# Patient Record
Sex: MALE | Race: WHITE
[De-identification: names, ages, dates, MRNs, and addresses within clinical notes are randomized per-mention and may not be internally consistent; named-entity substitution may affect disease eponyms.]

---

## 2020-12-23 ENCOUNTER — HOSPITAL ENCOUNTER (INPATIENT)
Dept: HOSPITAL 61 - 1 WEST ICU | Age: 65
LOS: 33 days | Discharge: NURSING, REHAB OR LONG-TERM CARE | DRG: 4 | End: 2021-01-25
Attending: INTERNAL MEDICINE | Admitting: INTERNAL MEDICINE
Payer: MEDICARE

## 2020-12-23 ENCOUNTER — HOSPITAL ENCOUNTER (EMERGENCY)
Dept: HOSPITAL 63 - ER | Age: 65
Discharge: ANOTHER HOSPITAL | End: 2020-12-23
Payer: MEDICARE

## 2020-12-23 VITALS — SYSTOLIC BLOOD PRESSURE: 111 MMHG | DIASTOLIC BLOOD PRESSURE: 69 MMHG

## 2020-12-23 VITALS — DIASTOLIC BLOOD PRESSURE: 67 MMHG | SYSTOLIC BLOOD PRESSURE: 111 MMHG

## 2020-12-23 VITALS — SYSTOLIC BLOOD PRESSURE: 96 MMHG | DIASTOLIC BLOOD PRESSURE: 62 MMHG

## 2020-12-23 VITALS — DIASTOLIC BLOOD PRESSURE: 83 MMHG | SYSTOLIC BLOOD PRESSURE: 128 MMHG

## 2020-12-23 VITALS — SYSTOLIC BLOOD PRESSURE: 71 MMHG | DIASTOLIC BLOOD PRESSURE: 49 MMHG

## 2020-12-23 VITALS — SYSTOLIC BLOOD PRESSURE: 105 MMHG | DIASTOLIC BLOOD PRESSURE: 64 MMHG

## 2020-12-23 VITALS — SYSTOLIC BLOOD PRESSURE: 83 MMHG | DIASTOLIC BLOOD PRESSURE: 53 MMHG

## 2020-12-23 VITALS — SYSTOLIC BLOOD PRESSURE: 92 MMHG | DIASTOLIC BLOOD PRESSURE: 60 MMHG

## 2020-12-23 VITALS — SYSTOLIC BLOOD PRESSURE: 100 MMHG | DIASTOLIC BLOOD PRESSURE: 64 MMHG

## 2020-12-23 VITALS — SYSTOLIC BLOOD PRESSURE: 74 MMHG | DIASTOLIC BLOOD PRESSURE: 55 MMHG

## 2020-12-23 VITALS — SYSTOLIC BLOOD PRESSURE: 101 MMHG | DIASTOLIC BLOOD PRESSURE: 62 MMHG

## 2020-12-23 VITALS — SYSTOLIC BLOOD PRESSURE: 124 MMHG | DIASTOLIC BLOOD PRESSURE: 73 MMHG

## 2020-12-23 VITALS — HEIGHT: 70 IN | WEIGHT: 227.08 LBS | BODY MASS INDEX: 32.51 KG/M2

## 2020-12-23 VITALS — SYSTOLIC BLOOD PRESSURE: 140 MMHG | DIASTOLIC BLOOD PRESSURE: 101 MMHG

## 2020-12-23 VITALS — DIASTOLIC BLOOD PRESSURE: 66 MMHG | SYSTOLIC BLOOD PRESSURE: 128 MMHG

## 2020-12-23 VITALS — HEIGHT: 70 IN | WEIGHT: 285.72 LBS | BODY MASS INDEX: 40.9 KG/M2

## 2020-12-23 VITALS — SYSTOLIC BLOOD PRESSURE: 81 MMHG | DIASTOLIC BLOOD PRESSURE: 54 MMHG

## 2020-12-23 VITALS — DIASTOLIC BLOOD PRESSURE: 54 MMHG | SYSTOLIC BLOOD PRESSURE: 81 MMHG

## 2020-12-23 VITALS — SYSTOLIC BLOOD PRESSURE: 139 MMHG | DIASTOLIC BLOOD PRESSURE: 82 MMHG

## 2020-12-23 DIAGNOSIS — E78.5: ICD-10-CM

## 2020-12-23 DIAGNOSIS — J80: ICD-10-CM

## 2020-12-23 DIAGNOSIS — N39.0: ICD-10-CM

## 2020-12-23 DIAGNOSIS — D63.8: ICD-10-CM

## 2020-12-23 DIAGNOSIS — E87.6: ICD-10-CM

## 2020-12-23 DIAGNOSIS — U07.1: Primary | ICD-10-CM

## 2020-12-23 DIAGNOSIS — I10: ICD-10-CM

## 2020-12-23 DIAGNOSIS — E43: ICD-10-CM

## 2020-12-23 DIAGNOSIS — E78.00: ICD-10-CM

## 2020-12-23 DIAGNOSIS — J12.82: ICD-10-CM

## 2020-12-23 DIAGNOSIS — K50.90: ICD-10-CM

## 2020-12-23 DIAGNOSIS — A41.89: Primary | ICD-10-CM

## 2020-12-23 DIAGNOSIS — Z85.810: ICD-10-CM

## 2020-12-23 DIAGNOSIS — L40.50: ICD-10-CM

## 2020-12-23 DIAGNOSIS — C61: ICD-10-CM

## 2020-12-23 DIAGNOSIS — Z85.46: ICD-10-CM

## 2020-12-23 DIAGNOSIS — M47.9: ICD-10-CM

## 2020-12-23 DIAGNOSIS — U07.1: ICD-10-CM

## 2020-12-23 DIAGNOSIS — I71.2: ICD-10-CM

## 2020-12-23 DIAGNOSIS — G89.29: ICD-10-CM

## 2020-12-23 DIAGNOSIS — F32.9: ICD-10-CM

## 2020-12-23 DIAGNOSIS — J96.90: ICD-10-CM

## 2020-12-23 DIAGNOSIS — Z80.9: ICD-10-CM

## 2020-12-23 DIAGNOSIS — E66.9: ICD-10-CM

## 2020-12-23 DIAGNOSIS — I25.10: ICD-10-CM

## 2020-12-23 DIAGNOSIS — L40.9: ICD-10-CM

## 2020-12-23 DIAGNOSIS — K21.9: ICD-10-CM

## 2020-12-23 DIAGNOSIS — N17.0: ICD-10-CM

## 2020-12-23 DIAGNOSIS — R13.10: ICD-10-CM

## 2020-12-23 DIAGNOSIS — N28.89: ICD-10-CM

## 2020-12-23 DIAGNOSIS — Z79.899: ICD-10-CM

## 2020-12-23 DIAGNOSIS — K59.00: ICD-10-CM

## 2020-12-23 DIAGNOSIS — J90: ICD-10-CM

## 2020-12-23 DIAGNOSIS — D84.821: ICD-10-CM

## 2020-12-23 DIAGNOSIS — Z82.49: ICD-10-CM

## 2020-12-23 DIAGNOSIS — I11.9: ICD-10-CM

## 2020-12-23 DIAGNOSIS — M19.90: ICD-10-CM

## 2020-12-23 LAB
ALBUMIN SERPL-MCNC: 3 G/DL (ref 3.4–5)
ALBUMIN/GLOB SERPL: 0.7 {RATIO} (ref 1–1.7)
ALP SERPL-CCNC: 49 U/L (ref 46–116)
ALT SERPL-CCNC: 35 U/L (ref 16–63)
ANION GAP SERPL CALC-SCNC: 9 MMOL/L (ref 6–14)
AST SERPL-CCNC: 80 U/L (ref 15–37)
BASE EXCESS ABG: 8 MMOL/L (ref -3–3)
BASOPHILS # BLD AUTO: 0 X10^3/UL (ref 0–0.2)
BASOPHILS NFR BLD: 0 % (ref 0–3)
BGAS PCO2: 42 MMHG (ref 35–46)
BGAS PH: 7.53 (ref 7.35–7.46)
BGAS PO2: 64 MMHG (ref 80–100)
BILIRUB SERPL-MCNC: 0.6 MG/DL (ref 0.2–1)
BUN/CREAT SERPL: 23 (ref 6–20)
CA-I SERPL ISE-MCNC: 34 MG/DL (ref 8–26)
CALCIUM SERPL-MCNC: 7.9 MG/DL (ref 8.5–10.1)
CHLORIDE SERPL-SCNC: 93 MMOL/L (ref 98–107)
CO2 SERPL-SCNC: 36 MMOL/L (ref 21–32)
CREAT SERPL-MCNC: 1.5 MG/DL (ref 0.7–1.3)
CRP SERPL-MCNC: 204.9 MG/L (ref 0–3.3)
DELTA BASE BGAS: 12 MMOL/L (ref 0–3)
EOSINOPHIL NFR BLD: 0 % (ref 0–3)
EOSINOPHIL NFR BLD: 0 X10^3/UL (ref 0–0.7)
ERYTHROCYTE [DISTWIDTH] IN BLOOD BY AUTOMATED COUNT: 14.5 % (ref 11.5–14.5)
GFR SERPLBLD BASED ON 1.73 SQ M-ARVRAT: 46.8 ML/MIN
GLOBULIN SER-MCNC: 4.2 G/DL (ref 2.2–3.8)
GLUCOSE SERPL-MCNC: 119 MG/DL (ref 70–99)
HCO3 BLDA-SCNC: 33 MMOL/L (ref 21–28)
HCT VFR BLD CALC: 35.6 % (ref 39–53)
HGB BLD-MCNC: 11.8 G/DL (ref 13–17.5)
INSPIRATION SETTING TIME VENT: 80
LYMPHOCYTES # BLD: 0.6 X10^3/UL (ref 1–4.8)
LYMPHOCYTES NFR BLD AUTO: 13 % (ref 24–48)
MCH RBC QN AUTO: 33 PG (ref 25–35)
MCHC RBC AUTO-ENTMCNC: 33 G/DL (ref 31–37)
MCV RBC AUTO: 99 FL (ref 79–100)
MONO #: 0 X10^3/UL (ref 0–1.1)
MONOCYTES NFR BLD: 1 % (ref 0–9)
NEUT #: 3.8 X10^3UL (ref 1.8–7.7)
NEUTROPHILS NFR BLD AUTO: 86 % (ref 31–73)
O2 SAT BGAS: 94 % (ref 92–99)
O2/TOTAL GAS SETTING VFR VENT: 80 %
PCO2 BLDA: 47 MMHG (ref 35–46)
PLATELET # BLD AUTO: 224 X10^3/UL (ref 140–400)
PO2 BLDA: 59 MMHG (ref 65–108)
POTASSIUM SERPL-SCNC: 2.8 MMOL/L (ref 3.5–5.1)
PROT SERPL-MCNC: 7.2 G/DL (ref 6.4–8.2)
RBC # BLD AUTO: 3.61 X10^6/UL (ref 4.3–5.7)
SAO2 % BLDA: 90 % (ref 92–99)
SODIUM SERPL-SCNC: 138 MMOL/L (ref 136–145)
WBC # BLD AUTO: 4.5 X10^3/UL (ref 4–11)

## 2020-12-23 PROCEDURE — 82550 ASSAY OF CK (CPK): CPT

## 2020-12-23 PROCEDURE — 74018 RADEX ABDOMEN 1 VIEW: CPT

## 2020-12-23 PROCEDURE — G0378 HOSPITAL OBSERVATION PER HR: HCPCS

## 2020-12-23 PROCEDURE — C9803 HOPD COVID-19 SPEC COLLECT: HCPCS

## 2020-12-23 PROCEDURE — 85379 FIBRIN DEGRADATION QUANT: CPT

## 2020-12-23 PROCEDURE — 84145 PROCALCITONIN (PCT): CPT

## 2020-12-23 PROCEDURE — P9016 RBC LEUKOCYTES REDUCED: HCPCS

## 2020-12-23 PROCEDURE — 84478 ASSAY OF TRIGLYCERIDES: CPT

## 2020-12-23 PROCEDURE — 87040 BLOOD CULTURE FOR BACTERIA: CPT

## 2020-12-23 PROCEDURE — 94760 N-INVAS EAR/PLS OXIMETRY 1: CPT

## 2020-12-23 PROCEDURE — 94002 VENT MGMT INPAT INIT DAY: CPT

## 2020-12-23 PROCEDURE — 36600 WITHDRAWAL OF ARTERIAL BLOOD: CPT

## 2020-12-23 PROCEDURE — 87426 SARSCOV CORONAVIRUS AG IA: CPT

## 2020-12-23 PROCEDURE — 96375 TX/PRO/DX INJ NEW DRUG ADDON: CPT

## 2020-12-23 PROCEDURE — 94660 CPAP INITIATION&MGMT: CPT

## 2020-12-23 PROCEDURE — 96365 THER/PROPH/DIAG IV INF INIT: CPT

## 2020-12-23 PROCEDURE — 71045 X-RAY EXAM CHEST 1 VIEW: CPT

## 2020-12-23 PROCEDURE — 83880 ASSAY OF NATRIURETIC PEPTIDE: CPT

## 2020-12-23 PROCEDURE — 83605 ASSAY OF LACTIC ACID: CPT

## 2020-12-23 PROCEDURE — 80053 COMPREHEN METABOLIC PANEL: CPT

## 2020-12-23 PROCEDURE — 36569 INSJ PICC 5 YR+ W/O IMAGING: CPT

## 2020-12-23 PROCEDURE — 87641 MR-STAPH DNA AMP PROBE: CPT

## 2020-12-23 PROCEDURE — 43246 EGD PLACE GASTROSTOMY TUBE: CPT

## 2020-12-23 PROCEDURE — 80048 BASIC METABOLIC PNL TOTAL CA: CPT

## 2020-12-23 PROCEDURE — 86900 BLOOD TYPING SEROLOGIC ABO: CPT

## 2020-12-23 PROCEDURE — 81001 URINALYSIS AUTO W/SCOPE: CPT

## 2020-12-23 PROCEDURE — 86901 BLOOD TYPING SEROLOGIC RH(D): CPT

## 2020-12-23 PROCEDURE — 5A09457 ASSISTANCE WITH RESPIRATORY VENTILATION, 24-96 CONSECUTIVE HOURS, CONTINUOUS POSITIVE AIRWAY PRESSURE: ICD-10-PCS | Performed by: INTERNAL MEDICINE

## 2020-12-23 PROCEDURE — 86920 COMPATIBILITY TEST SPIN: CPT

## 2020-12-23 PROCEDURE — 87106 FUNGI IDENTIFICATION YEAST: CPT

## 2020-12-23 PROCEDURE — 86850 RBC ANTIBODY SCREEN: CPT

## 2020-12-23 PROCEDURE — 71275 CT ANGIOGRAPHY CHEST: CPT

## 2020-12-23 PROCEDURE — 99291 CRITICAL CARE FIRST HOUR: CPT

## 2020-12-23 PROCEDURE — U0003 INFECTIOUS AGENT DETECTION BY NUCLEIC ACID (DNA OR RNA); SEVERE ACUTE RESPIRATORY SYNDROME CORONAVIRUS 2 (SARS-COV-2) (CORONAVIRUS DISEASE [COVID-19]), AMPLIFIED PROBE TECHNIQUE, MAKING USE OF HIGH THROUGHPUT TECHNOLOGIES AS DESCRIBED BY CMS-2020-01-R: HCPCS

## 2020-12-23 PROCEDURE — 82805 BLOOD GASES W/O2 SATURATION: CPT

## 2020-12-23 PROCEDURE — 94003 VENT MGMT INPAT SUBQ DAY: CPT

## 2020-12-23 PROCEDURE — 85610 PROTHROMBIN TIME: CPT

## 2020-12-23 PROCEDURE — 0BH17EZ INSERTION OF ENDOTRACHEAL AIRWAY INTO TRACHEA, VIA NATURAL OR ARTIFICIAL OPENING: ICD-10-PCS | Performed by: INTERNAL MEDICINE

## 2020-12-23 PROCEDURE — 86140 C-REACTIVE PROTEIN: CPT

## 2020-12-23 PROCEDURE — 85025 COMPLETE CBC W/AUTO DIFF WBC: CPT

## 2020-12-23 PROCEDURE — 85007 BL SMEAR W/DIFF WBC COUNT: CPT

## 2020-12-23 PROCEDURE — 02HV33Z INSERTION OF INFUSION DEVICE INTO SUPERIOR VENA CAVA, PERCUTANEOUS APPROACH: ICD-10-PCS | Performed by: INTERNAL MEDICINE

## 2020-12-23 PROCEDURE — 36415 COLL VENOUS BLD VENIPUNCTURE: CPT

## 2020-12-23 PROCEDURE — 83735 ASSAY OF MAGNESIUM: CPT

## 2020-12-23 PROCEDURE — 82565 ASSAY OF CREATININE: CPT

## 2020-12-23 PROCEDURE — 80076 HEPATIC FUNCTION PANEL: CPT

## 2020-12-23 PROCEDURE — 85027 COMPLETE CBC AUTOMATED: CPT

## 2020-12-23 PROCEDURE — 84484 ASSAY OF TROPONIN QUANT: CPT

## 2020-12-23 PROCEDURE — 82803 BLOOD GASES ANY COMBINATION: CPT

## 2020-12-23 PROCEDURE — 87086 URINE CULTURE/COLONY COUNT: CPT

## 2020-12-23 PROCEDURE — 82962 GLUCOSE BLOOD TEST: CPT

## 2020-12-23 RX ADMIN — ATORVASTATIN CALCIUM SCH MG: 40 TABLET, FILM COATED ORAL at 20:52

## 2020-12-23 RX ADMIN — GLYCERIN, ISOLEUCINE, LEUCINE, LYSINE, METHIONINE, PHENYLALANINE, THREONINE, TRYPTOPHAN, VALINE, ALANINE, GLYCINE, ARGININE, HISTIDINE, PROLINE, SERINE, CYSTEINE, SODIUM ACETATE, MAGNESIUM ACETATE, CALCIUM ACETATE, SODIUM CHLORIDE, POTASSIUM CHLORIDE, PHOSPHORIC ACID, AND POTASSIUM METABISULFITE SCH MLS/HR
3; .21; .27; .22; .16; .17; .12; .046; .2; .21; .42; .29; .085; .34; .18; .014; .2; .054; .026; .12; .15; .041 INJECTION INTRAVENOUS at 22:24

## 2020-12-23 RX ADMIN — BACITRACIN SCH MLS/HR: 5000 INJECTION, POWDER, FOR SOLUTION INTRAMUSCULAR at 15:10

## 2020-12-23 RX ADMIN — GLYCERIN, ISOLEUCINE, LEUCINE, LYSINE, METHIONINE, PHENYLALANINE, THREONINE, TRYPTOPHAN, VALINE, ALANINE, GLYCINE, ARGININE, HISTIDINE, PROLINE, SERINE, CYSTEINE, SODIUM ACETATE, MAGNESIUM ACETATE, CALCIUM ACETATE, SODIUM CHLORIDE, POTASSIUM CHLORIDE, PHOSPHORIC ACID, AND POTASSIUM METABISULFITE SCH MLS/HR
3; .21; .27; .22; .16; .17; .12; .046; .2; .21; .42; .29; .085; .34; .18; .014; .2; .054; .026; .12; .15; .041 INJECTION INTRAVENOUS at 09:57

## 2020-12-23 RX ADMIN — ZINC SULFATE CAP 220 MG (50 MG ELEMENTAL ZN) SCH MG: 220 (50 ZN) CAP at 09:00

## 2020-12-23 RX ADMIN — ENOXAPARIN SODIUM SCH MG: 40 INJECTION SUBCUTANEOUS at 20:52

## 2020-12-23 RX ADMIN — ENOXAPARIN SODIUM SCH MG: 40 INJECTION SUBCUTANEOUS at 09:58

## 2020-12-23 RX ADMIN — BACITRACIN SCH MLS/HR: 5000 INJECTION, POWDER, FOR SOLUTION INTRAMUSCULAR at 09:57

## 2020-12-23 RX ADMIN — MORPHINE SULFATE SCH MG: 15 TABLET, EXTENDED RELEASE ORAL at 20:52

## 2020-12-23 NOTE — RAD
Single view chest dated 12/23/2020.



No comparison available.



CLINICAL INDICATION: Shortness of breath.



FINDINGS: 

Single upright portable exam performed. Heart and mediastinal contours are within normal limits. Patc
hy perihilar and bibasilar airspace disease. No pleural effusion or pneumothorax.



IMPRESSION:



Bilateral airspace disease, suspicious for pneumonia. Covid 19 pneumonitis not excluded.



Electronically signed by: Giovanni Juarez MD (12/23/2020 3:52 AM) [facility]

## 2020-12-23 NOTE — PDOC1
History and Physical


Date of Admission


Date of Admission


DATE: 12/23/20 


TIME: 08:28





Identification/Chief Complaint


Chief Complaint


Shortness of breath





Source


Source:  Patient





History of Present Illness


History of Present Illness


Mr Nance is 65-year-old male w/ past medical history of Crohn's, psoriatic 

arthritis, GERD, HLD, HTN, prostate ca, tongue cancer who presents to the 

emergency department at [facility] concerned about shortness of breath that had 

been progressive. This started 12/18/2020 and has been getting worse since that 

time. Of note patient also has loss of taste, loss of smell, cough, shortness of

breath, fever. He recently went to [place] on a business trip and just 

returned 5 days prior to presentation.


Upon arrival to the emergency room patient had significant hypoxia with a pulse 

ox in the 40s.  He was placed initially on a nonrebreather and then placed on 

BiPAP.


He was also somewhat concerned about a Crohn's flareup.  Patient states he has 

been having abdominal pain with nausea and diarrhea.  These are not typical 

symptoms of his Crohn's.  He feels very tired and has body aches as well.


He was given steroids and Rocephin in ED. 


Chest radiograph concerning for bilateral interstitial infiltrates.


Labs significant for WBC 4.5, Hb 11.8, platelets 229, , K2.8, BUN 34, CR 

1.5, glucose 119, .9, albumin 3, , , lactic acid 1.6, D-

dimer 3.15, troponin 0.068.


ABG on 80% FiO2 7.53/42/64


CT negative for pulmonary embolism. Widespread airspace disease throughout both 

lungs, consistent with pneumonia. Mild mediastinal and bilateral hilar 

lymphadenopathy, likely reactive. Cardiomegaly with mild aneurysmal dilation of 

the ascending thoracic aorta. Indeterminate hyperdense nodule at the midpole 

right kidney. This could represent a solid mass or complex cyst.





Patient transferred to [facility] for pulmonary consultation and 

ICU admission.





Past Medical History


Cardiovascular:  HTN, Hyperlipidemia


GI:  GERD, Inflam bowel disease (Crohns)


Rheumatologic:  Other (Crohns, Psoriatic arthritis)





Past Surgical History


Past Surgical History


Prostate cancer, tongue cancer


Past Surgical History:  Other (Tongue resection)





Family History


Family History:  Cancer (Mother - ), Coronary Artery Disease (Mother)





Social History


Smoke:  No


ALCOHOL:  none


Drugs:  None





Current Medications


Current Medications





Current Medications


Sodium Chloride (Normal Saline Flush) 3 ml QSHIFT  PRN IV AFTER MEDS AND BLOOD 

DRAWS;  Start 12/23/20 at 07:45


Sodium Chloride 1,000 ml @  150 mls/hr Q6H40M IV ;  Start 12/23/20 at 08:15;  

Stop 12/23/20 at 21:34


Ondansetron HCl (Zofran) 4 mg PRN Q4HRS  PRN IV NAUSEA/VOMITING;  Start 12/23/20

at 08:15


Acetaminophen (Tylenol) 650 mg PRN Q4HRS  PRN PO TEMP OVER 100.4F OR MILD PAIN; 

Start 12/23/20 at 08:15


Docusate Sodium (Colace) 100 mg PRN BID  PRN PO HARD STOOLS;  Start 12/23/20 at 

08:15





Allergies


Allergies:  


Coded Allergies:  


     No Known Allergies (Verified  Allergy, Unknown, 12/23/20)





ROS


General:  YES: Chills, Fatigue, Malaise, Appetite; 


   No: Night Sweats, Other


PSYCHOLOGICAL ROS:  No: Anxiety, Behavioral Disorder, Concentration difficultie,

Decreased libido, Depression, Disorientation, Hallucinations, Hostility, Irritab

lity, Memory difficulties, Mood Swings, Obsessive thoughts, Physical abuse, 

Sexual abuse, Sleep disturbances, Suicidal ideation, Other


Eyes:  No Blurry vision, No Decreased vision, No Double vision, No Dry eyes, No 

Excessive tearing, No Eye Pain, No Itchy Eyes, No Loss of vision, No 

Photophobia, No Scotomata, No Uses contacts, No Uses glasses, No Other


HEENT:  No: Heacaches, Visual Changes, Hearing change, Nasal congestion, Nasal 

discharge, Oral lesions, Sinus pain, Sore Throat, Epistaxis, Sneezing, Snoring, 

Tinnitus, Vertigo, Vocal changes, Other


ALLERGY AND IMMUNOLOGY:  No: Hives, Insect Bite Sensitivity, Itchy/Watery Eyes, 

Nasal Congestion, Post Nasal Drip, Seasonal Allergies, Other


Hematological and Lymphatic:  No: Bleeding Problems, Blood Clots, Blood 

Transfusions, Brusing, Night Sweats, Pallor, Swollen Lymph Nodes, Other


ENDOCRINE:  No: Breast Changes, Galactorrhea, Hair Pattern Changes, Hot Flashes,

Malaise/lethargy, Mood Swings, Palpitations, Polydipsia/polyuria, Skin Changes, 

Temperature Intolerance, Unexpected Weight Changes, Other


Breast:  No New/Changing Breast Lumps, No Nipple changes, No Nipple discharge, 

No Other


Respiratory:  YES: Cough, Shortness of breath, SOB with excertion; 


   No: Hemoptysis, Orthopnea, Pleuritic Pain, Sputum Changes, Stridor, 

Tachypnea, Wheezing, Other


Cardiovascular:  No Chest Pain, No Palpitations, No Orthopnea, No Paroxysmal 

Noc. Dyspnea, No Edema, No Lt Headedness, No Other


Gastrointestinal:  Yes Nausea, Yes Diarrhea; 


   No Vomiting, No Abdominal Pain, No Constipation, No Melena, No Hematochezia, 

No Other


Genitourinary:  No Dysuria, No Frequency, No Incontinence, No Hematuria, No 

Retention, No Discharge, No Urgency, No Pain, No Flank Pain, No Other, No , No ,

No , No , No , No , No 


Musculoskeletal:  No Gait Disturbance, No Joint Pain, No Joint Stiffness, No 

Joint Swelling, No Muscle Pain, No Muscular Weakness, No Pain In:, No Swelling 

In:, No Other


Neurological:  No Behavorial Changes, No Bowel/Bladder ControlChng, No 

Confusion, No Dizziness, No Gait Disturbance, No Headaches, No Impaired 

Coord/balance, No Memory Loss, No Numbness/Tingling, No Seizures, No Speech 

Problems, No Tremors, No Visual Changes, No Weakness, No Other


Skin:  No Dry Skin, No Eczema, No Hair Changes, No Lumps, No Mole Changes, No 

Mottling, No Nail Changes, No Pruritus, No Rash, No Skin Lesion Changes, No 

Other, No Acne





Physical Exam


General:  Alert, Oriented X3, Cooperative, moderate distress


HEENT:  Atraumatic, PERRLA, EOMI, Mucous membr. moist/pink


Lungs:  Other (Bilateral rhonchi)


Heart:  S1S2, RRR, no thrills, no rubs, no gallops, no murmurs


Abdomen:  Normal bowel sounds, Soft, No tenderness, No hepatosplenomegaly, No 

masses


Male Genitals Exam:  normal genitalia


Rectal Exam:  not examined


Extremities:  No clubbing, No cyanosis, No edema, Normal pulses, No 

tenderness/swelling


Skin:  No rashes, No breakdown, No significant lesion


Neuro:  Normal gait, Normal speech, Strength at 5/5 X4 ext, Normal tone, 

Sensation intact, Cranial nerves 3-12 NL, Reflexes 2+


Psych/Mental Status:  Mental status NL, Mood NL





Vitals


Vitals





Vital Signs








  Date Time  Temp Pulse Resp B/P (MAP) Pulse Ox O2 Delivery O2 Flow Rate FiO2


 


12/23/20 07:00 98.8 72 19 105/64 (78) 91 BiPAP/CPAP  





 98.8       











Images


Images


Chest radiograph:


Single upright portable exam performed. Heart and mediastinal contours are 

within normal limits. Patchy perihilar and bibasilar airspace disease. No 

pleural effusion or pneumothorax.





IMPRESSION:


Bilateral airspace disease, suspicious for pneumonia. Covid 19 pneumonitis not 

excluded.





CTPA:


Contrast bolus is adequate. No evidence of central, lobar or segmental pulmonary

embolus. Subsegmental branches are not well evaluated based on technique.


Heart size is mildly enlarged. Mild aneurysmal dilation of the ascending 

thoracic aorta measuring 4.4 cm transverse. No pericardial effusion. Scattered 

coronary calcifications. Borderline enlarged bilateral hilar, subcarinal and 

right paratracheal lymph nodes measure up to 10 mm short axis. No hilar or 

supraclavicular lymphadenopathy. Thyroid gland unremarkable.


Central airways are patent. There is widespread airspace disease throughout both

lungs with peripheral predominance and groundglass appearance. Small right 

pleural effusion.


Limited images of the upper abdomen show no significant abnormality. There is a 

hyperdense nodule at the midpole right kidney posteriorly that measures about 

1.7 cm, indeterminate.


Bone windows show no acute findings. Multilevel spondylosis.





IMPRESSION:


1. No evidence of central, lobar or segmental pulmonary embolus.


2. Widespread airspace disease throughout both lungs, consistent with pneumonia.

Covid 19 pneumonitis not excluded.


3. Mild mediastinal and bilateral hilar lymphadenopathy, likely reactive.


4. Cardiomegaly with mild aneurysmal dilation of the ascending thoracic aorta.


5. Indeterminate hyperdense nodule at the midpole right kidney. This could 

represent a solid mass or complex cyst. Follow-up ultrasound may provide 

additional information.





VTE Prophylaxis Ordered


VTE Prophylaxis Devices:  No


VTE Pharmacological Prophylaxi:  Yes





Assessment/Plan


Assessment/Plan


A/P:


Acute hypoxic respiratory failure - no pulmonary history. With recent travel to 

and from [place] likely COVID 19 vs other atypical pneumonia. Cont empiric 

antibiotics, steroids. Consult Pulm. BIPAP in negative pressure room 16/8 on 80%

FiO2


RYDER - likely vasomotor nephropathy - no known renal history, will hydrate


Pneumonia - likely atypical, gram negative vs viral. will cover 


Prostate Ca - s/p resection at [facility] in [place]


Hypokalemia - likely nutritional or loss from diarrhea, will replace


Elevated troponin - likely from type II demand ischemia, will trend troponins, 

maintain telemetry


Crohn's - sees rheumatology regularly, Dr Fan in [place], on sulfasalazine


Psoriatic arthritis - on pain medications 30mg MS Contin BID, tramadol and 25mg 

Methotrexate weekly as DMARD per rheumatology, Dr. Fan. Hold MTX while 

inpatient


GERD - PPI


HLD - statin


HTN - Lisinopril and HCTZ on hold for RYDER


Anemia - likely of chronci disease, will monitor


Cardiomegaly - notable on CT chest - no known cardiac history, though his mother

did have CHF and CAD


Right renal mass - will need US for further assessment





FEN - NPO, can try regular diet when off BIPAP and more awake


PPX - lovenox


FULL CODE


Dispo - ICU for respiratory failure


CC time 47 minutes


Have d/w wife, Hoa and son, Desmond, [phone]





Justifications for Admission


Other Justification














MISSAEL MANCIA MD        Dec 23, 2020 09:33

## 2020-12-23 NOTE — RAD
CTA chest with contrast dated 12/23/2020.



No comparison available.



Clinical data indication: Shortness of breath. Hypoxia.



TECHNIQUE:



Contiguous axial imaging the chest performed following the intravenous administration of 75 cc Omnipa
que 350. Study was performed as dedicated PE protocol with thin cut coronal MIPS 3-D reconstruction. 


One or more of the following individualized dose reduction techniques were utilized for this examinat
ion:  

1. Automated exposure control  

2. Adjustment of the mA and/or kV according to patient size  

3. Use of iterative reconstruction technique.



FINDINGS:



Contrast bolus is adequate. No evidence of central, lobar or segmental pulmonary embolus. Subsegmenta
l branches are not well evaluated based on technique.



Heart size is mildly enlarged. Mild aneurysmal dilation of the ascending thoracic aorta measuring 4.4
 cm transverse. No pericardial effusion. Scattered coronary calcifications. Borderline enlarged bilat
eral hilar, subcarinal and right paratracheal lymph nodes measure up to 10 mm short axis. No hilar or
 supraclavicular lymphadenopathy. Thyroid gland unremarkable.



Central airways are patent. There is widespread airspace disease throughout both lungs with periphera
l predominance and groundglass appearance. Small right pleural effusion.



Limited images of the upper abdomen show no significant abnormality. There is a hyperdense nodule at 
the midpole right kidney posteriorly that measures about 1.7 cm, indeterminate.



Bone windows show no acute findings. Multilevel spondylosis.



IMPRESSION:

1. No evidence of central, lobar or segmental pulmonary embolus.

2. Widespread airspace disease throughout both lungs, consistent with pneumonia. Covid 19 pneumonitis
 not excluded.

3. Mild mediastinal and bilateral hilar lymphadenopathy, likely reactive.

4. Cardiomegaly with mild aneurysmal dilation of the ascending thoracic aorta.

5. Indeterminate hyperdense nodule at the midpole right kidney. This could represent a solid mass or 
complex cyst. Follow-up ultrasound may provide additional information.



Electronically signed by: Giovanni Juarez MD (12/23/2020 4:58 AM) [facility] Center[facility]IA

## 2020-12-23 NOTE — PHYS DOC
Past History


Past Medical History:  Arthritis, Cancer, Constipation, GERD, High Cholesterol, 

Hypertension


Additional Past Medical Histor:  CHRONES


Past Surgical History:  Cancer Surgery


Alcohol Use:  None





Adult General


Chief Complaint


Chief Complaint:  MULTIPLE COMPLAINTS





[facility]


HPI


Patient is 65-year-old male past medical history of Crohn's presents to the 

emergency room stating that he is concerned he may have a Crohn's flareup.  

Patient states he has been having abdominal pain with nausea and diarrhea.  This

started last Friday and has been getting worse since that time.  Of note patient

also has loss of taste, loss of smell, cough, shortness of breath, fever.  These

are not typical symptoms of his Crohn's.  He feels very tired and has body 

aches.  He recently went to [place] on a business trip and just returned 5 

days ago.





Review of Systems


Review of Systems


Complete ROS is negative unless otherwise documented in HPI





Current Medications


Current Medications





Current Medications








 Medications


  (Trade)  Dose


 Ordered  Sig/Kayli  Start Time


 Stop Time Status Last Admin


Dose Admin


 


 Ceftriaxone


 Sodium 1 gm/


 Sodium Chloride  50 ml @ 


 100 mls/hr  1X  ONCE  12/23/20 04:00


 12/23/20 04:29 DC 12/23/20 03:34


100 MLS/HR


 


 Ceftriaxone Sodium


  (Rocephin)  1 gm  STK-MED ONCE  12/23/20 03:27


 12/23/20 03:27 DC  





 


 Dexamethasone


 Sodium Phosphate


  (Decadron)  10 mg  1X  ONCE  12/23/20 03:30


 12/23/20 03:31 DC 12/23/20 03:34


10 MG


 


 Info


  (Do NOT chart on


 this entry -- for


 MONITORING)  1 each  PRN DAILY  PRN  12/23/20 04:15


 12/25/20 04:14   





 


 Iohexol


  (Omnipaque 350


 Mg/ml)  100 ml  1X  ONCE  12/23/20 04:30


 12/23/20 04:31 DC 12/23/20 04:35


100 ML


 


 Sodium Chloride  50 ml @ As


 Directed  STK-MED ONCE  12/23/20 03:27


 12/23/20 03:27 DC  














Allergies


Allergies





Allergies








Coded Allergies Type Severity Reaction Last Updated Verified


 


  No Known Drug Allergies    12/23/20 No











Physical Exam


Physical Exam


General: Awake, alert, NAD. Well Nourished, well hydrated. Cooperative


HEENT: Atraumatic, EOMI, PERRL, airway patent, moist oral mucosa


Neck: Supple, trachea midline


Respiratory: Decreased breath sounds bilaterally, diffuse crackles, mild 

increased work of breathing


CV: RRR, no murmur, cap refill <2


GI: Soft, nondistended, nontender, no masses


MSK: No obvious deformities


Skin: Warm, dry, intact


Neuro: A&O x3, speech NL, sensory and motor grossly intact, no focal deficits


Psych: Normal affect, normal mood, not suicidal or homicidal





Current Patient Data


Vital Signs





                                   Vital Signs








  Date Time  Temp Pulse Resp B/P (MAP) Pulse Ox O2 Delivery O2 Flow Rate FiO2


 


12/23/20 03:24  90 26 119/73 (88) 89 NonRebreather Mask 15.0 


 


12/23/20 02:45 98.8       








Lab Results





                                Laboratory Tests








Test


 12/23/20


03:07 12/23/20


04:15


 


White Blood Count


 4.5 x10^3/uL


(4.0-11.0) 





 


Red Blood Count


 3.61 x10^6/uL


(4.30-5.70)  L 





 


Hemoglobin


 11.8 g/dL


(13.0-17.5)  L 





 


Hematocrit


 35.6 %


(39.0-53.0)  L 





 


Mean Corpuscular Volume


 99 fL ()


 





 


Mean Corpuscular Hemoglobin 33 pg (25-35)   


 


Mean Corpuscular Hemoglobin


Concent 33 g/dL


(31-37) 





 


Red Cell Distribution Width


 14.5 %


(11.5-14.5) 





 


Platelet Count


 224 x10^3/uL


(140-400) 





 


Neutrophils (%) (Auto) 86 % (31-73)  H 


 


Lymphocytes (%) (Auto) 13 % (24-48)  L 


 


Monocytes (%) (Auto) 1 % (0-9)   


 


Eosinophils (%) (Auto) 0 % (0-3)   


 


Basophils (%) (Auto) 0 % (0-3)   


 


Neutrophils # (Auto)


 3.8 x10^3uL


(1.8-7.7) 





 


Lymphocytes # (Auto)


 0.6 x10^3/uL


(1.0-4.8)  L 





 


Monocytes # (Auto)


 0.0 x10^3/uL


(0.0-1.1) 





 


Eosinophils # (Auto)


 0.0 x10^3/uL


(0.0-0.7) 





 


Basophils # (Auto)


 0.0 x10^3/uL


(0.0-0.2) 





 


D-Dimer (Josefa)


 3.15 mg/L


(0.00-0.50)  H 





 


Sodium Level


 138 mmol/L


(136-145) 





 


Potassium Level


 2.8 mmol/L


(3.5-5.1)  *L 





 


Chloride Level


 93 mmol/L


()  L 





 


Carbon Dioxide Level


 36 mmol/L


(21-32)  H 





 


Anion Gap 9 (6-14)   


 


Blood Urea Nitrogen


 34 mg/dL


(8-26)  H 





 


Creatinine


 1.5 mg/dL


(0.7-1.3)  H 





 


Estimated GFR


(Cockcroft-Gault) 46.8  


 





 


BUN/Creatinine Ratio 23 (6-20)  H 


 


Glucose Level


 119 mg/dL


(70-99)  H 





 


Lactic Acid Level


 1.6 mmol/L


(0.4-2.0) 





 


Calcium Level


 7.9 mg/dL


(8.5-10.1)  L 





 


Total Bilirubin


 0.6 mg/dL


(0.2-1.0) 





 


Aspartate Amino Transferase


(AST) 80 U/L (15-37)


H 





 


Alanine Aminotransferase (ALT)


 35 U/L (16-63)


 





 


Alkaline Phosphatase


 49 U/L


() 





 


Creatine Kinase


 917 U/L


()  H 





 


Troponin I Quantitative


 0.068 ng/mL


(0-0.055)  H 





 


C-Reactive Protein


 204.9 mg/L


(0-3.3)  H 





 


NT-Pro-B-Type Natriuretic


Peptide 211 pg/mL


(0-124)  H 





 


Total Protein


 7.2 g/dL


(6.4-8.2) 





 


Albumin


 3.0 g/dL


(3.4-5.0)  L 





 


Albumin/Globulin Ratio


 0.7 (1.0-1.7)


L 





 


Blood pH


 


 7.53


(7.35-7.46)  H


 


Blood Gas PCO2


 


 42 mmHg


(35-46)


 


Blood Gas PO2


 


 64 mmHg


()  L


 


Blood Gas HCO3


 


 35 mmol/L


(21-28)  H


 


Arterial Bld O2 Saturation


(Calc) 


 94 % (92-99)  





 


FiO2  80 %  











EKG


EKG


[]





Radiology/Procedures


Radiology/Procedures


[]





Heart Score


Risk Factors:


Risk Factors:  DM, Current or recent (<one month) smoker, HTN, HLP, family 

history of CAD, obesity.


Risk Scores:


Risk Factors:  DM, Current or recent (<one month) smoker, HTN, HLP, family 

history of CAD, obesity.





Course & Med Decision Making


Course & Med Decision Making


Pertinent Labs and Imaging studies reviewed. (See chart for details)





Patient is a 65-year-old male who presents to the emergency room complaining of 

abdominal symptoms.  Of note patient has loss of taste, loss of smell, shortness

 of breath, severe hypoxia.  Patient likely has coronavirus 19.  Swab was sent 

for testing.  Patient will be treated as presumed coronavirus 19.  Upon arrival 

to the emergency room patient had significant hypoxia with a pulse ox in the 40s

.  He was placed initially on a nonrebreather and then placed on BiPAP.  He was 

given steroids and Rocephin.  Work-up was ordered to risk stratify the patient 

for his likely Covid infection.  Lab work is as above.  He did have an elevated 

D-dimer and given the increased rates of pulmonary embolisms with Covid a CT 

angio was done.  CT at this time is negative for pulmonary embolism.  Patient 

will need to be transferred to [facility] for pulmonology given 

severe respiratory failure.





Dragon Disclaimer


Dragon Disclaimer


This electronic medical record was generated, in whole or in part, using a voice

 recognition dictation system.





Critical Care Note


Comments


Critical Care:


Authorized and Performed by: Brent Moseley MD


Total critical care time: approximately 45 minutes


Due to a high probability of clinically significant, life threatening 

deterioration, the patient required my highest level of preparedness to 

intervene emergently and I personally spent this critical care time directly and

 personally managing the patient. This critical care time included obtaining a 

history; examining the patient; pulse oximetry; ventilator management if 

necessary; ordering and review of studies; arranging urgent treatment with 

development of a management plan; evaluation of patient's response to treatment;

 frequent reassessment; discussion with patient/family; and, discussions with 

other providers.


This critical care time was performed to assess and manage the high probability 

of imminent, life-threatening deterioration that could result in multi-organ 

failure. It was exclusive of separately billable procedures and treating other 

patients and teaching time.


Please see MDM section and the rest of the note for further information on 

patient assessment and treatment.





Departure


Departure:


Impression:  


   Primary Impression:  


   Suspected 2019 novel coronavirus infection


   Additional Impression:  


   Respiratory failure


Disposition:  02 DC/TRF OTHER SHORT TERM HOS


Condition:  IMPROVED


Referrals:  


PCP,UNKNOWN (PCP)





Problem Qualifiers











BRENT MOSELEY MD              Dec 23, 2020 05:16

## 2020-12-23 NOTE — CONS
DATE OF CONSULTATION:  12/23/2020



PULMONARY CONSULTATION



ATTENDING PHYSICIAN:  Brendan Samayoa MD



REASON FOR CONSULTATION:  Respiratory failure, highly suspected COVID pneumonia

and ARDS.



HISTORY OF PRESENT ILLNESS:  The patient is a 65-year-old male who has past

medical history of hypertension, GERD, history of psoriatic arthritis, history

of Crohn's disease, history of prostate cancer and tongue cancer.  He was

brought into the Emergency Department at [facility] because of shortness of

breath.  The symptoms began on 12/18/2020.  The patient also had loss of taste

and loss of smell.  He had a cough.  He was here recently.  He traveled to

[place] on a business trip and just returned 5 days prior to presentation. 

The patient was hypoxic in the Emergency Department with saturation in the 40s. 

He was initially placed on a nonrebreather mask and now currently on BiPAP.  He

appears to be comfortable on the BiPAP, resting.  His arterial blood gases

reveal a pH of 7.46, pCO2 of 47, a pO2 of 59 on 80% FiO2.



The patient's CT chest was reviewed from [facility].  There are extensive

ground glass and alveolar/interstitial infiltrates throughout both lungs.  There

is some reactive mediastinal/hilar adenopathy.  I have been asked to see him for

further evaluation.



PAST MEDICAL HISTORY:  Significant for:

1.  History of hypertension.

2.  Hyperlipidemia.

3.  GERD.

4.  Inflammatory bowel disease.

5.  History of psoriatic arthritis, on methotrexate.

6.  History of tongue cancer and prostate cancer, details are unknown.



PAST SURGICAL HISTORY:  He has history of surgery for prostate cancer and tongue

cancer,  details unknown.  Apparently, he had some tongue resection.



FAMILY HISTORY:  Mother with cancer and coronary artery disease.



SOCIAL HISTORY:  Reportedly, nonsmoker.



ALLERGIES:  None.



MEDICATIONS:  Reviewed as listed in the MRAD including antibiotics, azithromycin

and Rocephin.  He is on Lovenox for DVT prophylaxis and dexamethasone.



PHYSICAL EXAMINATION:

GENERAL:  He is comfortable while on BiPAP.

HEENT:  Visual exam done due to COVID suspicion.

LUNGS:  He is in no obvious respiratory distress.

ABDOMEN:  Slightly obese.

EXTREMITIES:  With no rash and no leg edema.



LABORATORY DATA:  Reviewed.  ABGs are discussed in my history of present

illness.



Chemistries and CBC is pending.  From [place], his platelets were 229,

hemoglobin 11.8, white cell count 4.5.  CPK was 917.  Lactic acid 1.6.  D-dimer

was 3.15.



IMPRESSION:

1.  Acute hypoxic respiratory failure secondary to highly suspected COVID-19

pneumonia and acute respiratory distress syndrome/acute lung injury.

2.  Abnormal CT chest with extensive widespread ground glass and alveolar and

interstitial infiltrates with reactive mild mediastinal/hilar adenopathy.  This

is likely related to COVID-19 pneumonia.

3.  Patient with history of psoriatic arthritis.  He is likely immunocompromise

as he has been on methotrexate.  Needs to cover for opportunistic infection.

4.  History of tongue cancer with resection, details unknown.

5.  History of prostate cancer.

6.  History of Crohn's disease.



RECOMMENDATIONS:

1.  The patient is critically ill.  He will remain on oxygen at high flow via

BiPAP.  We will closely watch for his respiratory status.

2.  Continue with empiric antibiotic.

3.  Continue with IV steroids.

4.  He is already severely hypoxic and would not benefit from remdesivir.

5.  DVT prophylaxis with Lovenox.

6.  Monitor the D-dimers.

7.  Discussed with RN and RT.  We will follow along with you.  Chart reviewed.



Critical care time, 37 minutes.

 



______________________________

ABI MENDEZ MD



DR:  PAULY/brionna  JOB#:  [id number] / [id number]

DD:  12/23/2020 10:31  DT:  12/23/2020 12:29

## 2020-12-24 VITALS — DIASTOLIC BLOOD PRESSURE: 66 MMHG | SYSTOLIC BLOOD PRESSURE: 118 MMHG

## 2020-12-24 VITALS — DIASTOLIC BLOOD PRESSURE: 65 MMHG | SYSTOLIC BLOOD PRESSURE: 110 MMHG

## 2020-12-24 VITALS — DIASTOLIC BLOOD PRESSURE: 81 MMHG | SYSTOLIC BLOOD PRESSURE: 140 MMHG

## 2020-12-24 VITALS — SYSTOLIC BLOOD PRESSURE: 119 MMHG | DIASTOLIC BLOOD PRESSURE: 65 MMHG

## 2020-12-24 VITALS — DIASTOLIC BLOOD PRESSURE: 69 MMHG | SYSTOLIC BLOOD PRESSURE: 113 MMHG

## 2020-12-24 VITALS — DIASTOLIC BLOOD PRESSURE: 56 MMHG | SYSTOLIC BLOOD PRESSURE: 116 MMHG

## 2020-12-24 VITALS — DIASTOLIC BLOOD PRESSURE: 61 MMHG | SYSTOLIC BLOOD PRESSURE: 87 MMHG

## 2020-12-24 VITALS — DIASTOLIC BLOOD PRESSURE: 77 MMHG | SYSTOLIC BLOOD PRESSURE: 123 MMHG

## 2020-12-24 VITALS — SYSTOLIC BLOOD PRESSURE: 127 MMHG | DIASTOLIC BLOOD PRESSURE: 75 MMHG

## 2020-12-24 VITALS — DIASTOLIC BLOOD PRESSURE: 67 MMHG | SYSTOLIC BLOOD PRESSURE: 112 MMHG

## 2020-12-24 VITALS — DIASTOLIC BLOOD PRESSURE: 75 MMHG | SYSTOLIC BLOOD PRESSURE: 150 MMHG

## 2020-12-24 VITALS — DIASTOLIC BLOOD PRESSURE: 90 MMHG | SYSTOLIC BLOOD PRESSURE: 162 MMHG

## 2020-12-24 VITALS — SYSTOLIC BLOOD PRESSURE: 141 MMHG | DIASTOLIC BLOOD PRESSURE: 84 MMHG

## 2020-12-24 VITALS — SYSTOLIC BLOOD PRESSURE: 119 MMHG | DIASTOLIC BLOOD PRESSURE: 70 MMHG

## 2020-12-24 VITALS — SYSTOLIC BLOOD PRESSURE: 110 MMHG | DIASTOLIC BLOOD PRESSURE: 61 MMHG

## 2020-12-24 VITALS — DIASTOLIC BLOOD PRESSURE: 70 MMHG | SYSTOLIC BLOOD PRESSURE: 130 MMHG

## 2020-12-24 VITALS — DIASTOLIC BLOOD PRESSURE: 80 MMHG | SYSTOLIC BLOOD PRESSURE: 148 MMHG

## 2020-12-24 VITALS — SYSTOLIC BLOOD PRESSURE: 131 MMHG | DIASTOLIC BLOOD PRESSURE: 66 MMHG

## 2020-12-24 VITALS — SYSTOLIC BLOOD PRESSURE: 119 MMHG | DIASTOLIC BLOOD PRESSURE: 72 MMHG

## 2020-12-24 VITALS — DIASTOLIC BLOOD PRESSURE: 71 MMHG | SYSTOLIC BLOOD PRESSURE: 125 MMHG

## 2020-12-24 VITALS — DIASTOLIC BLOOD PRESSURE: 68 MMHG | SYSTOLIC BLOOD PRESSURE: 118 MMHG

## 2020-12-24 VITALS — DIASTOLIC BLOOD PRESSURE: 67 MMHG | SYSTOLIC BLOOD PRESSURE: 117 MMHG

## 2020-12-24 VITALS — DIASTOLIC BLOOD PRESSURE: 80 MMHG | SYSTOLIC BLOOD PRESSURE: 106 MMHG

## 2020-12-24 VITALS — SYSTOLIC BLOOD PRESSURE: 165 MMHG | DIASTOLIC BLOOD PRESSURE: 87 MMHG

## 2020-12-24 LAB
% BANDS: 15 % (ref 0–9)
% LYMPHS: 8 % (ref 24–48)
% MONOS: 1 % (ref 0–10)
% SEGS: 76 % (ref 35–66)
ALBUMIN SERPL-MCNC: 2.5 G/DL (ref 3.4–5)
ALBUMIN/GLOB SERPL: 0.6 {RATIO} (ref 1–1.7)
ALP SERPL-CCNC: 48 U/L (ref 46–116)
ALT SERPL-CCNC: 39 U/L (ref 16–63)
ANION GAP SERPL CALC-SCNC: 5 MMOL/L (ref 6–14)
AST SERPL-CCNC: 74 U/L (ref 15–37)
BASOPHILS # BLD AUTO: 0 X10^3/UL (ref 0–0.2)
BASOPHILS NFR BLD: 0 % (ref 0–3)
BILIRUB SERPL-MCNC: 0.5 MG/DL (ref 0.2–1)
BUN SERPL-MCNC: 34 MG/DL (ref 8–26)
BUN/CREAT SERPL: 34 (ref 6–20)
CALCIUM SERPL-MCNC: 8.1 MG/DL (ref 8.5–10.1)
CHLORIDE SERPL-SCNC: 101 MMOL/L (ref 98–107)
CO2 SERPL-SCNC: 35 MMOL/L (ref 21–32)
CREAT SERPL-MCNC: 1 MG/DL (ref 0.7–1.3)
EOSINOPHIL NFR BLD: 0 % (ref 0–3)
EOSINOPHIL NFR BLD: 0 X10^3/UL (ref 0–0.7)
ERYTHROCYTE [DISTWIDTH] IN BLOOD BY AUTOMATED COUNT: 14.6 % (ref 11.5–14.5)
GFR SERPLBLD BASED ON 1.73 SQ M-ARVRAT: 74.8 ML/MIN
GLUCOSE SERPL-MCNC: 96 MG/DL (ref 70–99)
HCT VFR BLD CALC: 32.4 % (ref 39–53)
HGB BLD-MCNC: 11 G/DL (ref 13–17.5)
LYMPHOCYTES # BLD: 0.6 X10^3/UL (ref 1–4.8)
LYMPHOCYTES NFR BLD AUTO: 9 % (ref 24–48)
MCH RBC QN AUTO: 33 PG (ref 25–35)
MCHC RBC AUTO-ENTMCNC: 34 G/DL (ref 31–37)
MCV RBC AUTO: 98 FL (ref 79–100)
MONO #: 0 X10^3/UL (ref 0–1.1)
MONOCYTES NFR BLD: 1 % (ref 0–9)
NEUT #: 6.5 X10^3/UL (ref 1.8–7.7)
NEUTROPHILS NFR BLD AUTO: 90 % (ref 31–73)
PLATELET # BLD AUTO: 240 X10^3/UL (ref 140–400)
PLATELET # BLD EST: ADEQUATE 10*3/UL
POTASSIUM SERPL-SCNC: 3.1 MMOL/L (ref 3.5–5.1)
PROT SERPL-MCNC: 6.4 G/DL (ref 6.4–8.2)
RBC # BLD AUTO: 3.3 X10^6/UL (ref 4.3–5.7)
SODIUM SERPL-SCNC: 141 MMOL/L (ref 136–145)
WBC # BLD AUTO: 7.2 X10^3/UL (ref 4–11)

## 2020-12-24 RX ADMIN — GLYCERIN, ISOLEUCINE, LEUCINE, LYSINE, METHIONINE, PHENYLALANINE, THREONINE, TRYPTOPHAN, VALINE, ALANINE, GLYCINE, ARGININE, HISTIDINE, PROLINE, SERINE, CYSTEINE, SODIUM ACETATE, MAGNESIUM ACETATE, CALCIUM ACETATE, SODIUM CHLORIDE, POTASSIUM CHLORIDE, PHOSPHORIC ACID, AND POTASSIUM METABISULFITE SCH MLS/HR
3; .21; .27; .22; .16; .17; .12; .046; .2; .21; .42; .29; .085; .34; .18; .014; .2; .054; .026; .12; .15; .041 INJECTION INTRAVENOUS at 10:24

## 2020-12-24 RX ADMIN — ATORVASTATIN CALCIUM SCH MG: 40 TABLET, FILM COATED ORAL at 20:37

## 2020-12-24 RX ADMIN — GLYCERIN, ISOLEUCINE, LEUCINE, LYSINE, METHIONINE, PHENYLALANINE, THREONINE, TRYPTOPHAN, VALINE, ALANINE, GLYCINE, ARGININE, HISTIDINE, PROLINE, SERINE, CYSTEINE, SODIUM ACETATE, MAGNESIUM ACETATE, CALCIUM ACETATE, SODIUM CHLORIDE, POTASSIUM CHLORIDE, PHOSPHORIC ACID, AND POTASSIUM METABISULFITE SCH MLS/HR
3; .21; .27; .22; .16; .17; .12; .046; .2; .21; .42; .29; .085; .34; .18; .014; .2; .054; .026; .12; .15; .041 INJECTION INTRAVENOUS at 22:16

## 2020-12-24 RX ADMIN — MORPHINE SULFATE SCH MG: 15 TABLET, EXTENDED RELEASE ORAL at 20:38

## 2020-12-24 RX ADMIN — CITALOPRAM HYDROBROMIDE SCH MG: 20 TABLET ORAL at 08:30

## 2020-12-24 RX ADMIN — CETIRIZINE HYDROCHLORIDE SCH MG: 10 TABLET, FILM COATED ORAL at 08:31

## 2020-12-24 RX ADMIN — ENOXAPARIN SODIUM SCH MG: 40 INJECTION SUBCUTANEOUS at 20:37

## 2020-12-24 RX ADMIN — CEFTRIAXONE SCH GM: 1 INJECTION, POWDER, FOR SOLUTION INTRAMUSCULAR; INTRAVENOUS at 08:30

## 2020-12-24 RX ADMIN — ZINC SULFATE CAP 220 MG (50 MG ELEMENTAL ZN) SCH MG: 220 (50 ZN) CAP at 08:30

## 2020-12-24 RX ADMIN — CYANOCOBALAMIN TAB 1000 MCG SCH MCG: 1000 TAB at 08:30

## 2020-12-24 RX ADMIN — ZOLPIDEM TARTRATE PRN MG: 5 TABLET ORAL at 01:29

## 2020-12-24 RX ADMIN — DEXAMETHASONE SODIUM PHOSPHATE SCH MG: 4 INJECTION, SOLUTION INTRAMUSCULAR; INTRAVENOUS at 08:30

## 2020-12-24 RX ADMIN — ENOXAPARIN SODIUM SCH MG: 40 INJECTION SUBCUTANEOUS at 08:30

## 2020-12-24 RX ADMIN — AZITHROMYCIN MONOHYDRATE SCH MLS/HR: 500 INJECTION, POWDER, LYOPHILIZED, FOR SOLUTION INTRAVENOUS at 08:30

## 2020-12-24 RX ADMIN — MORPHINE SULFATE SCH MG: 15 TABLET, EXTENDED RELEASE ORAL at 08:31

## 2020-12-24 NOTE — PDOC
PULMONARY PROGRESS NOTES


DATE: 12/24/20 


TIME: 11:34


Subjective


Pt. remains on 100% NRB


afebrile overnight 


no overnight concerns from nursing


Vitals





Vital Signs








  Date Time  Temp Pulse Resp B/P (MAP) Pulse Ox O2 Delivery O2 Flow Rate FiO2


 


12/24/20 11:27     95 NonRebreather Mask  


 


12/24/20 11:00 97.7 90 18 125/71 (89)   15.0 





 97.7       








Comments


Pt. seen during COVID-19 pandemic, visual exam preformed 


RRR


no distress


100% NRB 


no accessory muscle use 


No edema or rash


Labs





Laboratory Tests








Test


 12/23/20


09:35 12/23/20


12:00 12/23/20


18:15 12/24/20


08:40


 


O2 Saturation 90 % (92-99)    


 


Arterial Blood pH


 7.46


(7.35-7.45) 


 


 





 


Arterial Blood pCO2 at


Patient Temp 47 mmHg


(35-46) 


 


 





 


Arterial Blood pO2 at Patient


Temp 59 mmHg


() 


 


 





 


Arterial Blood HCO3


 33 mmol/L


(21-28) 


 


 





 


Arterial Blood Base Excess


 8 mmol/L


(-3-3) 


 


 





 


FiO2 80    


 


Troponin I Quantitative


 


 0.031 ng/mL


(0.000-0.055) 0.019 ng/mL


(0.000-0.055) 





 


White Blood Count


 


 


 


 7.2 x10^3/uL


(4.0-11.0)


 


Red Blood Count


 


 


 


 3.30 x10^6/uL


(4.30-5.70)


 


Hemoglobin


 


 


 


 11.0 g/dL


(13.0-17.5)


 


Hematocrit


 


 


 


 32.4 %


(39.0-53.0)


 


Mean Corpuscular Volume    98 fL () 


 


Mean Corpuscular Hemoglobin    33 pg (25-35) 


 


Mean Corpuscular Hemoglobin


Concent 


 


 


 34 g/dL


(31-37)


 


Red Cell Distribution Width


 


 


 


 14.6 %


(11.5-14.5)


 


Platelet Count


 


 


 


 240 x10^3/uL


(140-400)


 


Neutrophils (%) (Auto)    90 % (31-73) 


 


Lymphocytes (%) (Auto)    9 % (24-48) 


 


Monocytes (%) (Auto)    1 % (0-9) 


 


Eosinophils (%) (Auto)    0 % (0-3) 


 


Basophils (%) (Auto)    0 % (0-3) 


 


Neutrophils # (Auto)


 


 


 


 6.5 x10^3/uL


(1.8-7.7)


 


Lymphocytes # (Auto)


 


 


 


 0.6 x10^3/uL


(1.0-4.8)


 


Monocytes # (Auto)


 


 


 


 0.0 x10^3/uL


(0.0-1.1)


 


Eosinophils # (Auto)


 


 


 


 0.0 x10^3/uL


(0.0-0.7)


 


Basophils # (Auto)


 


 


 


 0.0 x10^3/uL


(0.0-0.2)


 


D-Dimer (Josefa)


 


 


 


 1.64 ug/mlFEU


(0.00-0.50)


 


Sodium Level


 


 


 


 141 mmol/L


(136-145)


 


Potassium Level


 


 


 


 3.1 mmol/L


(3.5-5.1)


 


Chloride Level


 


 


 


 101 mmol/L


()


 


Carbon Dioxide Level


 


 


 


 35 mmol/L


(21-32)


 


Anion Gap    5 (6-14) 


 


Blood Urea Nitrogen


 


 


 


 34 mg/dL


(8-26)


 


Creatinine


 


 


 


 1.0 mg/dL


(0.7-1.3)


 


Estimated GFR


(Cockcroft-Gault) 


 


 


 74.8 





 


BUN/Creatinine Ratio    34 (6-20) 


 


Glucose Level


 


 


 


 96 mg/dL


(70-99)


 


Calcium Level


 


 


 


 8.1 mg/dL


(8.5-10.1)


 


Total Bilirubin


 


 


 


 0.5 mg/dL


(0.2-1.0)


 


Aspartate Amino Transf


(AST/SGOT) 


 


 


 74 U/L (15-37) 





 


Alanine Aminotransferase


(ALT/SGPT) 


 


 


 39 U/L (16-63) 





 


Alkaline Phosphatase


 


 


 


 48 U/L


()


 


Creatine Kinase


 


 


 


 537 U/L


()


 


Total Protein


 


 


 


 6.4 g/dL


(6.4-8.2)


 


Albumin


 


 


 


 2.5 g/dL


(3.4-5.0)


 


Albumin/Globulin Ratio    0.6 (1.0-1.7) 








Laboratory Tests








Test


 12/23/20


12:00 12/23/20


18:15 12/24/20


08:40


 


Troponin I Quantitative


 0.031 ng/mL


(0.000-0.055) 0.019 ng/mL


(0.000-0.055) 





 


White Blood Count


 


 


 7.2 x10^3/uL


(4.0-11.0)


 


Red Blood Count


 


 


 3.30 x10^6/uL


(4.30-5.70)


 


Hemoglobin


 


 


 11.0 g/dL


(13.0-17.5)


 


Hematocrit


 


 


 32.4 %


(39.0-53.0)


 


Mean Corpuscular Volume   98 fL () 


 


Mean Corpuscular Hemoglobin   33 pg (25-35) 


 


Mean Corpuscular Hemoglobin


Concent 


 


 34 g/dL


(31-37)


 


Red Cell Distribution Width


 


 


 14.6 %


(11.5-14.5)


 


Platelet Count


 


 


 240 x10^3/uL


(140-400)


 


Neutrophils (%) (Auto)   90 % (31-73) 


 


Lymphocytes (%) (Auto)   9 % (24-48) 


 


Monocytes (%) (Auto)   1 % (0-9) 


 


Eosinophils (%) (Auto)   0 % (0-3) 


 


Basophils (%) (Auto)   0 % (0-3) 


 


Neutrophils # (Auto)


 


 


 6.5 x10^3/uL


(1.8-7.7)


 


Lymphocytes # (Auto)


 


 


 0.6 x10^3/uL


(1.0-4.8)


 


Monocytes # (Auto)


 


 


 0.0 x10^3/uL


(0.0-1.1)


 


Eosinophils # (Auto)


 


 


 0.0 x10^3/uL


(0.0-0.7)


 


Basophils # (Auto)


 


 


 0.0 x10^3/uL


(0.0-0.2)


 


D-Dimer (Josefa)


 


 


 1.64 ug/mlFEU


(0.00-0.50)


 


Sodium Level


 


 


 141 mmol/L


(136-145)


 


Potassium Level


 


 


 3.1 mmol/L


(3.5-5.1)


 


Chloride Level


 


 


 101 mmol/L


()


 


Carbon Dioxide Level


 


 


 35 mmol/L


(21-32)


 


Anion Gap   5 (6-14) 


 


Blood Urea Nitrogen


 


 


 34 mg/dL


(8-26)


 


Creatinine


 


 


 1.0 mg/dL


(0.7-1.3)


 


Estimated GFR


(Cockcroft-Gault) 


 


 74.8 





 


BUN/Creatinine Ratio   34 (6-20) 


 


Glucose Level


 


 


 96 mg/dL


(70-99)


 


Calcium Level


 


 


 8.1 mg/dL


(8.5-10.1)


 


Total Bilirubin


 


 


 0.5 mg/dL


(0.2-1.0)


 


Aspartate Amino Transf


(AST/SGOT) 


 


 74 U/L (15-37) 





 


Alanine Aminotransferase


(ALT/SGPT) 


 


 39 U/L (16-63) 





 


Alkaline Phosphatase


 


 


 48 U/L


()


 


Creatine Kinase


 


 


 537 U/L


()


 


Total Protein


 


 


 6.4 g/dL


(6.4-8.2)


 


Albumin


 


 


 2.5 g/dL


(3.4-5.0)


 


Albumin/Globulin Ratio   0.6 (1.0-1.7) 








Medications





Active Scripts








 Medications  Dose


 Route/Sig


 Max Daily Dose Days Date Category


 


 Morphine Sulfate


 Er (Morphine


 Sulfate) 30 Mg


 Tablet.er  1 Tab


 PO BID


   12/23/20 Reported


 


 Tramadol Hcl 100


 Mg Tbmp.24hr  100 Mg


 PO PRN Q8HRS PRN


   12/23/20 Reported


 


 Ambien (Zolpidem


 Tartrate) 10 Mg


 Tablet  10 Mg


 PO PRN QHS PRN


   12/23/20 Reported


 


 Methotrexate


  (Methotrexate


 Sodium) 2.5 Mg


 Tablet  10 Tab


 PO WEEKLY


   12/23/20 Reported


 


 Lisinopril 10 Mg


 Tablet  1 Tab


 PO DAILY


   12/23/20 Reported


 


 Crestor


  (Rosuvastatin


 Calcium) 5 Mg


 Tablet  2 Tab


 PO DAILY


   12/23/20 Reported


 


 Hydrochlorothiazide


 Tablet


  (Hydrochlorothiazide)


 50 Mg Tablet  25 Mg


 PO DAILY


   12/23/20 Reported


 


 Escitalopram


 Oxalate 10 Mg


 Tablet  1 Tab


 PO DAILY


   12/23/20 Reported


 


 Sulfasalazine Dr


  (Sulfasalazine)


 500 Mg Tablet.dr  2 Tab


 PO TID


  30 12/23/20 Reported


 


 Colace (Docusate


 Sodium) 100 Mg


 Capsule  1 Cap


 PO DA


  30 12/23/20 Reported


 


 Acetaminophen


 Ext.release


  (Acetaminophen)


 650 Mg Tablet.er  650 Mg


 PO PRN Q8HRS PRN


   12/23/20 Reported


 


 Melatonin 5 Mg


 Capsule  1 Cap


 PO QHS


  30 12/23/20 Reported


 


 B-12


  (Cyanocobalamin


  (Vitamin B-12))


 500 Mcg Tablet  2 Tab


 PO DAILY


  30 12/23/20 Reported


 


 Vitamin D3


  (Cholecalciferol


  (Vitamin D3)) 50


 Mcg Capsule  50 Mcg


 PO DAILY


   12/23/20 Reported


 


 Aller-Sung


  (Cetirizine Hcl)


 10 Mg Tablet  1 Tab


 PO DAILY


  30 12/23/20 Reported











Impression


.


IMPRESSION:


1.  Acute hypoxic respiratory failure secondary to highly suspected COVID-19


pneumonia and acute respiratory distress syndrome/acute lung injury.


2.  Abnormal CT chest with extensive widespread ground glass and alveolar and


interstitial infiltrates with reactive mild mediastinal/hilar adenopathy.  This


is likely related to COVID-19 pneumonia.


3.  Patient with history of psoriatic arthritis.  He is likely immunocompromise


as he has been on methotrexate.  Needs to cover for opportunistic infection.


4.  History of tongue cancer with resection, details unknown.


5.  History of prostate cancer.


6.  History of Crohn's disease.





Plan


.


RECOMMENDATIONS:


Continue supplemental oxygen to keep sats above 92%, now on 100% NRB 


BIPAP PRN 


COVID-19 pending 


Continue with empiric antibiotic.


Continue with IV steroids with slow taper 


would not benefit from remdesivir.


Continue PPN for nutritional support 


DVT/GI PPX 





D/W RN and RT 





Critical care time 30 minutes











ABI MENDEZ MD                 Dec 24, 2020 11:38

## 2020-12-24 NOTE — PDOC
[employer] PROGRESS NOTE


Date of Service


DOS:


DATE: 12/24/20 


TIME: 07:49





Chief Complaint


Chief Complaint


A/P:


Acute hypoxic respiratory failure - no pulmonary history. With recent travel to 

and from [place] likely COVID 19 vs other atypical pneumonia. Cont empiric 

antibiotics, steroids. Consult Pulm. BIPAP in negative pressure room 16/8 on 80%

FiO2


RYDER - likely vasomotor nephropathy - no known renal history, will hydrate


Pneumonia - likely atypical, gram negative vs viral. will cover 


Prostate Ca - s/p resection at [facility] in [place]


Hypokalemia - likely nutritional or loss from diarrhea, will replace


Elevated troponin - likely from type II demand ischemia, will trend troponins, 

maintain telemetry


Crohn's - sees rheumatology regularly, Dr Fan in [place], on sulfasalazine


Psoriatic arthritis - on pain medications 30mg MS Contin BID, tramadol and 25mg 

Methotrexate weekly as DMARD per rheumatology, Dr. Fan. Hold MTX while 

inpatient


GERD - PPI


HLD - statin


HTN - Lisinopril and HCTZ on hold for RYDER


Anemia - likely of chronci disease, will monitor


Cardiomegaly - notable on CT chest - no known cardiac history, though his mother

did have CHF and CAD


Right renal mass - will need US for further assessment





FEN - NPO, can try regular diet when off BIPAP


PPX - lovenox


FULL CODE


Dispo - ICU for respiratory failure


CC time 47 minutes


Have d/w wife, Hoa and son, Desmond, [phone]





History of Present Illness


History of Present Illness


Mr Nance is 65-year-old male w/ past medical history of Crohn's, psoriatic 

arthritis, GERD, HLD, HTN, prostate ca, tongue cancer who presents to the 

emergency department at [facility] concerned about shortness of breath that had 

been progressive. This started 12/18/2020 and has been getting worse since that 

time. Of note patient also has loss of taste, loss of smell, cough, shortness of

breath, fever. He recently went to [place] on a business trip and just 

returned 5 days prior to presentation.


Upon arrival to the emergency room patient had significant hypoxia with a pulse 

ox in the 40s.  He was placed initially on a nonrebreather and then placed on 

BiPAP.


He was also somewhat concerned about a Crohn's flareup.  Patient states he has 

been having abdominal pain with nausea and diarrhea.  These are not typical 

symptoms of his Crohn's.  He feels very tired and has body aches as well.


He was given steroids and Rocephin in ED. 


Chest radiograph concerning for bilateral interstitial infiltrates.


Labs significant for WBC 4.5, Hb 11.8, platelets 229, , K2.8, BUN 34, CR 

1.5, glucose 119, .9, albumin 3, , , lactic acid 1.6, D-

dimer 3.15, troponin 0.068.


ABG on 80% FiO2 7.53/42/64


CT negative for pulmonary embolism. Widespread airspace disease throughout both 

lungs, consistent with pneumonia. Mild mediastinal and bilateral hilar 

lymphadenopathy, likely reactive. Cardiomegaly with mild aneurysmal dilation of 

the ascending thoracic aorta. Indeterminate hyperdense nodule at the midpole 

right kidney. This could represent a solid mass or complex cyst.


Patient transferred to [facility] for pulmonary consultation and 

ICU admission.





Overnight BP improved with fluids. O2 saturations slightly increased. Did not 

tolerate more than 1 hour off BIPAP even with non-rebreather O2 saturations were

92% at best. No pain currently, very slightly appetite.





Vitals/I&O


Vitals/I&O:





                                   Vital Signs








  Date Time  Temp Pulse Resp B/P (MAP) Pulse Ox O2 Delivery O2 Flow Rate FiO2


 


12/24/20 07:00 97.7 67 17 116/56 (76) 100 BiPAP/CPAP  





 97.7       














                                    I & O   


 


 12/23/20 12/23/20 12/24/20





 15:00 23:00 07:00


 


Intake Total 0 ml 120 ml 1274 ml


 


Output Total 50 ml 100 ml 750 ml


 


Balance -50 ml 20 ml 524 ml











Physical Exam


General:  Alert, Oriented X3, Cooperative, moderate distress


Abdomen:  Normal bowel sounds, Soft, No tenderness, No hepatosplenomegaly, No 

masses


Extremities:  No clubbing, No cyanosis, No edema, Normal pulses, No 

tenderness/swelling


Skin:  No rashes, No breakdown, No significant lesion





Labs


Labs:





Laboratory Tests








Test


 12/23/20


09:35 12/23/20


12:00 12/23/20


18:15


 


O2 Saturation 90 % (92-99)   


 


Arterial Blood pH


 7.46


(7.35-7.45) 


 





 


Arterial Blood pCO2 at


Patient Temp 47 mmHg


(35-46) 


 





 


Arterial Blood pO2 at Patient


Temp 59 mmHg


() 


 





 


Arterial Blood HCO3


 33 mmol/L


(21-28) 


 





 


Arterial Blood Base Excess


 8 mmol/L


(-3-3) 


 





 


FiO2 80   


 


Troponin I Quantitative


 


 0.031 ng/mL


(0.000-0.055) 0.019 ng/mL


(0.000-0.055)











Comment


Review of Relevant


I have reviewed the following items jabier (where applicable) has been applied.


Medications:





Current Medications








 Medications


  (Trade)  Dose


 Ordered  Sig/Kayli


 Route


 PRN Reason  Start Time


 Stop Time Status Last Admin


Dose Admin


 


 Sodium Chloride  1,000 ml @ 


 150 mls/hr  [id number]


 IV


   12/23/20 08:15


 12/23/20 21:34 DC 12/23/20 15:10





 


 Amino Acids/


 Glycerin/


 Electrolytes  1,000 ml @ 


 80 mls/hr  [id number]


 IV


   12/23/20 08:45


    12/23/20 22:24





 


 Enoxaparin Sodium


  (Lovenox 40mg


 Syringe)  40 mg  Q12HR


 SQ


   12/23/20 09:00


    12/23/20 20:52





 


 Azithromycin 500


 mg/Sodium Chloride  250 ml @ 


 250 mls/hr  1X  ONCE


 IV


   12/23/20 10:00


 12/23/20 10:59 DC 12/23/20 09:59





 


 Morphine Sulfate


  (Ms Contin)  15 mg  BID


 PO


   12/23/20 21:00


    12/23/20 20:52





 


 Atorvastatin


 Calcium


  (Lipitor)  40 mg  QHS


 PO


   12/23/20 21:00


    12/23/20 20:52





 


 Sulfasalazine


  (Azulfidine)  1,000 mg  BID


 PO


   12/23/20 21:00


    12/23/20 20:52





 


 Zolpidem Tartrate


  (Ambien)  5 mg  PRN QHS  PRN


 PO


 INSOMNIA  12/23/20 15:00


    12/24/20 01:29














Justifications for Admission


Other Justification














MISSAEL MANCIA MD        Dec 24, 2020 07:49

## 2020-12-25 VITALS — SYSTOLIC BLOOD PRESSURE: 138 MMHG | DIASTOLIC BLOOD PRESSURE: 81 MMHG

## 2020-12-25 VITALS — SYSTOLIC BLOOD PRESSURE: 163 MMHG | DIASTOLIC BLOOD PRESSURE: 92 MMHG

## 2020-12-25 VITALS — DIASTOLIC BLOOD PRESSURE: 78 MMHG | SYSTOLIC BLOOD PRESSURE: 125 MMHG

## 2020-12-25 VITALS — SYSTOLIC BLOOD PRESSURE: 141 MMHG | DIASTOLIC BLOOD PRESSURE: 86 MMHG

## 2020-12-25 VITALS — SYSTOLIC BLOOD PRESSURE: 122 MMHG | DIASTOLIC BLOOD PRESSURE: 81 MMHG

## 2020-12-25 VITALS — DIASTOLIC BLOOD PRESSURE: 82 MMHG | SYSTOLIC BLOOD PRESSURE: 122 MMHG

## 2020-12-25 VITALS — SYSTOLIC BLOOD PRESSURE: 140 MMHG | DIASTOLIC BLOOD PRESSURE: 86 MMHG

## 2020-12-25 VITALS — SYSTOLIC BLOOD PRESSURE: 141 MMHG | DIASTOLIC BLOOD PRESSURE: 117 MMHG

## 2020-12-25 VITALS — SYSTOLIC BLOOD PRESSURE: 132 MMHG | DIASTOLIC BLOOD PRESSURE: 81 MMHG

## 2020-12-25 VITALS — DIASTOLIC BLOOD PRESSURE: 95 MMHG | SYSTOLIC BLOOD PRESSURE: 163 MMHG

## 2020-12-25 VITALS — SYSTOLIC BLOOD PRESSURE: 150 MMHG | DIASTOLIC BLOOD PRESSURE: 97 MMHG

## 2020-12-25 VITALS — SYSTOLIC BLOOD PRESSURE: 160 MMHG | DIASTOLIC BLOOD PRESSURE: 90 MMHG

## 2020-12-25 VITALS — DIASTOLIC BLOOD PRESSURE: 86 MMHG | SYSTOLIC BLOOD PRESSURE: 148 MMHG

## 2020-12-25 VITALS — SYSTOLIC BLOOD PRESSURE: 137 MMHG | DIASTOLIC BLOOD PRESSURE: 83 MMHG

## 2020-12-25 VITALS — DIASTOLIC BLOOD PRESSURE: 81 MMHG | SYSTOLIC BLOOD PRESSURE: 132 MMHG

## 2020-12-25 VITALS — SYSTOLIC BLOOD PRESSURE: 122 MMHG | DIASTOLIC BLOOD PRESSURE: 77 MMHG

## 2020-12-25 VITALS — DIASTOLIC BLOOD PRESSURE: 79 MMHG | SYSTOLIC BLOOD PRESSURE: 131 MMHG

## 2020-12-25 VITALS — SYSTOLIC BLOOD PRESSURE: 160 MMHG | DIASTOLIC BLOOD PRESSURE: 91 MMHG

## 2020-12-25 VITALS — DIASTOLIC BLOOD PRESSURE: 89 MMHG | SYSTOLIC BLOOD PRESSURE: 154 MMHG

## 2020-12-25 VITALS — SYSTOLIC BLOOD PRESSURE: 140 MMHG | DIASTOLIC BLOOD PRESSURE: 83 MMHG

## 2020-12-25 VITALS — SYSTOLIC BLOOD PRESSURE: 137 MMHG | DIASTOLIC BLOOD PRESSURE: 78 MMHG

## 2020-12-25 VITALS — SYSTOLIC BLOOD PRESSURE: 154 MMHG | DIASTOLIC BLOOD PRESSURE: 89 MMHG

## 2020-12-25 LAB
ALBUMIN SERPL-MCNC: 2.5 G/DL (ref 3.4–5)
ALBUMIN/GLOB SERPL: 0.6 {RATIO} (ref 1–1.7)
ALP SERPL-CCNC: 53 U/L (ref 46–116)
ALT SERPL-CCNC: 51 U/L (ref 16–63)
ANION GAP SERPL CALC-SCNC: 7 MMOL/L (ref 6–14)
AST SERPL-CCNC: 89 U/L (ref 15–37)
BASOPHILS # BLD AUTO: 0 X10^3/UL (ref 0–0.2)
BASOPHILS NFR BLD: 0 % (ref 0–3)
BILIRUB SERPL-MCNC: 0.5 MG/DL (ref 0.2–1)
BUN SERPL-MCNC: 21 MG/DL (ref 8–26)
BUN/CREAT SERPL: 23 (ref 6–20)
CALCIUM SERPL-MCNC: 8.3 MG/DL (ref 8.5–10.1)
CHLORIDE SERPL-SCNC: 103 MMOL/L (ref 98–107)
CO2 SERPL-SCNC: 32 MMOL/L (ref 21–32)
CREAT SERPL-MCNC: 0.9 MG/DL (ref 0.7–1.3)
EOSINOPHIL NFR BLD: 0 % (ref 0–3)
EOSINOPHIL NFR BLD: 0 X10^3/UL (ref 0–0.7)
ERYTHROCYTE [DISTWIDTH] IN BLOOD BY AUTOMATED COUNT: 14.9 % (ref 11.5–14.5)
GFR SERPLBLD BASED ON 1.73 SQ M-ARVRAT: 84.4 ML/MIN
GLUCOSE SERPL-MCNC: 89 MG/DL (ref 70–99)
HCT VFR BLD CALC: 30.6 % (ref 39–53)
HGB BLD-MCNC: 10.3 G/DL (ref 13–17.5)
LYMPHOCYTES # BLD: 0.6 X10^3/UL (ref 1–4.8)
LYMPHOCYTES NFR BLD AUTO: 14 % (ref 24–48)
MCH RBC QN AUTO: 33 PG (ref 25–35)
MCHC RBC AUTO-ENTMCNC: 34 G/DL (ref 31–37)
MCV RBC AUTO: 97 FL (ref 79–100)
MONO #: 0.1 X10^3/UL (ref 0–1.1)
MONOCYTES NFR BLD: 2 % (ref 0–9)
NEUT #: 3.9 X10^3/UL (ref 1.8–7.7)
NEUTROPHILS NFR BLD AUTO: 85 % (ref 31–73)
PLATELET # BLD AUTO: 255 X10^3/UL (ref 140–400)
POTASSIUM SERPL-SCNC: 3.3 MMOL/L (ref 3.5–5.1)
PROT SERPL-MCNC: 6.4 G/DL (ref 6.4–8.2)
RBC # BLD AUTO: 3.15 X10^6/UL (ref 4.3–5.7)
SODIUM SERPL-SCNC: 142 MMOL/L (ref 136–145)
WBC # BLD AUTO: 4.6 X10^3/UL (ref 4–11)

## 2020-12-25 RX ADMIN — MORPHINE SULFATE SCH MG: 15 TABLET, EXTENDED RELEASE ORAL at 20:35

## 2020-12-25 RX ADMIN — MORPHINE SULFATE SCH MG: 15 TABLET, EXTENDED RELEASE ORAL at 08:17

## 2020-12-25 RX ADMIN — DEXAMETHASONE SODIUM PHOSPHATE SCH MG: 4 INJECTION, SOLUTION INTRAMUSCULAR; INTRAVENOUS at 08:19

## 2020-12-25 RX ADMIN — MORPHINE SULFATE PRN MG: 2 INJECTION, SOLUTION INTRAMUSCULAR; INTRAVENOUS at 17:04

## 2020-12-25 RX ADMIN — ATORVASTATIN CALCIUM SCH MG: 40 TABLET, FILM COATED ORAL at 20:35

## 2020-12-25 RX ADMIN — ENOXAPARIN SODIUM SCH MG: 40 INJECTION SUBCUTANEOUS at 20:35

## 2020-12-25 RX ADMIN — MORPHINE SULFATE PRN MG: 2 INJECTION, SOLUTION INTRAMUSCULAR; INTRAVENOUS at 08:48

## 2020-12-25 RX ADMIN — GUAIFENESIN AND DEXTROMETHORPHAN PRN ML: 100; 10 SYRUP ORAL at 17:03

## 2020-12-25 RX ADMIN — AZITHROMYCIN MONOHYDRATE SCH MLS/HR: 500 INJECTION, POWDER, LYOPHILIZED, FOR SOLUTION INTRAVENOUS at 08:20

## 2020-12-25 RX ADMIN — CEFTRIAXONE SCH GM: 1 INJECTION, POWDER, FOR SOLUTION INTRAMUSCULAR; INTRAVENOUS at 08:19

## 2020-12-25 RX ADMIN — GUAIFENESIN AND DEXTROMETHORPHAN PRN ML: 100; 10 SYRUP ORAL at 08:16

## 2020-12-25 RX ADMIN — ZINC SULFATE CAP 220 MG (50 MG ELEMENTAL ZN) SCH MG: 220 (50 ZN) CAP at 08:18

## 2020-12-25 RX ADMIN — GLYCERIN, ISOLEUCINE, LEUCINE, LYSINE, METHIONINE, PHENYLALANINE, THREONINE, TRYPTOPHAN, VALINE, ALANINE, GLYCINE, ARGININE, HISTIDINE, PROLINE, SERINE, CYSTEINE, SODIUM ACETATE, MAGNESIUM ACETATE, CALCIUM ACETATE, SODIUM CHLORIDE, POTASSIUM CHLORIDE, PHOSPHORIC ACID, AND POTASSIUM METABISULFITE SCH MLS/HR
3; .21; .27; .22; .16; .17; .12; .046; .2; .21; .42; .29; .085; .34; .18; .014; .2; .054; .026; .12; .15; .041 INJECTION INTRAVENOUS at 12:16

## 2020-12-25 RX ADMIN — MORPHINE SULFATE PRN MG: 2 INJECTION, SOLUTION INTRAMUSCULAR; INTRAVENOUS at 04:42

## 2020-12-25 RX ADMIN — CITALOPRAM HYDROBROMIDE SCH MG: 20 TABLET ORAL at 08:19

## 2020-12-25 RX ADMIN — CETIRIZINE HYDROCHLORIDE SCH MG: 10 TABLET, FILM COATED ORAL at 08:18

## 2020-12-25 RX ADMIN — CYANOCOBALAMIN TAB 1000 MCG SCH MCG: 1000 TAB at 08:17

## 2020-12-25 RX ADMIN — ENOXAPARIN SODIUM SCH MG: 40 INJECTION SUBCUTANEOUS at 08:16

## 2020-12-25 NOTE — PDOC
PULMONARY PROGRESS NOTES


DATE: 12/25/20 


TIME: 09:52


Subjective


Pt. is on BIPAP 65%, using intermittent HIGH flow N/C 


afebrile overnight 


no overnight concerns from nursing


Vitals





Vital Signs








  Date Time  Temp Pulse Resp B/P (MAP) Pulse Ox O2 Delivery O2 Flow Rate FiO2


 


12/25/20 09:18   24  94 BiPAP/CPAP  


 


12/25/20 08:17       15.0 


 


12/25/20 06:03 99.0 79  154/89 (110)    





 99.0       








Comments


Pt. seen during COVID-19 pandemic, visual exam preformed 


RRR


no distress


BIPAP


no accessory muscle use 


No edema or rash


General:  Alert


Labs





Laboratory Tests








Test


 12/23/20


10:15 12/23/20


12:00 12/23/20


18:15 12/24/20


08:40


 


Coronavirus (PCR)


 Detected (Not


Detected) 


 


 





 


Troponin I Quantitative


 


 0.031 ng/mL


(0.000-0.055) 0.019 ng/mL


(0.000-0.055) 





 


White Blood Count


 


 


 


 7.2 x10^3/uL


(4.0-11.0)


 


Red Blood Count


 


 


 


 3.30 x10^6/uL


(4.30-5.70)


 


Hemoglobin


 


 


 


 11.0 g/dL


(13.0-17.5)


 


Hematocrit


 


 


 


 32.4 %


(39.0-53.0)


 


Mean Corpuscular Volume    98 fL () 


 


Mean Corpuscular Hemoglobin    33 pg (25-35) 


 


Mean Corpuscular Hemoglobin


Concent 


 


 


 34 g/dL


(31-37)


 


Red Cell Distribution Width


 


 


 


 14.6 %


(11.5-14.5)


 


Platelet Count


 


 


 


 240 x10^3/uL


(140-400)


 


Neutrophils (%) (Auto)    90 % (31-73) 


 


Lymphocytes (%) (Auto)    9 % (24-48) 


 


Monocytes (%) (Auto)    1 % (0-9) 


 


Eosinophils (%) (Auto)    0 % (0-3) 


 


Basophils (%) (Auto)    0 % (0-3) 


 


Neutrophils # (Auto)


 


 


 


 6.5 x10^3/uL


(1.8-7.7)


 


Lymphocytes # (Auto)


 


 


 


 0.6 x10^3/uL


(1.0-4.8)


 


Monocytes # (Auto)


 


 


 


 0.0 x10^3/uL


(0.0-1.1)


 


Eosinophils # (Auto)


 


 


 


 0.0 x10^3/uL


(0.0-0.7)


 


Basophils # (Auto)


 


 


 


 0.0 x10^3/uL


(0.0-0.2)


 


Segmented Neutrophils %    76 % (35-66) 


 


Band Neutrophils %    15 % (0-9) 


 


Lymphocytes %    8 % (24-48) 


 


Monocytes %    1 % (0-10) 


 


Platelet Estimate


 


 


 


 Adequate


(ADEQUATE)


 


D-Dimer (Josefa)


 


 


 


 1.64 ug/mlFEU


(0.00-0.50)


 


Sodium Level


 


 


 


 141 mmol/L


(136-145)


 


Potassium Level


 


 


 


 3.1 mmol/L


(3.5-5.1)


 


Chloride Level


 


 


 


 101 mmol/L


()


 


Carbon Dioxide Level


 


 


 


 35 mmol/L


(21-32)


 


Anion Gap    5 (6-14) 


 


Blood Urea Nitrogen


 


 


 


 34 mg/dL


(8-26)


 


Creatinine


 


 


 


 1.0 mg/dL


(0.7-1.3)


 


Estimated GFR


(Cockcroft-Gault) 


 


 


 74.8 





 


BUN/Creatinine Ratio    34 (6-20) 


 


Glucose Level


 


 


 


 96 mg/dL


(70-99)


 


Calcium Level


 


 


 


 8.1 mg/dL


(8.5-10.1)


 


Magnesium Level


 


 


 


 2.4 mg/dL


(1.8-2.4)


 


Total Bilirubin


 


 


 


 0.5 mg/dL


(0.2-1.0)


 


Aspartate Amino Transf


(AST/SGOT) 


 


 


 74 U/L (15-37) 





 


Alanine Aminotransferase


(ALT/SGPT) 


 


 


 39 U/L (16-63) 





 


Alkaline Phosphatase


 


 


 


 48 U/L


()


 


Creatine Kinase


 


 


 


 537 U/L


()


 


Total Protein


 


 


 


 6.4 g/dL


(6.4-8.2)


 


Albumin


 


 


 


 2.5 g/dL


(3.4-5.0)


 


Albumin/Globulin Ratio    0.6 (1.0-1.7) 


 


Test


 12/25/20


04:00 


 


 





 


White Blood Count


 4.6 x10^3/uL


(4.0-11.0) 


 


 





 


Red Blood Count


 3.15 x10^6/uL


(4.30-5.70) 


 


 





 


Hemoglobin


 10.3 g/dL


(13.0-17.5) 


 


 





 


Hematocrit


 30.6 %


(39.0-53.0) 


 


 





 


Mean Corpuscular Volume 97 fL ()    


 


Mean Corpuscular Hemoglobin 33 pg (25-35)    


 


Mean Corpuscular Hemoglobin


Concent 34 g/dL


(31-37) 


 


 





 


Red Cell Distribution Width


 14.9 %


(11.5-14.5) 


 


 





 


Platelet Count


 255 x10^3/uL


(140-400) 


 


 





 


Neutrophils (%) (Auto) 85 % (31-73)    


 


Lymphocytes (%) (Auto) 14 % (24-48)    


 


Monocytes (%) (Auto) 2 % (0-9)    


 


Eosinophils (%) (Auto) 0 % (0-3)    


 


Basophils (%) (Auto) 0 % (0-3)    


 


Neutrophils # (Auto)


 3.9 x10^3/uL


(1.8-7.7) 


 


 





 


Lymphocytes # (Auto)


 0.6 x10^3/uL


(1.0-4.8) 


 


 





 


Monocytes # (Auto)


 0.1 x10^3/uL


(0.0-1.1) 


 


 





 


Eosinophils # (Auto)


 0.0 x10^3/uL


(0.0-0.7) 


 


 





 


Basophils # (Auto)


 0.0 x10^3/uL


(0.0-0.2) 


 


 





 


Sodium Level


 142 mmol/L


(136-145) 


 


 





 


Potassium Level


 3.3 mmol/L


(3.5-5.1) 


 


 





 


Chloride Level


 103 mmol/L


() 


 


 





 


Carbon Dioxide Level


 32 mmol/L


(21-32) 


 


 





 


Anion Gap 7 (6-14)    


 


Blood Urea Nitrogen


 21 mg/dL


(8-26) 


 


 





 


Creatinine


 0.9 mg/dL


(0.7-1.3) 


 


 





 


Estimated GFR


(Cockcroft-Gault) 84.4 


 


 


 





 


BUN/Creatinine Ratio 23 (6-20)    


 


Glucose Level


 89 mg/dL


(70-99) 


 


 





 


Calcium Level


 8.3 mg/dL


(8.5-10.1) 


 


 





 


Total Bilirubin


 0.5 mg/dL


(0.2-1.0) 


 


 





 


Aspartate Amino Transf


(AST/SGOT) 89 U/L (15-37) 


 


 


 





 


Alanine Aminotransferase


(ALT/SGPT) 51 U/L (16-63) 


 


 


 





 


Alkaline Phosphatase


 53 U/L


() 


 


 





 


Total Protein


 6.4 g/dL


(6.4-8.2) 


 


 





 


Albumin


 2.5 g/dL


(3.4-5.0) 


 


 





 


Albumin/Globulin Ratio 0.6 (1.0-1.7)    








Laboratory Tests








Test


 12/25/20


04:00


 


White Blood Count


 4.6 x10^3/uL


(4.0-11.0)


 


Red Blood Count


 3.15 x10^6/uL


(4.30-5.70)


 


Hemoglobin


 10.3 g/dL


(13.0-17.5)


 


Hematocrit


 30.6 %


(39.0-53.0)


 


Mean Corpuscular Volume 97 fL () 


 


Mean Corpuscular Hemoglobin 33 pg (25-35) 


 


Mean Corpuscular Hemoglobin


Concent 34 g/dL


(31-37)


 


Red Cell Distribution Width


 14.9 %


(11.5-14.5)


 


Platelet Count


 255 x10^3/uL


(140-400)


 


Neutrophils (%) (Auto) 85 % (31-73) 


 


Lymphocytes (%) (Auto) 14 % (24-48) 


 


Monocytes (%) (Auto) 2 % (0-9) 


 


Eosinophils (%) (Auto) 0 % (0-3) 


 


Basophils (%) (Auto) 0 % (0-3) 


 


Neutrophils # (Auto)


 3.9 x10^3/uL


(1.8-7.7)


 


Lymphocytes # (Auto)


 0.6 x10^3/uL


(1.0-4.8)


 


Monocytes # (Auto)


 0.1 x10^3/uL


(0.0-1.1)


 


Eosinophils # (Auto)


 0.0 x10^3/uL


(0.0-0.7)


 


Basophils # (Auto)


 0.0 x10^3/uL


(0.0-0.2)


 


Sodium Level


 142 mmol/L


(136-145)


 


Potassium Level


 3.3 mmol/L


(3.5-5.1)


 


Chloride Level


 103 mmol/L


()


 


Carbon Dioxide Level


 32 mmol/L


(21-32)


 


Anion Gap 7 (6-14) 


 


Blood Urea Nitrogen


 21 mg/dL


(8-26)


 


Creatinine


 0.9 mg/dL


(0.7-1.3)


 


Estimated GFR


(Cockcroft-Gault) 84.4 





 


BUN/Creatinine Ratio 23 (6-20) 


 


Glucose Level


 89 mg/dL


(70-99)


 


Calcium Level


 8.3 mg/dL


(8.5-10.1)


 


Total Bilirubin


 0.5 mg/dL


(0.2-1.0)


 


Aspartate Amino Transf


(AST/SGOT) 89 U/L (15-37) 





 


Alanine Aminotransferase


(ALT/SGPT) 51 U/L (16-63) 





 


Alkaline Phosphatase


 53 U/L


()


 


Total Protein


 6.4 g/dL


(6.4-8.2)


 


Albumin


 2.5 g/dL


(3.4-5.0)


 


Albumin/Globulin Ratio 0.6 (1.0-1.7) 








Medications





Active Scripts








 Medications  Dose


 Route/Sig


 Max Daily Dose Days Date Category


 


 Morphine Sulfate


 Er (Morphine


 Sulfate) 30 Mg


 Tablet.er  1 Tab


 PO BID


   12/23/20 Reported


 


 Tramadol Hcl 100


 Mg Tbmp.24hr  100 Mg


 PO PRN Q8HRS PRN


   12/23/20 Reported


 


 Ambien (Zolpidem


 Tartrate) 10 Mg


 Tablet  10 Mg


 PO PRN QHS PRN


   12/23/20 Reported


 


 Methotrexate


  (Methotrexate


 Sodium) 2.5 Mg


 Tablet  10 Tab


 PO WEEKLY


   12/23/20 Reported


 


 Lisinopril 10 Mg


 Tablet  1 Tab


 PO DAILY


   12/23/20 Reported


 


 Crestor


  (Rosuvastatin


 Calcium) 5 Mg


 Tablet  2 Tab


 PO DAILY


   12/23/20 Reported


 


 Hydrochlorothiazide


 Tablet


  (Hydrochlorothiazide)


 50 Mg Tablet  25 Mg


 PO DAILY


   12/23/20 Reported


 


 Escitalopram


 Oxalate 10 Mg


 Tablet  1 Tab


 PO DAILY


   12/23/20 Reported


 


 Sulfasalazine Dr


  (Sulfasalazine)


 500 Mg Tablet.dr  2 Tab


 PO TID


  30 12/23/20 Reported


 


 Colace (Docusate


 Sodium) 100 Mg


 Capsule  1 Cap


 PO DA


  30 12/23/20 Reported


 


 Acetaminophen


 Ext.release


  (Acetaminophen)


 650 Mg Tablet.er  650 Mg


 PO PRN Q8HRS PRN


   12/23/20 Reported


 


 Melatonin 5 Mg


 Capsule  1 Cap


 PO QHS


  30 12/23/20 Reported


 


 B-12


  (Cyanocobalamin


  (Vitamin B-12))


 500 Mcg Tablet  2 Tab


 PO DAILY


  30 12/23/20 Reported


 


 Vitamin D3


  (Cholecalciferol


  (Vitamin D3)) 50


 Mcg Capsule  50 Mcg


 PO DAILY


   12/23/20 Reported


 


 Aller-Sung


  (Cetirizine Hcl)


 10 Mg Tablet  1 Tab


 PO DAILY


  30 12/23/20 Reported











Impression


.


IMPRESSION:


1.  Acute hypoxic respiratory failure secondary to highly suspected COVID-19


pneumonia and acute respiratory distress syndrome/acute lung injury.-- COVID-19 

positive 


2.  Abnormal CT chest with extensive widespread ground glass and alveolar and


interstitial infiltrates with reactive mild mediastinal/hilar adenopathy.  This


is likely related to COVID-19 pneumonia.


3.  Patient with history of psoriatic arthritis.  He is likely immunocompromise


as he has been on methotrexate.  Needs to cover for opportunistic infection.


4.  History of tongue cancer with resection, details unknown.


5.  History of prostate cancer.


6.  History of Crohn's disease.





Plan


.


RECOMMENDATIONS:


Continue supplemental oxygen to keep sats above 92%, Continue BIPAP 65%, may 

take intermittent breaks from BIPAP as tolerated 


COVID-19 positive


Continue with empiric antibiotic, rocephin and azithromycin 


Would likely not benefit from remdesivir 


Continue with IV steroids with slow taper 


Continue PPN for nutritional support 


DVT/GI PPX 





D/W RN and RT 





Critical care time 30 minutes











ABI MENDEZ MD                 Dec 25, 2020 09:55

## 2020-12-25 NOTE — PDOC
[employer] PROGRESS NOTE


Date of Service


DOS:


DATE: 12/25/20 


TIME: 09:54





Chief Complaint


Chief Complaint


A/P:


Acute hypoxic respiratory failure - no pulmonary history. With recent travel to 

and from [place] likely COVID 19 vs other atypical pneumonia. Cont empiric 

antibiotics, steroids. Consult Pulm. BIPAP in negative pressure room 16/8 on 80%

FiO2


COVID 19 positive - with pneumonia - given transaminitis and late presentation 

with high O2 requirements no indication for remdesivir


RYDER - likely vasomotor nephropathy - no known renal history, will hydrate


Pneumonia - likely atypical, gram negative vs viral. will cover 


Prostate Ca - s/p resection at [facility] in [place]


Hypokalemia - likely nutritional or loss from diarrhea, will replace


Elevated troponin - likely from type II demand ischemia, will trend troponins, 

maintain telemetry


Crohn's - sees rheumatology regularly, Dr Fan in [place], on sulfasalazine


Psoriatic arthritis - on pain medications 30mg MS Contin BID, tramadol and 25mg 

Methotrexate weekly as DMARD per rheumatology, Dr. Fan. Hold MTX while 

inpatient


GERD - PPI


HLD - statin


HTN - Lisinopril and HCTZ on hold for RYDER


Anemia - likely of chronci disease, will monitor


Cardiomegaly - notable on CT chest - no known cardiac history, though his mother

did have CHF and CAD


Right renal mass - will need US for further assessment





FEN - NPO, can try regular diet when off BIPAP


PPX - lovenox


FULL CODE


Dispo - ICU for respiratory failure


CC time 47 minutes


Have d/w wife, Hoa and son, Desmond, [phone]





History of Present Illness


History of Present Illness


Mr Nance is 65-year-old male w/ past medical history of Crohn's, psoriatic 

arthritis, GERD, HLD, HTN, prostate ca, tongue cancer who presents to the 

emergency department at [facility] concerned about shortness of breath that had 

been progressive. This started 12/18/2020 and has been getting worse since that 

time. Of note patient also has loss of taste, loss of smell, cough, shortness of

breath, fever. He recently went to [place] on a business trip and just ret

urned 5 days prior to presentation.


Upon arrival to the emergency room patient had significant hypoxia with a pulse 

ox in the 40s.  He was placed initially on a nonrebreather and then placed on 

BiPAP.


He was also somewhat concerned about a Crohn's flareup.  Patient states he has 

been having abdominal pain with nausea and diarrhea.  These are not typical 

symptoms of his Crohn's.  He feels very tired and has body aches as well.


He was given steroids and Rocephin in ED. 


Chest radiograph concerning for bilateral interstitial infiltrates.


Labs significant for WBC 4.5, Hb 11.8, platelets 229, , K2.8, BUN 34, CR 

1.5, glucose 119, .9, albumin 3, , , lactic acid 1.6, D-

dimer 3.15, troponin 0.068.


ABG on 80% FiO2 7.53/42/64


CT negative for pulmonary embolism. Widespread airspace disease throughout both 

lungs, consistent with pneumonia. Mild mediastinal and bilateral hilar 

lymphadenopathy, likely reactive. Cardiomegaly with mild aneurysmal dilation of 

the ascending thoracic aorta. Indeterminate hyperdense nodule at the midpole 

right kidney. This could represent a solid mass or complex cyst.


Patient transferred to [facility] for pulmonary consultation and 

ICU admission.





12/24: Overnight BP improved with fluids. O2 saturations slightly increased. Did

not tolerate more than 1 hour off BIPAP even with non-rebreather O2 saturations 

were 92% at best. No pain currently, very slightly appetite.





COVID-19 returned positive.  Down to 65% on his BiPAP 18/8.  Was able to take it

off for medications meal yesterday, but desaturates to 79% and recovers quickly.

 Still very drowsy with little appetite.  Afebrile.





Vitals/I&O


Vitals/I&O:





                                   Vital Signs








  Date Time  Temp Pulse Resp B/P (MAP) Pulse Ox O2 Delivery O2 Flow Rate FiO2


 


12/25/20 09:18   24  94 BiPAP/CPAP  


 


12/25/20 08:17       15.0 


 


12/25/20 06:03 99.0 79  154/89 (110)    





 99.0       














                                    I & O   


 


 12/24/20 12/24/20 12/25/20





 15:00 23:00 07:00


 


Intake Total 1120 ml 330 ml 120 ml


 


Output Total 950 ml 325 ml 355 ml


 


Balance 170 ml 5 ml -235 ml











Physical Exam


General:  Alert, Oriented X3, Cooperative, moderate distress


Abdomen:  Normal bowel sounds, Soft, No tenderness, No hepatosplenomegaly, No 

masses


Extremities:  No clubbing, No cyanosis, No edema, Normal pulses, No 

tenderness/swelling


Skin:  No rashes, No breakdown, No significant lesion





Labs


Labs:





Laboratory Tests








Test


 12/25/20


04:00


 


White Blood Count


 4.6 x10^3/uL


(4.0-11.0)


 


Red Blood Count


 3.15 x10^6/uL


(4.30-5.70)


 


Hemoglobin


 10.3 g/dL


(13.0-17.5)


 


Hematocrit


 30.6 %


(39.0-53.0)


 


Mean Corpuscular Volume 97 fL () 


 


Mean Corpuscular Hemoglobin 33 pg (25-35) 


 


Mean Corpuscular Hemoglobin


Concent 34 g/dL


(31-37)


 


Red Cell Distribution Width


 14.9 %


(11.5-14.5)


 


Platelet Count


 255 x10^3/uL


(140-400)


 


Neutrophils (%) (Auto) 85 % (31-73) 


 


Lymphocytes (%) (Auto) 14 % (24-48) 


 


Monocytes (%) (Auto) 2 % (0-9) 


 


Eosinophils (%) (Auto) 0 % (0-3) 


 


Basophils (%) (Auto) 0 % (0-3) 


 


Neutrophils # (Auto)


 3.9 x10^3/uL


(1.8-7.7)


 


Lymphocytes # (Auto)


 0.6 x10^3/uL


(1.0-4.8)


 


Monocytes # (Auto)


 0.1 x10^3/uL


(0.0-1.1)


 


Eosinophils # (Auto)


 0.0 x10^3/uL


(0.0-0.7)


 


Basophils # (Auto)


 0.0 x10^3/uL


(0.0-0.2)


 


Sodium Level


 142 mmol/L


(136-145)


 


Potassium Level


 3.3 mmol/L


(3.5-5.1)


 


Chloride Level


 103 mmol/L


()


 


Carbon Dioxide Level


 32 mmol/L


(21-32)


 


Anion Gap 7 (6-14) 


 


Blood Urea Nitrogen


 21 mg/dL


(8-26)


 


Creatinine


 0.9 mg/dL


(0.7-1.3)


 


Estimated GFR


(Cockcroft-Gault) 84.4 





 


BUN/Creatinine Ratio 23 (6-20) 


 


Glucose Level


 89 mg/dL


(70-99)


 


Calcium Level


 8.3 mg/dL


(8.5-10.1)


 


Total Bilirubin


 0.5 mg/dL


(0.2-1.0)


 


Aspartate Amino Transf


(AST/SGOT) 89 U/L (15-37) 





 


Alanine Aminotransferase


(ALT/SGPT) 51 U/L (16-63) 





 


Alkaline Phosphatase


 53 U/L


()


 


Total Protein


 6.4 g/dL


(6.4-8.2)


 


Albumin


 2.5 g/dL


(3.4-5.0)


 


Albumin/Globulin Ratio 0.6 (1.0-1.7) 











Comment


Review of Relevant


I have reviewed the following items jabier (where applicable) has been applied.


Medications:





Current Medications








 Medications


  (Trade)  Dose


 Ordered  Sig/Kayli


 Route


 PRN Reason  Start Time


 Stop Time Status Last Admin


Dose Admin


 


 Potassium Chloride


  (Klor-Con)  40 meq  1X  ONCE


 PO


   12/24/20 14:15


 12/24/20 14:16 DC 12/24/20 14:24














Justifications for Admission


Other Justification














MISSAEL MANCIA MD        Dec 25, 2020 10:04

## 2020-12-26 VITALS — SYSTOLIC BLOOD PRESSURE: 165 MMHG | DIASTOLIC BLOOD PRESSURE: 91 MMHG

## 2020-12-26 VITALS — SYSTOLIC BLOOD PRESSURE: 178 MMHG | DIASTOLIC BLOOD PRESSURE: 99 MMHG

## 2020-12-26 VITALS — DIASTOLIC BLOOD PRESSURE: 84 MMHG | SYSTOLIC BLOOD PRESSURE: 163 MMHG

## 2020-12-26 VITALS — DIASTOLIC BLOOD PRESSURE: 88 MMHG | SYSTOLIC BLOOD PRESSURE: 171 MMHG

## 2020-12-26 VITALS — DIASTOLIC BLOOD PRESSURE: 89 MMHG | SYSTOLIC BLOOD PRESSURE: 159 MMHG

## 2020-12-26 VITALS — SYSTOLIC BLOOD PRESSURE: 157 MMHG | DIASTOLIC BLOOD PRESSURE: 88 MMHG

## 2020-12-26 VITALS — DIASTOLIC BLOOD PRESSURE: 89 MMHG | SYSTOLIC BLOOD PRESSURE: 170 MMHG

## 2020-12-26 VITALS — DIASTOLIC BLOOD PRESSURE: 91 MMHG | SYSTOLIC BLOOD PRESSURE: 157 MMHG

## 2020-12-26 VITALS — DIASTOLIC BLOOD PRESSURE: 80 MMHG | SYSTOLIC BLOOD PRESSURE: 147 MMHG

## 2020-12-26 VITALS — DIASTOLIC BLOOD PRESSURE: 73 MMHG | SYSTOLIC BLOOD PRESSURE: 141 MMHG

## 2020-12-26 VITALS — DIASTOLIC BLOOD PRESSURE: 85 MMHG | SYSTOLIC BLOOD PRESSURE: 143 MMHG

## 2020-12-26 VITALS — DIASTOLIC BLOOD PRESSURE: 98 MMHG | SYSTOLIC BLOOD PRESSURE: 170 MMHG

## 2020-12-26 VITALS — DIASTOLIC BLOOD PRESSURE: 92 MMHG | SYSTOLIC BLOOD PRESSURE: 133 MMHG

## 2020-12-26 VITALS — SYSTOLIC BLOOD PRESSURE: 165 MMHG | DIASTOLIC BLOOD PRESSURE: 85 MMHG

## 2020-12-26 VITALS — DIASTOLIC BLOOD PRESSURE: 82 MMHG | SYSTOLIC BLOOD PRESSURE: 144 MMHG

## 2020-12-26 VITALS — DIASTOLIC BLOOD PRESSURE: 95 MMHG | SYSTOLIC BLOOD PRESSURE: 175 MMHG

## 2020-12-26 VITALS — SYSTOLIC BLOOD PRESSURE: 142 MMHG | DIASTOLIC BLOOD PRESSURE: 86 MMHG

## 2020-12-26 VITALS — DIASTOLIC BLOOD PRESSURE: 88 MMHG | SYSTOLIC BLOOD PRESSURE: 161 MMHG

## 2020-12-26 VITALS — DIASTOLIC BLOOD PRESSURE: 98 MMHG | SYSTOLIC BLOOD PRESSURE: 172 MMHG

## 2020-12-26 VITALS — SYSTOLIC BLOOD PRESSURE: 148 MMHG | DIASTOLIC BLOOD PRESSURE: 87 MMHG

## 2020-12-26 VITALS — SYSTOLIC BLOOD PRESSURE: 138 MMHG | DIASTOLIC BLOOD PRESSURE: 82 MMHG

## 2020-12-26 VITALS — SYSTOLIC BLOOD PRESSURE: 165 MMHG | DIASTOLIC BLOOD PRESSURE: 88 MMHG

## 2020-12-26 VITALS — DIASTOLIC BLOOD PRESSURE: 92 MMHG | SYSTOLIC BLOOD PRESSURE: 146 MMHG

## 2020-12-26 VITALS — DIASTOLIC BLOOD PRESSURE: 109 MMHG | SYSTOLIC BLOOD PRESSURE: 178 MMHG

## 2020-12-26 LAB
ALBUMIN SERPL-MCNC: 2.2 G/DL (ref 3.4–5)
ALBUMIN/GLOB SERPL: 0.6 {RATIO} (ref 1–1.7)
ALP SERPL-CCNC: 61 U/L (ref 46–116)
ALT SERPL-CCNC: 78 U/L (ref 16–63)
ANION GAP SERPL CALC-SCNC: 7 MMOL/L (ref 6–14)
AST SERPL-CCNC: 111 U/L (ref 15–37)
BASE EXCESS ABG: 4 MMOL/L (ref -3–3)
BASOPHILS # BLD AUTO: 0 X10^3/UL (ref 0–0.2)
BASOPHILS NFR BLD: 0 % (ref 0–3)
BILIRUB SERPL-MCNC: 0.5 MG/DL (ref 0.2–1)
BUN SERPL-MCNC: 20 MG/DL (ref 8–26)
BUN/CREAT SERPL: 40 (ref 6–20)
CALCIUM SERPL-MCNC: 8 MG/DL (ref 8.5–10.1)
CHLORIDE SERPL-SCNC: 102 MMOL/L (ref 98–107)
CO2 SERPL-SCNC: 29 MMOL/L (ref 21–32)
CREAT SERPL-MCNC: 0.5 MG/DL (ref 0.7–1.3)
EOSINOPHIL NFR BLD: 0 % (ref 0–3)
EOSINOPHIL NFR BLD: 0 X10^3/UL (ref 0–0.7)
ERYTHROCYTE [DISTWIDTH] IN BLOOD BY AUTOMATED COUNT: 14.4 % (ref 11.5–14.5)
GFR SERPLBLD BASED ON 1.73 SQ M-ARVRAT: 166.4 ML/MIN
GLUCOSE SERPL-MCNC: 85 MG/DL (ref 70–99)
HCO3 BLDA-SCNC: 28 MMOL/L (ref 21–28)
HCT VFR BLD CALC: 28.9 % (ref 39–53)
HGB BLD-MCNC: 9.7 G/DL (ref 13–17.5)
INSPIRATION SETTING TIME VENT: (no result)
LYMPHOCYTES # BLD: 0.7 X10^3/UL (ref 1–4.8)
LYMPHOCYTES NFR BLD AUTO: 16 % (ref 24–48)
MCH RBC QN AUTO: 33 PG (ref 25–35)
MCHC RBC AUTO-ENTMCNC: 34 G/DL (ref 31–37)
MCV RBC AUTO: 97 FL (ref 79–100)
MONO #: 0.1 X10^3/UL (ref 0–1.1)
MONOCYTES NFR BLD: 3 % (ref 0–9)
NEUT #: 3.4 X10^3/UL (ref 1.8–7.7)
NEUTROPHILS NFR BLD AUTO: 81 % (ref 31–73)
PCO2 BLDA: 37 MMHG (ref 35–46)
PLATELET # BLD AUTO: 242 X10^3/UL (ref 140–400)
PO2 BLDA: 59 MMHG (ref 65–108)
POTASSIUM SERPL-SCNC: 3.3 MMOL/L (ref 3.5–5.1)
PROT SERPL-MCNC: 5.8 G/DL (ref 6.4–8.2)
RBC # BLD AUTO: 2.97 X10^6/UL (ref 4.3–5.7)
SAO2 % BLDA: 91 % (ref 92–99)
SODIUM SERPL-SCNC: 138 MMOL/L (ref 136–145)
WBC # BLD AUTO: 4.2 X10^3/UL (ref 4–11)

## 2020-12-26 RX ADMIN — CYANOCOBALAMIN TAB 1000 MCG SCH MCG: 1000 TAB at 09:15

## 2020-12-26 RX ADMIN — CETIRIZINE HYDROCHLORIDE SCH MG: 10 TABLET, FILM COATED ORAL at 09:15

## 2020-12-26 RX ADMIN — CITALOPRAM HYDROBROMIDE SCH MG: 20 TABLET ORAL at 09:15

## 2020-12-26 RX ADMIN — ZINC SULFATE CAP 220 MG (50 MG ELEMENTAL ZN) SCH MG: 220 (50 ZN) CAP at 09:15

## 2020-12-26 RX ADMIN — GLYCERIN, ISOLEUCINE, LEUCINE, LYSINE, METHIONINE, PHENYLALANINE, THREONINE, TRYPTOPHAN, VALINE, ALANINE, GLYCINE, ARGININE, HISTIDINE, PROLINE, SERINE, CYSTEINE, SODIUM ACETATE, MAGNESIUM ACETATE, CALCIUM ACETATE, SODIUM CHLORIDE, POTASSIUM CHLORIDE, PHOSPHORIC ACID, AND POTASSIUM METABISULFITE SCH MLS/HR
3; .21; .27; .22; .16; .17; .12; .046; .2; .21; .42; .29; .085; .34; .18; .014; .2; .054; .026; .12; .15; .041 INJECTION INTRAVENOUS at 15:01

## 2020-12-26 RX ADMIN — GUAIFENESIN AND DEXTROMETHORPHAN PRN ML: 100; 10 SYRUP ORAL at 22:43

## 2020-12-26 RX ADMIN — ENOXAPARIN SODIUM SCH MG: 40 INJECTION SUBCUTANEOUS at 09:14

## 2020-12-26 RX ADMIN — MORPHINE SULFATE SCH MG: 15 TABLET, EXTENDED RELEASE ORAL at 21:26

## 2020-12-26 RX ADMIN — WATER PRN ML: 1 INJECTION INTRAVENOUS at 16:43

## 2020-12-26 RX ADMIN — BENZONATATE SCH MG: 100 CAPSULE, LIQUID FILLED ORAL at 15:02

## 2020-12-26 RX ADMIN — GLYCERIN, ISOLEUCINE, LEUCINE, LYSINE, METHIONINE, PHENYLALANINE, THREONINE, TRYPTOPHAN, VALINE, ALANINE, GLYCINE, ARGININE, HISTIDINE, PROLINE, SERINE, CYSTEINE, SODIUM ACETATE, MAGNESIUM ACETATE, CALCIUM ACETATE, SODIUM CHLORIDE, POTASSIUM CHLORIDE, PHOSPHORIC ACID, AND POTASSIUM METABISULFITE SCH MLS/HR
3; .21; .27; .22; .16; .17; .12; .046; .2; .21; .42; .29; .085; .34; .18; .014; .2; .054; .026; .12; .15; .041 INJECTION INTRAVENOUS at 01:09

## 2020-12-26 RX ADMIN — GUAIFENESIN AND DEXTROMETHORPHAN PRN ML: 100; 10 SYRUP ORAL at 03:26

## 2020-12-26 RX ADMIN — WATER PRN ML: 1 INJECTION INTRAVENOUS at 09:51

## 2020-12-26 RX ADMIN — ATORVASTATIN CALCIUM SCH MG: 40 TABLET, FILM COATED ORAL at 21:26

## 2020-12-26 RX ADMIN — BENZONATATE SCH MG: 100 CAPSULE, LIQUID FILLED ORAL at 21:26

## 2020-12-26 RX ADMIN — MORPHINE SULFATE PRN MG: 2 INJECTION, SOLUTION INTRAMUSCULAR; INTRAVENOUS at 05:41

## 2020-12-26 RX ADMIN — MORPHINE SULFATE SCH MG: 15 TABLET, EXTENDED RELEASE ORAL at 09:15

## 2020-12-26 RX ADMIN — LABETALOL HYDROCHLORIDE PRN MG: 5 INJECTION, SOLUTION INTRAVENOUS at 19:37

## 2020-12-26 RX ADMIN — DEXAMETHASONE SODIUM PHOSPHATE SCH MG: 4 INJECTION, SOLUTION INTRAMUSCULAR; INTRAVENOUS at 09:15

## 2020-12-26 RX ADMIN — ENOXAPARIN SODIUM SCH MG: 40 INJECTION SUBCUTANEOUS at 21:40

## 2020-12-26 RX ADMIN — CEFTRIAXONE SCH GM: 1 INJECTION, POWDER, FOR SOLUTION INTRAMUSCULAR; INTRAVENOUS at 09:15

## 2020-12-26 RX ADMIN — MORPHINE SULFATE PRN MG: 2 INJECTION, SOLUTION INTRAMUSCULAR; INTRAVENOUS at 19:38

## 2020-12-26 RX ADMIN — AZITHROMYCIN MONOHYDRATE SCH MLS/HR: 500 INJECTION, POWDER, LYOPHILIZED, FOR SOLUTION INTRAVENOUS at 09:14

## 2020-12-26 NOTE — PDOC
PULMONARY PROGRESS NOTES


DATE: 12/26/20 


TIME: 09:35


Subjective


requiring more oxygen via BIPAP, now on 100% BIPAP


Restless at times 


no overnight concerns from nursing


Vitals





Vital Signs








  Date Time  Temp Pulse Resp B/P (MAP) Pulse Ox O2 Delivery O2 Flow Rate FiO2


 


12/26/20 09:15     93  15.0 


 


12/26/20 08:06      BiPAP/CPAP  


 


12/26/20 08:00 98.6 74 25 161/88 (112)    





 98.6       








Comments


Pt. seen during COVID-19 pandemic, visual exam preformed 


RRR


no distress


BIPAP


no accessory muscle use 


No edema or rash


General:  Alert


Labs





Laboratory Tests








Test


 12/25/20


04:00 12/26/20


05:30 12/26/20


07:50


 


White Blood Count


 4.6 x10^3/uL


(4.0-11.0) 4.2 x10^3/uL


(4.0-11.0) 





 


Red Blood Count


 3.15 x10^6/uL


(4.30-5.70) 2.97 x10^6/uL


(4.30-5.70) 





 


Hemoglobin


 10.3 g/dL


(13.0-17.5) 9.7 g/dL


(13.0-17.5) 





 


Hematocrit


 30.6 %


(39.0-53.0) 28.9 %


(39.0-53.0) 





 


Mean Corpuscular Volume 97 fL ()  97 fL ()  


 


Mean Corpuscular Hemoglobin 33 pg (25-35)  33 pg (25-35)  


 


Mean Corpuscular Hemoglobin


Concent 34 g/dL


(31-37) 34 g/dL


(31-37) 





 


Red Cell Distribution Width


 14.9 %


(11.5-14.5) 14.4 %


(11.5-14.5) 





 


Platelet Count


 255 x10^3/uL


(140-400) 242 x10^3/uL


(140-400) 





 


Neutrophils (%) (Auto) 85 % (31-73)  81 % (31-73)  


 


Lymphocytes (%) (Auto) 14 % (24-48)  16 % (24-48)  


 


Monocytes (%) (Auto) 2 % (0-9)  3 % (0-9)  


 


Eosinophils (%) (Auto) 0 % (0-3)  0 % (0-3)  


 


Basophils (%) (Auto) 0 % (0-3)  0 % (0-3)  


 


Neutrophils # (Auto)


 3.9 x10^3/uL


(1.8-7.7) 3.4 x10^3/uL


(1.8-7.7) 





 


Lymphocytes # (Auto)


 0.6 x10^3/uL


(1.0-4.8) 0.7 x10^3/uL


(1.0-4.8) 





 


Monocytes # (Auto)


 0.1 x10^3/uL


(0.0-1.1) 0.1 x10^3/uL


(0.0-1.1) 





 


Eosinophils # (Auto)


 0.0 x10^3/uL


(0.0-0.7) 0.0 x10^3/uL


(0.0-0.7) 





 


Basophils # (Auto)


 0.0 x10^3/uL


(0.0-0.2) 0.0 x10^3/uL


(0.0-0.2) 





 


Sodium Level


 142 mmol/L


(136-145) 138 mmol/L


(136-145) 





 


Potassium Level


 3.3 mmol/L


(3.5-5.1) 3.3 mmol/L


(3.5-5.1) 





 


Chloride Level


 103 mmol/L


() 102 mmol/L


() 





 


Carbon Dioxide Level


 32 mmol/L


(21-32) 29 mmol/L


(21-32) 





 


Anion Gap 7 (6-14)  7 (6-14)  


 


Blood Urea Nitrogen


 21 mg/dL


(8-26) 20 mg/dL


(8-26) 





 


Creatinine


 0.9 mg/dL


(0.7-1.3) 0.5 mg/dL


(0.7-1.3) 





 


Estimated GFR


(Cockcroft-Gault) 84.4 


 166.4 


 





 


BUN/Creatinine Ratio 23 (6-20)  40 (6-20)  


 


Glucose Level


 89 mg/dL


(70-99) 85 mg/dL


(70-99) 





 


Calcium Level


 8.3 mg/dL


(8.5-10.1) 8.0 mg/dL


(8.5-10.1) 





 


Total Bilirubin


 0.5 mg/dL


(0.2-1.0) 0.5 mg/dL


(0.2-1.0) 





 


Aspartate Amino Transf


(AST/SGOT) 89 U/L (15-37) 


 111 U/L


(15-37) 





 


Alanine Aminotransferase


(ALT/SGPT) 51 U/L (16-63) 


 78 U/L (16-63) 


 





 


Alkaline Phosphatase


 53 U/L


() 61 U/L


() 





 


Total Protein


 6.4 g/dL


(6.4-8.2) 5.8 g/dL


(6.4-8.2) 





 


Albumin


 2.5 g/dL


(3.4-5.0) 2.2 g/dL


(3.4-5.0) 





 


Albumin/Globulin Ratio 0.6 (1.0-1.7)  0.6 (1.0-1.7)  


 


O2 Saturation   91 % (92-99) 


 


Arterial Blood pH


 


 


 7.49


(7.35-7.45)


 


Arterial Blood pCO2 at


Patient Temp 


 


 37 mmHg


(35-46)


 


Arterial Blood pO2 at Patient


Temp 


 


 59 mmHg


()


 


Arterial Blood HCO3


 


 


 28 mmol/L


(21-28)


 


Arterial Blood Base Excess


 


 


 4 mmol/L


(-3-3)


 


FiO2   100% 








Laboratory Tests








Test


 12/26/20


05:30 12/26/20


07:50


 


White Blood Count


 4.2 x10^3/uL


(4.0-11.0) 





 


Red Blood Count


 2.97 x10^6/uL


(4.30-5.70) 





 


Hemoglobin


 9.7 g/dL


(13.0-17.5) 





 


Hematocrit


 28.9 %


(39.0-53.0) 





 


Mean Corpuscular Volume 97 fL ()  


 


Mean Corpuscular Hemoglobin 33 pg (25-35)  


 


Mean Corpuscular Hemoglobin


Concent 34 g/dL


(31-37) 





 


Red Cell Distribution Width


 14.4 %


(11.5-14.5) 





 


Platelet Count


 242 x10^3/uL


(140-400) 





 


Neutrophils (%) (Auto) 81 % (31-73)  


 


Lymphocytes (%) (Auto) 16 % (24-48)  


 


Monocytes (%) (Auto) 3 % (0-9)  


 


Eosinophils (%) (Auto) 0 % (0-3)  


 


Basophils (%) (Auto) 0 % (0-3)  


 


Neutrophils # (Auto)


 3.4 x10^3/uL


(1.8-7.7) 





 


Lymphocytes # (Auto)


 0.7 x10^3/uL


(1.0-4.8) 





 


Monocytes # (Auto)


 0.1 x10^3/uL


(0.0-1.1) 





 


Eosinophils # (Auto)


 0.0 x10^3/uL


(0.0-0.7) 





 


Basophils # (Auto)


 0.0 x10^3/uL


(0.0-0.2) 





 


Sodium Level


 138 mmol/L


(136-145) 





 


Potassium Level


 3.3 mmol/L


(3.5-5.1) 





 


Chloride Level


 102 mmol/L


() 





 


Carbon Dioxide Level


 29 mmol/L


(21-32) 





 


Anion Gap 7 (6-14)  


 


Blood Urea Nitrogen


 20 mg/dL


(8-26) 





 


Creatinine


 0.5 mg/dL


(0.7-1.3) 





 


Estimated GFR


(Cockcroft-Gault) 166.4 


 





 


BUN/Creatinine Ratio 40 (6-20)  


 


Glucose Level


 85 mg/dL


(70-99) 





 


Calcium Level


 8.0 mg/dL


(8.5-10.1) 





 


Total Bilirubin


 0.5 mg/dL


(0.2-1.0) 





 


Aspartate Amino Transf


(AST/SGOT) 111 U/L


(15-37) 





 


Alanine Aminotransferase


(ALT/SGPT) 78 U/L (16-63) 


 





 


Alkaline Phosphatase


 61 U/L


() 





 


Total Protein


 5.8 g/dL


(6.4-8.2) 





 


Albumin


 2.2 g/dL


(3.4-5.0) 





 


Albumin/Globulin Ratio 0.6 (1.0-1.7)  


 


O2 Saturation  91 % (92-99) 


 


Arterial Blood pH


 


 7.49


(7.35-7.45)


 


Arterial Blood pCO2 at


Patient Temp 


 37 mmHg


(35-46)


 


Arterial Blood pO2 at Patient


Temp 


 59 mmHg


()


 


Arterial Blood HCO3


 


 28 mmol/L


(21-28)


 


Arterial Blood Base Excess


 


 4 mmol/L


(-3-3)


 


FiO2  100% 








Medications





Active Scripts








 Medications  Dose


 Route/Sig


 Max Daily Dose Days Date Category


 


 Morphine Sulfate


 Er (Morphine


 Sulfate) 30 Mg


 Tablet.er  1 Tab


 PO BID


   12/23/20 Reported


 


 Tramadol Hcl 100


 Mg Tbmp.24hr  100 Mg


 PO PRN Q8HRS PRN


   12/23/20 Reported


 


 Ambien (Zolpidem


 Tartrate) 10 Mg


 Tablet  10 Mg


 PO PRN QHS PRN


   12/23/20 Reported


 


 Methotrexate


  (Methotrexate


 Sodium) 2.5 Mg


 Tablet  10 Tab


 PO WEEKLY


   12/23/20 Reported


 


 Lisinopril 10 Mg


 Tablet  1 Tab


 PO DAILY


   12/23/20 Reported


 


 Crestor


  (Rosuvastatin


 Calcium) 5 Mg


 Tablet  2 Tab


 PO DAILY


   12/23/20 Reported


 


 Hydrochlorothiazide


 Tablet


  (Hydrochlorothiazide)


 50 Mg Tablet  25 Mg


 PO DAILY


   12/23/20 Reported


 


 Escitalopram


 Oxalate 10 Mg


 Tablet  1 Tab


 PO DAILY


   12/23/20 Reported


 


 Sulfasalazine Dr


  (Sulfasalazine)


 500 Mg Tablet.dr  2 Tab


 PO TID


  30 12/23/20 Reported


 


 Colace (Docusate


 Sodium) 100 Mg


 Capsule  1 Cap


 PO DA


  30 12/23/20 Reported


 


 Acetaminophen


 Ext.release


  (Acetaminophen)


 650 Mg Tablet.er  650 Mg


 PO PRN Q8HRS PRN


   12/23/20 Reported


 


 Melatonin 5 Mg


 Capsule  1 Cap


 PO QHS


  30 12/23/20 Reported


 


 B-12


  (Cyanocobalamin


  (Vitamin B-12))


 500 Mcg Tablet  2 Tab


 PO DAILY


  30 12/23/20 Reported


 


 Vitamin D3


  (Cholecalciferol


  (Vitamin D3)) 50


 Mcg Capsule  50 Mcg


 PO DAILY


   12/23/20 Reported


 


 Aller-Sung


  (Cetirizine Hcl)


 10 Mg Tablet  1 Tab


 PO DAILY


  30 12/23/20 Reported











Impression


.


IMPRESSION:


1.  Acute hypoxic respiratory failure secondary to highly suspected COVID-19


pneumonia and acute respiratory distress syndrome/acute lung injury.-- COVID-19 

positive 


2.  Abnormal CT chest with extensive widespread ground glass and alveolar and


interstitial infiltrates with reactive mild mediastinal/hilar adenopathy.  This


is likely related to COVID-19 pneumonia.


3.  Patient with history of psoriatic arthritis.  He is likely immunocompromise


as he has been on methotrexate.  Needs to cover for opportunistic infection.


4.  History of tongue cancer with resection, details unknown.


5.  History of prostate cancer.


6.  History of Crohn's disease.





Plan


.


RECOMMENDATIONS:


Continue supplemental oxygen to keep sats above 92%, Continue BIPAP 100%


COVID-19 positive


Continue with empiric antibiotic, rocephin and azithromycin 


Would likely not benefit from remdesivir 


Continue with IV steroids with slow taper 


Continue PPN for nutritional support 


DVT/GI PPX 





D/W RN and RT 





Critical care time 30 minutes











ABI MENDEZ MD                 Dec 26, 2020 09:37

## 2020-12-26 NOTE — PDOC
[employer] PROGRESS NOTE


Date of Service


DOS:


DATE: 12/26/20 


TIME: 10:27





Chief Complaint


Chief Complaint


A/P:


Acute hypoxic respiratory failure - no pulmonary history. With recent travel to 

and from [place] likely COVID 19 vs other atypical pneumonia. Cont empiric 

antibiotics, steroids. Consult Pulm. BIPAP in negative pressure room 16/8 on 80%

FiO2


COVID 19 positive - with pneumonia - given transaminitis and late presentation 

with high O2 requirements no indication for remdesivir


RYDER - likely vasomotor nephropathy - no known renal history, will hydrate


Pneumonia - likely atypical, gram negative vs viral. will cover 


Prostate Ca - s/p resection at [facility] in [place]


Hypokalemia - likely nutritional or loss from diarrhea, will replace


Elevated troponin - likely from type II demand ischemia, will trend troponins, 

maintain telemetry


Crohn's - sees rheumatology regularly, Dr Fan in [place], on sulfasalazine


Psoriatic arthritis - on pain medications 30mg MS Contin BID, tramadol and 25mg 

Methotrexate weekly as DMARD per rheumatology, Dr. Fan. Hold MTX while 

inpatient


GERD - PPI


HLD - statin


HTN - Lisinopril and HCTZ on hold for RYDER


Anemia - likely of chronci disease, will monitor


Cardiomegaly - notable on CT chest - no known cardiac history, though his mother

did have CHF and CAD


Right renal mass - will need US for further assessment





FEN - NPO, can try regular diet when off BIPAP


PPX - lovenox


FULL CODE


Dispo - ICU for respiratory failure


CC time 47 minutes


Have d/w wife, Hoa and son, Desmond, [phone]





History of Present Illness


History of Present Illness


Mr Nance is 65-year-old male w/ past medical history of Crohn's, psoriatic 

arthritis, GERD, HLD, HTN, prostate ca, tongue cancer who presents to the 

emergency department at [facility] concerned about shortness of breath that had 

been progressive. This started 12/18/2020 and has been getting worse since that 

time. Of note patient also has loss of taste, loss of smell, cough, shortness of

breath, fever. He recently went to [place] on a business trip and just ret

urned 5 days prior to presentation.


Upon arrival to the emergency room patient had significant hypoxia with a pulse 

ox in the 40s.  He was placed initially on a nonrebreather and then placed on 

BiPAP.


He was also somewhat concerned about a Crohn's flareup.  Patient states he has 

been having abdominal pain with nausea and diarrhea.  These are not typical 

symptoms of his Crohn's.  He feels very tired and has body aches as well.


He was given steroids and Rocephin in ED. 


Chest radiograph concerning for bilateral interstitial infiltrates.


Labs significant for WBC 4.5, Hb 11.8, platelets 229, , K2.8, BUN 34, CR 

1.5, glucose 119, .9, albumin 3, , , lactic acid 1.6, D-

dimer 3.15, troponin 0.068.


ABG on 80% FiO2 7.53/42/64


CT negative for pulmonary embolism. Widespread airspace disease throughout both 

lungs, consistent with pneumonia. Mild mediastinal and bilateral hilar 

lymphadenopathy, likely reactive. Cardiomegaly with mild aneurysmal dilation of 

the ascending thoracic aorta. Indeterminate hyperdense nodule at the midpole 

right kidney. This could represent a solid mass or complex cyst.


Patient transferred to [facility] for pulmonary consultation and 

ICU admission.





12/24: Overnight BP improved with fluids. O2 saturations slightly increased. Did

not tolerate more than 1 hour off BIPAP even with non-rebreather O2 saturations 

were 92% at best. No pain currently, very slightly appetite.


12/25: COVID-19 returned positive.  Down to 65% on his BiPAP 18/8.  Was able to 

take it off for medications meal yesterday, but desaturates to 79% and recovers 

quickly.  Still very drowsy with little appetite.  Afebrile.





Afebrile with rhino 100% O2 on BiPAP today.  Transition to Vapotherm with more 

ease of breathing.  He has almost no appetite.  Less drowsy today.  He is 

depressed mood but now has a cell phone  and is able to talk to his 

family.  No complaints of chest pain some abdominal pain no bowel movement as of

yet.  ABG 7.4 9/37/59





Vitals/I&O


Vitals/I&O:





                                   Vital Signs








  Date Time  Temp Pulse Resp B/P (MAP) Pulse Ox O2 Delivery O2 Flow Rate FiO2


 


12/26/20 10:00  91 26 178/99 (125) 94 vapotherm  


 


12/26/20 09:15       15.0 


 


12/26/20 08:00 98.6       





 98.6       














                                    I & O0   


 


 12/25/20 12/25/20 12/26/20





 15:00 23:00 07:00


 


Intake Total 500 ml 100 ml 1263 ml


 


Output Total 0 ml 1000 ml 550 ml


 


Balance 500 ml -900 ml 713 ml











Physical Exam


General:  Alert, Oriented X3, Cooperative, moderate distress


Abdomen:  Normal bowel sounds, Soft, No tenderness, No hepatosplenomegaly, No ma

sses


Extremities:  No clubbing, No cyanosis, No edema, Normal pulses, No 

tenderness/swelling


Skin:  No rashes, No breakdown, No significant lesion





Labs


Labs:





Laboratory Tests








Test


 12/26/20


05:30 12/26/20


07:50


 


White Blood Count


 4.2 x10^3/uL


(4.0-11.0) 





 


Red Blood Count


 2.97 x10^6/uL


(4.30-5.70) 





 


Hemoglobin


 9.7 g/dL


(13.0-17.5) 





 


Hematocrit


 28.9 %


(39.0-53.0) 





 


Mean Corpuscular Volume 97 fL ()  


 


Mean Corpuscular Hemoglobin 33 pg (25-35)  


 


Mean Corpuscular Hemoglobin


Concent 34 g/dL


(31-37) 





 


Red Cell Distribution Width


 14.4 %


(11.5-14.5) 





 


Platelet Count


 242 x10^3/uL


(140-400) 





 


Neutrophils (%) (Auto) 81 % (31-73)  


 


Lymphocytes (%) (Auto) 16 % (24-48)  


 


Monocytes (%) (Auto) 3 % (0-9)  


 


Eosinophils (%) (Auto) 0 % (0-3)  


 


Basophils (%) (Auto) 0 % (0-3)  


 


Neutrophils # (Auto)


 3.4 x10^3/uL


(1.8-7.7) 





 


Lymphocytes # (Auto)


 0.7 x10^3/uL


(1.0-4.8) 





 


Monocytes # (Auto)


 0.1 x10^3/uL


(0.0-1.1) 





 


Eosinophils # (Auto)


 0.0 x10^3/uL


(0.0-0.7) 





 


Basophils # (Auto)


 0.0 x10^3/uL


(0.0-0.2) 





 


Sodium Level


 138 mmol/L


(136-145) 





 


Potassium Level


 3.3 mmol/L


(3.5-5.1) 





 


Chloride Level


 102 mmol/L


() 





 


Carbon Dioxide Level


 29 mmol/L


(21-32) 





 


Anion Gap 7 (6-14)  


 


Blood Urea Nitrogen


 20 mg/dL


(8-26) 





 


Creatinine


 0.5 mg/dL


(0.7-1.3) 





 


Estimated GFR


(Cockcroft-Gault) 166.4 


 





 


BUN/Creatinine Ratio 40 (6-20)  


 


Glucose Level


 85 mg/dL


(70-99) 





 


Calcium Level


 8.0 mg/dL


(8.5-10.1) 





 


Total Bilirubin


 0.5 mg/dL


(0.2-1.0) 





 


Aspartate Amino Transf


(AST/SGOT) 111 U/L


(15-37) 





 


Alanine Aminotransferase


(ALT/SGPT) 78 U/L (16-63) 


 





 


Alkaline Phosphatase


 61 U/L


() 





 


Total Protein


 5.8 g/dL


(6.4-8.2) 





 


Albumin


 2.2 g/dL


(3.4-5.0) 





 


Albumin/Globulin Ratio 0.6 (1.0-1.7)  


 


O2 Saturation  91 % (92-99) 


 


Arterial Blood pH


 


 7.49


(7.35-7.45)


 


Arterial Blood pCO2 at


Patient Temp 


 37 mmHg


(35-46)


 


Arterial Blood pO2 at Patient


Temp 


 59 mmHg


()


 


Arterial Blood HCO3


 


 28 mmol/L


(21-28)


 


Arterial Blood Base Excess


 


 4 mmol/L


(-3-3)


 


FiO2  100% 











Comment


Review of Relevant


I have reviewed the following items jabier (where applicable) has been applied.


Medications:





Current Medications








 Medications


  (Trade)  Dose


 Ordered  Sig/Kayli


 Route


 PRN Reason  Start Time


 Stop Time Status Last Admin


Dose Admin


 


 Sterile Water


  (WATER for RESP)  1,000 ml  CONT  PRN


 INH


 VIA VAPOTHERM DEVICE  12/26/20 09:30


    12/26/20 09:51














Justifications for Admission


Other Justification














MISSAEL MANCIA MD        Dec 26, 2020 10:28

## 2020-12-27 VITALS — DIASTOLIC BLOOD PRESSURE: 94 MMHG | SYSTOLIC BLOOD PRESSURE: 161 MMHG

## 2020-12-27 VITALS — DIASTOLIC BLOOD PRESSURE: 95 MMHG | SYSTOLIC BLOOD PRESSURE: 177 MMHG

## 2020-12-27 VITALS — SYSTOLIC BLOOD PRESSURE: 157 MMHG | DIASTOLIC BLOOD PRESSURE: 80 MMHG

## 2020-12-27 VITALS — SYSTOLIC BLOOD PRESSURE: 172 MMHG | DIASTOLIC BLOOD PRESSURE: 95 MMHG

## 2020-12-27 VITALS — SYSTOLIC BLOOD PRESSURE: 176 MMHG | DIASTOLIC BLOOD PRESSURE: 89 MMHG

## 2020-12-27 VITALS — SYSTOLIC BLOOD PRESSURE: 113 MMHG | DIASTOLIC BLOOD PRESSURE: 66 MMHG

## 2020-12-27 VITALS — DIASTOLIC BLOOD PRESSURE: 75 MMHG | SYSTOLIC BLOOD PRESSURE: 135 MMHG

## 2020-12-27 VITALS — SYSTOLIC BLOOD PRESSURE: 161 MMHG | DIASTOLIC BLOOD PRESSURE: 87 MMHG

## 2020-12-27 VITALS — DIASTOLIC BLOOD PRESSURE: 81 MMHG | SYSTOLIC BLOOD PRESSURE: 155 MMHG

## 2020-12-27 VITALS — DIASTOLIC BLOOD PRESSURE: 89 MMHG | SYSTOLIC BLOOD PRESSURE: 177 MMHG

## 2020-12-27 VITALS — DIASTOLIC BLOOD PRESSURE: 96 MMHG | SYSTOLIC BLOOD PRESSURE: 176 MMHG

## 2020-12-27 VITALS — SYSTOLIC BLOOD PRESSURE: 141 MMHG | DIASTOLIC BLOOD PRESSURE: 70 MMHG

## 2020-12-27 VITALS — SYSTOLIC BLOOD PRESSURE: 155 MMHG | DIASTOLIC BLOOD PRESSURE: 79 MMHG

## 2020-12-27 VITALS — SYSTOLIC BLOOD PRESSURE: 23 MMHG | DIASTOLIC BLOOD PRESSURE: 68 MMHG

## 2020-12-27 VITALS — DIASTOLIC BLOOD PRESSURE: 89 MMHG | SYSTOLIC BLOOD PRESSURE: 147 MMHG

## 2020-12-27 VITALS — SYSTOLIC BLOOD PRESSURE: 158 MMHG | DIASTOLIC BLOOD PRESSURE: 77 MMHG

## 2020-12-27 VITALS — DIASTOLIC BLOOD PRESSURE: 69 MMHG | SYSTOLIC BLOOD PRESSURE: 150 MMHG

## 2020-12-27 VITALS — DIASTOLIC BLOOD PRESSURE: 87 MMHG | SYSTOLIC BLOOD PRESSURE: 185 MMHG

## 2020-12-27 VITALS — DIASTOLIC BLOOD PRESSURE: 98 MMHG | SYSTOLIC BLOOD PRESSURE: 198 MMHG

## 2020-12-27 VITALS — DIASTOLIC BLOOD PRESSURE: 83 MMHG | SYSTOLIC BLOOD PRESSURE: 139 MMHG

## 2020-12-27 VITALS — SYSTOLIC BLOOD PRESSURE: 165 MMHG | DIASTOLIC BLOOD PRESSURE: 83 MMHG

## 2020-12-27 VITALS — SYSTOLIC BLOOD PRESSURE: 162 MMHG | DIASTOLIC BLOOD PRESSURE: 81 MMHG

## 2020-12-27 VITALS — DIASTOLIC BLOOD PRESSURE: 99 MMHG | SYSTOLIC BLOOD PRESSURE: 199 MMHG

## 2020-12-27 VITALS — DIASTOLIC BLOOD PRESSURE: 84 MMHG | SYSTOLIC BLOOD PRESSURE: 148 MMHG

## 2020-12-27 RX ADMIN — BENZONATATE SCH MG: 100 CAPSULE, LIQUID FILLED ORAL at 14:36

## 2020-12-27 RX ADMIN — CITALOPRAM HYDROBROMIDE SCH MG: 20 TABLET ORAL at 08:30

## 2020-12-27 RX ADMIN — CEFTRIAXONE SCH GM: 1 INJECTION, POWDER, FOR SOLUTION INTRAMUSCULAR; INTRAVENOUS at 08:30

## 2020-12-27 RX ADMIN — ATORVASTATIN CALCIUM SCH MG: 40 TABLET, FILM COATED ORAL at 20:49

## 2020-12-27 RX ADMIN — BENZONATATE SCH MG: 100 CAPSULE, LIQUID FILLED ORAL at 08:30

## 2020-12-27 RX ADMIN — ENOXAPARIN SODIUM SCH MG: 40 INJECTION SUBCUTANEOUS at 08:29

## 2020-12-27 RX ADMIN — CYANOCOBALAMIN TAB 1000 MCG SCH MCG: 1000 TAB at 08:30

## 2020-12-27 RX ADMIN — ZINC SULFATE CAP 220 MG (50 MG ELEMENTAL ZN) SCH MG: 220 (50 ZN) CAP at 08:30

## 2020-12-27 RX ADMIN — QUETIAPINE FUMARATE PRN MG: 25 TABLET, FILM COATED ORAL at 20:49

## 2020-12-27 RX ADMIN — GLYCERIN, ISOLEUCINE, LEUCINE, LYSINE, METHIONINE, PHENYLALANINE, THREONINE, TRYPTOPHAN, VALINE, ALANINE, GLYCINE, ARGININE, HISTIDINE, PROLINE, SERINE, CYSTEINE, SODIUM ACETATE, MAGNESIUM ACETATE, CALCIUM ACETATE, SODIUM CHLORIDE, POTASSIUM CHLORIDE, PHOSPHORIC ACID, AND POTASSIUM METABISULFITE SCH MLS/HR
3; .21; .27; .22; .16; .17; .12; .046; .2; .21; .42; .29; .085; .34; .18; .014; .2; .054; .026; .12; .15; .041 INJECTION INTRAVENOUS at 20:47

## 2020-12-27 RX ADMIN — MORPHINE SULFATE SCH MG: 15 TABLET, EXTENDED RELEASE ORAL at 20:49

## 2020-12-27 RX ADMIN — GUAIFENESIN AND DEXTROMETHORPHAN PRN ML: 100; 10 SYRUP ORAL at 20:47

## 2020-12-27 RX ADMIN — GUAIFENESIN AND DEXTROMETHORPHAN PRN ML: 100; 10 SYRUP ORAL at 05:07

## 2020-12-27 RX ADMIN — ENALAPRILAT PRN MG: 1.25 INJECTION, SOLUTION INTRAVENOUS at 12:58

## 2020-12-27 RX ADMIN — GUAIFENESIN PRN MG: 100 SOLUTION ORAL at 22:36

## 2020-12-27 RX ADMIN — WATER PRN ML: 1 INJECTION INTRAVENOUS at 05:28

## 2020-12-27 RX ADMIN — GLYCERIN, ISOLEUCINE, LEUCINE, LYSINE, METHIONINE, PHENYLALANINE, THREONINE, TRYPTOPHAN, VALINE, ALANINE, GLYCINE, ARGININE, HISTIDINE, PROLINE, SERINE, CYSTEINE, SODIUM ACETATE, MAGNESIUM ACETATE, CALCIUM ACETATE, SODIUM CHLORIDE, POTASSIUM CHLORIDE, PHOSPHORIC ACID, AND POTASSIUM METABISULFITE SCH MLS/HR
3; .21; .27; .22; .16; .17; .12; .046; .2; .21; .42; .29; .085; .34; .18; .014; .2; .054; .026; .12; .15; .041 INJECTION INTRAVENOUS at 07:44

## 2020-12-27 RX ADMIN — LABETALOL HYDROCHLORIDE PRN MG: 5 INJECTION, SOLUTION INTRAVENOUS at 09:16

## 2020-12-27 RX ADMIN — ENOXAPARIN SODIUM SCH MG: 40 INJECTION SUBCUTANEOUS at 20:48

## 2020-12-27 RX ADMIN — DEXAMETHASONE SODIUM PHOSPHATE SCH MG: 4 INJECTION, SOLUTION INTRAMUSCULAR; INTRAVENOUS at 08:30

## 2020-12-27 RX ADMIN — LABETALOL HYDROCHLORIDE PRN MG: 5 INJECTION, SOLUTION INTRAVENOUS at 03:13

## 2020-12-27 RX ADMIN — MORPHINE SULFATE SCH MG: 15 TABLET, EXTENDED RELEASE ORAL at 08:30

## 2020-12-27 RX ADMIN — AZITHROMYCIN MONOHYDRATE SCH MLS/HR: 500 INJECTION, POWDER, LYOPHILIZED, FOR SOLUTION INTRAVENOUS at 08:55

## 2020-12-27 RX ADMIN — CETIRIZINE HYDROCHLORIDE SCH MG: 10 TABLET, FILM COATED ORAL at 08:30

## 2020-12-27 RX ADMIN — ZOLPIDEM TARTRATE PRN MG: 5 TABLET ORAL at 00:26

## 2020-12-27 RX ADMIN — LABETALOL HYDROCHLORIDE PRN MG: 5 INJECTION, SOLUTION INTRAVENOUS at 06:11

## 2020-12-27 RX ADMIN — BENZONATATE SCH MG: 100 CAPSULE, LIQUID FILLED ORAL at 20:49

## 2020-12-27 RX ADMIN — MORPHINE SULFATE PRN MG: 2 INJECTION, SOLUTION INTRAMUSCULAR; INTRAVENOUS at 05:26

## 2020-12-27 RX ADMIN — WATER PRN ML: 1 INJECTION INTRAVENOUS at 15:53

## 2020-12-27 NOTE — PDOC
PULMONARY PROGRESS NOTES


DATE: 12/27/20 


TIME: 11:11


Subjective


 on 100% FIO2 via Vapotherm , tolerating better


Restless at times 


no overnight concerns from nursing


Vitals





Vital Signs








  Date Time  Temp Pulse Resp B/P (MAP) Pulse Ox O2 Delivery O2 Flow Rate FiO2


 


12/27/20 09:16  90  185/87    


 


12/27/20 09:00   20  95 Vapotherm/NRB  


 


12/27/20 08:30       40.0 


 


12/27/20 08:00 98.2       





 98.2       








Comments


Pt. seen during COVID-19 pandemic, visual exam preformed 


RRR


no distress


BIPAP


no accessory muscle use 


No edema or rash


General:  Alert, No acute distress


Labs





Laboratory Tests








Test


 12/26/20


05:30 12/26/20


07:50


 


White Blood Count


 4.2 x10^3/uL


(4.0-11.0) 





 


Red Blood Count


 2.97 x10^6/uL


(4.30-5.70) 





 


Hemoglobin


 9.7 g/dL


(13.0-17.5) 





 


Hematocrit


 28.9 %


(39.0-53.0) 





 


Mean Corpuscular Volume 97 fL ()  


 


Mean Corpuscular Hemoglobin 33 pg (25-35)  


 


Mean Corpuscular Hemoglobin


Concent 34 g/dL


(31-37) 





 


Red Cell Distribution Width


 14.4 %


(11.5-14.5) 





 


Platelet Count


 242 x10^3/uL


(140-400) 





 


Neutrophils (%) (Auto) 81 % (31-73)  


 


Lymphocytes (%) (Auto) 16 % (24-48)  


 


Monocytes (%) (Auto) 3 % (0-9)  


 


Eosinophils (%) (Auto) 0 % (0-3)  


 


Basophils (%) (Auto) 0 % (0-3)  


 


Neutrophils # (Auto)


 3.4 x10^3/uL


(1.8-7.7) 





 


Lymphocytes # (Auto)


 0.7 x10^3/uL


(1.0-4.8) 





 


Monocytes # (Auto)


 0.1 x10^3/uL


(0.0-1.1) 





 


Eosinophils # (Auto)


 0.0 x10^3/uL


(0.0-0.7) 





 


Basophils # (Auto)


 0.0 x10^3/uL


(0.0-0.2) 





 


Sodium Level


 138 mmol/L


(136-145) 





 


Potassium Level


 3.3 mmol/L


(3.5-5.1) 





 


Chloride Level


 102 mmol/L


() 





 


Carbon Dioxide Level


 29 mmol/L


(21-32) 





 


Anion Gap 7 (6-14)  


 


Blood Urea Nitrogen


 20 mg/dL


(8-26) 





 


Creatinine


 0.5 mg/dL


(0.7-1.3) 





 


Estimated GFR


(Cockcroft-Gault) 166.4 


 





 


BUN/Creatinine Ratio 40 (6-20)  


 


Glucose Level


 85 mg/dL


(70-99) 





 


Calcium Level


 8.0 mg/dL


(8.5-10.1) 





 


Total Bilirubin


 0.5 mg/dL


(0.2-1.0) 





 


Aspartate Amino Transf


(AST/SGOT) 111 U/L


(15-37) 





 


Alanine Aminotransferase


(ALT/SGPT) 78 U/L (16-63) 


 





 


Alkaline Phosphatase


 61 U/L


() 





 


Total Protein


 5.8 g/dL


(6.4-8.2) 





 


Albumin


 2.2 g/dL


(3.4-5.0) 





 


Albumin/Globulin Ratio 0.6 (1.0-1.7)  


 


O2 Saturation  91 % (92-99) 


 


Arterial Blood pH


 


 7.49


(7.35-7.45)


 


Arterial Blood pCO2 at


Patient Temp 


 37 mmHg


(35-46)


 


Arterial Blood pO2 at Patient


Temp 


 59 mmHg


()


 


Arterial Blood HCO3


 


 28 mmol/L


(21-28)


 


Arterial Blood Base Excess


 


 4 mmol/L


(-3-3)


 


FiO2  100% 








Medications





Active Scripts








 Medications  Dose


 Route/Sig


 Max Daily Dose Days Date Category


 


 Morphine Sulfate


 Er (Morphine


 Sulfate) 30 Mg


 Tablet.er  1 Tab


 PO BID


   12/23/20 Reported


 


 Tramadol Hcl 100


 Mg Tbmp.24hr  100 Mg


 PO PRN Q8HRS PRN


   12/23/20 Reported


 


 Ambien (Zolpidem


 Tartrate) 10 Mg


 Tablet  10 Mg


 PO PRN QHS PRN


   12/23/20 Reported


 


 Methotrexate


  (Methotrexate


 Sodium) 2.5 Mg


 Tablet  10 Tab


 PO WEEKLY


   12/23/20 Reported


 


 Lisinopril 10 Mg


 Tablet  1 Tab


 PO DAILY


   12/23/20 Reported


 


 Crestor


  (Rosuvastatin


 Calcium) 5 Mg


 Tablet  2 Tab


 PO DAILY


   12/23/20 Reported


 


 Hydrochlorothiazide


 Tablet


  (Hydrochlorothiazide)


 50 Mg Tablet  25 Mg


 PO DAILY


   12/23/20 Reported


 


 Escitalopram


 Oxalate 10 Mg


 Tablet  1 Tab


 PO DAILY


   12/23/20 Reported


 


 Sulfasalazine Dr


  (Sulfasalazine)


 500 Mg Tablet.dr  2 Tab


 PO TID


  30 12/23/20 Reported


 


 Colace (Docusate


 Sodium) 100 Mg


 Capsule  1 Cap


 PO DA


  30 12/23/20 Reported


 


 Acetaminophen


 Ext.release


  (Acetaminophen)


 650 Mg Tablet.er  650 Mg


 PO PRN Q8HRS PRN


   12/23/20 Reported


 


 Melatonin 5 Mg


 Capsule  1 Cap


 PO QHS


  30 12/23/20 Reported


 


 B-12


  (Cyanocobalamin


  (Vitamin B-12))


 500 Mcg Tablet  2 Tab


 PO DAILY


  30 12/23/20 Reported


 


 Vitamin D3


  (Cholecalciferol


  (Vitamin D3)) 50


 Mcg Capsule  50 Mcg


 PO DAILY


   12/23/20 Reported


 


 Aller-Sung


  (Cetirizine Hcl)


 10 Mg Tablet  1 Tab


 PO DAILY


  30 12/23/20 Reported











Impression


.


IMPRESSION:


1.  Acute hypoxic respiratory failure secondary to highly suspected COVID-19


pneumonia and acute respiratory distress syndrome/acute lung injury.-- COVID-19 

positive 


2.  Abnormal CT chest with extensive widespread ground glass and alveolar and


interstitial infiltrates with reactive mild mediastinal/hilar adenopathy.  This


is likely related to COVID-19 pneumonia.


3.  Patient with history of psoriatic arthritis.  He is likely immunocompromise


as he has been on methotrexate.  covering for opportunistic infection with Abx


4.  History of tongue cancer with resection, details unknown.


5.  History of prostate cancer.


6.  History of Crohn's disease.





Plan


.


RECOMMENDATIONS:


Continue supplemental oxygen to keep sats above 92%, Continue Vapotherm, 

739HRQ5/40 litres


COVID-19 positive


Continue with empiric antibiotic, rocephin and azithromycin 


Continue with IV steroids with slow taper 


Continue PPN for nutritional support 


DVT/GI PPX 





D/W RN and RT 


d/w Dr Samayoa


Critical care time 30 minutes











ABI MENDEZ MD                 Dec 27, 2020 11:13

## 2020-12-27 NOTE — PDOC
[employer] PROGRESS NOTE


Date of Service


DOS:


DATE: 12/27/20 


TIME: 07:38





Chief Complaint


Chief Complaint


A/P:


Acute hypoxic respiratory failure - no pulmonary history. With recent travel to 

and from [place] likely COVID 19 vs other atypical pneumonia. Cont empiric 

antibiotics, steroids. Consult Pulm. BIPAP in negative pressure room 16/8 on 80%

FiO2


COVID 19 positive - with pneumonia - given transaminitis and late presentation 

with high O2 requirements no indication for remdesivir


RYDER - likely vasomotor nephropathy - no known renal history, will hydrate


Pneumonia - likely atypical, gram negative vs viral. will cover 


Prostate Ca - s/p resection at [facility] in [place]


Hypokalemia - likely nutritional or loss from diarrhea, will replace


Elevated troponin - likely from type II demand ischemia, will trend troponins, 

maintain telemetry


Crohn's - sees rheumatology regularly, Dr Fan in [place], on sulfasalazine


Psoriatic arthritis - on pain medications 30mg MS Contin BID, tramadol and 25mg 

Methotrexate weekly as DMARD per rheumatology, Dr. Fan. Hold MTX while 

inpatient


GERD - PPI


HLD - statin


HTN - Lisinopril and HCTZ on hold for RYDER


Anemia - likely of chronci disease, will monitor


Cardiomegaly - notable on CT chest - no known cardiac history, though his mother

did have CHF and CAD


Right renal mass - will need US for further assessment





FEN - NPO, can try regular diet when off BIPAP


PPX - lovenox


FULL CODE


Dispo - ICU for respiratory failure


CC time 47 minutes


Have d/w wife, Hoa and son, Desmond, [phone]





History of Present Illness


History of Present Illness


Mr Nance is 66 yo male w/ past medical history of Crohn's, psoriatic arthritis,

GERD, HLD, HTN, prostate ca, tongue cancer who presents to the emergency 

department at [facility] concerned about shortness of breath that had been 

progressive. This started 12/18/2020 and has been getting worse since that time.

Of note patient also has loss of taste, loss of smell, cough, shortness of 

breath, fever. He recently went to [place] on a business trip and just 

returned 5 days prior to presentation.


Upon arrival to the emergency room patient had significant hypoxia with a pulse 

ox in the 40s.  He was placed initially on a nonrebreather and then placed on 

BiPAP.


He was also somewhat concerned about a Crohn's flareup.  Patient states he has 

been having abdominal pain with nausea and diarrhea.  These are not typical 

symptoms of his Crohn's.  He feels very tired and has body aches as well.


He was given steroids and Rocephin in ED. 


Chest radiograph concerning for bilateral interstitial infiltrates.


Labs significant for WBC 4.5, Hb 11.8, platelets 229, , K2.8, BUN 34, CR 

1.5, glucose 119, .9, albumin 3, , , lactic acid 1.6, D-

dimer 3.15, troponin 0.068.


ABG on 80% FiO2 7.53/42/64


CT negative for pulmonary embolism. Widespread airspace disease throughout both 

lungs, consistent with pneumonia. Mild mediastinal and bilateral hilar 

lymphadenopathy, likely reactive. Cardiomegaly with mild aneurysmal dilation of 

the ascending thoracic aorta. Indeterminate hyperdense nodule at the midpole 

right kidney. This could represent a solid mass or complex cyst.


Patient transferred to [facility] for pulmonary consultation and 

ICU admission.





12/24: Overnight BP improved with fluids. O2 saturations slightly increased. Did

not tolerate more than 1 hour off BIPAP even with non-rebreather O2 saturations 

were 92% at best. No pain currently, very slightly appetite.


12/25: COVID-19 returned positive.  Down to 65% on his BiPAP 18/8.  Was able to 

take it off for medications meal yesterday, but desaturates to 79% and recovers 

quickly.  Still very drowsy with little appetite.  Afebrile.


12/26: Afebrile with 100% O2 on BiPAP today.  Transition to Vapotherm with more 

ease of breathing.  He has almost no appetite.  Less drowsy today.  He is 

depressed mood but now has a cell phone  and is able to talk to his 

family.  No complaints of chest pain some abdominal pain no bowel movement as of

yet.  ABG 7.49/37/59





Afebrile overnight.  Tolerating Vapotherm at 100% FiO2 much better with O2 

saturations 96%.  Appetite is increased slightly.  Less drowsy.  He does note 

that he barely slept last night.  Pain is well controlled.  He has had 2 formed 

stools.  No chest pain.  Still with rough cough.  Blood pressure systolic in the

180s minimally responsive to labetalol.





Plan:


Seroquel prn sleep


Vasotec prn HTN


Awaiting PICC





Vitals/I&O


Vitals/I&O:





                                   Vital Signs








  Date Time  Temp Pulse Resp B/P (MAP) Pulse Ox O2 Delivery O2 Flow Rate FiO2


 


12/27/20 06:11  84  177/89    


 


12/27/20 06:00   25  99 Vapotherm/NRB  


 


12/27/20 05:26       40.0 


 


12/27/20 05:00 99.7       





 99.7       














                                    I & O   


 


 12/26/20 12/26/20 12/27/20





 15:00 23:00 07:00


 


Intake Total  2217 ml 595 ml


 


Output Total  800 ml 350 ml


 


Balance  1417 ml 245 ml











Physical Exam


General:  Alert, Oriented X3, Cooperative, moderate distress


Abdomen:  Normal bowel sounds, Soft, No tenderness, No hepatosplenomegaly, No 

masses


Extremities:  No clubbing, No cyanosis, No edema, Normal pulses, No 

tenderness/swelling


Skin:  No rashes, No breakdown, No significant lesion





Labs


Labs:





Laboratory Tests








Test


 12/26/20


07:50


 


O2 Saturation 91 % (92-99) 


 


Arterial Blood pH


 7.49


(7.35-7.45)


 


Arterial Blood pCO2 at


Patient Temp 37 mmHg


(35-46)


 


Arterial Blood pO2 at Patient


Temp 59 mmHg


()


 


Arterial Blood HCO3


 28 mmol/L


(21-28)


 


Arterial Blood Base Excess


 4 mmol/L


(-3-3)


 


FiO2 100% 











Comment


Review of Relevant


I have reviewed the following items jabier (where applicable) has been applied.


Medications:





Current Medications








 Medications


  (Trade)  Dose


 Ordered  Sig/Kayli


 Route


 PRN Reason  Start Time


 Stop Time Status Last Admin


Dose Admin


 


 Sterile Water


  (WATER for RESP)  1,000 ml  CONT  PRN


 INH


 VIA VAPOTHERM DEVICE  12/26/20 09:30


    12/27/20 05:28





 


 Benzonatate


  (Tessalon Perle)  100 mg  [id number]


 PO


   12/26/20 14:00


    12/26/20 21:26





 


 Labetalol HCl


  (Normodyne Iv


 Push)  10 mg  PRN Q2HR  PRN


 IVP


 HYPERTENSION  12/26/20 18:30


    12/27/20 06:11














Justifications for Admission


Other Justification














MISSAEL MANCIA MD        Dec 27, 2020 07:39

## 2020-12-27 NOTE — RAD
Chest AP portable at 1047:



Reason for examination: PICC line placement.



PICC line is present on the right with the tip at the superior vena cava distally. Heart and mediasti
num are unremarkable. Lung fields show diffuse patchy alveolar opacities bilaterally but predominantl
y in the lower lobes. No pleural effusions are seen. No acute bony abnormalities are present.



IMPRESSION:



PICC line tip at the distal superior vena cava.

Diffuse bilateral alveolar opacities which are predominantly lower lobe.



Electronically signed by: Munira Irwin MD (12/27/2020 12:28 PM) [id number]

## 2020-12-28 VITALS — SYSTOLIC BLOOD PRESSURE: 147 MMHG | DIASTOLIC BLOOD PRESSURE: 95 MMHG

## 2020-12-28 VITALS — SYSTOLIC BLOOD PRESSURE: 165 MMHG | DIASTOLIC BLOOD PRESSURE: 88 MMHG

## 2020-12-28 VITALS — DIASTOLIC BLOOD PRESSURE: 66 MMHG | SYSTOLIC BLOOD PRESSURE: 114 MMHG

## 2020-12-28 VITALS — SYSTOLIC BLOOD PRESSURE: 153 MMHG | DIASTOLIC BLOOD PRESSURE: 86 MMHG

## 2020-12-28 VITALS — SYSTOLIC BLOOD PRESSURE: 108 MMHG | DIASTOLIC BLOOD PRESSURE: 62 MMHG

## 2020-12-28 VITALS — SYSTOLIC BLOOD PRESSURE: 144 MMHG | DIASTOLIC BLOOD PRESSURE: 86 MMHG

## 2020-12-28 VITALS — SYSTOLIC BLOOD PRESSURE: 149 MMHG | DIASTOLIC BLOOD PRESSURE: 81 MMHG

## 2020-12-28 VITALS — DIASTOLIC BLOOD PRESSURE: 80 MMHG | SYSTOLIC BLOOD PRESSURE: 157 MMHG

## 2020-12-28 VITALS — DIASTOLIC BLOOD PRESSURE: 87 MMHG | SYSTOLIC BLOOD PRESSURE: 137 MMHG

## 2020-12-28 VITALS — DIASTOLIC BLOOD PRESSURE: 73 MMHG | SYSTOLIC BLOOD PRESSURE: 137 MMHG

## 2020-12-28 VITALS — SYSTOLIC BLOOD PRESSURE: 140 MMHG | DIASTOLIC BLOOD PRESSURE: 70 MMHG

## 2020-12-28 VITALS — DIASTOLIC BLOOD PRESSURE: 56 MMHG | SYSTOLIC BLOOD PRESSURE: 111 MMHG

## 2020-12-28 VITALS — DIASTOLIC BLOOD PRESSURE: 71 MMHG | SYSTOLIC BLOOD PRESSURE: 140 MMHG

## 2020-12-28 VITALS — SYSTOLIC BLOOD PRESSURE: 111 MMHG | DIASTOLIC BLOOD PRESSURE: 65 MMHG

## 2020-12-28 VITALS — DIASTOLIC BLOOD PRESSURE: 85 MMHG | SYSTOLIC BLOOD PRESSURE: 151 MMHG

## 2020-12-28 VITALS — SYSTOLIC BLOOD PRESSURE: 175 MMHG | DIASTOLIC BLOOD PRESSURE: 94 MMHG

## 2020-12-28 VITALS — DIASTOLIC BLOOD PRESSURE: 69 MMHG | SYSTOLIC BLOOD PRESSURE: 120 MMHG

## 2020-12-28 VITALS — DIASTOLIC BLOOD PRESSURE: 70 MMHG | SYSTOLIC BLOOD PRESSURE: 132 MMHG

## 2020-12-28 VITALS — SYSTOLIC BLOOD PRESSURE: 128 MMHG | DIASTOLIC BLOOD PRESSURE: 70 MMHG

## 2020-12-28 VITALS — SYSTOLIC BLOOD PRESSURE: 149 MMHG | DIASTOLIC BLOOD PRESSURE: 82 MMHG

## 2020-12-28 VITALS — DIASTOLIC BLOOD PRESSURE: 71 MMHG | SYSTOLIC BLOOD PRESSURE: 150 MMHG

## 2020-12-28 VITALS — SYSTOLIC BLOOD PRESSURE: 178 MMHG | DIASTOLIC BLOOD PRESSURE: 88 MMHG

## 2020-12-28 VITALS — DIASTOLIC BLOOD PRESSURE: 84 MMHG | SYSTOLIC BLOOD PRESSURE: 147 MMHG

## 2020-12-28 VITALS — DIASTOLIC BLOOD PRESSURE: 61 MMHG | SYSTOLIC BLOOD PRESSURE: 96 MMHG

## 2020-12-28 LAB
ALBUMIN SERPL-MCNC: 2 G/DL (ref 3.4–5)
ALBUMIN/GLOB SERPL: 0.5 {RATIO} (ref 1–1.7)
ALP SERPL-CCNC: 104 U/L (ref 46–116)
ALT SERPL-CCNC: 141 U/L (ref 16–63)
ANION GAP SERPL CALC-SCNC: 4 MMOL/L (ref 6–14)
AST SERPL-CCNC: 132 U/L (ref 15–37)
BASOPHILS # BLD AUTO: 0 X10^3/UL (ref 0–0.2)
BASOPHILS NFR BLD: 0 % (ref 0–3)
BILIRUB SERPL-MCNC: 0.4 MG/DL (ref 0.2–1)
BUN SERPL-MCNC: 14 MG/DL (ref 8–26)
BUN/CREAT SERPL: 20 (ref 6–20)
CALCIUM SERPL-MCNC: 8.3 MG/DL (ref 8.5–10.1)
CHLORIDE SERPL-SCNC: 109 MMOL/L (ref 98–107)
CO2 SERPL-SCNC: 30 MMOL/L (ref 21–32)
CREAT SERPL-MCNC: 0.7 MG/DL (ref 0.7–1.3)
EOSINOPHIL NFR BLD: 0 X10^3/UL (ref 0–0.7)
EOSINOPHIL NFR BLD: 1 % (ref 0–3)
ERYTHROCYTE [DISTWIDTH] IN BLOOD BY AUTOMATED COUNT: 14.4 % (ref 11.5–14.5)
GFR SERPLBLD BASED ON 1.73 SQ M-ARVRAT: 112.8 ML/MIN
GLUCOSE SERPL-MCNC: 92 MG/DL (ref 70–99)
HCT VFR BLD CALC: 29.9 % (ref 39–53)
HGB BLD-MCNC: 10 G/DL (ref 13–17.5)
LYMPHOCYTES # BLD: 0.7 X10^3/UL (ref 1–4.8)
LYMPHOCYTES NFR BLD AUTO: 12 % (ref 24–48)
MCH RBC QN AUTO: 33 PG (ref 25–35)
MCHC RBC AUTO-ENTMCNC: 34 G/DL (ref 31–37)
MCV RBC AUTO: 97 FL (ref 79–100)
MONO #: 0.3 X10^3/UL (ref 0–1.1)
MONOCYTES NFR BLD: 6 % (ref 0–9)
NEUT #: 4.7 X10^3/UL (ref 1.8–7.7)
NEUTROPHILS NFR BLD AUTO: 81 % (ref 31–73)
PLATELET # BLD AUTO: 248 X10^3/UL (ref 140–400)
POTASSIUM SERPL-SCNC: 3.7 MMOL/L (ref 3.5–5.1)
PROT SERPL-MCNC: 5.7 G/DL (ref 6.4–8.2)
RBC # BLD AUTO: 3.08 X10^6/UL (ref 4.3–5.7)
SODIUM SERPL-SCNC: 143 MMOL/L (ref 136–145)
WBC # BLD AUTO: 5.8 X10^3/UL (ref 4–11)

## 2020-12-28 RX ADMIN — MORPHINE SULFATE SCH MG: 15 TABLET, EXTENDED RELEASE ORAL at 08:17

## 2020-12-28 RX ADMIN — WATER PRN ML: 1 INJECTION INTRAVENOUS at 17:17

## 2020-12-28 RX ADMIN — ENALAPRILAT PRN MG: 1.25 INJECTION, SOLUTION INTRAVENOUS at 12:29

## 2020-12-28 RX ADMIN — GUAIFENESIN PRN MG: 100 SOLUTION ORAL at 07:14

## 2020-12-28 RX ADMIN — ATORVASTATIN CALCIUM SCH MG: 40 TABLET, FILM COATED ORAL at 20:24

## 2020-12-28 RX ADMIN — ZINC SULFATE CAP 220 MG (50 MG ELEMENTAL ZN) SCH MG: 220 (50 ZN) CAP at 08:16

## 2020-12-28 RX ADMIN — GLYCERIN, ISOLEUCINE, LEUCINE, LYSINE, METHIONINE, PHENYLALANINE, THREONINE, TRYPTOPHAN, VALINE, ALANINE, GLYCINE, ARGININE, HISTIDINE, PROLINE, SERINE, CYSTEINE, SODIUM ACETATE, MAGNESIUM ACETATE, CALCIUM ACETATE, SODIUM CHLORIDE, POTASSIUM CHLORIDE, PHOSPHORIC ACID, AND POTASSIUM METABISULFITE SCH MLS/HR
3; .21; .27; .22; .16; .17; .12; .046; .2; .21; .42; .29; .085; .34; .18; .014; .2; .054; .026; .12; .15; .041 INJECTION INTRAVENOUS at 10:28

## 2020-12-28 RX ADMIN — DEXAMETHASONE SODIUM PHOSPHATE SCH MG: 4 INJECTION, SOLUTION INTRAMUSCULAR; INTRAVENOUS at 08:17

## 2020-12-28 RX ADMIN — CETIRIZINE HYDROCHLORIDE SCH MG: 10 TABLET, FILM COATED ORAL at 08:17

## 2020-12-28 RX ADMIN — MORPHINE SULFATE PRN MG: 2 INJECTION, SOLUTION INTRAMUSCULAR; INTRAVENOUS at 07:15

## 2020-12-28 RX ADMIN — BENZONATATE SCH MG: 100 CAPSULE, LIQUID FILLED ORAL at 20:25

## 2020-12-28 RX ADMIN — CITALOPRAM HYDROBROMIDE SCH MG: 20 TABLET ORAL at 08:16

## 2020-12-28 RX ADMIN — MORPHINE SULFATE SCH MG: 15 TABLET, EXTENDED RELEASE ORAL at 20:25

## 2020-12-28 RX ADMIN — QUETIAPINE FUMARATE PRN MG: 25 TABLET, FILM COATED ORAL at 22:05

## 2020-12-28 RX ADMIN — BENZONATATE SCH MG: 100 CAPSULE, LIQUID FILLED ORAL at 14:00

## 2020-12-28 RX ADMIN — CEFTRIAXONE SCH GM: 1 INJECTION, POWDER, FOR SOLUTION INTRAMUSCULAR; INTRAVENOUS at 09:12

## 2020-12-28 RX ADMIN — CYANOCOBALAMIN TAB 1000 MCG SCH MCG: 1000 TAB at 08:16

## 2020-12-28 RX ADMIN — GUAIFENESIN AND DEXTROMETHORPHAN PRN ML: 100; 10 SYRUP ORAL at 06:40

## 2020-12-28 RX ADMIN — ENOXAPARIN SODIUM SCH MG: 40 INJECTION SUBCUTANEOUS at 08:18

## 2020-12-28 RX ADMIN — BENZONATATE SCH MG: 100 CAPSULE, LIQUID FILLED ORAL at 08:16

## 2020-12-28 RX ADMIN — ENOXAPARIN SODIUM SCH MG: 40 INJECTION SUBCUTANEOUS at 20:24

## 2020-12-28 NOTE — PDOC
PULMONARY PROGRESS NOTES


DATE: 12/28/20 


TIME: 11:24


Subjective


 on 100% FIO2 via Vapotherm , tolerating better


Restless at times 


no overnight concerns from nursing


Vitals





Vital Signs








  Date Time  Temp Pulse Resp B/P (MAP) Pulse Ox O2 Delivery O2 Flow Rate FiO2


 


12/28/20 10:00  85 30 140/71 (94) 96 Vapotherm/NRB  


 


12/28/20 08:17       40.0 


 


12/28/20 08:00 97.8       





 97.8       








Comments


Pt. seen during COVID-19 pandemic, visual exam preformed 


RRR


no distress


BIPAP


no accessory muscle use 


No edema or rash


General:  Alert, No acute distress


Labs





Laboratory Tests








Test


 12/28/20


06:20


 


White Blood Count


 5.8 x10^3/uL


(4.0-11.0)


 


Red Blood Count


 3.08 x10^6/uL


(4.30-5.70)


 


Hemoglobin


 10.0 g/dL


(13.0-17.5)


 


Hematocrit


 29.9 %


(39.0-53.0)


 


Mean Corpuscular Volume 97 fL () 


 


Mean Corpuscular Hemoglobin 33 pg (25-35) 


 


Mean Corpuscular Hemoglobin


Concent 34 g/dL


(31-37)


 


Red Cell Distribution Width


 14.4 %


(11.5-14.5)


 


Platelet Count


 248 x10^3/uL


(140-400)


 


Neutrophils (%) (Auto) 81 % (31-73) 


 


Lymphocytes (%) (Auto) 12 % (24-48) 


 


Monocytes (%) (Auto) 6 % (0-9) 


 


Eosinophils (%) (Auto) 1 % (0-3) 


 


Basophils (%) (Auto) 0 % (0-3) 


 


Neutrophils # (Auto)


 4.7 x10^3/uL


(1.8-7.7)


 


Lymphocytes # (Auto)


 0.7 x10^3/uL


(1.0-4.8)


 


Monocytes # (Auto)


 0.3 x10^3/uL


(0.0-1.1)


 


Eosinophils # (Auto)


 0.0 x10^3/uL


(0.0-0.7)


 


Basophils # (Auto)


 0.0 x10^3/uL


(0.0-0.2)


 


Sodium Level


 143 mmol/L


(136-145)


 


Potassium Level


 3.7 mmol/L


(3.5-5.1)


 


Chloride Level


 109 mmol/L


()


 


Carbon Dioxide Level


 30 mmol/L


(21-32)


 


Anion Gap 4 (6-14) 


 


Blood Urea Nitrogen


 14 mg/dL


(8-26)


 


Creatinine


 0.7 mg/dL


(0.7-1.3)


 


Estimated GFR


(Cockcroft-Gault) 112.8 





 


BUN/Creatinine Ratio 20 (6-20) 


 


Glucose Level


 92 mg/dL


(70-99)


 


Calcium Level


 8.3 mg/dL


(8.5-10.1)


 


Total Bilirubin


 0.4 mg/dL


(0.2-1.0)


 


Aspartate Amino Transf


(AST/SGOT) 132 U/L


(15-37)


 


Alanine Aminotransferase


(ALT/SGPT) 141 U/L


(16-63)


 


Alkaline Phosphatase


 104 U/L


()


 


Total Protein


 5.7 g/dL


(6.4-8.2)


 


Albumin


 2.0 g/dL


(3.4-5.0)


 


Albumin/Globulin Ratio 0.5 (1.0-1.7) 








Laboratory Tests








Test


 12/28/20


06:20


 


White Blood Count


 5.8 x10^3/uL


(4.0-11.0)


 


Red Blood Count


 3.08 x10^6/uL


(4.30-5.70)


 


Hemoglobin


 10.0 g/dL


(13.0-17.5)


 


Hematocrit


 29.9 %


(39.0-53.0)


 


Mean Corpuscular Volume 97 fL () 


 


Mean Corpuscular Hemoglobin 33 pg (25-35) 


 


Mean Corpuscular Hemoglobin


Concent 34 g/dL


(31-37)


 


Red Cell Distribution Width


 14.4 %


(11.5-14.5)


 


Platelet Count


 248 x10^3/uL


(140-400)


 


Neutrophils (%) (Auto) 81 % (31-73) 


 


Lymphocytes (%) (Auto) 12 % (24-48) 


 


Monocytes (%) (Auto) 6 % (0-9) 


 


Eosinophils (%) (Auto) 1 % (0-3) 


 


Basophils (%) (Auto) 0 % (0-3) 


 


Neutrophils # (Auto)


 4.7 x10^3/uL


(1.8-7.7)


 


Lymphocytes # (Auto)


 0.7 x10^3/uL


(1.0-4.8)


 


Monocytes # (Auto)


 0.3 x10^3/uL


(0.0-1.1)


 


Eosinophils # (Auto)


 0.0 x10^3/uL


(0.0-0.7)


 


Basophils # (Auto)


 0.0 x10^3/uL


(0.0-0.2)


 


Sodium Level


 143 mmol/L


(136-145)


 


Potassium Level


 3.7 mmol/L


(3.5-5.1)


 


Chloride Level


 109 mmol/L


()


 


Carbon Dioxide Level


 30 mmol/L


(21-32)


 


Anion Gap 4 (6-14) 


 


Blood Urea Nitrogen


 14 mg/dL


(8-26)


 


Creatinine


 0.7 mg/dL


(0.7-1.3)


 


Estimated GFR


(Cockcroft-Gault) 112.8 





 


BUN/Creatinine Ratio 20 (6-20) 


 


Glucose Level


 92 mg/dL


(70-99)


 


Calcium Level


 8.3 mg/dL


(8.5-10.1)


 


Total Bilirubin


 0.4 mg/dL


(0.2-1.0)


 


Aspartate Amino Transf


(AST/SGOT) 132 U/L


(15-37)


 


Alanine Aminotransferase


(ALT/SGPT) 141 U/L


(16-63)


 


Alkaline Phosphatase


 104 U/L


()


 


Total Protein


 5.7 g/dL


(6.4-8.2)


 


Albumin


 2.0 g/dL


(3.4-5.0)


 


Albumin/Globulin Ratio 0.5 (1.0-1.7) 








Medications





Active Scripts








 Medications  Dose


 Route/Sig


 Max Daily Dose Days Date Category


 


 Morphine Sulfate


 Er (Morphine


 Sulfate) 30 Mg


 Tablet.er  1 Tab


 PO BID


   12/23/20 Reported


 


 Tramadol Hcl 100


 Mg Tbmp.24hr  100 Mg


 PO PRN Q8HRS PRN


   12/23/20 Reported


 


 Ambien (Zolpidem


 Tartrate) 10 Mg


 Tablet  10 Mg


 PO PRN QHS PRN


   12/23/20 Reported


 


 Methotrexate


  (Methotrexate


 Sodium) 2.5 Mg


 Tablet  10 Tab


 PO WEEKLY


   12/23/20 Reported


 


 Lisinopril 10 Mg


 Tablet  1 Tab


 PO DAILY


   12/23/20 Reported


 


 Crestor


  (Rosuvastatin


 Calcium) 5 Mg


 Tablet  2 Tab


 PO DAILY


   12/23/20 Reported


 


 Hydrochlorothiazide


 Tablet


  (Hydrochlorothiazide)


 50 Mg Tablet  25 Mg


 PO DAILY


   12/23/20 Reported


 


 Escitalopram


 Oxalate 10 Mg


 Tablet  1 Tab


 PO DAILY


   12/23/20 Reported


 


 Sulfasalazine Dr


  (Sulfasalazine)


 500 Mg Tablet.dr  2 Tab


 PO TID


  30 12/23/20 Reported


 


 Colace (Docusate


 Sodium) 100 Mg


 Capsule  1 Cap


 PO DA


  30 12/23/20 Reported


 


 Acetaminophen


 Ext.release


  (Acetaminophen)


 650 Mg Tablet.er  650 Mg


 PO PRN Q8HRS PRN


   12/23/20 Reported


 


 Melatonin 5 Mg


 Capsule  1 Cap


 PO QHS


  30 12/23/20 Reported


 


 B-12


  (Cyanocobalamin


  (Vitamin B-12))


 500 Mcg Tablet  2 Tab


 PO DAILY


  30 12/23/20 Reported


 


 Vitamin D3


  (Cholecalciferol


  (Vitamin D3)) 50


 Mcg Capsule  50 Mcg


 PO DAILY


   12/23/20 Reported


 


 Aller-Sung


  (Cetirizine Hcl)


 10 Mg Tablet  1 Tab


 PO DAILY


  30 12/23/20 Reported











Impression


.


IMPRESSION:


1.  Acute hypoxic respiratory failure secondary to highly suspected COVID-19


pneumonia and acute respiratory distress syndrome/acute lung injury.-- COVID-19 

positive 


2.  Abnormal CT chest with extensive widespread ground glass and alveolar and


interstitial infiltrates with reactive mild mediastinal/hilar adenopathy.  This


is likely related to COVID-19 pneumonia.


3.  Patient with history of psoriatic arthritis.  He is likely immunocompromise


as he has been on methotrexate.  covering for opportunistic infection with Abx


4.  History of tongue cancer with resection, details unknown.


5.  History of prostate cancer.


6.  History of Crohn's disease.





Plan


.


RECOMMENDATIONS:


Continue supplemental oxygen to keep sats above 92%, Continue Vapotherm, 

[id number]/40 litres


COVID-19 positive


Continue with empiric antibiotic, rocephin and azithromycin 


Continue with IV steroids with slow taper 


Continue PPN for nutritional support 


DVT/GI PPX 


remains critically ill, watch closely for need for intubation


message left for Wife


D/W RN and RT 


Critical care time 30 minutes











ABI MENDEZ MD                 Dec 28, 2020 11:27

## 2020-12-28 NOTE — PDOC
PROGRESS NOTES


Date of Service:


DATE: 12/28/20 


TIME: 08:52





Chief Complaint


Chief Complaint


impressionn ====================





Acute hypoxic respiratory failure - no pulmonary history. With recent travel to 

and from [place] likely COVID 19 vs other atypical pneumonia. Cont empiric 

antibiotics, steroids. Consult Pulm. BIPAP in negative pressure room 16/8 on 80%

FiO2


COVID 19 positive - with pneumonia - given transaminitis and late presentation 

with high O2 requirements no indication for remdesivir


RYDER - likely vasomotor nephropathy - no known renal history, will hydrate


Pneumonia - likely atypical, gram negative vs viral. will cover 


Prostate Ca - s/p resection at [facility] in [place]


Hypokalemia - likely nutritional or loss from diarrhea, will replace


Elevated troponin - likely from type II demand ischemia, will trend troponins, 

maintain telemetry


Crohn's - sees rheumatology regularly, Dr Fan in [place], on sulfasalazine


Psoriatic arthritis - on pain medications 30mg MS Contin BID, tramadol and 25mg 

Methotrexate weekly as DMARD per rheumatology, Dr. Fan. Hold MTX while 

inpatient


GERD - PPI


HLD - statin


HTN - Lisinopril and HCTZ on hold for RYDER


Anemia - likely of chronci disease, will monitor


Cardiomegaly - notable on CT chest - no known cardiac history, though his mother

did have CHF and CAD


Right renal mass - will need US for further assessment


on 100% FIO2 via Vapotherm , tolerating 








plan==============





FEN - NPO, can try regular diet when off BIPAP


PPX - lovenox


FULL CODE


Dispo - ICU 





CC time 37 minutes


Hoa and son, Desmond, [phone]





History of Present Illness


History of Present Illness


Mr Nance is 64 yo male w/ past medical history of Crohn's, psoriatic arthritis,

GERD, HLD, HTN, prostate ca, tongue cancer who presents to the emergency 

department at [facility] about shortness of breath that had been 

progressive. This started 12/18/2020 and has been getting worse since that time.

Of note patient also has loss of taste, loss of smell, cough, shortness of 

breath, fever. He recently went to [place] on a business trip and just 

returned 5 days prior to presentation.


Upon arrival to the emergency room patient had significant hypoxia with a pulse 

ox in the 40s.  He was placed initially on a nonrebreather and then placed on 

BiPAP.


He was also somewhat concerned about a Crohn's flareup.  Patient states he has 

been having abdominal pain with nausea and diarrhea.  These are not typical 

symptoms of his Crohn's.  He feels very tired and has body aches as well.


He was given steroids and Rocephin in ED. 


Chest radiograph concerning for bilateral interstitial infiltrates.


Labs significant for WBC 4.5, Hb 11.8, platelets 229, , K2.8, BUN 34, CR 

1.5, glucose 119, .9, albumin 3, , , lactic acid 1.6, D-

dimer 3.15, troponin 0.068.


ABG on 80% FiO2 7.53/42/64


CT negative for pulmonary embolism. Widespread airspace disease throughout both 

lungs, consistent with pneumonia. Mild mediastinal and bilateral hilar 

lymphadenopathy, likely reactive. Cardiomegaly with mild aneurysmal dilation of 

the ascending thoracic aorta. Indeterminate hyperdense nodule at the midpole 

right kidney. This could represent a solid mass or complex cyst.


Patient transferred to [facility] for pulmonary consultation and 

ICU admission.





12/24: Overnight BP improved with fluids. O2 saturations slightly increased. Did

not tolerate more than 1 hour off BIPAP even with non-rebreather O2 saturations 

were 92% at best. No pain currently, very slightly appetite.


12/25: COVID-19 returned positive.  Down to 65% on his BiPAP 18/8.  Was able to 

take it off for medications meal yesterday, but desaturates to 79% and recovers 

quickly.  Still very drowsy with little appetite.  Afebrile.


12/26: Afebrile with 100% O2 on BiPAP today.  Transition to Vapotherm with more 

ease of breathing.  He has almost no appetite.  Less drowsy today.  He is 

depressed mood but now has a cell phone  and is able to talk to his 

family.  No complaints of chest pain some abdominal pain no bowel movement as of

yet.  ABG 7.49/37/59





Afebrile overnight.  Tolerating Vapotherm at 100% FiO2 much better with O2 

saturations 96%.  Appetite is increased slightly.  Less drowsy.  He does note 

that he barely slept last night.  Pain is well controlled.  He has had 2 formed 

stools.  No chest pain.  Still with rough cough.  Blood pressure systolic in the

180s minimally responsive to labetalol.





Plan:


Seroquel prn sleep


Vasotec prn HTN


Awaiting PICC





Vitals


Vitals





Vital Signs








  Date Time  Temp Pulse Resp B/P (MAP) Pulse Ox O2 Delivery O2 Flow Rate FiO2


 


12/28/20 08:17   26  95 Nasal Cannula 40.0 


 


12/28/20 07:00  88  137/73 (94)    


 


12/28/20 04:00 98.4       





 98.4       











Physical Exam


General:  Alert, Oriented X3, Cooperative, moderate distress


Heart:  Regular rate


Lungs:  Crackles


Abdomen:  Normal bowel sounds, Soft, No tenderness, No hepatosplenomegaly, No 

masses


Extremities:  No clubbing, No cyanosis, No edema, Normal pulses, No 

tenderness/swelling


Skin:  No rashes, No breakdown, No significant lesion





Labs


LABS





Laboratory Tests








Test


 12/28/20


06:20


 


White Blood Count


 5.8 x10^3/uL


(4.0-11.0)


 


Red Blood Count


 3.08 x10^6/uL


(4.30-5.70)


 


Hemoglobin


 10.0 g/dL


(13.0-17.5)


 


Hematocrit


 29.9 %


(39.0-53.0)


 


Mean Corpuscular Volume 97 fL () 


 


Mean Corpuscular Hemoglobin 33 pg (25-35) 


 


Mean Corpuscular Hemoglobin


Concent 34 g/dL


(31-37)


 


Red Cell Distribution Width


 14.4 %


(11.5-14.5)


 


Platelet Count


 248 x10^3/uL


(140-400)


 


Neutrophils (%) (Auto) 81 % (31-73) 


 


Lymphocytes (%) (Auto) 12 % (24-48) 


 


Monocytes (%) (Auto) 6 % (0-9) 


 


Eosinophils (%) (Auto) 1 % (0-3) 


 


Basophils (%) (Auto) 0 % (0-3) 


 


Neutrophils # (Auto)


 4.7 x10^3/uL


(1.8-7.7)


 


Lymphocytes # (Auto)


 0.7 x10^3/uL


(1.0-4.8)


 


Monocytes # (Auto)


 0.3 x10^3/uL


(0.0-1.1)


 


Eosinophils # (Auto)


 0.0 x10^3/uL


(0.0-0.7)


 


Basophils # (Auto)


 0.0 x10^3/uL


(0.0-0.2)


 


Sodium Level


 143 mmol/L


(136-145)


 


Potassium Level


 3.7 mmol/L


(3.5-5.1)


 


Chloride Level


 109 mmol/L


()


 


Carbon Dioxide Level


 30 mmol/L


(21-32)


 


Anion Gap 4 (6-14) 


 


Blood Urea Nitrogen


 14 mg/dL


(8-26)


 


Creatinine


 0.7 mg/dL


(0.7-1.3)


 


Estimated GFR


(Cockcroft-Gault) 112.8 





 


BUN/Creatinine Ratio 20 (6-20) 


 


Glucose Level


 92 mg/dL


(70-99)


 


Calcium Level


 8.3 mg/dL


(8.5-10.1)


 


Total Bilirubin


 0.4 mg/dL


(0.2-1.0)


 


Aspartate Amino Transf


(AST/SGOT) 132 U/L


(15-37)


 


Alanine Aminotransferase


(ALT/SGPT) 141 U/L


(16-63)


 


Alkaline Phosphatase


 104 U/L


()


 


Total Protein


 5.7 g/dL


(6.4-8.2)


 


Albumin


 2.0 g/dL


(3.4-5.0)


 


Albumin/Globulin Ratio 0.5 (1.0-1.7) 











Assessment and Plan


Assessmemt and Plan





DPOA REVIEW 19 MIN =================== to patient portal








 What Is a Power of ?  





A power of  (POA) is a legal document giving one person (the agent or 

-in-fact) the power to act for another person (the principal). The agent

can have broad legal authority or limited authority to make legal decisions 

about the principal's property, finances or medical care. The power of  

is frequently used in the event of a principal's illness or disability, or when 

the principal can't be present to sign necessary legal documents for financial 

transactions. 





A power of  can end for a number of reasons, such as when the principal 

dies, the principal revokes it, a court invalidates it, the principal divorces 

their spouse, who happens to be the agent, or the agent can no longer carry out 

the outlined responsibilities. 








Conventional POAs lapse when the creator becomes incapacitated, but a durable 

POA remains in force to enable the agent to manage the creators affairs, and a

springing POA comes into effect only if and when the creator of the POA 

becomes incapacitated. A medical or healthcare POA enables an agent to make 

medical decisions on behalf of an incapacitated person. 

















Key Takeaways





A power of  (POA) is a legal document giving one person, the agent or 

-in-fact the power to act for another person, the principal.


The agent can have broad legal authority or limited authority to make decisions

about the principal's property, finances or medical care.


The power of  is often used when a principal becomes ill or disabled, 

or when they can't be present to sign necessary legal documents for financial 

transactions.


 


 Understanding Power of   





A power of  should be considered when planning for long-term care. There

are different types of POAs that fall under either a general power of  

or limited power of . 

















A general power of  acts on behalf of the principal in any and all 

matters, as allowed by the state. The agent under a general POA agreement may be

authorized to take care of issues such as handling bank accounts, signing 

checks, selling property and assets like stocks, f





A limited power of  gives the agent the power to act on behalf of the 

principal in specific matters or events. For example, the limited POA may 

explicitly state that the agent is only allowed to manage the principal's 

detention accounts. A limited POA may also be limited to a specific period of 

time (e.g., if the principal will be out of the country for, say, two years). 














Most fox of  documents allow an agent to represent the principal in 

all property and financial matters as long as the principals mental state of 

mind is good. If a situation occurs where the principal becomes incapable of 

making decisions for him or herself, the POA agreement would automatically end. 

However, someone who wants the POA to remain in effect after the persons health

deteriorates would need to sign a durable power of  (DPOA). 








What is an advance directive?


An advance directive is a legal document that says how you want to be cared for 

if you are unable to make decisions. You can include what medical treatments you

would want and who you would trust to make decisions for you.





An advance directive can also include other legal documents. A living will is a 

list of treatment preferences. It can be used to indicate whether you would want

cardiopulmonary resuscitation (CPR), tube feedings, a breathing machine, or 

certain medicines, like antibiotics.





The durable power of  for health care document identifies the person you

would want to make medical decisions for you. This person is also called a 

proxy. Your proxy should be familiar with your values and wishes.





How do I get started?


You can get advance directive documents for your state from your doctor's office

or from [email or web address]. Review the forms, and ask your doctor if you 

have any questions. Pick a person to be your proxy, and talk it over with that 

person.





Comment


Review of Relevant


I have reviewed the following items jabier (where applicable) has been applied.


Labs





Laboratory Tests








Test


 12/28/20


06:20


 


White Blood Count


 5.8 x10^3/uL


(4.0-11.0)


 


Red Blood Count


 3.08 x10^6/uL


(4.30-5.70)


 


Hemoglobin


 10.0 g/dL


(13.0-17.5)


 


Hematocrit


 29.9 %


(39.0-53.0)


 


Mean Corpuscular Volume 97 fL () 


 


Mean Corpuscular Hemoglobin 33 pg (25-35) 


 


Mean Corpuscular Hemoglobin


Concent 34 g/dL


(31-37)


 


Red Cell Distribution Width


 14.4 %


(11.5-14.5)


 


Platelet Count


 248 x10^3/uL


(140-400)


 


Neutrophils (%) (Auto) 81 % (31-73) 


 


Lymphocytes (%) (Auto) 12 % (24-48) 


 


Monocytes (%) (Auto) 6 % (0-9) 


 


Eosinophils (%) (Auto) 1 % (0-3) 


 


Basophils (%) (Auto) 0 % (0-3) 


 


Neutrophils # (Auto)


 4.7 x10^3/uL


(1.8-7.7)


 


Lymphocytes # (Auto)


 0.7 x10^3/uL


(1.0-4.8)


 


Monocytes # (Auto)


 0.3 x10^3/uL


(0.0-1.1)


 


Eosinophils # (Auto)


 0.0 x10^3/uL


(0.0-0.7)


 


Basophils # (Auto)


 0.0 x10^3/uL


(0.0-0.2)


 


Sodium Level


 143 mmol/L


(136-145)


 


Potassium Level


 3.7 mmol/L


(3.5-5.1)


 


Chloride Level


 109 mmol/L


()


 


Carbon Dioxide Level


 30 mmol/L


(21-32)


 


Anion Gap 4 (6-14) 


 


Blood Urea Nitrogen


 14 mg/dL


(8-26)


 


Creatinine


 0.7 mg/dL


(0.7-1.3)


 


Estimated GFR


(Cockcroft-Gault) 112.8 





 


BUN/Creatinine Ratio 20 (6-20) 


 


Glucose Level


 92 mg/dL


(70-99)


 


Calcium Level


 8.3 mg/dL


(8.5-10.1)


 


Total Bilirubin


 0.4 mg/dL


(0.2-1.0)


 


Aspartate Amino Transf


(AST/SGOT) 132 U/L


(15-37)


 


Alanine Aminotransferase


(ALT/SGPT) 141 U/L


(16-63)


 


Alkaline Phosphatase


 104 U/L


()


 


Total Protein


 5.7 g/dL


(6.4-8.2)


 


Albumin


 2.0 g/dL


(3.4-5.0)


 


Albumin/Globulin Ratio 0.5 (1.0-1.7) 








Laboratory Tests








Test


 12/28/20


06:20


 


White Blood Count


 5.8 x10^3/uL


(4.0-11.0)


 


Red Blood Count


 3.08 x10^6/uL


(4.30-5.70)


 


Hemoglobin


 10.0 g/dL


(13.0-17.5)


 


Hematocrit


 29.9 %


(39.0-53.0)


 


Mean Corpuscular Volume 97 fL () 


 


Mean Corpuscular Hemoglobin 33 pg (25-35) 


 


Mean Corpuscular Hemoglobin


Concent 34 g/dL


(31-37)


 


Red Cell Distribution Width


 14.4 %


(11.5-14.5)


 


Platelet Count


 248 x10^3/uL


(140-400)


 


Neutrophils (%) (Auto) 81 % (31-73) 


 


Lymphocytes (%) (Auto) 12 % (24-48) 


 


Monocytes (%) (Auto) 6 % (0-9) 


 


Eosinophils (%) (Auto) 1 % (0-3) 


 


Basophils (%) (Auto) 0 % (0-3) 


 


Neutrophils # (Auto)


 4.7 x10^3/uL


(1.8-7.7)


 


Lymphocytes # (Auto)


 0.7 x10^3/uL


(1.0-4.8)


 


Monocytes # (Auto)


 0.3 x10^3/uL


(0.0-1.1)


 


Eosinophils # (Auto)


 0.0 x10^3/uL


(0.0-0.7)


 


Basophils # (Auto)


 0.0 x10^3/uL


(0.0-0.2)


 


Sodium Level


 143 mmol/L


(136-145)


 


Potassium Level


 3.7 mmol/L


(3.5-5.1)


 


Chloride Level


 109 mmol/L


()


 


Carbon Dioxide Level


 30 mmol/L


(21-32)


 


Anion Gap 4 (6-14) 


 


Blood Urea Nitrogen


 14 mg/dL


(8-26)


 


Creatinine


 0.7 mg/dL


(0.7-1.3)


 


Estimated GFR


(Cockcroft-Gault) 112.8 





 


BUN/Creatinine Ratio 20 (6-20) 


 


Glucose Level


 92 mg/dL


(70-99)


 


Calcium Level


 8.3 mg/dL


(8.5-10.1)


 


Total Bilirubin


 0.4 mg/dL


(0.2-1.0)


 


Aspartate Amino Transf


(AST/SGOT) 132 U/L


(15-37)


 


Alanine Aminotransferase


(ALT/SGPT) 141 U/L


(16-63)


 


Alkaline Phosphatase


 104 U/L


()


 


Total Protein


 5.7 g/dL


(6.4-8.2)


 


Albumin


 2.0 g/dL


(3.4-5.0)


 


Albumin/Globulin Ratio 0.5 (1.0-1.7) 








Medications





Current Medications


Sodium Chloride (Normal Saline Flush) 3 ml QSHIFT  PRN IV AFTER MEDS AND BLOOD 

DRAWS;  Start 12/23/20 at 07:45


Sodium Chloride 1,000 ml @  150 mls/hr Q6H40M IV  Last administered on 

12/23/20at 15:10;  Start 12/23/20 at 08:15;  Stop 12/23/20 at 21:34;  Status DC


Ondansetron HCl (Zofran) 4 mg PRN Q4HRS  PRN IV NAUSEA/VOMITING;  Start 12/23/20

at 08:15


Acetaminophen (Tylenol) 650 mg PRN Q4HRS  PRN PO TEMP OVER 100.4F OR MILD PAIN; 

Start 12/23/20 at 08:15


Docusate Sodium (Colace) 100 mg PRN BID  PRN PO HARD STOOLS Last administered on

12/25/20at 17:03;  Start 12/23/20 at 08:15


Ceftriaxone Sodium (Rocephin) 1 gm Q24H IVP  Last administered on 12/27/20at 

08:30;  Start 12/24/20 at 09:00


Dexamethasone Sodium Phosphate (Decadron) 4 mg DAILY IVP  Last administered on 

12/28/20at 08:17;  Start 12/24/20 at 09:00


Amino Acids/ Glycerin/ Electrolytes 1,000 ml @  80 mls/hr [id number] IV  Last 

administered on 12/27/20at 20:47;  Start 12/23/20 at 08:45


Enoxaparin Sodium (Lovenox 40mg Syringe) 40 mg Q12HR SQ  Last administered on 

12/28/20at 08:18;  Start 12/23/20 at 09:00


Zinc Sulfate (Orazinc) 220 mg DAILY PO  Last administered on 12/28/20at 08:16;  

Start 12/23/20 at 09:00


Guaifenesin (Robitussin Dm) 10 ml PRN Q6HRS  PRN PO COUGH Last administered on 

12/28/20at 06:40;  Start 12/23/20 at 08:45


Acetaminophen (Tylenol Supp) 650 mg PRN Q6HRS  PRN MO MILD PAIN / TEMP > 

100.3'F;  Start 12/23/20 at 08:45


Morphine Sulfate (Morphine Sulfate) 2 mg PRN Q4HRS  PRN IV PAIN Last 

administered on 12/28/20at 07:15;  Start 12/23/20 at 08:45


Azithromycin 500 mg/Sodium Chloride 250 ml @  250 mls/hr 1X  ONCE IV  Last 

administered on 12/23/20at 09:59;  Start 12/23/20 at 10:00;  Stop 12/23/20 at 

10:59;  Status DC


Azithromycin 250 mg/Sodium Chloride 250 ml @  250 mls/hr Q24H IV  Last 

administered on 12/27/20at 08:55;  Start 12/24/20 at 09:00;  Stop 12/27/20 at 

09:59;  Status DC


Cetirizine HCl (ZyrTEC) 10 mg DAILY PO  Last administered on 12/28/20at 08:17;  

Start 12/24/20 at 09:00


Morphine Sulfate (Ms Contin) 15 mg BID PO  Last administered on 12/28/20at 

08:17;  Start 12/23/20 at 21:00


Cyanocobalamin (Vitamin B-12) 1,000 mcg DAILY PO  Last administered on 

12/28/20at 08:16;  Start 12/24/20 at 09:00


Citalopram Hydrobromide (CeleXA) 20 mg DAILY PO  Last administered on 12/28/20at

08:16;  Start 12/24/20 at 09:00


Atorvastatin Calcium (Lipitor) 40 mg QHS PO  Last administered on 12/27/20at 

20:49;  Start 12/23/20 at 21:00


Sulfasalazine (Azulfidine) 1,000 mg BID PO  Last administered on 12/28/20at 

08:16;  Start 12/23/20 at 21:00


Tramadol HCl (Ultram) 100 mg PRN Q8HRS  PRN PO MODERATE PAIN, SEVERE PAIN Last 

administered on 12/28/20at 07:15;  Start 12/23/20 at 15:00


Zolpidem Tartrate (Ambien) 5 mg PRN QHS  PRN PO INSOMNIA Last administered on 

12/27/20at 00:26;  Start 12/23/20 at 15:00


Info ([facility] Electrolyte Protocol) 1 ea CONT PRN  PRN MC PER PROTOCOL;  Start 

12/24/20 at 14:15


Potassium Chloride (Klor-Con) 40 meq 1X  ONCE PO  Last administered on 

12/24/20at 14:24;  Start 12/24/20 at 14:15;  Stop 12/24/20 at 14:16;  Status DC


Sterile Water (WATER for RESP) 1,000 ml CONT  PRN INH VIA VAPOTHERM DEVICE Last 

administered on 12/27/20at 15:53;  Start 12/26/20 at 09:30


Benzonatate (Tessalon Perle) 100 mg [id number] PO  Last administered on 12/28/20at 

08:16;  Start 12/26/20 at 14:00


Labetalol HCl (Normodyne Iv Push) 10 mg PRN Q2HR  PRN IVP HYPERTENSION Last 

administered on 12/27/20at 09:16;  Start 12/26/20 at 18:30


Quetiapine Fumarate (SEROquel) 50 mg PRN QHS  PRN PO sleep Last administered on 

12/27/20at 20:49;  Start 12/27/20 at 21:00


Enalaprilat (Vasotec Inj) 2.5 mg PRN Q6HRS  PRN IVP HYPERTENSION Last 

administered on 12/27/20at 12:58;  Start 12/27/20 at 10:00


Olanzapine (ZyPREXA ZYDIS) 5 mg PRN BID  PRN PO ANXIETY / AGITATION Last 

administered on 12/27/20at 12:43;  Start 12/27/20 at 12:30


Guaifenesin (Robitussin) 400 mg PRN Q4HRS  PRN PO COUGH;  Start 12/27/20 at 

22:15;  Stop 12/27/20 at 22:27;  Status DC


Guaifenesin (Robitussin) 400 mg PRN Q4HRS  PRN PO COUGH Last administered on 

12/28/20at 07:14;  Start 12/27/20 at 22:30





Active Scripts


Active


Reported


Morphine Sulfate Er (Morphine Sulfate) 30 Mg Tablet.er 1 Tab PO BID


Tramadol Hcl 100 Mg Tbmp.24hr 100 Mg PO PRN Q8HRS PRN


Ambien (Zolpidem Tartrate) 10 Mg Tablet 10 Mg PO PRN QHS PRN


Methotrexate (Methotrexate Sodium) 2.5 Mg Tablet 10 Tab PO WEEKLY


Lisinopril 10 Mg Tablet 1 Tab PO DAILY


Crestor (Rosuvastatin Calcium) 5 Mg Tablet 2 Tab PO DAILY


Hydrochlorothiazide Tablet (Hydrochlorothiazide) 50 Mg Tablet 25 Mg PO DAILY


Escitalopram Oxalate 10 Mg Tablet 1 Tab PO DAILY


Sulfasalazine Dr (Sulfasalazine) 500 Mg Tablet.dr 2 Tab PO TID 30 Days


Colace (Docusate Sodium) 100 Mg Capsule 1 Cap PO DA 30 Days


Acetaminophen Ext.release (Acetaminophen) 650 Mg Tablet.er 650 Mg PO PRN Q8HRS 

PRN


Melatonin 5 Mg Capsule 1 Cap PO QHS 30 Days


B-12 (Cyanocobalamin (Vitamin B-12)) 500 Mcg Tablet 2 Tab PO DAILY 30 Days


Vitamin D3 (Cholecalciferol (Vitamin D3)) 50 Mcg Capsule 50 Mcg PO DAILY


Aller-Sung (Cetirizine Hcl) 10 Mg Tablet 1 Tab PO DAILY 30 Days


Vitals/I & O





Vital Sign - Last 24 Hours








 12/27/20 12/27/20 12/27/20 12/27/20





 09:00 09:16 10:00 11:00


 


Pulse 90 90 82 82


 


Resp 20  26 36


 


B/P (MAP) 185/87 (119) 185/87 161/94 (116) 176/96 (122)


 


Pulse Ox 95  99 94


 


O2 Delivery Vapotherm/NRB  Vapotherm/NRB Vapotherm/NRB





 12/27/20 12/27/20 12/27/20 12/27/20





 11:22 12:00 12:00 12:30


 


Temp   98.2 





   98.2 


 


Pulse   83 


 


Resp   32 25


 


B/P (MAP)   198/98 (131) 


 


Pulse Ox 93  91 93


 


O2 Delivery VapoTherm Nasal Cannula Vapotherm/NRB Nasal Cannula


 


O2 Flow Rate 40.0 40.0  40.0


 


    





    





 12/27/20 12/27/20 12/27/20 12/27/20





 12:58 13:00 14:00 15:00


 


Pulse 85 81 80 79


 


Resp  30 32 33


 


B/P (MAP) 198/98 176/89 (118) 135/75 (95) 162/81 (108)


 


Pulse Ox  92 91 99


 


O2 Delivery  Vapotherm/NRB Vapotherm/NRB Vapotherm/NRB





 12/27/20 12/27/20 12/27/20 12/27/20





 15:44 16:00 16:00 17:00


 


Temp   98.4 





   98.4 


 


Pulse   78 80


 


Resp   26 24


 


B/P (MAP)   23/68 (53) 157/80 (105)


 


Pulse Ox 97  97 96


 


O2 Delivery VapoTherm Nasal Cannula Vapotherm/NRB Vapotherm/NRB


 


O2 Flow Rate 40.0 40.0  


 


    





    





 12/27/20 12/27/20 12/27/20 12/27/20





 18:00 19:00 20:00 20:00


 


Pulse 80 77 75 


 


Resp 26 24 21 


 


B/P (MAP) 158/77 (104) 141/70 (93) 155/81 (105) 


 


Pulse Ox 95 97 91 


 


O2 Delivery Vapotherm/NRB Vapotherm/NRB Vapotherm/NRB Nasal Cannula


 


O2 Flow Rate    40.0





 12/27/20 12/27/20 12/27/20 12/27/20





 20:30 20:49 21:00 22:00


 


Temp   98.7 





   98.7 


 


Pulse   92 82


 


Resp  21 24 27


 


B/P (MAP)   172/95 (120) 148/84 (105)


 


Pulse Ox 94 95 95 95


 


O2 Delivery VapoTherm Vapotherm/NRB Vapotherm/NRB Vapotherm/NRB


 


O2 Flow Rate 40.0   


 


    





    





 12/27/20 12/27/20 12/28/20 12/28/20





 23:00 23:59 00:00 00:37


 


Pulse 70  71 


 


Resp 18  22 


 


B/P (MAP) 113/66 (82)  111/56 (74) 


 


Pulse Ox 97  100 100


 


O2 Delivery Vapotherm/NRB Nasal Cannula Vapotherm/NRB VapoTherm


 


O2 Flow Rate  40.0  40.0





 12/28/20 12/28/20 12/28/20 12/28/20





 00:49 01:00 02:00 03:00


 


Temp  99.0  





  99.0  


 


Pulse  70 69 80


 


Resp  19 23 20


 


B/P (MAP)  114/66 (82) 149/81 (103) 111/65 (80)


 


Pulse Ox 100 100 99 93


 


O2 Delivery Nasal Cannula Vapotherm/NRB Vapotherm/NRB Vapotherm/NRB


 


O2 Flow Rate 40.0   


 


    





    





 12/28/20 12/28/20 12/28/20 12/28/20





 04:00 04:00 04:33 05:00


 


Temp 98.4   





 98.4   


 


Pulse 84   85


 


Resp 27   21


 


B/P (MAP) 108/62 (77)   128/70 (89)


 


Pulse Ox 95  96 97


 


O2 Delivery Vapotherm/NRB Nasal Cannula VapoTherm Vapotherm/NRB


 


O2 Flow Rate  40.0 40.0 


 


    





    





 12/28/20 12/28/20 12/28/20 12/28/20





 06:00 07:00 07:15 07:15


 


Pulse 79 88  


 


Resp 23 24 25 25


 


B/P (MAP) 120/69 (86) 137/73 (94)  


 


Pulse Ox 96 96 96 96


 


O2 Delivery Vapotherm/NRB Vapotherm/NRB Nasal Cannula Nasal Cannula


 


O2 Flow Rate   40.0 40.0





 12/28/20 12/28/20  





 08:05 08:17  


 


Resp  26  


 


Pulse Ox 95 95  


 


O2 Delivery VapoTherm Nasal Cannula  


 


O2 Flow Rate 40.0 40.0  














Intake and Output   


 


 12/27/20 12/27/20 12/28/20





 15:00 23:00 07:00


 


Intake Total 490 ml 920 ml 1336 ml


 


Output Total 1673 ml 600 ml 900 ml


 


Balance -1183 ml 320 ml 436 ml











Justicifation of Admission Dx:


Justifications for Admission:


Justification of Admission Dx:  Yes


Comminuty Aquired Pneumonia:  Med-High Risk Pt











CLAIRE EVANGELISTA MD          Dec 28, 2020 08:52

## 2020-12-29 VITALS — SYSTOLIC BLOOD PRESSURE: 165 MMHG | DIASTOLIC BLOOD PRESSURE: 86 MMHG

## 2020-12-29 VITALS — SYSTOLIC BLOOD PRESSURE: 147 MMHG | DIASTOLIC BLOOD PRESSURE: 90 MMHG

## 2020-12-29 VITALS — DIASTOLIC BLOOD PRESSURE: 82 MMHG | SYSTOLIC BLOOD PRESSURE: 176 MMHG

## 2020-12-29 VITALS — DIASTOLIC BLOOD PRESSURE: 79 MMHG | SYSTOLIC BLOOD PRESSURE: 123 MMHG

## 2020-12-29 VITALS — SYSTOLIC BLOOD PRESSURE: 154 MMHG | DIASTOLIC BLOOD PRESSURE: 95 MMHG

## 2020-12-29 VITALS — DIASTOLIC BLOOD PRESSURE: 84 MMHG | SYSTOLIC BLOOD PRESSURE: 123 MMHG

## 2020-12-29 VITALS — DIASTOLIC BLOOD PRESSURE: 82 MMHG | SYSTOLIC BLOOD PRESSURE: 153 MMHG

## 2020-12-29 VITALS — DIASTOLIC BLOOD PRESSURE: 74 MMHG | SYSTOLIC BLOOD PRESSURE: 167 MMHG

## 2020-12-29 VITALS — SYSTOLIC BLOOD PRESSURE: 136 MMHG | DIASTOLIC BLOOD PRESSURE: 71 MMHG

## 2020-12-29 VITALS — SYSTOLIC BLOOD PRESSURE: 159 MMHG | DIASTOLIC BLOOD PRESSURE: 82 MMHG

## 2020-12-29 VITALS — DIASTOLIC BLOOD PRESSURE: 88 MMHG | SYSTOLIC BLOOD PRESSURE: 172 MMHG

## 2020-12-29 VITALS — SYSTOLIC BLOOD PRESSURE: 139 MMHG | DIASTOLIC BLOOD PRESSURE: 86 MMHG

## 2020-12-29 VITALS — SYSTOLIC BLOOD PRESSURE: 167 MMHG | DIASTOLIC BLOOD PRESSURE: 85 MMHG

## 2020-12-29 VITALS — DIASTOLIC BLOOD PRESSURE: 81 MMHG | SYSTOLIC BLOOD PRESSURE: 142 MMHG

## 2020-12-29 VITALS — SYSTOLIC BLOOD PRESSURE: 160 MMHG | DIASTOLIC BLOOD PRESSURE: 87 MMHG

## 2020-12-29 VITALS — DIASTOLIC BLOOD PRESSURE: 63 MMHG | SYSTOLIC BLOOD PRESSURE: 154 MMHG

## 2020-12-29 VITALS — DIASTOLIC BLOOD PRESSURE: 87 MMHG | SYSTOLIC BLOOD PRESSURE: 162 MMHG

## 2020-12-29 VITALS — DIASTOLIC BLOOD PRESSURE: 73 MMHG | SYSTOLIC BLOOD PRESSURE: 163 MMHG

## 2020-12-29 VITALS — SYSTOLIC BLOOD PRESSURE: 106 MMHG | DIASTOLIC BLOOD PRESSURE: 59 MMHG

## 2020-12-29 VITALS — DIASTOLIC BLOOD PRESSURE: 82 MMHG | SYSTOLIC BLOOD PRESSURE: 168 MMHG

## 2020-12-29 VITALS — SYSTOLIC BLOOD PRESSURE: 160 MMHG | DIASTOLIC BLOOD PRESSURE: 76 MMHG

## 2020-12-29 VITALS — DIASTOLIC BLOOD PRESSURE: 82 MMHG | SYSTOLIC BLOOD PRESSURE: 180 MMHG

## 2020-12-29 VITALS — DIASTOLIC BLOOD PRESSURE: 73 MMHG | SYSTOLIC BLOOD PRESSURE: 133 MMHG

## 2020-12-29 VITALS — SYSTOLIC BLOOD PRESSURE: 157 MMHG | DIASTOLIC BLOOD PRESSURE: 82 MMHG

## 2020-12-29 RX ADMIN — GUAIFENESIN PRN MG: 100 SOLUTION ORAL at 21:42

## 2020-12-29 RX ADMIN — MORPHINE SULFATE PRN MG: 2 INJECTION, SOLUTION INTRAMUSCULAR; INTRAVENOUS at 06:15

## 2020-12-29 RX ADMIN — ENOXAPARIN SODIUM SCH MG: 40 INJECTION SUBCUTANEOUS at 20:18

## 2020-12-29 RX ADMIN — GLYCERIN, ISOLEUCINE, LEUCINE, LYSINE, METHIONINE, PHENYLALANINE, THREONINE, TRYPTOPHAN, VALINE, ALANINE, GLYCINE, ARGININE, HISTIDINE, PROLINE, SERINE, CYSTEINE, SODIUM ACETATE, MAGNESIUM ACETATE, CALCIUM ACETATE, SODIUM CHLORIDE, POTASSIUM CHLORIDE, PHOSPHORIC ACID, AND POTASSIUM METABISULFITE SCH MLS/HR
3; .21; .27; .22; .16; .17; .12; .046; .2; .21; .42; .29; .085; .34; .18; .014; .2; .054; .026; .12; .15; .041 INJECTION INTRAVENOUS at 00:06

## 2020-12-29 RX ADMIN — MORPHINE SULFATE SCH MG: 15 TABLET, EXTENDED RELEASE ORAL at 08:01

## 2020-12-29 RX ADMIN — BENZONATATE SCH MG: 100 CAPSULE, LIQUID FILLED ORAL at 08:00

## 2020-12-29 RX ADMIN — CYANOCOBALAMIN TAB 1000 MCG SCH MCG: 1000 TAB at 08:00

## 2020-12-29 RX ADMIN — GUAIFENESIN PRN MG: 100 SOLUTION ORAL at 05:53

## 2020-12-29 RX ADMIN — CITALOPRAM HYDROBROMIDE SCH MG: 20 TABLET ORAL at 08:00

## 2020-12-29 RX ADMIN — ZINC SULFATE CAP 220 MG (50 MG ELEMENTAL ZN) SCH MG: 220 (50 ZN) CAP at 08:00

## 2020-12-29 RX ADMIN — BENZONATATE SCH MG: 100 CAPSULE, LIQUID FILLED ORAL at 20:18

## 2020-12-29 RX ADMIN — BENZONATATE SCH MG: 100 CAPSULE, LIQUID FILLED ORAL at 14:39

## 2020-12-29 RX ADMIN — GUAIFENESIN AND DEXTROMETHORPHAN PRN ML: 100; 10 SYRUP ORAL at 11:15

## 2020-12-29 RX ADMIN — DEXAMETHASONE SODIUM PHOSPHATE SCH MG: 4 INJECTION, SOLUTION INTRAMUSCULAR; INTRAVENOUS at 08:00

## 2020-12-29 RX ADMIN — ENOXAPARIN SODIUM SCH MG: 40 INJECTION SUBCUTANEOUS at 07:59

## 2020-12-29 RX ADMIN — GLYCERIN, ISOLEUCINE, LEUCINE, LYSINE, METHIONINE, PHENYLALANINE, THREONINE, TRYPTOPHAN, VALINE, ALANINE, GLYCINE, ARGININE, HISTIDINE, PROLINE, SERINE, CYSTEINE, SODIUM ACETATE, MAGNESIUM ACETATE, CALCIUM ACETATE, SODIUM CHLORIDE, POTASSIUM CHLORIDE, PHOSPHORIC ACID, AND POTASSIUM METABISULFITE SCH MLS/HR
3; .21; .27; .22; .16; .17; .12; .046; .2; .21; .42; .29; .085; .34; .18; .014; .2; .054; .026; .12; .15; .041 INJECTION INTRAVENOUS at 07:00

## 2020-12-29 RX ADMIN — MORPHINE SULFATE SCH MG: 15 TABLET, EXTENDED RELEASE ORAL at 20:19

## 2020-12-29 RX ADMIN — MORPHINE SULFATE PRN MG: 2 INJECTION, SOLUTION INTRAMUSCULAR; INTRAVENOUS at 23:14

## 2020-12-29 RX ADMIN — ATORVASTATIN CALCIUM SCH MG: 40 TABLET, FILM COATED ORAL at 20:18

## 2020-12-29 RX ADMIN — MORPHINE SULFATE PRN MG: 2 INJECTION, SOLUTION INTRAMUSCULAR; INTRAVENOUS at 17:05

## 2020-12-29 RX ADMIN — CEFTRIAXONE SCH GM: 1 INJECTION, POWDER, FOR SOLUTION INTRAMUSCULAR; INTRAVENOUS at 08:01

## 2020-12-29 RX ADMIN — GLYCERIN, ISOLEUCINE, LEUCINE, LYSINE, METHIONINE, PHENYLALANINE, THREONINE, TRYPTOPHAN, VALINE, ALANINE, GLYCINE, ARGININE, HISTIDINE, PROLINE, SERINE, CYSTEINE, SODIUM ACETATE, MAGNESIUM ACETATE, CALCIUM ACETATE, SODIUM CHLORIDE, POTASSIUM CHLORIDE, PHOSPHORIC ACID, AND POTASSIUM METABISULFITE SCH MLS/HR
3; .21; .27; .22; .16; .17; .12; .046; .2; .21; .42; .29; .085; .34; .18; .014; .2; .054; .026; .12; .15; .041 INJECTION INTRAVENOUS at 16:03

## 2020-12-29 RX ADMIN — CETIRIZINE HYDROCHLORIDE SCH MG: 10 TABLET, FILM COATED ORAL at 08:00

## 2020-12-29 NOTE — PDOC
PULMONARY PROGRESS NOTES


DATE: 12/29/20 


TIME: 14:02


Subjective


on 100% FIO2 via Vapotherm , with additional 100% NEB mask 


Poor intake today


Feeling a little better 


no overnight concerns from nursing


Vitals





Vital Signs








  Date Time  Temp Pulse Resp B/P (MAP) Pulse Ox O2 Delivery O2 Flow Rate FiO2


 


12/29/20 13:00  107 26 123/84 (97) 95 Vapotherm 40.0 


 


12/29/20 12:00 98.2       





 98.2       








Comments


Pt. seen during COVID-19 pandemic, visual exam preformed 


RRR


no distress


BIPAP


no accessory muscle use 


No edema or rash


General:  Alert, No acute distress


Lungs:  Crackles


Labs





Laboratory Tests








Test


 12/28/20


06:20


 


White Blood Count


 5.8 x10^3/uL


(4.0-11.0)


 


Red Blood Count


 3.08 x10^6/uL


(4.30-5.70)


 


Hemoglobin


 10.0 g/dL


(13.0-17.5)


 


Hematocrit


 29.9 %


(39.0-53.0)


 


Mean Corpuscular Volume 97 fL () 


 


Mean Corpuscular Hemoglobin 33 pg (25-35) 


 


Mean Corpuscular Hemoglobin


Concent 34 g/dL


(31-37)


 


Red Cell Distribution Width


 14.4 %


(11.5-14.5)


 


Platelet Count


 248 x10^3/uL


(140-400)


 


Neutrophils (%) (Auto) 81 % (31-73) 


 


Lymphocytes (%) (Auto) 12 % (24-48) 


 


Monocytes (%) (Auto) 6 % (0-9) 


 


Eosinophils (%) (Auto) 1 % (0-3) 


 


Basophils (%) (Auto) 0 % (0-3) 


 


Neutrophils # (Auto)


 4.7 x10^3/uL


(1.8-7.7)


 


Lymphocytes # (Auto)


 0.7 x10^3/uL


(1.0-4.8)


 


Monocytes # (Auto)


 0.3 x10^3/uL


(0.0-1.1)


 


Eosinophils # (Auto)


 0.0 x10^3/uL


(0.0-0.7)


 


Basophils # (Auto)


 0.0 x10^3/uL


(0.0-0.2)


 


Sodium Level


 143 mmol/L


(136-145)


 


Potassium Level


 3.7 mmol/L


(3.5-5.1)


 


Chloride Level


 109 mmol/L


()


 


Carbon Dioxide Level


 30 mmol/L


(21-32)


 


Anion Gap 4 (6-14) 


 


Blood Urea Nitrogen


 14 mg/dL


(8-26)


 


Creatinine


 0.7 mg/dL


(0.7-1.3)


 


Estimated GFR


(Cockcroft-Gault) 112.8 





 


BUN/Creatinine Ratio 20 (6-20) 


 


Glucose Level


 92 mg/dL


(70-99)


 


Calcium Level


 8.3 mg/dL


(8.5-10.1)


 


Total Bilirubin


 0.4 mg/dL


(0.2-1.0)


 


Aspartate Amino Transf


(AST/SGOT) 132 U/L


(15-37)


 


Alanine Aminotransferase


(ALT/SGPT) 141 U/L


(16-63)


 


Alkaline Phosphatase


 104 U/L


()


 


Total Protein


 5.7 g/dL


(6.4-8.2)


 


Albumin


 2.0 g/dL


(3.4-5.0)


 


Albumin/Globulin Ratio 0.5 (1.0-1.7) 








Medications





Active Scripts








 Medications  Dose


 Route/Sig


 Max Daily Dose Days Date Category


 


 Morphine Sulfate


 Er (Morphine


 Sulfate) 30 Mg


 Tablet.er  1 Tab


 PO BID


   12/23/20 Reported


 


 Tramadol Hcl 100


 Mg Tbmp.24hr  100 Mg


 PO PRN Q8HRS PRN


   12/23/20 Reported


 


 Ambien (Zolpidem


 Tartrate) 10 Mg


 Tablet  10 Mg


 PO PRN QHS PRN


   12/23/20 Reported


 


 Methotrexate


  (Methotrexate


 Sodium) 2.5 Mg


 Tablet  10 Tab


 PO WEEKLY


   12/23/20 Reported


 


 Lisinopril 10 Mg


 Tablet  1 Tab


 PO DAILY


   12/23/20 Reported


 


 Crestor


  (Rosuvastatin


 Calcium) 5 Mg


 Tablet  2 Tab


 PO DAILY


   12/23/20 Reported


 


 Hydrochlorothiazide


 Tablet


  (Hydrochlorothiazide)


 50 Mg Tablet  25 Mg


 PO DAILY


   12/23/20 Reported


 


 Escitalopram


 Oxalate 10 Mg


 Tablet  1 Tab


 PO DAILY


   12/23/20 Reported


 


 Sulfasalazine Dr


  (Sulfasalazine)


 500 Mg Tablet.dr  2 Tab


 PO TID


  30 12/23/20 Reported


 


 Colace (Docusate


 Sodium) 100 Mg


 Capsule  1 Cap


 PO DA


  30 12/23/20 Reported


 


 Acetaminophen


 Ext.release


  (Acetaminophen)


 650 Mg Tablet.er  650 Mg


 PO PRN Q8HRS PRN


   12/23/20 Reported


 


 Melatonin 5 Mg


 Capsule  1 Cap


 PO QHS


  30 12/23/20 Reported


 


 B-12


  (Cyanocobalamin


  (Vitamin B-12))


 500 Mcg Tablet  2 Tab


 PO DAILY


  30 12/23/20 Reported


 


 Vitamin D3


  (Cholecalciferol


  (Vitamin D3)) 50


 Mcg Capsule  50 Mcg


 PO DAILY


   12/23/20 Reported


 


 Aller-Sung


  (Cetirizine Hcl)


 10 Mg Tablet  1 Tab


 PO DAILY


  30 12/23/20 Reported











Impression


.


IMPRESSION:


1.  Acute hypoxic respiratory failure secondary to highly suspected COVID-19


pneumonia and acute respiratory distress syndrome/acute lung injury.-- COVID-19 

positive 


2.  Abnormal CT chest with extensive widespread ground glass and alveolar and


interstitial infiltrates with reactive mild mediastinal/hilar adenopathy.  This


is likely related to COVID-19 pneumonia.


3.  Patient with history of psoriatic arthritis.  He is likely immunocompromise


as he has been on methotrexate.  covering for opportunistic infection with Abx


4.  History of tongue cancer with resection, details unknown.


5.  History of prostate cancer.


6.  History of Crohn's disease.





Plan


.


RECOMMENDATIONS:


Continue supplemental oxygen to keep sats above 92%, Continue Vapotherm, 

314FVN0/40 litres, with additional NRB mask 100%


Monitor for respiratory distress requiring intubation 


COVID-19 positive


Continue with empiric antibiotic, rocephin


Continue with IV steroids with slow taper 


Continue PPN for nutritional support 


DVT/GI PPX 





D/W RN and RT 


Critical care time 1000-1030AM











JUAN RHOADES MD              Dec 29, 2020 14:04

## 2020-12-29 NOTE — PDOC
PROGRESS NOTES


Date of Service:


DATE: 20 


TIME: 07:42





Chief Complaint


Chief Complaint


impression ====================





Acute hypoxic respiratory failure - no pulmonary history. With recent travel to 

and from [place] likely COVID 19 vs other atypical pneumonia. Cont empiric 

antibiotics, steroids. Consult Pulm. BIPAP in negative pressure room  on 80%

FiO2


COVID 19 positive - with pneumonia - given transaminitis and late presentation 

with high O2 requirements no indication for remdesivir


RYDER - likely vasomotor nephropathy - no known renal history, will hydrate


Pneumonia - likely atypical, gram negative vs viral. will cover 


Prostate Ca - s/p resection at [facility] in [place]


Hypokalemia - likely nutritional or loss from diarrhea, will replace


Elevated troponin - likely from type II demand ischemia, will trend troponins, 

maintain telemetry


Crohn's - sees rheumatology regularly, Dr Fan in [place], on sulfasalazine


Psoriatic arthritis - on pain medications 30mg MS Contin BID, tramadol and 25mg 

Methotrexate weekly as DMARD per rheumatology, Dr. Fan. Hold MTX while 

inpatient


GERD - PPI


HLD - statin


HTN - Lisinopril and HCTZ on hold for RYDER


Anemia - likely of chronic disease, will monitor


Cardiomegaly - notable on CT chest - no known cardiac history, though his mother

did have CHF and CAD


Right renal mass - will need US for further assessment


on 100% FIO2 via Vapotherm , tolerating 








plan==============





FEN - NPO, can try regular diet when off BIPAP 40% vapotherm 


PPX - lovenox


FULL CODE


Dispo - ICU 











CC time 38 minutes








Hoa and son, Desmond, [phone]





History of Present Illness


History of Present Illness





PAST MEDICAL HISTORY:  Significant for:


1.  History of hypertension.


2.  Hyperlipidemia.


3.  GERD.


4.  Inflammatory bowel disease.


5.  History of psoriatic arthritis, on methotrexate.


6.  History of tongue cancer and prostate cancer, details are unknown.





PAST SURGICAL HISTORY:  He has history of surgery for prostate cancer and tongue


cancer,  details unknown.  Apparently, he had some tongue resection.





FAMILY HISTORY:  Mother with cancer and coronary artery disease.





SOCIAL HISTORY:  Reportedly, nonsmoker.




















Mr Bueno is 64 yo male w/ past medical history of Crohn's, psoriatic arthritis,

GERD, HLD, HTN, prostate ca, tongue cancer who presents to the emergency depa

rtment at [facility] concerned about shortness of breath that had been 

progressive. This started 2020 and has been getting worse since that time.

Of note patient also has loss of taste, loss of smell, cough, shortness of 

breath, fever. He recently went to [place] on a business trip and just 

returned 5 days prior to presentation.


Upon arrival to the emergency room patient had significant hypoxia with a pulse 

ox in the 40s.  He was placed initially on a nonrebreather and then placed on 

BiPAP.


He was also somewhat concerned about a Crohn's flareup.  Patient states he has 

been having abdominal pain with nausea and diarrhea.  These are not typical 

symptoms of his Crohn's.  He feels very tired and has body aches as well.


He was given steroids and Rocephin in ED. 


Chest radiograph concerning for bilateral interstitial infiltrates.


Labs significant for WBC 4.5, Hb 11.8, platelets 229, , K2.8, BUN 34, CR 

1.5, glucose 119, .9, albumin 3, , , lactic acid 1.6, D-

dimer 3.15, troponin 0.068.


ABG on 80% FiO2 7.53/42/64


CT negative for pulmonary embolism. Widespread airspace disease throughout both 

lungs, consistent with pneumonia. Mild mediastinal and bilateral hilar 

lymphadenopathy, likely reactive. Cardiomegaly with mild aneurysmal dilation of 

the ascending thoracic aorta. Indeterminate hyperdense nodule at the midpole 

right kidney. This could represent a solid mass or complex cyst.


Patient transferred to [facility] for pulmonary consultation and 

ICU admission.





: Overnight BP improved with fluids. O2 saturations slightly increased. Did

not tolerate more than 1 hour off BIPAP even with non-rebreather O2 saturations 

were 92% at best. No pain currently, very slightly appetite.


: COVID-19 returned positive.  Down to 65% on his BiPAP .  Was able to 

take it off for medications meal yesterday, but desaturates to 79% and recovers 

quickly.  Still very drowsy with little appetite.  Afebrile.


: Afebrile with 100% O2 on BiPAP today.  Transition to Vapotherm with more 

ease of breathing.  He has almost no appetite.  Less drowsy today.  He is 

depressed mood but now has a cell phone  and is able to talk to his 

family.  No complaints of chest pain some abdominal pain no bowel movement as of

yet.  ABG 7.49/37/59


 abg pending 





Afebrile overnight.  Tolerating Vapotherm at 100% FiO2 much better with O2 

saturations 96%.  Appetite is increased slightly.  Less drowsy.  He does note 

that he barely slept last night.  Pain is well controlled.  He has had 2 formed 

stools.  No chest pain.  Still with rough cough.  Blood pressure systolic in the

180s minimally responsive to labetalol.





Plan:





Seroquel prn sleep


Vasotec prn HTN


PICC





Vitals


Vitals





Vital Signs








  Date Time  Temp Pulse Resp B/P (MAP) Pulse Ox O2 Delivery O2 Flow Rate FiO2


 


20 07:00  94 22 163/73 (103) 93 Vapotherm/NRB  


 


20 06:15       40.0 


 


20 04:00 98.1       





 98.1       











Physical Exam


General:  Alert, Oriented X3, Cooperative, moderate distress


Heart:  Regular rate


Lungs:  Crackles


Abdomen:  Normal bowel sounds, Soft, No tenderness, No hepatosplenomegaly, No 

masses


Extremities:  No clubbing, No cyanosis, No edema, Normal pulses, No 

tenderness/swelling


Skin:  No rashes, No breakdown, No significant lesion


General:  Alert, Oriented X3, Cooperative, mild distress, moderate distress


Heart:  Regular rate


Lungs:  Crackles


Abdomen:  Normal bowel sounds, Soft, No tenderness, No hepatosplenomegaly, No 

masses


Extremities:  No clubbing, No cyanosis, No edema, Normal pulses, No 

tenderness/swelling


Skin:  No rashes, No breakdown, No significant lesion





Labs


LABS





PATIENT: WALESKA BUENO    ACCOUNT: [id number]     MRN#: [id number]


: 1955           LOCATION: [facility] ICU      AGE: 65


SEX: M                    EXAM DT: 20         ACCESSION#: [id number]


STATUS: ADM IN            ORD. PHYSICIAN: MISSAEL MANCIA MD


REASON: PICC line placement


PROCEDURE: CHEST AP ONLY





Chest AP portable at 1047:





Reason for examination: PICC line placement.





PICC line is present on the right with the tip at the superior vena cava 

distally. Heart and mediastinum are unremarkable. Lung fields show diffuse 

patchy alveolar opacities bilaterally but predominantly in the lower lobes. No 

pleural effusions are seen. No acute bony abnormalities are present.





IMPRESSION:





PICC line tip at the distal superior vena cava.


Diffuse bilateral alveolar opacities which are predominantly lower lobe.





Electronically signed by: Khoi Love MD (2020 12:28 PM) [id number]














DICTATED and SIGNED BY:     KHOI LOVE MD


DATE:     20 [id number]





Comment


Review of Relevant


I have reviewed the following items ajbier (where applicable) has been applied.


Labs





Laboratory Tests








Test


 20


06:20


 


White Blood Count


 5.8 x10^3/uL


(4.0-11.0)


 


Red Blood Count


 3.08 x10^6/uL


(4.30-5.70)


 


Hemoglobin


 10.0 g/dL


(13.0-17.5)


 


Hematocrit


 29.9 %


(39.0-53.0)


 


Mean Corpuscular Volume 97 fL () 


 


Mean Corpuscular Hemoglobin 33 pg (25-35) 


 


Mean Corpuscular Hemoglobin


Concent 34 g/dL


(31-37)


 


Red Cell Distribution Width


 14.4 %


(11.5-14.5)


 


Platelet Count


 248 x10^3/uL


(140-400)


 


Neutrophils (%) (Auto) 81 % (31-73) 


 


Lymphocytes (%) (Auto) 12 % (24-48) 


 


Monocytes (%) (Auto) 6 % (0-9) 


 


Eosinophils (%) (Auto) 1 % (0-3) 


 


Basophils (%) (Auto) 0 % (0-3) 


 


Neutrophils # (Auto)


 4.7 x10^3/uL


(1.8-7.7)


 


Lymphocytes # (Auto)


 0.7 x10^3/uL


(1.0-4.8)


 


Monocytes # (Auto)


 0.3 x10^3/uL


(0.0-1.1)


 


Eosinophils # (Auto)


 0.0 x10^3/uL


(0.0-0.7)


 


Basophils # (Auto)


 0.0 x10^3/uL


(0.0-0.2)


 


Sodium Level


 143 mmol/L


(136-145)


 


Potassium Level


 3.7 mmol/L


(3.5-5.1)


 


Chloride Level


 109 mmol/L


()


 


Carbon Dioxide Level


 30 mmol/L


(21-32)


 


Anion Gap 4 (6-14) 


 


Blood Urea Nitrogen


 14 mg/dL


(8-26)


 


Creatinine


 0.7 mg/dL


(0.7-1.3)


 


Estimated GFR


(Cockcroft-Gault) 112.8 





 


BUN/Creatinine Ratio 20 (6-20) 


 


Glucose Level


 92 mg/dL


(70-99)


 


Calcium Level


 8.3 mg/dL


(8.5-10.1)


 


Total Bilirubin


 0.4 mg/dL


(0.2-1.0)


 


Aspartate Amino Transf


(AST/SGOT) 132 U/L


(15-37)


 


Alanine Aminotransferase


(ALT/SGPT) 141 U/L


(16-63)


 


Alkaline Phosphatase


 104 U/L


()


 


Total Protein


 5.7 g/dL


(6.4-8.2)


 


Albumin


 2.0 g/dL


(3.4-5.0)


 


Albumin/Globulin Ratio 0.5 (1.0-1.7) 








Medications





Current Medications


Sodium Chloride (Normal Saline Flush) 3 ml QSHIFT  PRN IV AFTER MEDS AND BLOOD 

DRAWS;  Start 20 at 07:45


Sodium Chloride 1,000 ml @  150 mls/hr Q6H40M IV  Last administered on 

20at 15:10;  Start 20 at 08:15;  Stop 20 at 21:34;  Status DC


Ondansetron HCl (Zofran) 4 mg PRN Q4HRS  PRN IV NAUSEA/VOMITING;  Start 20

at 08:15


Acetaminophen (Tylenol) 650 mg PRN Q4HRS  PRN PO TEMP OVER 100.4F OR MILD PAIN; 

Start 20 at 08:15


Docusate Sodium (Colace) 100 mg PRN BID  PRN PO HARD STOOLS Last administered on

20at 17:03;  Start 20 at 08:15


Ceftriaxone Sodium (Rocephin) 1 gm Q24H IVP  Last administered on 20at 

09:12;  Start 20 at 09:00


Dexamethasone Sodium Phosphate (Decadron) 4 mg DAILY IVP  Last administered on 

20at 08:17;  Start 20 at 09:00


Amino Acids/ Glycerin/ Electrolytes 1,000 ml @  80 mls/hr [id number] IV  Last 

administered on 20at 00:06;  Start 20 at 08:45


Enoxaparin Sodium (Lovenox 40mg Syringe) 40 mg Q12HR SQ  Last administered on 

20at 20:24;  Start 20 at 09:00


Zinc Sulfate (Orazinc) 220 mg DAILY PO  Last administered on 20at 08:16;  

Start 20 at 09:00


Guaifenesin (Robitussin Dm) 10 ml PRN Q6HRS  PRN PO COUGH Last administered on 

20at 06:40;  Start 20 at 08:45


Acetaminophen (Tylenol Supp) 650 mg PRN Q6HRS  PRN TN MILD PAIN / TEMP > 

100.3'F;  Start 20 at 08:45


Morphine Sulfate (Morphine Sulfate) 2 mg PRN Q4HRS  PRN IV PAIN Last 

administered on 20at 06:15;  Start 20 at 08:45


Azithromycin 500 mg/Sodium Chloride 250 ml @  250 mls/hr 1X  ONCE IV  Last 

administered on 20at 09:59;  Start 20 at 10:00;  Stop 20 at 

10:59;  Status DC


Azithromycin 250 mg/Sodium Chloride 250 ml @  250 mls/hr Q24H IV  Last admi

nistered on 20at 08:55;  Start 20 at 09:00;  Stop 20 at 09:59;

 Status DC


Cetirizine HCl (ZyrTEC) 10 mg DAILY PO  Last administered on  08:17;  

Start 20 at 09:00


Morphine Sulfate (Ms Contin) 15 mg BID PO  Last administered on  

20:25;  Start 20 at 21:00


Cyanocobalamin (Vitamin B-12) 1,000 mcg DAILY PO  Last administered on 

 08:16;  Start 20 at 09:00


Citalopram Hydrobromide (CeleXA) 20 mg DAILY PO  Last administered on 

08:16;  Start 20 at 09:00


Atorvastatin Calcium (Lipitor) 40 mg QHS PO  Last administered on  

20:24;  Start 20 at 21:00


Sulfasalazine (Azulfidine) 1,000 mg BID PO  Last administered on  

20:24;  Start 20 at 21:00


Tramadol HCl (Ultram) 100 mg PRN Q8HRS  PRN PO MODERATE PAIN, SEVERE PAIN Last 

administered on  07:15;  Start 20 at 15:00


Zolpidem Tartrate (Ambien) 5 mg PRN QHS  PRN PO INSOMNIA Last administered on 

20at 00:26;  Start 20 at 15:00


Info (Icu Electrolyte Protocol) 1 ea CONT PRN  PRN MC PER PROTOCOL;  Start 

20 at 14:15


Potassium Chloride (Klor-Con) 40 meq 1X  ONCE PO  Last administered on 

20at 14:24;  Start 20 at 14:15;  Stop 20 at 14:16;  Status DC


Sterile Water (WATER for RESP) 1,000 ml CONT  PRN INH VIA VAPOTHERM DEVICE Last 

administered on  17:17;  Start 20 at 09:30


Benzonatate (Tessalon Perle) 100 mg [id number] PO  Last administered on 20at 

20:25;  Start 20 at 14:00


Labetalol HCl (Normodyne Iv Push) 10 mg PRN Q2HR  PRN IVP HYPERTENSION Last 

administered on 20at 09:16;  Start 20 at 18:30


Quetiapine Fumarate (SEROquel) 50 mg PRN QHS  PRN PO sleep Last administered on 

 22:05;  Start 20 at 21:00


Enalaprilat (Vasotec Inj) 2.5 mg PRN Q6HRS  PRN IVP HYPERTENSION Last 

administered on 20at 12:29;  Start 20 at 10:00


Olanzapine (ZyPREXA ZYDIS) 5 mg PRN BID  PRN PO ANXIETY / AGITATION Last 

administered on 20at 00:13;  Start 20 at 12:30


Guaifenesin (Robitussin) 400 mg PRN Q4HRS  PRN PO COUGH;  Start 20 at 

22:15;  Stop 20 at 22:27;  Status DC


Guaifenesin (Robitussin) 400 mg PRN Q4HRS  PRN PO COUGH Last administered on 

20at 05:53;  Start 20 at 22:30





Active Scripts


Active


Reported


Morphine Sulfate Er (Morphine Sulfate) 30 Mg Tablet.er 1 Tab PO BID


Tramadol Hcl 100 Mg Tbmp.24hr 100 Mg PO PRN Q8HRS PRN


Ambien (Zolpidem Tartrate) 10 Mg Tablet 10 Mg PO PRN QHS PRN


Methotrexate (Methotrexate Sodium) 2.5 Mg Tablet 10 Tab PO WEEKLY


Lisinopril 10 Mg Tablet 1 Tab PO DAILY


Crestor (Rosuvastatin Calcium) 5 Mg Tablet 2 Tab PO DAILY


Hydrochlorothiazide Tablet (Hydrochlorothiazide) 50 Mg Tablet 25 Mg PO DAILY


Escitalopram Oxalate 10 Mg Tablet 1 Tab PO DAILY


Sulfasalazine Dr (Sulfasalazine) 500 Mg Tablet.dr 2 Tab PO TID 30 Days


Colace (Docusate Sodium) 100 Mg Capsule 1 Cap PO DA 30 Days


Acetaminophen Ext.release (Acetaminophen) 650 Mg Tablet.er 650 Mg PO PRN Q8HRS 

PRN


Melatonin 5 Mg Capsule 1 Cap PO QHS 30 Days


B-12 (Cyanocobalamin (Vitamin B-12)) 500 Mcg Tablet 2 Tab PO DAILY 30 Days


Vitamin D3 (Cholecalciferol (Vitamin D3)) 50 Mcg Capsule 50 Mcg PO DAILY


Aller-Sung (Cetirizine Hcl) 10 Mg Tablet 1 Tab PO DAILY 30 Days


Vitals/I & O





Vital Sign - Last 24 Hours








 20





 07:45 08:00 08:00 08:05


 


Temp  97.8  





  97.8  


 


Pulse  88  


 


Resp 25 25  


 


B/P (MAP)  151/85 (107)  


 


Pulse Ox 96 94  95


 


O2 Delivery Nasal Cannula Vapotherm/NRB Nasal Cannula VapoTherm


 


O2 Flow Rate 40.0  40.0 40.0


 


    





    





 20





 08:15 08:17 09:00 10:00


 


Pulse   85 85


 


Resp 25 26 24 30


 


B/P (MAP)   137/87 (104) 140/71 (94)


 


Pulse Ox 95 95 95 96


 


O2 Delivery Nasal Cannula Nasal Cannula Vapotherm/NRB Vapotherm/NRB


 


O2 Flow Rate 40.0 40.0  





 20





 11:00 11:35 12:00 12:00


 


Temp   96.7 





   96.7 


 


Pulse 88  100 


 


Resp 26  24 


 


B/P (MAP) 175/94 (121)  178/88 (118) 


 


Pulse Ox 92 92 98 


 


O2 Delivery Vapotherm/NRB VapoTherm Vapotherm/NRB Nasal Cannula


 


O2 Flow Rate  40.0  40.0


 


    





    





 20





 12:17 12:29 13:00 14:00


 


Pulse  105 95 79


 


Resp  25


 


B/P (MAP)  178/88 140/70 (93) 147/84 (105)


 


Pulse Ox 96  94 96


 


O2 Delivery Nasal Cannula  Vapotherm/NRB Vapotherm/NRB


 


O2 Flow Rate 40.0   





 20





 15:00 15:40 16:00 16:00


 


Temp    97.8





    97.8


 


Pulse 67   78


 


Resp 27   19


 


B/P (MAP) 96/61 (73)   149/82 (104)


 


Pulse Ox 99 96  97


 


O2 Delivery Vapotherm/NRB VapoTherm Nasal Cannula Vapotherm/NRB


 


O2 Flow Rate  40.0 40.0 


 


    





    





 20





 17:00 17:18 18:00 20:00


 


Pulse 75  80 82


 


Resp 23  30 28


 


B/P (MAP) 157/80 (105)  144/86 (105) 132/70 (90)


 


Pulse Ox 95 96 96 97


 


O2 Delivery Vapotherm/NRB VapoTherm Vapotherm/NRB Vapotherm/NRB


 


O2 Flow Rate  40.0  





 20





 20:00 20:00 20:23 20:25


 


Pulse 81   


 


Resp 31   


 


B/P (MAP) 133/71 (91)   


 


Pulse Ox 96  97 97


 


O2 Delivery Vapotherm/NRB Nasal Cannula VapoTherm Nasal Cannula


 


O2 Flow Rate  40.0 40.0 40.0





 20





 21:00 22:00 23:00 23:59


 


Temp  98.5  





  98.5  


 


Pulse 77 80 84 


 


Resp 22 22 22 


 


B/P (MAP) 153/86 (108) 165/88 (113) 147/95 (112) 


 


Pulse Ox 97 95 97 


 


O2 Delivery Vapotherm/NRB Vapotherm/NRB Vapotherm/NRB Nasal Cannula


 


O2 Flow Rate    40.0


 


    





    





 20





 23:59 23:59 00:10 00:20


 


Pulse  82  


 


Resp  22  


 


B/P (MAP)  150/71 (97)  


 


Pulse Ox  99 97 98


 


O2 Delivery Nasal Cannula Vapotherm/NRB Nasal Cannula VapoTherm


 


O2 Flow Rate 40.0  40.0 40.0





 20





 01:00 02:00 03:00 03:24


 


Temp 98.9   





 98.9   


 


Pulse 82 83 79 


 


Resp  21 


 


B/P (MAP) 157/82 (107) 136/71 (92) 142/81 (101) 


 


Pulse Ox 99 98 99 97


 


O2 Delivery Vapotherm/NRB Vapotherm/NRB Vapotherm/NRB VapoTherm


 


O2 Flow Rate    40.0


 


    





    





 20





 04:00 04:00 05:00 06:00


 


Temp 98.1   





 98.1   


 


Pulse 80  81 102


 


Resp  20


 


B/P (MAP) 106/59 (75)  133/73 (93) 167/74 (105)


 


Pulse Ox 99  93 93


 


O2 Delivery Vapotherm/NRB Nasal Cannula Vapotherm/NRB Vapotherm/NRB


 


O2 Flow Rate  40.0  


 


    





    





 20  





 06:15 07:00  


 


Pulse  94  


 


Resp  22  


 


B/P (MAP)  163/73 (103)  


 


Pulse Ox 93 93  


 


O2 Delivery Nasal Cannula Vapotherm/NRB  


 


O2 Flow Rate 40.0   














Intake and Output   


 


 20





 15:00 23:00 07:00


 


Intake Total  1504 ml 969 ml


 


Output Total 550 ml 200 ml 400 ml


 


Balance -550 ml 1304 ml 569 ml











Justicifation of Admission Dx:


Justifications for Admission:


Justification of Admission Dx:  Yes


Comminuty Aquired Pneumonia:  Med-High Risk Pt











CLAIRE EVANGELISTA MD          Dec 29, 2020 07:42

## 2020-12-30 VITALS — DIASTOLIC BLOOD PRESSURE: 74 MMHG | SYSTOLIC BLOOD PRESSURE: 131 MMHG

## 2020-12-30 VITALS — SYSTOLIC BLOOD PRESSURE: 152 MMHG | DIASTOLIC BLOOD PRESSURE: 83 MMHG

## 2020-12-30 VITALS — SYSTOLIC BLOOD PRESSURE: 143 MMHG | DIASTOLIC BLOOD PRESSURE: 79 MMHG

## 2020-12-30 VITALS — DIASTOLIC BLOOD PRESSURE: 81 MMHG | SYSTOLIC BLOOD PRESSURE: 137 MMHG

## 2020-12-30 VITALS — SYSTOLIC BLOOD PRESSURE: 109 MMHG | DIASTOLIC BLOOD PRESSURE: 66 MMHG

## 2020-12-30 VITALS — DIASTOLIC BLOOD PRESSURE: 97 MMHG | SYSTOLIC BLOOD PRESSURE: 174 MMHG

## 2020-12-30 VITALS — SYSTOLIC BLOOD PRESSURE: 145 MMHG | DIASTOLIC BLOOD PRESSURE: 80 MMHG

## 2020-12-30 VITALS — SYSTOLIC BLOOD PRESSURE: 161 MMHG | DIASTOLIC BLOOD PRESSURE: 89 MMHG

## 2020-12-30 VITALS — DIASTOLIC BLOOD PRESSURE: 69 MMHG | SYSTOLIC BLOOD PRESSURE: 117 MMHG

## 2020-12-30 VITALS — SYSTOLIC BLOOD PRESSURE: 106 MMHG | DIASTOLIC BLOOD PRESSURE: 67 MMHG

## 2020-12-30 VITALS — SYSTOLIC BLOOD PRESSURE: 135 MMHG | DIASTOLIC BLOOD PRESSURE: 66 MMHG

## 2020-12-30 VITALS — DIASTOLIC BLOOD PRESSURE: 76 MMHG | SYSTOLIC BLOOD PRESSURE: 146 MMHG

## 2020-12-30 VITALS — SYSTOLIC BLOOD PRESSURE: 153 MMHG | DIASTOLIC BLOOD PRESSURE: 87 MMHG

## 2020-12-30 VITALS — SYSTOLIC BLOOD PRESSURE: 176 MMHG | DIASTOLIC BLOOD PRESSURE: 91 MMHG

## 2020-12-30 VITALS — SYSTOLIC BLOOD PRESSURE: 169 MMHG | DIASTOLIC BLOOD PRESSURE: 92 MMHG

## 2020-12-30 VITALS — DIASTOLIC BLOOD PRESSURE: 76 MMHG | SYSTOLIC BLOOD PRESSURE: 162 MMHG

## 2020-12-30 VITALS — DIASTOLIC BLOOD PRESSURE: 86 MMHG | SYSTOLIC BLOOD PRESSURE: 145 MMHG

## 2020-12-30 VITALS — DIASTOLIC BLOOD PRESSURE: 78 MMHG | SYSTOLIC BLOOD PRESSURE: 137 MMHG

## 2020-12-30 VITALS — DIASTOLIC BLOOD PRESSURE: 90 MMHG | SYSTOLIC BLOOD PRESSURE: 177 MMHG

## 2020-12-30 VITALS — DIASTOLIC BLOOD PRESSURE: 58 MMHG | SYSTOLIC BLOOD PRESSURE: 110 MMHG

## 2020-12-30 VITALS — DIASTOLIC BLOOD PRESSURE: 87 MMHG | SYSTOLIC BLOOD PRESSURE: 151 MMHG

## 2020-12-30 VITALS — SYSTOLIC BLOOD PRESSURE: 166 MMHG | DIASTOLIC BLOOD PRESSURE: 97 MMHG

## 2020-12-30 LAB
ALBUMIN SERPL-MCNC: 2.2 G/DL (ref 3.4–5)
ALBUMIN/GLOB SERPL: 0.6 {RATIO} (ref 1–1.7)
ALP SERPL-CCNC: 115 U/L (ref 46–116)
ALT SERPL-CCNC: 122 U/L (ref 16–63)
ANION GAP SERPL CALC-SCNC: 7 MMOL/L (ref 6–14)
AST SERPL-CCNC: 106 U/L (ref 15–37)
BASOPHILS # BLD AUTO: 0 X10^3/UL (ref 0–0.2)
BASOPHILS NFR BLD: 1 % (ref 0–3)
BILIRUB SERPL-MCNC: 0.6 MG/DL (ref 0.2–1)
BUN SERPL-MCNC: 17 MG/DL (ref 8–26)
BUN/CREAT SERPL: 21 (ref 6–20)
CALCIUM SERPL-MCNC: 8.4 MG/DL (ref 8.5–10.1)
CHLORIDE SERPL-SCNC: 106 MMOL/L (ref 98–107)
CO2 SERPL-SCNC: 28 MMOL/L (ref 21–32)
CREAT SERPL-MCNC: 0.8 MG/DL (ref 0.7–1.3)
EOSINOPHIL NFR BLD: 0.1 X10^3/UL (ref 0–0.7)
EOSINOPHIL NFR BLD: 2 % (ref 0–3)
ERYTHROCYTE [DISTWIDTH] IN BLOOD BY AUTOMATED COUNT: 14.7 % (ref 11.5–14.5)
GFR SERPLBLD BASED ON 1.73 SQ M-ARVRAT: 96.7 ML/MIN
GLUCOSE SERPL-MCNC: 89 MG/DL (ref 70–99)
HCT VFR BLD CALC: 34 % (ref 39–53)
HGB BLD-MCNC: 11.2 G/DL (ref 13–17.5)
LYMPHOCYTES # BLD: 0.9 X10^3/UL (ref 1–4.8)
LYMPHOCYTES NFR BLD AUTO: 12 % (ref 24–48)
MCH RBC QN AUTO: 32 PG (ref 25–35)
MCHC RBC AUTO-ENTMCNC: 33 G/DL (ref 31–37)
MCV RBC AUTO: 98 FL (ref 79–100)
MONO #: 0.7 X10^3/UL (ref 0–1.1)
MONOCYTES NFR BLD: 10 % (ref 0–9)
NEUT #: 5.6 X10^3/UL (ref 1.8–7.7)
NEUTROPHILS NFR BLD AUTO: 76 % (ref 31–73)
PLATELET # BLD AUTO: 342 X10^3/UL (ref 140–400)
POTASSIUM SERPL-SCNC: 4 MMOL/L (ref 3.5–5.1)
PROT SERPL-MCNC: 6.2 G/DL (ref 6.4–8.2)
RBC # BLD AUTO: 3.48 X10^6/UL (ref 4.3–5.7)
SODIUM SERPL-SCNC: 141 MMOL/L (ref 136–145)
WBC # BLD AUTO: 7.3 X10^3/UL (ref 4–11)

## 2020-12-30 RX ADMIN — LABETALOL HYDROCHLORIDE PRN MG: 5 INJECTION, SOLUTION INTRAVENOUS at 10:15

## 2020-12-30 RX ADMIN — ENOXAPARIN SODIUM SCH MG: 40 INJECTION SUBCUTANEOUS at 07:30

## 2020-12-30 RX ADMIN — CEFTRIAXONE SCH GM: 1 INJECTION, POWDER, FOR SOLUTION INTRAMUSCULAR; INTRAVENOUS at 07:30

## 2020-12-30 RX ADMIN — ZINC SULFATE CAP 220 MG (50 MG ELEMENTAL ZN) SCH MG: 220 (50 ZN) CAP at 07:30

## 2020-12-30 RX ADMIN — GLYCERIN, ISOLEUCINE, LEUCINE, LYSINE, METHIONINE, PHENYLALANINE, THREONINE, TRYPTOPHAN, VALINE, ALANINE, GLYCINE, ARGININE, HISTIDINE, PROLINE, SERINE, CYSTEINE, SODIUM ACETATE, MAGNESIUM ACETATE, CALCIUM ACETATE, SODIUM CHLORIDE, POTASSIUM CHLORIDE, PHOSPHORIC ACID, AND POTASSIUM METABISULFITE SCH MLS/HR
3; .21; .27; .22; .16; .17; .12; .046; .2; .21; .42; .29; .085; .34; .18; .014; .2; .054; .026; .12; .15; .041 INJECTION INTRAVENOUS at 03:15

## 2020-12-30 RX ADMIN — MORPHINE SULFATE SCH MG: 15 TABLET, EXTENDED RELEASE ORAL at 07:31

## 2020-12-30 RX ADMIN — BENZONATATE SCH MG: 100 CAPSULE, LIQUID FILLED ORAL at 20:37

## 2020-12-30 RX ADMIN — GUAIFENESIN AND DEXTROMETHORPHAN PRN ML: 100; 10 SYRUP ORAL at 22:23

## 2020-12-30 RX ADMIN — CYANOCOBALAMIN TAB 1000 MCG SCH MCG: 1000 TAB at 07:30

## 2020-12-30 RX ADMIN — CITALOPRAM HYDROBROMIDE SCH MG: 20 TABLET ORAL at 07:31

## 2020-12-30 RX ADMIN — ENOXAPARIN SODIUM SCH MG: 40 INJECTION SUBCUTANEOUS at 20:37

## 2020-12-30 RX ADMIN — ATORVASTATIN CALCIUM SCH MG: 40 TABLET, FILM COATED ORAL at 20:37

## 2020-12-30 RX ADMIN — CETIRIZINE HYDROCHLORIDE SCH MG: 10 TABLET, FILM COATED ORAL at 07:30

## 2020-12-30 RX ADMIN — GUAIFENESIN AND DEXTROMETHORPHAN PRN ML: 100; 10 SYRUP ORAL at 01:10

## 2020-12-30 RX ADMIN — DEXAMETHASONE SODIUM PHOSPHATE SCH MG: 4 INJECTION, SOLUTION INTRAMUSCULAR; INTRAVENOUS at 07:30

## 2020-12-30 RX ADMIN — GUAIFENESIN AND DEXTROMETHORPHAN PRN ML: 100; 10 SYRUP ORAL at 07:30

## 2020-12-30 RX ADMIN — WATER PRN ML: 1 INJECTION INTRAVENOUS at 16:42

## 2020-12-30 RX ADMIN — GLYCERIN, ISOLEUCINE, LEUCINE, LYSINE, METHIONINE, PHENYLALANINE, THREONINE, TRYPTOPHAN, VALINE, ALANINE, GLYCINE, ARGININE, HISTIDINE, PROLINE, SERINE, CYSTEINE, SODIUM ACETATE, MAGNESIUM ACETATE, CALCIUM ACETATE, SODIUM CHLORIDE, POTASSIUM CHLORIDE, PHOSPHORIC ACID, AND POTASSIUM METABISULFITE SCH MLS/HR
3; .21; .27; .22; .16; .17; .12; .046; .2; .21; .42; .29; .085; .34; .18; .014; .2; .054; .026; .12; .15; .041 INJECTION INTRAVENOUS at 14:12

## 2020-12-30 RX ADMIN — MORPHINE SULFATE SCH MG: 15 TABLET, EXTENDED RELEASE ORAL at 20:37

## 2020-12-30 RX ADMIN — WATER PRN ML: 1 INJECTION INTRAVENOUS at 03:19

## 2020-12-30 RX ADMIN — GUAIFENESIN AND DEXTROMETHORPHAN PRN ML: 100; 10 SYRUP ORAL at 16:42

## 2020-12-30 RX ADMIN — BENZONATATE SCH MG: 100 CAPSULE, LIQUID FILLED ORAL at 14:12

## 2020-12-30 RX ADMIN — BENZONATATE SCH MG: 100 CAPSULE, LIQUID FILLED ORAL at 07:30

## 2020-12-30 NOTE — PDOC
PROGRESS NOTES


Date of Service:


DATE: 12/30/20 


TIME: 08:59





Chief Complaint


Chief Complaint


impression ====================





Acute hypoxic respiratory failure - no pulmonary history. With recent travel to 

and from [place] likely COVID 19 vs other atypical pneumonia. Cont empiric 

antibiotics, steroids. Consult Pulm. BIPAP in negative pressure room 16/8 on 80%

FiO2


COVID 19 positive - with pneumonia - given transaminitis and late presentation 

with high O2 requirements no indication for remdesivir


RYDER - likely vasomotor nephropathy - no known renal history, will hydrate


Pneumonia - likely atypical, gram negative vs viral. will cover 


Prostate Ca - s/p resection at [facility] in [place]


Hypokalemia - likely nutritional or loss from diarrhea, will replace


Elevated troponin - likely from type II demand ischemia, will trend troponins, 

maintain telemetry


Crohn's - sees rheumatology regularly, Dr Fan in [place], on sulfasalazine


Psoriatic arthritis - on pain medications 30mg MS Contin BID, tramadol and 25mg 

Methotrexate weekly as DMARD per rheumatology, Dr. Fan. Hold MTX while 

inpatient


GERD - PPI


HLD - statin


HTN - Lisinopril and HCTZ on hold for RYDER


Anemia - likely of chronic disease, will monitor


Cardiomegaly - notable on CT chest - no known cardiac history, though his mother

did have CHF and CAD


Right renal mass - will need US for further assessment


on 100% FIO2 via Vapotherm , tolerating 








plan==============





FEN - NPO,  on 100% FIO2 via Vapotherm , with additional 100% NEB mask 


PPX - lovenox


FULL CODE


Dispo - ICU 











CC time 37 minutes








Hoa and son, Desmond, [phone]





History of Present Illness


History of Present Illness





PAST MEDICAL HISTORY:  Significant for:


1.  History of hypertension.


2.  Hyperlipidemia.


3.  GERD.


4.  Inflammatory bowel disease.


5.  History of psoriatic arthritis, on methotrexate.


6.  History of tongue cancer and prostate cancer, details are unknown.





PAST SURGICAL HISTORY:  He has history of surgery for prostate cancer and tongue


cancer,  details unknown.  Apparently, he had some tongue resection.





FAMILY HISTORY:  Mother with cancer and coronary artery disease.





SOCIAL HISTORY:  Reportedly, nonsmoker.




















Mr Nance is 66 yo male w/ past medical history of Crohn's, psoriatic arthritis,

GERD, HLD, HTN, prostate ca, tongue cancer who presents to the emergency 

department at [facility] concerned about shortness of breath that had been 

progressive. This started 12/18/2020 and has been getting worse since that time.

Of note patient also has loss of taste, loss of smell, cough, shortness of 

breath, fever. He recently went to [place] on a business trip and just 

returned 5 days prior to presentation.


Upon arrival to the emergency room patient had significant hypoxia with a pulse 

ox in the 40s.  He was placed initially on a nonrebreather and then placed on 

BiPAP.


He was also somewhat concerned about a Crohn's flareup.  Patient states he has 

been having abdominal pain with nausea and diarrhea.  These are not typical 

symptoms of his Crohn's.  He feels very tired and has body aches as well.


He was given steroids and Rocephin in ED. 


Chest radiograph concerning for bilateral interstitial infiltrates.


Labs significant for WBC 4.5, Hb 11.8, platelets 229, , K2.8, BUN 34, CR 

1.5, glucose 119, .9, albumin 3, , , lactic acid 1.6, D-

dimer 3.15, troponin 0.068.


ABG on 80% FiO2 7.53/42/64


CT negative for pulmonary embolism. Widespread airspace disease throughout both 

lungs, consistent with pneumonia. Mild mediastinal and bilateral hilar 

lymphadenopathy, likely reactive. Cardiomegaly with mild aneurysmal dilation of 

the ascending thoracic aorta. Indeterminate hyperdense nodule at the midpole 

right kidney. This could represent a solid mass or complex cyst.


Patient transferred to [facility] for pulmonary consultation and 

ICU admission.





12/24: Overnight BP improved with fluids. O2 saturations slightly increased. Did

not tolerate more than 1 hour off BIPAP even with non-rebreather O2 saturations 

were 92% at best. No pain currently, very slightly appetite.


12/25: COVID-19 returned positive.  Down to 65% on his BiPAP 18/8.  Was able to 

take it off for medications meal yesterday, but desaturates to 79% and recovers 

quickly.  Still very drowsy with little appetite.  Afebrile.


12/26: Afebrile with 100% O2 on BiPAP today.  Transition to Vapotherm with more 

ease of breathing.  He has almost no appetite.  Less drowsy today.  He is 

depressed mood but now has a cell phone  and is able to talk to his 

family.  No complaints of chest pain some abdominal pain no bowel movement as of

yet.  ABG 7.49/37/59


12-29 abg pending 





Afebrile overnight.  Tolerating Vapotherm at 100% FiO2 much better with O2 

saturations 96%.  Appetite is increased slightly.  Less drowsy.  He does note 

that he barely slept last night.  Pain is well controlled.  He has had 2 formed 

stools.  No chest pain.  Still with rough cough.  Blood pressure systolic in the

180s minimally responsive to labetalol.





Plan:





Seroquel prn sleep


Vasotec prn HTN


PICC





Vitals


Vitals





Vital Signs








  Date Time  Temp Pulse Resp B/P (MAP) Pulse Ox O2 Delivery O2 Flow Rate FiO2


 


12/30/20 08:00 98.0 108 26 151/87 (108) 93 Vapotherm 40.0 





 98.0       











Physical Exam


General:  Alert, Oriented X3, Cooperative, moderate distress


Heart:  Regular rate


Lungs:  Crackles


Abdomen:  Normal bowel sounds, Soft, No tenderness, No hepatosplenomegaly, No ma

sses


Extremities:  No clubbing, No cyanosis, No edema, Normal pulses, No 

tenderness/swelling


Skin:  No rashes, No breakdown, No significant lesion


General:  Alert, Oriented X3, Cooperative, mild distress, moderate distress


Heart:  Regular rate


Lungs:  Crackles


Abdomen:  Normal bowel sounds, Soft, No tenderness, No hepatosplenomegaly, No 

masses


Extremities:  No clubbing, No cyanosis, No edema, Normal pulses, No 

tenderness/swelling


Skin:  No rashes, No breakdown, No significant lesion





Labs


LABS





Laboratory Tests








Test


 12/30/20


03:55


 


White Blood Count


 7.3 x10^3/uL


(4.0-11.0)


 


Red Blood Count


 3.48 x10^6/uL


(4.30-5.70)


 


Hemoglobin


 11.2 g/dL


(13.0-17.5)


 


Hematocrit


 34.0 %


(39.0-53.0)


 


Mean Corpuscular Volume 98 fL () 


 


Mean Corpuscular Hemoglobin 32 pg (25-35) 


 


Mean Corpuscular Hemoglobin


Concent 33 g/dL


(31-37)


 


Red Cell Distribution Width


 14.7 %


(11.5-14.5)


 


Platelet Count


 342 x10^3/uL


(140-400)


 


Neutrophils (%) (Auto) 76 % (31-73) 


 


Lymphocytes (%) (Auto) 12 % (24-48) 


 


Monocytes (%) (Auto) 10 % (0-9) 


 


Eosinophils (%) (Auto) 2 % (0-3) 


 


Basophils (%) (Auto) 1 % (0-3) 


 


Neutrophils # (Auto)


 5.6 x10^3/uL


(1.8-7.7)


 


Lymphocytes # (Auto)


 0.9 x10^3/uL


(1.0-4.8)


 


Monocytes # (Auto)


 0.7 x10^3/uL


(0.0-1.1)


 


Eosinophils # (Auto)


 0.1 x10^3/uL


(0.0-0.7)


 


Basophils # (Auto)


 0.0 x10^3/uL


(0.0-0.2)


 


Sodium Level


 141 mmol/L


(136-145)


 


Potassium Level


 4.0 mmol/L


(3.5-5.1)


 


Chloride Level


 106 mmol/L


()


 


Carbon Dioxide Level


 28 mmol/L


(21-32)


 


Anion Gap 7 (6-14) 


 


Blood Urea Nitrogen


 17 mg/dL


(8-26)


 


Creatinine


 0.8 mg/dL


(0.7-1.3)


 


Estimated GFR


(Cockcroft-Gault) 96.7 





 


BUN/Creatinine Ratio 21 (6-20) 


 


Glucose Level


 89 mg/dL


(70-99)


 


Calcium Level


 8.4 mg/dL


(8.5-10.1)


 


Total Bilirubin


 0.6 mg/dL


(0.2-1.0)


 


Aspartate Amino Transf


(AST/SGOT) 106 U/L


(15-37)


 


Alanine Aminotransferase


(ALT/SGPT) 122 U/L


(16-63)


 


Alkaline Phosphatase


 115 U/L


()


 


Total Protein


 6.2 g/dL


(6.4-8.2)


 


Albumin


 2.2 g/dL


(3.4-5.0)


 


Albumin/Globulin Ratio 0.6 (1.0-1.7) 











Comment


Review of Relevant


I have reviewed the following items jabier (where applicable) has been applied.


Labs





Laboratory Tests








Test


 12/30/20


03:55


 


White Blood Count


 7.3 x10^3/uL


(4.0-11.0)


 


Red Blood Count


 3.48 x10^6/uL


(4.30-5.70)


 


Hemoglobin


 11.2 g/dL


(13.0-17.5)


 


Hematocrit


 34.0 %


(39.0-53.0)


 


Mean Corpuscular Volume 98 fL () 


 


Mean Corpuscular Hemoglobin 32 pg (25-35) 


 


Mean Corpuscular Hemoglobin


Concent 33 g/dL


(31-37)


 


Red Cell Distribution Width


 14.7 %


(11.5-14.5)


 


Platelet Count


 342 x10^3/uL


(140-400)


 


Neutrophils (%) (Auto) 76 % (31-73) 


 


Lymphocytes (%) (Auto) 12 % (24-48) 


 


Monocytes (%) (Auto) 10 % (0-9) 


 


Eosinophils (%) (Auto) 2 % (0-3) 


 


Basophils (%) (Auto) 1 % (0-3) 


 


Neutrophils # (Auto)


 5.6 x10^3/uL


(1.8-7.7)


 


Lymphocytes # (Auto)


 0.9 x10^3/uL


(1.0-4.8)


 


Monocytes # (Auto)


 0.7 x10^3/uL


(0.0-1.1)


 


Eosinophils # (Auto)


 0.1 x10^3/uL


(0.0-0.7)


 


Basophils # (Auto)


 0.0 x10^3/uL


(0.0-0.2)


 


Sodium Level


 141 mmol/L


(136-145)


 


Potassium Level


 4.0 mmol/L


(3.5-5.1)


 


Chloride Level


 106 mmol/L


()


 


Carbon Dioxide Level


 28 mmol/L


(21-32)


 


Anion Gap 7 (6-14) 


 


Blood Urea Nitrogen


 17 mg/dL


(8-26)


 


Creatinine


 0.8 mg/dL


(0.7-1.3)


 


Estimated GFR


(Cockcroft-Gault) 96.7 





 


BUN/Creatinine Ratio 21 (6-20) 


 


Glucose Level


 89 mg/dL


(70-99)


 


Calcium Level


 8.4 mg/dL


(8.5-10.1)


 


Total Bilirubin


 0.6 mg/dL


(0.2-1.0)


 


Aspartate Amino Transf


(AST/SGOT) 106 U/L


(15-37)


 


Alanine Aminotransferase


(ALT/SGPT) 122 U/L


(16-63)


 


Alkaline Phosphatase


 115 U/L


()


 


Total Protein


 6.2 g/dL


(6.4-8.2)


 


Albumin


 2.2 g/dL


(3.4-5.0)


 


Albumin/Globulin Ratio 0.6 (1.0-1.7) 








Laboratory Tests








Test


 12/30/20


03:55


 


White Blood Count


 7.3 x10^3/uL


(4.0-11.0)


 


Red Blood Count


 3.48 x10^6/uL


(4.30-5.70)


 


Hemoglobin


 11.2 g/dL


(13.0-17.5)


 


Hematocrit


 34.0 %


(39.0-53.0)


 


Mean Corpuscular Volume 98 fL () 


 


Mean Corpuscular Hemoglobin 32 pg (25-35) 


 


Mean Corpuscular Hemoglobin


Concent 33 g/dL


(31-37)


 


Red Cell Distribution Width


 14.7 %


(11.5-14.5)


 


Platelet Count


 342 x10^3/uL


(140-400)


 


Neutrophils (%) (Auto) 76 % (31-73) 


 


Lymphocytes (%) (Auto) 12 % (24-48) 


 


Monocytes (%) (Auto) 10 % (0-9) 


 


Eosinophils (%) (Auto) 2 % (0-3) 


 


Basophils (%) (Auto) 1 % (0-3) 


 


Neutrophils # (Auto)


 5.6 x10^3/uL


(1.8-7.7)


 


Lymphocytes # (Auto)


 0.9 x10^3/uL


(1.0-4.8)


 


Monocytes # (Auto)


 0.7 x10^3/uL


(0.0-1.1)


 


Eosinophils # (Auto)


 0.1 x10^3/uL


(0.0-0.7)


 


Basophils # (Auto)


 0.0 x10^3/uL


(0.0-0.2)


 


Sodium Level


 141 mmol/L


(136-145)


 


Potassium Level


 4.0 mmol/L


(3.5-5.1)


 


Chloride Level


 106 mmol/L


()


 


Carbon Dioxide Level


 28 mmol/L


(21-32)


 


Anion Gap 7 (6-14) 


 


Blood Urea Nitrogen


 17 mg/dL


(8-26)


 


Creatinine


 0.8 mg/dL


(0.7-1.3)


 


Estimated GFR


(Cockcroft-Gault) 96.7 





 


BUN/Creatinine Ratio 21 (6-20) 


 


Glucose Level


 89 mg/dL


(70-99)


 


Calcium Level


 8.4 mg/dL


(8.5-10.1)


 


Total Bilirubin


 0.6 mg/dL


(0.2-1.0)


 


Aspartate Amino Transf


(AST/SGOT) 106 U/L


(15-37)


 


Alanine Aminotransferase


(ALT/SGPT) 122 U/L


(16-63)


 


Alkaline Phosphatase


 115 U/L


()


 


Total Protein


 6.2 g/dL


(6.4-8.2)


 


Albumin


 2.2 g/dL


(3.4-5.0)


 


Albumin/Globulin Ratio 0.6 (1.0-1.7) 








Medications





Current Medications


Sodium Chloride (Normal Saline Flush) 3 ml QSHIFT  PRN IV AFTER MEDS AND BLOOD 

DRAWS;  Start 12/23/20 at 07:45


Sodium Chloride 1,000 ml @  150 mls/hr Q6H40M IV  Last administered on 

12/23/20at 15:10;  Start 12/23/20 at 08:15;  Stop 12/23/20 at 21:34;  Status DC


Ondansetron HCl (Zofran) 4 mg PRN Q4HRS  PRN IV NAUSEA/VOMITING;  Start 12/23/20

at 08:15


Acetaminophen (Tylenol) 650 mg PRN Q4HRS  PRN PO TEMP OVER 100.4F OR MILD PAIN; 

Start 12/23/20 at 08:15


Docusate Sodium (Colace) 100 mg PRN BID  PRN PO HARD STOOLS Last administered on

12/25/20at 17:03;  Start 12/23/20 at 08:15


Ceftriaxone Sodium (Rocephin) 1 gm Q24H IVP  Last administered on 12/30/20at 

07:30;  Start 12/24/20 at 09:00


Dexamethasone Sodium Phosphate (Decadron) 4 mg DAILY IVP  Last administered on 

12/30/20at 07:30;  Start 12/24/20 at 09:00


Amino Acids/ Glycerin/ Electrolytes 1,000 ml @  80 mls/hr [id number] IV  Last 

administered on 12/30/20at 03:15;  Start 12/23/20 at 08:45


Enoxaparin Sodium (Lovenox 40mg Syringe) 40 mg Q12HR SQ  Last administered on 

12/30/20at 07:30;  Start 12/23/20 at 09:00


Zinc Sulfate (Orazinc) 220 mg DAILY PO  Last administered on 12/30/20at 07:30;  

Start 12/23/20 at 09:00


Guaifenesin (Robitussin Dm) 10 ml PRN Q6HRS  PRN PO COUGH Last administered on 

12/30/20at 07:30;  Start 12/23/20 at 08:45


Acetaminophen (Tylenol Supp) 650 mg PRN Q6HRS  PRN CT MILD PAIN / TEMP > 

100.3'F;  Start 12/23/20 at 08:45


Morphine Sulfate (Morphine Sulfate) 2 mg PRN Q4HRS  PRN IV PAIN Last 

administered on 12/29/20at 23:14;  Start 12/23/20 at 08:45


Azithromycin 500 mg/Sodium Chloride 250 ml @  250 mls/hr 1X  ONCE IV  Last 

administered on 12/23/20at 09:59;  Start 12/23/20 at 10:00;  Stop 12/23/20 at 

10:59;  Status DC


Azithromycin 250 mg/Sodium Chloride 250 ml @  250 mls/hr Q24H IV  Last 

administered on 12/27/20at 08:55;  Start 12/24/20 at 09:00;  Stop 12/27/20 at 

09:59;  Status DC


Cetirizine HCl (ZyrTEC) 10 mg DAILY PO  Last administered on 12/30/20at 07:30;  

Start 12/24/20 at 09:00


Morphine Sulfate (Ms Contin) 15 mg BID PO  Last administered on 12/30/20at 

07:31;  Start 12/23/20 at 21:00


Cyanocobalamin (Vitamin B-12) 1,000 mcg DAILY PO  Last administered on 

12/30/20at 07:30;  Start 12/24/20 at 09:00


Citalopram Hydrobromide (CeleXA) 20 mg DAILY PO  Last administered on 12/30/20at

07:31;  Start 12/24/20 at 09:00


Atorvastatin Calcium (Lipitor) 40 mg QHS PO  Last administered on 12/29/20at 2

0:18;  Start 12/23/20 at 21:00


Sulfasalazine (Azulfidine) 1,000 mg BID PO  Last administered on 12/30/20at 

07:30;  Start 12/23/20 at 21:00


Tramadol HCl (Ultram) 100 mg PRN Q8HRS  PRN PO MODERATE PAIN, SEVERE PAIN Last 

administered on 12/28/20at 07:15;  Start 12/23/20 at 15:00


Zolpidem Tartrate (Ambien) 5 mg PRN QHS  PRN PO INSOMNIA Last administered on 

12/27/20at 00:26;  Start 12/23/20 at 15:00


Info ([facility] Electrolyte Protocol) 1 ea CONT PRN  PRN MC PER PROTOCOL;  Start 

12/24/20 at 14:15


Potassium Chloride (Klor-Con) 40 meq 1X  ONCE PO  Last administered on 

12/24/20at 14:24;  Start 12/24/20 at 14:15;  Stop 12/24/20 at 14:16;  Status DC


Sterile Water (WATER for RESP) 1,000 ml CONT  PRN INH VIA VAPOTHERM DEVICE Last 

administered on 12/30/20at 03:19;  Start 12/26/20 at 09:30


Benzonatate (Tessalon Perle) 100 mg [id number] PO  Last administered on 12/30/20at 

07:30;  Start 12/26/20 at 14:00


Labetalol HCl (Normodyne Iv Push) 10 mg PRN Q2HR  PRN IVP HYPERTENSION Last 

administered on 12/27/20at 09:16;  Start 12/26/20 at 18:30


Quetiapine Fumarate (SEROquel) 50 mg PRN QHS  PRN PO sleep Last administered on 

12/28/20at 22:05;  Start 12/27/20 at 21:00


Enalaprilat (Vasotec Inj) 2.5 mg PRN Q6HRS  PRN IVP HYPERTENSION Last 

administered on 12/28/20at 12:29;  Start 12/27/20 at 10:00


Olanzapine (ZyPREXA ZYDIS) 5 mg PRN BID  PRN PO ANXIETY / AGITATION Last 

administered on 12/30/20at 03:19;  Start 12/27/20 at 12:30


Guaifenesin (Robitussin) 400 mg PRN Q4HRS  PRN PO COUGH;  Start 12/27/20 at 

22:15;  Stop 12/27/20 at 22:27;  Status DC


Guaifenesin (Robitussin) 400 mg PRN Q4HRS  PRN PO COUGH Last administered on 

12/29/20at 21:42;  Start 12/27/20 at 22:30





Active Scripts


Active


Reported


Morphine Sulfate Er (Morphine Sulfate) 30 Mg Tablet.er 1 Tab PO BID


Tramadol Hcl 100 Mg Tbmp.24hr 100 Mg PO PRN Q8HRS PRN


Ambien (Zolpidem Tartrate) 10 Mg Tablet 10 Mg PO PRN QHS PRN


Methotrexate (Methotrexate Sodium) 2.5 Mg Tablet 10 Tab PO WEEKLY


Lisinopril 10 Mg Tablet 1 Tab PO DAILY


Crestor (Rosuvastatin Calcium) 5 Mg Tablet 2 Tab PO DAILY


Hydrochlorothiazide Tablet (Hydrochlorothiazide) 50 Mg Tablet 25 Mg PO DAILY


Escitalopram Oxalate 10 Mg Tablet 1 Tab PO DAILY


Sulfasalazine Dr (Sulfasalazine) 500 Mg Tablet.dr 2 Tab PO TID 30 Days


Colace (Docusate Sodium) 100 Mg Capsule 1 Cap PO DA 30 Days


Acetaminophen Ext.release (Acetaminophen) 650 Mg Tablet.er 650 Mg PO PRN Q8HRS 

PRN


Melatonin 5 Mg Capsule 1 Cap PO QHS 30 Days


B-12 (Cyanocobalamin (Vitamin B-12)) 500 Mcg Tablet 2 Tab PO DAILY 30 Days


Vitamin D3 (Cholecalciferol (Vitamin D3)) 50 Mcg Capsule 50 Mcg PO DAILY


Aller-Sung (Cetirizine Hcl) 10 Mg Tablet 1 Tab PO DAILY 30 Days


Vitals/I & O





Vital Sign - Last 24 Hours








 12/29/20 12/29/20 12/29/20 12/29/20





 09:00 10:00 11:00 11:40


 


Pulse 98 101 97 


 


Resp 20 24 26 


 


B/P (MAP) 167/85 (112) 168/82 (110) 147/90 (109) 


 


Pulse Ox 92 94 93 


 


O2 Delivery Vapotherm Vapotherm Vapotherm Nasal Cannula


 


O2 Flow Rate 40.0 40.0 40.0 40.0





 12/29/20 12/29/20 12/29/20 12/29/20





 12:00 12:00 12:01 13:00


 


Temp  98.2  





  98.2  


 


Pulse  100  107


 


Resp  22 26 26


 


B/P (MAP)  139/86 (103)  123/84 (97)


 


Pulse Ox 96 93 93 95


 


O2 Delivery VapoTherm Vapotherm Nasal Cannula Vapotherm


 


O2 Flow Rate 40.0 40.0 40.0 40.0


 


    





    





 12/29/20 12/29/20 12/29/20 12/29/20





 14:00 15:00 15:37 15:56


 


Pulse 89 89  


 


Resp 19 30  


 


B/P (MAP) 123/79 (94) 160/87 (111)  


 


Pulse Ox 94 94  99


 


O2 Delivery Vapotherm Vapotherm Nasal Cannula VapoTherm


 


O2 Flow Rate 40.0 40.0 40.0 40.0





 12/29/20 12/29/20 12/29/20 12/29/20





 16:00 17:00 17:05 17:35


 


Temp 98.4   





 98.4   


 


Pulse 86 90  


 


Resp 28 25 18 24


 


B/P (MAP) 154/95 (114) 153/82 (105)  


 


Pulse Ox 94 94 95 94


 


O2 Delivery Vapotherm Vapotherm High Flow Nasal Cannula High Flow Nasal Cannula


 


O2 Flow Rate 40.0 40.0 40.0 40.0


 


    





    





 12/29/20 12/29/20 12/29/20 12/29/20





 18:00 19:00 20:00 20:00


 


Temp    98.4





    98.4


 


Pulse 109 110  102


 


Resp 24 28  26


 


B/P (MAP) 154/63 (93) 176/82 (113)  159/82 (107)


 


Pulse Ox 94 96  98


 


O2 Delivery Vapotherm Vapotherm Nasal Cannula Vapotherm


 


O2 Flow Rate 40.0 40.0 40.0 40.0


 


    





    





 12/29/20 12/29/20 12/29/20 12/29/20





 20:10 20:19 21:00 22:00


 


Pulse   98 105


 


Resp   28 21


 


B/P (MAP)   165/86 (112) 160/76 (104)


 


Pulse Ox 94 96 96 96


 


O2 Delivery VapoTherm Nasal Cannula Vapotherm Vapotherm


 


O2 Flow Rate 40.0 40.0 40.0 40.0





 12/29/20 12/29/20 12/29/20 12/29/20





 23:00 23:14 23:45 23:52


 


Pulse 94   


 


Resp 20   


 


B/P (MAP) 162/87 (112)   


 


Pulse Ox 96 96 96 97


 


O2 Delivery Vapotherm Nasal Cannula Nasal Cannula VapoTherm


 


O2 Flow Rate 40.0 40.0 40.0 40.0





 12/29/20 12/29/20 12/30/20 12/30/20





 23:59 23:59 00:31 01:00


 


Temp 98.3   





 98.3   


 


Pulse 100   93


 


Resp 22 24


 


B/P (MAP) 172/88 (116)   166/97 (120)


 


Pulse Ox 98  97 96


 


O2 Delivery Vapotherm Nasal Cannula Nasal Cannula Vapotherm


 


O2 Flow Rate 40.0 40.0 40.0 40.0


 


    





    





 12/30/20 12/30/20 12/30/20 12/30/20





 02:00 03:00 03:11 04:00


 


Pulse 97 103  


 


Resp 22 20  


 


B/P (MAP) 174/97 (122) 161/89 (113)  


 


Pulse Ox 96 96 96 


 


O2 Delivery Vapotherm Vapotherm VapoTherm Nasal Cannula


 


O2 Flow Rate 40.0 40.0 40.0 40.0





 12/30/20 12/30/20 12/30/20 12/30/20





 04:00 05:00 06:03 07:00


 


Temp 97.6   





 97.6   


 


Pulse 105 93 89 84


 


Resp 22 20 20 24


 


B/P (MAP) 169/92 (117) 145/86 (105) 177/90 (119) 153/87 (109)


 


Pulse Ox 98 98 99 94


 


O2 Delivery Vapotherm Vapotherm Vapotherm Vapotherm


 


O2 Flow Rate 40.0 40.0 40.0 40.0


 


    





    





 12/30/20 12/30/20 12/30/20 





 07:31 08:00 08:00 


 


Temp   98.0 





   98.0 


 


Pulse   108 


 


Resp 22 26 


 


B/P (MAP)   151/87 (108) 


 


Pulse Ox 97  93 


 


O2 Delivery High Flow Nasal Cannula Nasal Cannula Vapotherm 


 


O2 Flow Rate 40.0 40.0 40.0 














Intake and Output   


 


 12/29/20 12/29/20 12/30/20





 15:00 23:00 07:00


 


Intake Total 250 ml 658 ml 694 ml


 


Output Total 1050 ml 600 ml 600 ml


 


Balance -800 ml 58 ml 94 ml











Justicifation of Admission Dx:


Justifications for Admission:


Justification of Admission Dx:  Yes


Comminuty Aquired Pneumonia:  Med-High Risk Pt











CLAIRE EVANGELISTA MD          Dec 30, 2020 08:59

## 2020-12-30 NOTE — PDOC
PULMONARY PROGRESS NOTES


DATE: 12/30/20 


TIME: 11:02


Subjective


on 100% FIO2 via Vapotherm , with additional 100% NEB mask 


no overnight concerns from nursing


Vitals





Vital Signs








  Date Time  Temp Pulse Resp B/P (MAP) Pulse Ox O2 Delivery O2 Flow Rate FiO2


 


12/30/20 10:15  108  182/96    


 


12/30/20 10:00   24  95 Vapotherm 40.0 


 


12/30/20 08:00 98.0       





 98.0       








Comments


Pt. seen during COVID-19 pandemic, visual exam preformed 


RRR


no distress


BIPAP


no accessory muscle use 


No edema or rash


General:  Alert, No acute distress


Lungs:  Crackles


Labs





Laboratory Tests








Test


 12/30/20


03:55


 


White Blood Count


 7.3 x10^3/uL


(4.0-11.0)


 


Red Blood Count


 3.48 x10^6/uL


(4.30-5.70)


 


Hemoglobin


 11.2 g/dL


(13.0-17.5)


 


Hematocrit


 34.0 %


(39.0-53.0)


 


Mean Corpuscular Volume 98 fL () 


 


Mean Corpuscular Hemoglobin 32 pg (25-35) 


 


Mean Corpuscular Hemoglobin


Concent 33 g/dL


(31-37)


 


Red Cell Distribution Width


 14.7 %


(11.5-14.5)


 


Platelet Count


 342 x10^3/uL


(140-400)


 


Neutrophils (%) (Auto) 76 % (31-73) 


 


Lymphocytes (%) (Auto) 12 % (24-48) 


 


Monocytes (%) (Auto) 10 % (0-9) 


 


Eosinophils (%) (Auto) 2 % (0-3) 


 


Basophils (%) (Auto) 1 % (0-3) 


 


Neutrophils # (Auto)


 5.6 x10^3/uL


(1.8-7.7)


 


Lymphocytes # (Auto)


 0.9 x10^3/uL


(1.0-4.8)


 


Monocytes # (Auto)


 0.7 x10^3/uL


(0.0-1.1)


 


Eosinophils # (Auto)


 0.1 x10^3/uL


(0.0-0.7)


 


Basophils # (Auto)


 0.0 x10^3/uL


(0.0-0.2)


 


Sodium Level


 141 mmol/L


(136-145)


 


Potassium Level


 4.0 mmol/L


(3.5-5.1)


 


Chloride Level


 106 mmol/L


()


 


Carbon Dioxide Level


 28 mmol/L


(21-32)


 


Anion Gap 7 (6-14) 


 


Blood Urea Nitrogen


 17 mg/dL


(8-26)


 


Creatinine


 0.8 mg/dL


(0.7-1.3)


 


Estimated GFR


(Cockcroft-Gault) 96.7 





 


BUN/Creatinine Ratio 21 (6-20) 


 


Glucose Level


 89 mg/dL


(70-99)


 


Calcium Level


 8.4 mg/dL


(8.5-10.1)


 


Total Bilirubin


 0.6 mg/dL


(0.2-1.0)


 


Aspartate Amino Transf


(AST/SGOT) 106 U/L


(15-37)


 


Alanine Aminotransferase


(ALT/SGPT) 122 U/L


(16-63)


 


Alkaline Phosphatase


 115 U/L


()


 


Total Protein


 6.2 g/dL


(6.4-8.2)


 


Albumin


 2.2 g/dL


(3.4-5.0)


 


Albumin/Globulin Ratio 0.6 (1.0-1.7) 








Laboratory Tests








Test


 12/30/20


03:55


 


White Blood Count


 7.3 x10^3/uL


(4.0-11.0)


 


Red Blood Count


 3.48 x10^6/uL


(4.30-5.70)


 


Hemoglobin


 11.2 g/dL


(13.0-17.5)


 


Hematocrit


 34.0 %


(39.0-53.0)


 


Mean Corpuscular Volume 98 fL () 


 


Mean Corpuscular Hemoglobin 32 pg (25-35) 


 


Mean Corpuscular Hemoglobin


Concent 33 g/dL


(31-37)


 


Red Cell Distribution Width


 14.7 %


(11.5-14.5)


 


Platelet Count


 342 x10^3/uL


(140-400)


 


Neutrophils (%) (Auto) 76 % (31-73) 


 


Lymphocytes (%) (Auto) 12 % (24-48) 


 


Monocytes (%) (Auto) 10 % (0-9) 


 


Eosinophils (%) (Auto) 2 % (0-3) 


 


Basophils (%) (Auto) 1 % (0-3) 


 


Neutrophils # (Auto)


 5.6 x10^3/uL


(1.8-7.7)


 


Lymphocytes # (Auto)


 0.9 x10^3/uL


(1.0-4.8)


 


Monocytes # (Auto)


 0.7 x10^3/uL


(0.0-1.1)


 


Eosinophils # (Auto)


 0.1 x10^3/uL


(0.0-0.7)


 


Basophils # (Auto)


 0.0 x10^3/uL


(0.0-0.2)


 


Sodium Level


 141 mmol/L


(136-145)


 


Potassium Level


 4.0 mmol/L


(3.5-5.1)


 


Chloride Level


 106 mmol/L


()


 


Carbon Dioxide Level


 28 mmol/L


(21-32)


 


Anion Gap 7 (6-14) 


 


Blood Urea Nitrogen


 17 mg/dL


(8-26)


 


Creatinine


 0.8 mg/dL


(0.7-1.3)


 


Estimated GFR


(Cockcroft-Gault) 96.7 





 


BUN/Creatinine Ratio 21 (6-20) 


 


Glucose Level


 89 mg/dL


(70-99)


 


Calcium Level


 8.4 mg/dL


(8.5-10.1)


 


Total Bilirubin


 0.6 mg/dL


(0.2-1.0)


 


Aspartate Amino Transf


(AST/SGOT) 106 U/L


(15-37)


 


Alanine Aminotransferase


(ALT/SGPT) 122 U/L


(16-63)


 


Alkaline Phosphatase


 115 U/L


()


 


Total Protein


 6.2 g/dL


(6.4-8.2)


 


Albumin


 2.2 g/dL


(3.4-5.0)


 


Albumin/Globulin Ratio 0.6 (1.0-1.7) 








Medications





Active Scripts








 Medications  Dose


 Route/Sig


 Max Daily Dose Days Date Category


 


 Morphine Sulfate


 Er (Morphine


 Sulfate) 30 Mg


 Tablet.er  1 Tab


 PO BID


   12/23/20 Reported


 


 Tramadol Hcl 100


 Mg Tbmp.24hr  100 Mg


 PO PRN Q8HRS PRN


   12/23/20 Reported


 


 Ambien (Zolpidem


 Tartrate) 10 Mg


 Tablet  10 Mg


 PO PRN QHS PRN


   12/23/20 Reported


 


 Methotrexate


  (Methotrexate


 Sodium) 2.5 Mg


 Tablet  10 Tab


 PO WEEKLY


   12/23/20 Reported


 


 Lisinopril 10 Mg


 Tablet  1 Tab


 PO DAILY


   12/23/20 Reported


 


 Crestor


  (Rosuvastatin


 Calcium) 5 Mg


 Tablet  2 Tab


 PO DAILY


   12/23/20 Reported


 


 Hydrochlorothiazide


 Tablet


  (Hydrochlorothiazide)


 50 Mg Tablet  25 Mg


 PO DAILY


   12/23/20 Reported


 


 Escitalopram


 Oxalate 10 Mg


 Tablet  1 Tab


 PO DAILY


   12/23/20 Reported


 


 Sulfasalazine Dr


  (Sulfasalazine)


 500 Mg Tablet.dr  2 Tab


 PO TID


  30 12/23/20 Reported


 


 Colace (Docusate


 Sodium) 100 Mg


 Capsule  1 Cap


 PO DA


  30 12/23/20 Reported


 


 Acetaminophen


 Ext.release


  (Acetaminophen)


 650 Mg Tablet.er  650 Mg


 PO PRN Q8HRS PRN


   12/23/20 Reported


 


 Melatonin 5 Mg


 Capsule  1 Cap


 PO QHS


  30 12/23/20 Reported


 


 B-12


  (Cyanocobalamin


  (Vitamin B-12))


 500 Mcg Tablet  2 Tab


 PO DAILY


  30 12/23/20 Reported


 


 Vitamin D3


  (Cholecalciferol


  (Vitamin D3)) 50


 Mcg Capsule  50 Mcg


 PO DAILY


   12/23/20 Reported


 


 Aller-Sung


  (Cetirizine Hcl)


 10 Mg Tablet  1 Tab


 PO DAILY


  30 12/23/20 Reported








Comments


CXR 12/20


IMPRESSION:


Worsened aeration of the left upper lobe with increased consolidative change.





Impression


.


IMPRESSION:


1.  Acute hypoxic respiratory failure secondary to highly suspected COVID-19


pneumonia and acute respiratory distress syndrome/acute lung injury.-- COVID-19 

positive 


2.  Abnormal CT chest with extensive widespread ground glass and alveolar and


interstitial infiltrates with reactive mild mediastinal/hilar adenopathy.  This


is likely related to COVID-19 pneumonia.


3.  Patient with history of psoriatic arthritis.  He is likely immunocompromise


as he has been on methotrexate.  covering for opportunistic infection with Abx


4.  History of tongue cancer with resection, details unknown.


5.  History of prostate cancer.


6.  History of Crohn's disease.





Plan


.


RECOMMENDATIONS:


Continue supplemental oxygen to keep sats above 92%, Continue Vapotherm, 

819USA3/40 litres, with additional NRB mask 100%


Follow chest x-ray/ABG--- chest x-ray worse today left lower lobe infiltrate


Monitor for respiratory distress requiring intubation 


COVID-19 positive


Continue with empiric antibiotic, rocephin


Continue with IV steroids with slow taper 


DVT/GI PPX 





D/W RN and RT 


Critical care time 0900-0930AM











JUAN RHOADES MD              Dec 30, 2020 11:03

## 2020-12-30 NOTE — RAD
XR CHEST 1V 12/30/2020 4:12 AM



INDICATION: Covid, ARDS



COMPARISON: 12/27/2020



TECHNIQUE: Portable frontal view of the chest is provided.



FINDINGS:



The cardiomediastinal silhouette is similar in appearance. Right upper extremity PICC is in similar p
osition.



Increased consolidative change identified in the left upper lobe compared to prior examination. Bilat
eral perihilar mixed interstitial and alveolar airspace disease is identified suggestive of ARDS. No 
pleural effusions or pneumothorax.



No suspicious osseous abnormality.



IMPRESSION:



Worsened aeration of the left upper lobe with increased consolidative change.



Electronically signed by: Estrellita Ramey MD (12/30/2020 4:19 AM) [facility]

## 2020-12-31 VITALS — SYSTOLIC BLOOD PRESSURE: 158 MMHG | DIASTOLIC BLOOD PRESSURE: 71 MMHG

## 2020-12-31 VITALS — SYSTOLIC BLOOD PRESSURE: 113 MMHG | DIASTOLIC BLOOD PRESSURE: 77 MMHG

## 2020-12-31 VITALS — SYSTOLIC BLOOD PRESSURE: 176 MMHG | DIASTOLIC BLOOD PRESSURE: 83 MMHG

## 2020-12-31 VITALS — DIASTOLIC BLOOD PRESSURE: 75 MMHG | SYSTOLIC BLOOD PRESSURE: 154 MMHG

## 2020-12-31 VITALS — SYSTOLIC BLOOD PRESSURE: 159 MMHG | DIASTOLIC BLOOD PRESSURE: 118 MMHG

## 2020-12-31 VITALS — SYSTOLIC BLOOD PRESSURE: 186 MMHG | DIASTOLIC BLOOD PRESSURE: 100 MMHG

## 2020-12-31 VITALS — SYSTOLIC BLOOD PRESSURE: 99 MMHG | DIASTOLIC BLOOD PRESSURE: 65 MMHG

## 2020-12-31 VITALS — SYSTOLIC BLOOD PRESSURE: 162 MMHG | DIASTOLIC BLOOD PRESSURE: 85 MMHG

## 2020-12-31 VITALS — SYSTOLIC BLOOD PRESSURE: 139 MMHG | DIASTOLIC BLOOD PRESSURE: 80 MMHG

## 2020-12-31 VITALS — SYSTOLIC BLOOD PRESSURE: 155 MMHG | DIASTOLIC BLOOD PRESSURE: 76 MMHG

## 2020-12-31 VITALS — DIASTOLIC BLOOD PRESSURE: 77 MMHG | SYSTOLIC BLOOD PRESSURE: 151 MMHG

## 2020-12-31 VITALS — SYSTOLIC BLOOD PRESSURE: 99 MMHG | DIASTOLIC BLOOD PRESSURE: 58 MMHG

## 2020-12-31 VITALS — SYSTOLIC BLOOD PRESSURE: 179 MMHG | DIASTOLIC BLOOD PRESSURE: 82 MMHG

## 2020-12-31 VITALS — SYSTOLIC BLOOD PRESSURE: 83 MMHG | DIASTOLIC BLOOD PRESSURE: 53 MMHG

## 2020-12-31 VITALS — DIASTOLIC BLOOD PRESSURE: 59 MMHG | SYSTOLIC BLOOD PRESSURE: 106 MMHG

## 2020-12-31 VITALS — SYSTOLIC BLOOD PRESSURE: 181 MMHG | DIASTOLIC BLOOD PRESSURE: 96 MMHG

## 2020-12-31 VITALS — SYSTOLIC BLOOD PRESSURE: 121 MMHG | DIASTOLIC BLOOD PRESSURE: 73 MMHG

## 2020-12-31 VITALS — SYSTOLIC BLOOD PRESSURE: 147 MMHG | DIASTOLIC BLOOD PRESSURE: 75 MMHG

## 2020-12-31 VITALS — DIASTOLIC BLOOD PRESSURE: 114 MMHG | SYSTOLIC BLOOD PRESSURE: 174 MMHG

## 2020-12-31 VITALS — SYSTOLIC BLOOD PRESSURE: 103 MMHG | DIASTOLIC BLOOD PRESSURE: 64 MMHG

## 2020-12-31 VITALS — SYSTOLIC BLOOD PRESSURE: 169 MMHG | DIASTOLIC BLOOD PRESSURE: 91 MMHG

## 2020-12-31 VITALS — SYSTOLIC BLOOD PRESSURE: 108 MMHG | DIASTOLIC BLOOD PRESSURE: 62 MMHG

## 2020-12-31 VITALS — DIASTOLIC BLOOD PRESSURE: 81 MMHG | SYSTOLIC BLOOD PRESSURE: 140 MMHG

## 2020-12-31 VITALS — SYSTOLIC BLOOD PRESSURE: 164 MMHG | DIASTOLIC BLOOD PRESSURE: 76 MMHG

## 2020-12-31 LAB
% BANDS: 4 % (ref 0–9)
% LYMPHS: 3 % (ref 24–48)
% MONOS: 6 % (ref 0–10)
% SEGS: 87 % (ref 35–66)
ALBUMIN SERPL-MCNC: 2.3 G/DL (ref 3.4–5)
ALBUMIN/GLOB SERPL: 0.7 {RATIO} (ref 1–1.7)
ALP SERPL-CCNC: 108 U/L (ref 46–116)
ALT SERPL-CCNC: 161 U/L (ref 16–63)
ANION GAP SERPL CALC-SCNC: 8 MMOL/L (ref 6–14)
AST SERPL-CCNC: 140 U/L (ref 15–37)
BASOPHILS # BLD AUTO: 0 X10^3/UL (ref 0–0.2)
BASOPHILS NFR BLD: 1 % (ref 0–3)
BILIRUB SERPL-MCNC: 0.4 MG/DL (ref 0.2–1)
BUN SERPL-MCNC: 19 MG/DL (ref 8–26)
BUN/CREAT SERPL: 32 (ref 6–20)
CALCIUM SERPL-MCNC: 8.3 MG/DL (ref 8.5–10.1)
CHLORIDE SERPL-SCNC: 107 MMOL/L (ref 98–107)
CO2 SERPL-SCNC: 28 MMOL/L (ref 21–32)
CREAT SERPL-MCNC: 0.6 MG/DL (ref 0.7–1.3)
EOSINOPHIL NFR BLD: 0 % (ref 0–3)
EOSINOPHIL NFR BLD: 0 X10^3/UL (ref 0–0.7)
ERYTHROCYTE [DISTWIDTH] IN BLOOD BY AUTOMATED COUNT: 14.6 % (ref 11.5–14.5)
GFR SERPLBLD BASED ON 1.73 SQ M-ARVRAT: 134.8 ML/MIN
GLUCOSE SERPL-MCNC: 115 MG/DL (ref 70–99)
HCT VFR BLD CALC: 33 % (ref 39–53)
HGB BLD-MCNC: 11 G/DL (ref 13–17.5)
LYMPHOCYTES # BLD: 0.5 X10^3/UL (ref 1–4.8)
LYMPHOCYTES NFR BLD AUTO: 5 % (ref 24–48)
MCH RBC QN AUTO: 32 PG (ref 25–35)
MCHC RBC AUTO-ENTMCNC: 33 G/DL (ref 31–37)
MCV RBC AUTO: 97 FL (ref 79–100)
MONO #: 0.6 X10^3/UL (ref 0–1.1)
MONOCYTES NFR BLD: 7 % (ref 0–9)
NEUT #: 8.2 X10^3/UL (ref 1.8–7.7)
NEUTROPHILS NFR BLD AUTO: 87 % (ref 31–73)
PLATELET # BLD AUTO: 333 X10^3/UL (ref 140–400)
PLATELET # BLD EST: ADEQUATE 10*3/UL
POTASSIUM SERPL-SCNC: 4.7 MMOL/L (ref 3.5–5.1)
PROT SERPL-MCNC: 5.6 G/DL (ref 6.4–8.2)
RBC # BLD AUTO: 3.38 X10^6/UL (ref 4.3–5.7)
SODIUM SERPL-SCNC: 143 MMOL/L (ref 136–145)
WBC # BLD AUTO: 9.4 X10^3/UL (ref 4–11)

## 2020-12-31 RX ADMIN — GUAIFENESIN PRN MG: 100 SOLUTION ORAL at 17:58

## 2020-12-31 RX ADMIN — CYANOCOBALAMIN TAB 1000 MCG SCH MCG: 1000 TAB at 10:13

## 2020-12-31 RX ADMIN — BENZONATATE SCH MG: 100 CAPSULE, LIQUID FILLED ORAL at 10:15

## 2020-12-31 RX ADMIN — WATER PRN ML: 1 INJECTION INTRAVENOUS at 17:29

## 2020-12-31 RX ADMIN — WATER PRN ML: 1 INJECTION INTRAVENOUS at 05:06

## 2020-12-31 RX ADMIN — BENZONATATE SCH MG: 100 CAPSULE, LIQUID FILLED ORAL at 14:00

## 2020-12-31 RX ADMIN — CITALOPRAM HYDROBROMIDE SCH MG: 20 TABLET ORAL at 10:14

## 2020-12-31 RX ADMIN — CETIRIZINE HYDROCHLORIDE SCH MG: 10 TABLET, FILM COATED ORAL at 10:14

## 2020-12-31 RX ADMIN — DEXAMETHASONE SODIUM PHOSPHATE SCH MG: 4 INJECTION, SOLUTION INTRAMUSCULAR; INTRAVENOUS at 10:15

## 2020-12-31 RX ADMIN — ENOXAPARIN SODIUM SCH MG: 40 INJECTION SUBCUTANEOUS at 10:13

## 2020-12-31 RX ADMIN — BENZONATATE SCH MG: 100 CAPSULE, LIQUID FILLED ORAL at 20:25

## 2020-12-31 RX ADMIN — ZOLPIDEM TARTRATE PRN MG: 5 TABLET ORAL at 01:02

## 2020-12-31 RX ADMIN — CEFTRIAXONE SCH GM: 1 INJECTION, POWDER, FOR SOLUTION INTRAMUSCULAR; INTRAVENOUS at 10:17

## 2020-12-31 RX ADMIN — MORPHINE SULFATE SCH MG: 15 TABLET, EXTENDED RELEASE ORAL at 09:00

## 2020-12-31 RX ADMIN — BACITRACIN SCH MLS/HR: 5000 INJECTION, POWDER, FOR SOLUTION INTRAMUSCULAR at 10:15

## 2020-12-31 RX ADMIN — ENOXAPARIN SODIUM SCH MG: 40 INJECTION SUBCUTANEOUS at 20:23

## 2020-12-31 RX ADMIN — ZINC SULFATE CAP 220 MG (50 MG ELEMENTAL ZN) SCH MG: 220 (50 ZN) CAP at 10:14

## 2020-12-31 RX ADMIN — GLYCERIN, ISOLEUCINE, LEUCINE, LYSINE, METHIONINE, PHENYLALANINE, THREONINE, TRYPTOPHAN, VALINE, ALANINE, GLYCINE, ARGININE, HISTIDINE, PROLINE, SERINE, CYSTEINE, SODIUM ACETATE, MAGNESIUM ACETATE, CALCIUM ACETATE, SODIUM CHLORIDE, POTASSIUM CHLORIDE, PHOSPHORIC ACID, AND POTASSIUM METABISULFITE SCH MLS/HR
3; .21; .27; .22; .16; .17; .12; .046; .2; .21; .42; .29; .085; .34; .18; .014; .2; .054; .026; .12; .15; .041 INJECTION INTRAVENOUS at 16:45

## 2020-12-31 RX ADMIN — GUAIFENESIN PRN MG: 100 SOLUTION ORAL at 01:02

## 2020-12-31 RX ADMIN — ATORVASTATIN CALCIUM SCH MG: 40 TABLET, FILM COATED ORAL at 20:24

## 2020-12-31 RX ADMIN — MORPHINE SULFATE SCH MG: 15 TABLET, EXTENDED RELEASE ORAL at 20:25

## 2020-12-31 RX ADMIN — GLYCERIN, ISOLEUCINE, LEUCINE, LYSINE, METHIONINE, PHENYLALANINE, THREONINE, TRYPTOPHAN, VALINE, ALANINE, GLYCINE, ARGININE, HISTIDINE, PROLINE, SERINE, CYSTEINE, SODIUM ACETATE, MAGNESIUM ACETATE, CALCIUM ACETATE, SODIUM CHLORIDE, POTASSIUM CHLORIDE, PHOSPHORIC ACID, AND POTASSIUM METABISULFITE SCH MLS/HR
3; .21; .27; .22; .16; .17; .12; .046; .2; .21; .42; .29; .085; .34; .18; .014; .2; .054; .026; .12; .15; .041 INJECTION INTRAVENOUS at 01:03

## 2020-12-31 NOTE — PDOC
PULMONARY PROGRESS NOTES


DATE: 12/31/20 


TIME: 10:04


Subjective


on 100% FIO2 via Vapotherm , with additional 100% NEB mask 


nursing reports hypotension this am 


no overnight concerns from nursing


Vitals





Vital Signs








  Date Time  Temp Pulse Resp B/P (MAP) Pulse Ox O2 Delivery O2 Flow Rate FiO2


 


12/31/20 08:00 98.6 82  108/62 (77)  Nasal Cannula 40.0 





 98.6       


 


12/31/20 07:42     91   


 


12/31/20 04:13   28     








Comments


Pt. seen during COVID-19 pandemic, visual exam preformed 


RRR


no distress


BIPAP


no accessory muscle use 


No edema or rash


General:  Alert, No acute distress


Lungs:  Crackles


Labs





Laboratory Tests








Test


 12/30/20


03:55


 


White Blood Count


 7.3 x10^3/uL


(4.0-11.0)


 


Red Blood Count


 3.48 x10^6/uL


(4.30-5.70)


 


Hemoglobin


 11.2 g/dL


(13.0-17.5)


 


Hematocrit


 34.0 %


(39.0-53.0)


 


Mean Corpuscular Volume 98 fL () 


 


Mean Corpuscular Hemoglobin 32 pg (25-35) 


 


Mean Corpuscular Hemoglobin


Concent 33 g/dL


(31-37)


 


Red Cell Distribution Width


 14.7 %


(11.5-14.5)


 


Platelet Count


 342 x10^3/uL


(140-400)


 


Neutrophils (%) (Auto) 76 % (31-73) 


 


Lymphocytes (%) (Auto) 12 % (24-48) 


 


Monocytes (%) (Auto) 10 % (0-9) 


 


Eosinophils (%) (Auto) 2 % (0-3) 


 


Basophils (%) (Auto) 1 % (0-3) 


 


Neutrophils # (Auto)


 5.6 x10^3/uL


(1.8-7.7)


 


Lymphocytes # (Auto)


 0.9 x10^3/uL


(1.0-4.8)


 


Monocytes # (Auto)


 0.7 x10^3/uL


(0.0-1.1)


 


Eosinophils # (Auto)


 0.1 x10^3/uL


(0.0-0.7)


 


Basophils # (Auto)


 0.0 x10^3/uL


(0.0-0.2)


 


Sodium Level


 141 mmol/L


(136-145)


 


Potassium Level


 4.0 mmol/L


(3.5-5.1)


 


Chloride Level


 106 mmol/L


()


 


Carbon Dioxide Level


 28 mmol/L


(21-32)


 


Anion Gap 7 (6-14) 


 


Blood Urea Nitrogen


 17 mg/dL


(8-26)


 


Creatinine


 0.8 mg/dL


(0.7-1.3)


 


Estimated GFR


(Cockcroft-Gault) 96.7 





 


BUN/Creatinine Ratio 21 (6-20) 


 


Glucose Level


 89 mg/dL


(70-99)


 


Calcium Level


 8.4 mg/dL


(8.5-10.1)


 


Total Bilirubin


 0.6 mg/dL


(0.2-1.0)


 


Aspartate Amino Transf


(AST/SGOT) 106 U/L


(15-37)


 


Alanine Aminotransferase


(ALT/SGPT) 122 U/L


(16-63)


 


Alkaline Phosphatase


 115 U/L


()


 


Total Protein


 6.2 g/dL


(6.4-8.2)


 


Albumin


 2.2 g/dL


(3.4-5.0)


 


Albumin/Globulin Ratio 0.6 (1.0-1.7) 








Medications





Active Scripts








 Medications  Dose


 Route/Sig


 Max Daily Dose Days Date Category


 


 Morphine Sulfate


 Er (Morphine


 Sulfate) 30 Mg


 Tablet.er  1 Tab


 PO BID


   12/23/20 Reported


 


 Tramadol Hcl 100


 Mg Tbmp.24hr  100 Mg


 PO PRN Q8HRS PRN


   12/23/20 Reported


 


 Ambien (Zolpidem


 Tartrate) 10 Mg


 Tablet  10 Mg


 PO PRN QHS PRN


   12/23/20 Reported


 


 Methotrexate


  (Methotrexate


 Sodium) 2.5 Mg


 Tablet  10 Tab


 PO WEEKLY


   12/23/20 Reported


 


 Lisinopril 10 Mg


 Tablet  1 Tab


 PO DAILY


   12/23/20 Reported


 


 Crestor


  (Rosuvastatin


 Calcium) 5 Mg


 Tablet  2 Tab


 PO DAILY


   12/23/20 Reported


 


 Hydrochlorothiazide


 Tablet


  (Hydrochlorothiazide)


 50 Mg Tablet  25 Mg


 PO DAILY


   12/23/20 Reported


 


 Escitalopram


 Oxalate 10 Mg


 Tablet  1 Tab


 PO DAILY


   12/23/20 Reported


 


 Sulfasalazine Dr


  (Sulfasalazine)


 500 Mg Tablet.dr  2 Tab


 PO TID


  30 12/23/20 Reported


 


 Colace (Docusate


 Sodium) 100 Mg


 Capsule  1 Cap


 PO DA


  30 12/23/20 Reported


 


 Acetaminophen


 Ext.release


  (Acetaminophen)


 650 Mg Tablet.er  650 Mg


 PO PRN Q8HRS PRN


   12/23/20 Reported


 


 Melatonin 5 Mg


 Capsule  1 Cap


 PO QHS


  30 12/23/20 Reported


 


 B-12


  (Cyanocobalamin


  (Vitamin B-12))


 500 Mcg Tablet  2 Tab


 PO DAILY


  30 12/23/20 Reported


 


 Vitamin D3


  (Cholecalciferol


  (Vitamin D3)) 50


 Mcg Capsule  50 Mcg


 PO DAILY


   12/23/20 Reported


 


 Aller-Sung


  (Cetirizine Hcl)


 10 Mg Tablet  1 Tab


 PO DAILY


  30 12/23/20 Reported








Comments


CXR 12/20


IMPRESSION:


Worsened aeration of the left upper lobe with increased consolidative change.





Impression


.


IMPRESSION:


1.  Acute hypoxic respiratory failure secondary to highly suspected COVID-19


pneumonia and acute respiratory distress syndrome/acute lung injury.-- COVID-19 

positive 


2.  Abnormal CT chest with extensive widespread ground glass and alveolar and


interstitial infiltrates with reactive mild mediastinal/hilar adenopathy.  This


is likely related to COVID-19 pneumonia.


3.  Patient with history of psoriatic arthritis.  He is likely immunocompromise


as he has been on methotrexate.  covering for opportunistic infection with Abx


4.  History of tongue cancer with resection, details unknown.


5.  History of prostate cancer.


6.  History of Crohn's disease.





Plan


.


RECOMMENDATIONS:


Continue supplemental oxygen to keep sats above 92%, Continue Vapotherm, 

[id number]/40 litres, with additional NRB mask 100%


Follow chest x-ray/ABG


IVF bolus for hypotension, vasopressors if needed to keep MAP above 65 


start continuous IVF 


Monitor for respiratory distress requiring intubation 


COVID-19 positive


Continue with empiric antibiotic


Continue with IV steroids with slow taper


Continue PPN for nutritional support  


DVT/GI PPX 





D/W RN and RT 


Critical care time [id number]











JUAN RHOADES MD              Dec 31, 2020 10:07

## 2020-12-31 NOTE — PDOC
PROGRESS NOTES


Date of Service:


DATE: 12/31/20 


TIME: 08:03





Chief Complaint


Chief Complaint


impression ====================





Acute hypoxic respiratory failure - no pulmonary history. With recent travel to 

and from [place] likely COVID 19 vs other atypical pneumonia. Cont empiric 

antibiotics, steroids. Consult Pulm. BIPAP in negative pressure room 16/8 on 80%

FiO2


COVID 19 positive - with pneumonia - given transaminitis and late presentation 

with high O2 requirements no indication for remdesivir


RYDER - likely vasomotor nephropathy - no known renal history, will hydrate


Pneumonia - likely atypical, gram negative vs viral. will cover 


Prostate Ca - s/p resection at [facility] in [place]


Hypokalemia - likely nutritional or loss from diarrhea, will replace


Elevated troponin - likely from type II demand ischemia, will trend troponins, 

maintain telemetry


Crohn's - sees rheumatology regularly, Dr Fan in [place], on sulfasalazine


Psoriatic arthritis - on pain medications 30mg MS Contin BID, tramadol and 25mg 

Methotrexate weekly as DMARD per rheumatology, Dr. Fan. Hold MTX while 

inpatient


GERD - PPI


HLD - statin


HTN - Lisinopril and HCTZ on hold for RYDER


Anemia - likely of chronic disease, will monitor


Cardiomegaly - notable on CT chest - no known cardiac history, though his mother

did have CHF and CAD


Right renal mass - will need US for further assessment


on 100% FIO2 via Vapotherm , tolerating 


HYPOTENSION 


history of psoriatic arthritis.  He is likely immunocompromise


as he has been on methotrexate. 








plan==============





FEN - NPO,  Vapotherm ,  NEB mask 


PPX - lovenox


FULL CODE


Dispo - ICU 


IVF bolus for hypotension, vasopressors if needed to keep MAP above 65 


start continuous IVF 


Monitor for respiratory distress requiring intubation 


COVID-19 positive


Continue with empiric antibiotic


Continue with IV steroids with slow taper


Continue PPN for nutritional support  


DVT/GI PPX 














CC time 33 minutes








Hoa and son, Desmond, [phone]





History of Present Illness


History of Present Illness





PAST MEDICAL HISTORY:  Significant for:


1.  History of hypertension.


2.  Hyperlipidemia.


3.  GERD.


4.  Inflammatory bowel disease.


5.  History of psoriatic arthritis, on methotrexate.


6.  History of tongue cancer and prostate cancer, details are unknown.





PAST SURGICAL HISTORY:  He has history of surgery for prostate cancer and tongue


cancer,  details unknown.  Apparently, he had some tongue resection.





FAMILY HISTORY:  Mother with cancer and coronary artery disease.





SOCIAL HISTORY:  Reportedly, nonsmoker.




















Mr Nance is 64 yo male w/ past medical history of Crohn's, psoriatic arthritis,

GERD, HLD, HTN, prostate ca, tongue cancer who presents to the emergency 

department at [facility] concerned about shortness of breath that had been 

progressive. This started 12/18/2020 and has been getting worse since that time.

Of note patient also has loss of taste, loss of smell, cough, shortness of 

breath, fever. He recently went to [place] on a business trip and just 

returned 5 days prior to presentation.


Upon arrival to the emergency room patient had significant hypoxia with a pulse 

ox in the 40s.  He was placed initially on a nonrebreather and then placed on 

BiPAP.


He was also somewhat concerned about a Crohn's flareup.  Patient states he has 

been having abdominal pain with nausea and diarrhea.  These are not typical 

symptoms of his Crohn's.  He feels very tired and has body aches as well.


He was given steroids and Rocephin in ED. 


Chest radiograph concerning for bilateral interstitial infiltrates.


Labs significant for WBC 4.5, Hb 11.8, platelets 229, , K2.8, BUN 34, CR 

1.5, glucose 119, .9, albumin 3, , , lactic acid 1.6, D-

dimer 3.15, troponin 0.068.


ABG on 80% FiO2 7.53/42/64


CT negative for pulmonary embolism. Widespread airspace disease throughout both 

lungs, consistent with pneumonia. Mild mediastinal and bilateral hilar 

lymphadenopathy, likely reactive. Cardiomegaly with mild aneurysmal dilation of 

the ascending thoracic aorta. Indeterminate hyperdense nodule at the midpole 

right kidney. This could represent a solid mass or complex cyst.


Patient transferred to [facility] for pulmonary consultation and 

ICU admission.





12/24: Overnight BP improved with fluids. O2 saturations slightly increased. Did

not tolerate more than 1 hour off BIPAP even with non-rebreather O2 saturations 

were 92% at best. No pain currently, very slightly appetite.


12/25: COVID-19 returned positive.  Down to 65% on his BiPAP 18/8.  Was able to 

take it off for medications meal yesterday, but desaturates to 79% and recovers 

quickly.  Still very drowsy with little appetite.  Afebrile.


12/26: Afebrile with 100% O2 on BiPAP today.  Transition to Vapotherm with more 

ease of breathing.  He has almost no appetite.  Less drowsy today.  He is 

depressed mood but now has a cell phone  and is able to talk to his 

family.  No complaints of chest pain some abdominal pain no bowel movement as of

yet.  ABG 7.49/37/59


12-29 abg pending 





Afebrile overnight.  Tolerating Vapotherm at 100% FiO2 much better with O2 

saturations 96%.  Appetite is increased slightly.  Less drowsy.  He does note 

that he barely slept last night.  Pain is well controlled.  He has had 2 formed 

stools.  No chest pain.  Still with rough cough.  Blood pressure systolic in the

180s minimally responsive to labetalol.





Plan:





Seroquel prn sleep


Vasotec prn HTN


PICC





Vitals


Vitals





Vital Signs








  Date Time  Temp Pulse Resp B/P (MAP) Pulse Ox O2 Delivery O2 Flow Rate FiO2


 


12/31/20 07:42     91 VapoTherm 40.0 


 


12/31/20 06:05  85  169/91 (117)    


 


12/31/20 04:13   28     


 


12/31/20 00:02 98.8       





 98.8       











Physical Exam


General:  


Heart:  Regular rate


Lungs:  Crackles


Abdomen:  Normal bowel sounds, Soft, No tenderness, No hepatosplenomegaly, No 

masses


Extremities:  No clubbing, No cyanosis, No edema, Normal pulses, No 

tenderness/swelling


Skin:  No rashes, No breakdown, No significant lesion


General:  Alert, Oriented X3, Cooperative, mild distress, moderate distress


Heart:  Regular rate


Lungs:  Crackles


Abdomen:  Normal bowel sounds, Soft, No tenderness, No hepatosplenomegaly, No 

masses


Extremities:  No clubbing, No cyanosis, No edema, Normal pulses, No 

tenderness/swelling


Skin:  No rashes, No breakdown, No significant lesion





Comment


Review of Relevant


I have reviewed the following items jabier (where applicable) has been applied.


Labs





Laboratory Tests








Test


 12/30/20


03:55


 


White Blood Count


 7.3 x10^3/uL


(4.0-11.0)


 


Red Blood Count


 3.48 x10^6/uL


(4.30-5.70)


 


Hemoglobin


 11.2 g/dL


(13.0-17.5)


 


Hematocrit


 34.0 %


(39.0-53.0)


 


Mean Corpuscular Volume 98 fL () 


 


Mean Corpuscular Hemoglobin 32 pg (25-35) 


 


Mean Corpuscular Hemoglobin


Concent 33 g/dL


(31-37)


 


Red Cell Distribution Width


 14.7 %


(11.5-14.5)


 


Platelet Count


 342 x10^3/uL


(140-400)


 


Neutrophils (%) (Auto) 76 % (31-73) 


 


Lymphocytes (%) (Auto) 12 % (24-48) 


 


Monocytes (%) (Auto) 10 % (0-9) 


 


Eosinophils (%) (Auto) 2 % (0-3) 


 


Basophils (%) (Auto) 1 % (0-3) 


 


Neutrophils # (Auto)


 5.6 x10^3/uL


(1.8-7.7)


 


Lymphocytes # (Auto)


 0.9 x10^3/uL


(1.0-4.8)


 


Monocytes # (Auto)


 0.7 x10^3/uL


(0.0-1.1)


 


Eosinophils # (Auto)


 0.1 x10^3/uL


(0.0-0.7)


 


Basophils # (Auto)


 0.0 x10^3/uL


(0.0-0.2)


 


Sodium Level


 141 mmol/L


(136-145)


 


Potassium Level


 4.0 mmol/L


(3.5-5.1)


 


Chloride Level


 106 mmol/L


()


 


Carbon Dioxide Level


 28 mmol/L


(21-32)


 


Anion Gap 7 (6-14) 


 


Blood Urea Nitrogen


 17 mg/dL


(8-26)


 


Creatinine


 0.8 mg/dL


(0.7-1.3)


 


Estimated GFR


(Cockcroft-Gault) 96.7 





 


BUN/Creatinine Ratio 21 (6-20) 


 


Glucose Level


 89 mg/dL


(70-99)


 


Calcium Level


 8.4 mg/dL


(8.5-10.1)


 


Total Bilirubin


 0.6 mg/dL


(0.2-1.0)


 


Aspartate Amino Transf


(AST/SGOT) 106 U/L


(15-37)


 


Alanine Aminotransferase


(ALT/SGPT) 122 U/L


(16-63)


 


Alkaline Phosphatase


 115 U/L


()


 


Total Protein


 6.2 g/dL


(6.4-8.2)


 


Albumin


 2.2 g/dL


(3.4-5.0)


 


Albumin/Globulin Ratio 0.6 (1.0-1.7) 








Medications





Current Medications


Sodium Chloride (Normal Saline Flush) 3 ml QSHIFT  PRN IV AFTER MEDS AND BLOOD 

DRAWS;  Start 12/23/20 at 07:45


Sodium Chloride 1,000 ml @  150 mls/hr Q6H40M IV  Last administered on 

12/23/20at 15:10;  Start 12/23/20 at 08:15;  Stop 12/23/20 at 21:34;  Status DC


Ondansetron HCl (Zofran) 4 mg PRN Q4HRS  PRN IV NAUSEA/VOMITING;  Start 12/23/20

at 08:15


Acetaminophen (Tylenol) 650 mg PRN Q4HRS  PRN PO TEMP OVER 100.4F OR MILD PAIN 

Last administered on 12/31/20at 01:02;  Start 12/23/20 at 08:15


Docusate Sodium (Colace) 100 mg PRN BID  PRN PO HARD STOOLS Last administered on

12/25/20at 17:03;  Start 12/23/20 at 08:15


Ceftriaxone Sodium (Rocephin) 1 gm Q24H IVP  Last administered on 12/30/20at 

07:30;  Start 12/24/20 at 09:00


Dexamethasone Sodium Phosphate (Decadron) 4 mg DAILY IVP  Last administered on 

12/30/20at 07:30;  Start 12/24/20 at 09:00


Amino Acids/ Glycerin/ Electrolytes 1,000 ml @  80 mls/hr [id number] IV  Last 

administered on 12/31/20at 01:03;  Start 12/23/20 at 08:45


Enoxaparin Sodium (Lovenox 40mg Syringe) 40 mg Q12HR SQ  Last administered on 

12/30/20at 20:37;  Start 12/23/20 at 09:00


Zinc Sulfate (Orazinc) 220 mg DAILY PO  Last administered on 12/30/20at 07:30;  

Start 12/23/20 at 09:00


Guaifenesin (Robitussin Dm) 10 ml PRN Q6HRS  PRN PO COUGH-2ND CHOICE Last 

administered on 12/30/20at 22:23;  Start 12/23/20 at 08:45


Acetaminophen (Tylenol Supp) 650 mg PRN Q6HRS  PRN AL MILD PAIN / TEMP > 100.3'

F;  Start 12/23/20 at 08:45


Morphine Sulfate (Morphine Sulfate) 2 mg PRN Q4HRS  PRN IV PAIN Last 

administered on 12/29/20at 23:14;  Start 12/23/20 at 08:45


Azithromycin 500 mg/Sodium Chloride 250 ml @  250 mls/hr 1X  ONCE IV  Last 

administered on 12/23/20at 09:59;  Start 12/23/20 at 10:00;  Stop 12/23/20 at 

10:59;  Status DC


Azithromycin 250 mg/Sodium Chloride 250 ml @  250 mls/hr Q24H IV  Last 

administered on 12/27/20at 08:55;  Start 12/24/20 at 09:00;  Stop 12/27/20 at 

09:59;  Status DC


Cetirizine HCl (ZyrTEC) 10 mg DAILY PO  Last administered on 12/30/20at 07:30;  

Start 12/24/20 at 09:00


Morphine Sulfate (Ms Contin) 15 mg BID PO  Last administered on 12/30/20at 

20:37;  Start 12/23/20 at 21:00


Cyanocobalamin (Vitamin B-12) 1,000 mcg DAILY PO  Last administered on 

12/30/20at 07:30;  Start 12/24/20 at 09:00


Citalopram Hydrobromide (CeleXA) 20 mg DAILY PO  Last administered on 12/30/20at

07:31;  Start 12/24/20 at 09:00


Atorvastatin Calcium (Lipitor) 40 mg QHS PO  Last administered on 12/30/20at 

20:37;  Start 12/23/20 at 21:00


Sulfasalazine (Azulfidine) 1,000 mg BID PO  Last administered on 12/30/20at 

20:36;  Start 12/23/20 at 21:00


Tramadol HCl (Ultram) 100 mg PRN Q8HRS  PRN PO MODERATE PAIN, SEVERE PAIN Last 

administered on 12/28/20at 07:15;  Start 12/23/20 at 15:00


Zolpidem Tartrate (Ambien) 5 mg PRN QHS  PRN PO INSOMNIA Last administered on 

12/31/20at 01:02;  Start 12/23/20 at 15:00


Info (Icu Electrolyte Protocol) 1 ea CONT PRN  PRN MC PER PROTOCOL;  Start 

12/24/20 at 14:15


Potassium Chloride (Klor-Con) 40 meq 1X  ONCE PO  Last administered on 

12/24/20at 14:24;  Start 12/24/20 at 14:15;  Stop 12/24/20 at 14:16;  Status DC


Sterile Water (WATER for RESP) 1,000 ml CONT  PRN INH VIA VAPOTHERM DEVICE Last 

administered on 12/31/20at 05:06;  Start 12/26/20 at 09:30


Benzonatate (Tessalon Perle) 100 mg [id number] PO  Last administered on 12/30/20at 

20:37;  Start 12/26/20 at 14:00


Labetalol HCl (Normodyne Iv Push) 10 mg PRN Q2HR  PRN IVP HYPERTENSION Last 

administered on 12/30/20at 10:15;  Start 12/26/20 at 18:30


Quetiapine Fumarate (SEROquel) 50 mg PRN QHS  PRN PO sleep Last administered on 

12/28/20at 22:05;  Start 12/27/20 at 21:00


Enalaprilat (Vasotec Inj) 2.5 mg PRN Q6HRS  PRN IVP HYPERTENSION Last 

administered on 12/28/20at 12:29;  Start 12/27/20 at 10:00


Olanzapine (ZyPREXA ZYDIS) 5 mg PRN BID  PRN PO ANXIETY / AGITATION Last 

administered on 12/30/20at 21:57;  Start 12/27/20 at 12:30


Guaifenesin (Robitussin) 400 mg PRN Q4HRS  PRN PO COUGH;  Start 12/27/20 at 

22:15;  Stop 12/27/20 at 22:27;  Status DC


Guaifenesin (Robitussin) 400 mg PRN Q4HRS  PRN PO COUGH Last administered on 

12/31/20at 01:02;  Start 12/27/20 at 22:30


Furosemide (Lasix) 40 mg 1X  ONCE IVP  Last administered on 12/30/20at 10:15;  

Start 12/30/20 at 10:00;  Stop 12/30/20 at 10:01;  Status DC





Active Scripts


Active


Reported


Morphine Sulfate Er (Morphine Sulfate) 30 Mg Tablet.er 1 Tab PO BID


Tramadol Hcl 100 Mg Tbmp.24hr 100 Mg PO PRN Q8HRS PRN


Ambien (Zolpidem Tartrate) 10 Mg Tablet 10 Mg PO PRN QHS PRN


Methotrexate (Methotrexate Sodium) 2.5 Mg Tablet 10 Tab PO WEEKLY


Lisinopril 10 Mg Tablet 1 Tab PO DAILY


Crestor (Rosuvastatin Calcium) 5 Mg Tablet 2 Tab PO DAILY


Hydrochlorothiazide Tablet (Hydrochlorothiazide) 50 Mg Tablet 25 Mg PO DAILY


Escitalopram Oxalate 10 Mg Tablet 1 Tab PO DAILY


Sulfasalazine Dr (Sulfasalazine) 500 Mg Tablet.dr 2 Tab PO TID 30 Days


Colace (Docusate Sodium) 100 Mg Capsule 1 Cap PO DA 30 Days


Acetaminophen Ext.release (Acetaminophen) 650 Mg Tablet.er 650 Mg PO PRN Q8HRS 

PRN


Melatonin 5 Mg Capsule 1 Cap PO QHS 30 Days


B-12 (Cyanocobalamin (Vitamin B-12)) 500 Mcg Tablet 2 Tab PO DAILY 30 Days


Vitamin D3 (Cholecalciferol (Vitamin D3)) 50 Mcg Capsule 50 Mcg PO DAILY


Aller-Sung (Cetirizine Hcl) 10 Mg Tablet 1 Tab PO DAILY 30 Days


Vitals/I & O





Vital Sign - Last 24 Hours








 12/30/20 12/30/20 12/30/20 12/30/20





 09:00 09:08 10:00 10:15


 


Pulse 91  105 108


 


Resp 26  24 


 


B/P (MAP) 109/66 (80)  162/76 (104) 182/96


 


Pulse Ox 93 97 95 


 


O2 Delivery Vapotherm VapoTherm Vapotherm 


 


O2 Flow Rate 40.0 40.0 40.0 





 12/30/20 12/30/20 12/30/20 12/30/20





 11:00 11:27 11:59 12:00


 


Temp    98.4





    98.4


 


Pulse 84   88


 


Resp 30   26


 


B/P (MAP) 131/74 (93)   145/80 (101)


 


Pulse Ox 97 95  97


 


O2 Delivery Vapotherm VapoTherm Nasal Cannula Vapotherm


 


O2 Flow Rate 40.0 40.0 40.0 40.0


 


    





    





 12/30/20 12/30/20 12/30/20 12/30/20





 13:00 13:30 14:00 14:59


 


Temp    97.5





    97.5


 


Pulse 98  96 100


 


Resp 28  27 29


 


B/P (MAP) 117/69 (85)   106/67 (80)


 


Pulse Ox 98  92 91


 


O2 Delivery vapotherm 40/100 and non rebreather at 100% vapotherm at 40/100 and 

non rebreather at 100% vapotherm at 40/100 and non rebreather at 100% vapotherm 

at 40/100


 


    





    





 12/30/20 12/30/20 12/30/20 12/30/20





 16:00 16:00 16:03 16:09


 


Pulse  96  


 


Resp  34  


 


B/P (MAP)  110/58 (75)  


 


Pulse Ox  96 97 95


 


O2 Delivery Nasal Cannula vapotherm at 40/100 VapoTherm VapoTherm


 


O2 Flow Rate 40.0  40.0 40.0





 12/30/20 12/30/20 12/30/20 12/30/20





 17:00 18:00 19:00 20:00


 


Temp    97.5





    97.5


 


Pulse 90 90 106 105


 


Resp 27 30 24 22


 


B/P (MAP) 137/81 (99) 137/78 (97) 143/79 (100) 135/66 (89)


 


Pulse Ox 93 93 90 90


 


O2 Delivery vapotherm at 40/100 and non rebreather at 100% vapotherm at 40/100 

and non rebreather at 100% vapotherm at 40/100 and non rebreather at 100% Nasal 

Cannula


 


O2 Flow Rate 15.0 15.0 15.0 40.0


 


    





    





 12/30/20 12/30/20 12/30/20 12/30/20





 20:00 20:30 20:37 21:29


 


Pulse    96


 


Resp   22 22


 


B/P (MAP)    152/83 (106)


 


Pulse Ox  91 91 90


 


O2 Delivery Nasal Cannula VapoTherm Nasal Cannula Nasal Cannula


 


O2 Flow Rate 40.0 40.0 40.0 40.0





 12/30/20 12/30/20 12/30/20 12/30/20





 22:10 23:10 23:47 23:52


 


Pulse 93 91  


 


Resp 22 28  


 


B/P (MAP) 176/91 (119) 146/76 (99)  


 


Pulse Ox 94 91  92


 


O2 Delivery Nasal Cannula Nasal Cannula Nasal Cannula VapoTherm


 


O2 Flow Rate 40.0 40.0 40.0 40.0





 12/31/20 12/31/20 12/31/20 12/31/20





 00:02 00:37 01:00 02:00


 


Temp 98.8   





 98.8   


 


Pulse 95  96 96


 


Resp 28 26 28 28


 


B/P (MAP) 151/77 (101)  140/81 (100) 103/64 (77)


 


Pulse Ox 88 88 91 91


 


O2 Delivery Nasal Cannula Nasal Cannula Nasal Cannula Nasal Cannula


 


O2 Flow Rate 40.0 40.0 40.0 40.0


 


    





    





 12/31/20 12/31/20 12/31/20 12/31/20





 03:06 04:12 04:13 04:14


 


Pulse 96  95 


 


Resp 28  28 


 


B/P (MAP) 99/58 (72)  147/75 (99) 


 


Pulse Ox 91  88 87


 


O2 Delivery Nasal Cannula Nasal Cannula Nasal Cannula VapoTherm


 


O2 Flow Rate 40.0 40.0 40.0 40.0





 12/31/20 12/31/20 12/31/20 





 05:00 06:05 07:42 


 


Pulse 88 85  


 


B/P (MAP) 113/77 (89) 169/91 (117)  


 


Pulse Ox 89 91 91 


 


O2 Delivery Nasal Cannula Nasal Cannula VapoTherm 


 


O2 Flow Rate 40.0 40.0 40.0 














Intake and Output   


 


 12/30/20 12/30/20 12/31/20





 15:00 23:00 07:00


 


Intake Total  30 ml 1000 ml


 


Output Total 2425 ml 450 ml 200 ml


 


Balance -2425 ml -420 ml 800 ml











Justicifation of Admission Dx:


Justifications for Admission:


Justification of Admission Dx:  Yes


Comminuty Aquired Pneumonia:  Med-High Risk Pt











CLAIRE EVANGELISTA MD          Dec 31, 2020 08:04

## 2021-01-01 VITALS — DIASTOLIC BLOOD PRESSURE: 88 MMHG | SYSTOLIC BLOOD PRESSURE: 177 MMHG

## 2021-01-01 VITALS — DIASTOLIC BLOOD PRESSURE: 90 MMHG | SYSTOLIC BLOOD PRESSURE: 184 MMHG

## 2021-01-01 VITALS — DIASTOLIC BLOOD PRESSURE: 85 MMHG | SYSTOLIC BLOOD PRESSURE: 185 MMHG

## 2021-01-01 VITALS — DIASTOLIC BLOOD PRESSURE: 63 MMHG | SYSTOLIC BLOOD PRESSURE: 98 MMHG

## 2021-01-01 VITALS — SYSTOLIC BLOOD PRESSURE: 157 MMHG | DIASTOLIC BLOOD PRESSURE: 83 MMHG

## 2021-01-01 VITALS — SYSTOLIC BLOOD PRESSURE: 161 MMHG | DIASTOLIC BLOOD PRESSURE: 79 MMHG

## 2021-01-01 VITALS — DIASTOLIC BLOOD PRESSURE: 81 MMHG | SYSTOLIC BLOOD PRESSURE: 148 MMHG

## 2021-01-01 VITALS — SYSTOLIC BLOOD PRESSURE: 144 MMHG | DIASTOLIC BLOOD PRESSURE: 62 MMHG

## 2021-01-01 VITALS — SYSTOLIC BLOOD PRESSURE: 190 MMHG | DIASTOLIC BLOOD PRESSURE: 81 MMHG

## 2021-01-01 VITALS — DIASTOLIC BLOOD PRESSURE: 72 MMHG | SYSTOLIC BLOOD PRESSURE: 128 MMHG

## 2021-01-01 VITALS — DIASTOLIC BLOOD PRESSURE: 79 MMHG | SYSTOLIC BLOOD PRESSURE: 167 MMHG

## 2021-01-01 VITALS — SYSTOLIC BLOOD PRESSURE: 152 MMHG | DIASTOLIC BLOOD PRESSURE: 81 MMHG

## 2021-01-01 VITALS — SYSTOLIC BLOOD PRESSURE: 148 MMHG | DIASTOLIC BLOOD PRESSURE: 80 MMHG

## 2021-01-01 VITALS — DIASTOLIC BLOOD PRESSURE: 78 MMHG | SYSTOLIC BLOOD PRESSURE: 120 MMHG

## 2021-01-01 VITALS — DIASTOLIC BLOOD PRESSURE: 82 MMHG | SYSTOLIC BLOOD PRESSURE: 171 MMHG

## 2021-01-01 VITALS — DIASTOLIC BLOOD PRESSURE: 83 MMHG | SYSTOLIC BLOOD PRESSURE: 173 MMHG

## 2021-01-01 VITALS — SYSTOLIC BLOOD PRESSURE: 155 MMHG | DIASTOLIC BLOOD PRESSURE: 83 MMHG

## 2021-01-01 VITALS — DIASTOLIC BLOOD PRESSURE: 82 MMHG | SYSTOLIC BLOOD PRESSURE: 142 MMHG

## 2021-01-01 VITALS — DIASTOLIC BLOOD PRESSURE: 96 MMHG | SYSTOLIC BLOOD PRESSURE: 168 MMHG

## 2021-01-01 VITALS — SYSTOLIC BLOOD PRESSURE: 150 MMHG | DIASTOLIC BLOOD PRESSURE: 84 MMHG

## 2021-01-01 LAB
ALBUMIN SERPL-MCNC: 2.1 G/DL (ref 3.4–5)
ALBUMIN/GLOB SERPL: 0.5 {RATIO} (ref 1–1.7)
ALP SERPL-CCNC: 89 U/L (ref 46–116)
ALT SERPL-CCNC: 129 U/L (ref 16–63)
ANION GAP SERPL CALC-SCNC: 8 MMOL/L (ref 6–14)
AST SERPL-CCNC: 89 U/L (ref 15–37)
BASOPHILS # BLD AUTO: 0.1 X10^3/UL (ref 0–0.2)
BASOPHILS NFR BLD: 1 % (ref 0–3)
BILIRUB SERPL-MCNC: 0.4 MG/DL (ref 0.2–1)
BUN SERPL-MCNC: 15 MG/DL (ref 8–26)
BUN/CREAT SERPL: 21 (ref 6–20)
CALCIUM SERPL-MCNC: 8.1 MG/DL (ref 8.5–10.1)
CHLORIDE SERPL-SCNC: 105 MMOL/L (ref 98–107)
CO2 SERPL-SCNC: 28 MMOL/L (ref 21–32)
CREAT SERPL-MCNC: 0.7 MG/DL (ref 0.7–1.3)
EOSINOPHIL NFR BLD: 0.1 X10^3/UL (ref 0–0.7)
EOSINOPHIL NFR BLD: 1 % (ref 0–3)
ERYTHROCYTE [DISTWIDTH] IN BLOOD BY AUTOMATED COUNT: 14.5 % (ref 11.5–14.5)
GFR SERPLBLD BASED ON 1.73 SQ M-ARVRAT: 112.8 ML/MIN
GLUCOSE SERPL-MCNC: 105 MG/DL (ref 70–99)
HCT VFR BLD CALC: 31.9 % (ref 39–53)
HGB BLD-MCNC: 10.6 G/DL (ref 13–17.5)
LYMPHOCYTES # BLD: 1 X10^3/UL (ref 1–4.8)
LYMPHOCYTES NFR BLD AUTO: 10 % (ref 24–48)
MCH RBC QN AUTO: 33 PG (ref 25–35)
MCHC RBC AUTO-ENTMCNC: 33 G/DL (ref 31–37)
MCV RBC AUTO: 98 FL (ref 79–100)
MONO #: 0.8 X10^3/UL (ref 0–1.1)
MONOCYTES NFR BLD: 8 % (ref 0–9)
NEUT #: 8.1 X10^3/UL (ref 1.8–7.7)
NEUTROPHILS NFR BLD AUTO: 81 % (ref 31–73)
PLATELET # BLD AUTO: 276 X10^3/UL (ref 140–400)
POTASSIUM SERPL-SCNC: 4 MMOL/L (ref 3.5–5.1)
PROT SERPL-MCNC: 6.1 G/DL (ref 6.4–8.2)
RBC # BLD AUTO: 3.27 X10^6/UL (ref 4.3–5.7)
SODIUM SERPL-SCNC: 141 MMOL/L (ref 136–145)
WBC # BLD AUTO: 10 X10^3/UL (ref 4–11)

## 2021-01-01 RX ADMIN — BACITRACIN SCH MLS/HR: 5000 INJECTION, POWDER, FOR SOLUTION INTRAMUSCULAR at 00:24

## 2021-01-01 RX ADMIN — BENZONATATE SCH MG: 100 CAPSULE, LIQUID FILLED ORAL at 20:51

## 2021-01-01 RX ADMIN — GLYCERIN, ISOLEUCINE, LEUCINE, LYSINE, METHIONINE, PHENYLALANINE, THREONINE, TRYPTOPHAN, VALINE, ALANINE, GLYCINE, ARGININE, HISTIDINE, PROLINE, SERINE, CYSTEINE, SODIUM ACETATE, MAGNESIUM ACETATE, CALCIUM ACETATE, SODIUM CHLORIDE, POTASSIUM CHLORIDE, PHOSPHORIC ACID, AND POTASSIUM METABISULFITE SCH MLS/HR
3; .21; .27; .22; .16; .17; .12; .046; .2; .21; .42; .29; .085; .34; .18; .014; .2; .054; .026; .12; .15; .041 INJECTION INTRAVENOUS at 05:11

## 2021-01-01 RX ADMIN — GUAIFENESIN AND DEXTROMETHORPHAN PRN ML: 100; 10 SYRUP ORAL at 08:20

## 2021-01-01 RX ADMIN — ZINC SULFATE CAP 220 MG (50 MG ELEMENTAL ZN) SCH MG: 220 (50 ZN) CAP at 08:21

## 2021-01-01 RX ADMIN — CITALOPRAM HYDROBROMIDE SCH MG: 20 TABLET ORAL at 08:21

## 2021-01-01 RX ADMIN — GUAIFENESIN PRN MG: 100 SOLUTION ORAL at 13:35

## 2021-01-01 RX ADMIN — CYANOCOBALAMIN TAB 1000 MCG SCH MCG: 1000 TAB at 08:21

## 2021-01-01 RX ADMIN — WATER PRN ML: 1 INJECTION INTRAVENOUS at 15:49

## 2021-01-01 RX ADMIN — BACITRACIN SCH MLS/HR: 5000 INJECTION, POWDER, FOR SOLUTION INTRAMUSCULAR at 16:15

## 2021-01-01 RX ADMIN — MORPHINE SULFATE SCH MG: 15 TABLET, EXTENDED RELEASE ORAL at 08:21

## 2021-01-01 RX ADMIN — DEXAMETHASONE SODIUM PHOSPHATE SCH MG: 4 INJECTION, SOLUTION INTRAMUSCULAR; INTRAVENOUS at 08:21

## 2021-01-01 RX ADMIN — MORPHINE SULFATE SCH MG: 15 TABLET, EXTENDED RELEASE ORAL at 20:52

## 2021-01-01 RX ADMIN — GUAIFENESIN AND DEXTROMETHORPHAN PRN ML: 100; 10 SYRUP ORAL at 15:37

## 2021-01-01 RX ADMIN — ATORVASTATIN CALCIUM SCH MG: 40 TABLET, FILM COATED ORAL at 20:51

## 2021-01-01 RX ADMIN — BENZONATATE SCH MG: 100 CAPSULE, LIQUID FILLED ORAL at 08:21

## 2021-01-01 RX ADMIN — LABETALOL HYDROCHLORIDE PRN MG: 5 INJECTION, SOLUTION INTRAVENOUS at 02:05

## 2021-01-01 RX ADMIN — ENOXAPARIN SODIUM SCH MG: 40 INJECTION SUBCUTANEOUS at 08:22

## 2021-01-01 RX ADMIN — GLYCERIN, ISOLEUCINE, LEUCINE, LYSINE, METHIONINE, PHENYLALANINE, THREONINE, TRYPTOPHAN, VALINE, ALANINE, GLYCINE, ARGININE, HISTIDINE, PROLINE, SERINE, CYSTEINE, SODIUM ACETATE, MAGNESIUM ACETATE, CALCIUM ACETATE, SODIUM CHLORIDE, POTASSIUM CHLORIDE, PHOSPHORIC ACID, AND POTASSIUM METABISULFITE SCH MLS/HR
3; .21; .27; .22; .16; .17; .12; .046; .2; .21; .42; .29; .085; .34; .18; .014; .2; .054; .026; .12; .15; .041 INJECTION INTRAVENOUS at 16:13

## 2021-01-01 RX ADMIN — BACITRACIN SCH MLS/HR: 5000 INJECTION, POWDER, FOR SOLUTION INTRAMUSCULAR at 08:20

## 2021-01-01 RX ADMIN — CETIRIZINE HYDROCHLORIDE SCH MG: 10 TABLET, FILM COATED ORAL at 08:20

## 2021-01-01 RX ADMIN — MORPHINE SULFATE PRN MG: 2 INJECTION, SOLUTION INTRAMUSCULAR; INTRAVENOUS at 16:12

## 2021-01-01 RX ADMIN — ZOLPIDEM TARTRATE PRN MG: 5 TABLET ORAL at 22:05

## 2021-01-01 RX ADMIN — QUETIAPINE FUMARATE PRN MG: 25 TABLET, FILM COATED ORAL at 00:24

## 2021-01-01 RX ADMIN — GUAIFENESIN PRN MG: 100 SOLUTION ORAL at 05:30

## 2021-01-01 RX ADMIN — BENZONATATE SCH MG: 100 CAPSULE, LIQUID FILLED ORAL at 13:34

## 2021-01-01 RX ADMIN — CEFTRIAXONE SCH GM: 1 INJECTION, POWDER, FOR SOLUTION INTRAMUSCULAR; INTRAVENOUS at 13:35

## 2021-01-01 RX ADMIN — GUAIFENESIN PRN MG: 100 SOLUTION ORAL at 23:00

## 2021-01-01 RX ADMIN — ENOXAPARIN SODIUM SCH MG: 40 INJECTION SUBCUTANEOUS at 20:52

## 2021-01-01 NOTE — RAD
XR CHEST 1V 1/1/2021 6:29 AM



INDICATION: Pneumonia



COMPARISON: 12/30/2020



TECHNIQUE: Portable frontal view of the chest is provided.



FINDINGS:



The cardiomediastinal silhouette is similar in appearance. Right upper extremity PICC is in similar p
osition.



Severe predominantly alveolar airspace disease identified within the lungs bilaterally, progressed si
nce the prior examination. There is relative sparing of the lung bases. No pneumothorax.



No suspicious osseous abnormality.



IMPRESSION:



Severe predominantly bilateral alveolar airspace disease has progressed. Findings suggestive of acute
 respiratory distress syndrome. Consideration may be given for multifocal pneumonia versus pulmonary 
edema.



Electronically signed by: Estrellita Ramey MD (1/1/2021 6:37 AM) [facility]

## 2021-01-01 NOTE — PDOC
PULMONARY PROGRESS NOTES


DATE: 1/1/21 


TIME: 11:38


Subjective


on 100% FIO2 via Vapotherm and 100% NRB 


states he feels better today 


fever overnight 


no overnight concerns from nursing


Vitals





Vital Signs








  Date Time  Temp Pulse Resp B/P (MAP) Pulse Ox O2 Delivery O2 Flow Rate FiO2


 


1/1/21 11:24     89 Nasal Cannula 40.0 


 


1/1/21 11:00  110 28     


 


1/1/21 09:00 100.0       





 100.0       








Comments


Pt. seen during COVID-19 pandemic, visual exam preformed 


RRR


Vapotherm 


no distress


BIPAP


no accessory muscle use 


No edema or rash


General:  Alert, No acute distress


Lungs:  Crackles


Labs





Laboratory Tests








Test


 12/31/20


16:50 1/1/21


05:35


 


White Blood Count


 9.4 x10^3/uL


(4.0-11.0) 10.0 x10^3/uL


(4.0-11.0)


 


Red Blood Count


 3.38 x10^6/uL


(4.30-5.70) 3.27 x10^6/uL


(4.30-5.70)


 


Hemoglobin


 11.0 g/dL


(13.0-17.5) 10.6 g/dL


(13.0-17.5)


 


Hematocrit


 33.0 %


(39.0-53.0) 31.9 %


(39.0-53.0)


 


Mean Corpuscular Volume 97 fL ()  98 fL () 


 


Mean Corpuscular Hemoglobin 32 pg (25-35)  33 pg (25-35) 


 


Mean Corpuscular Hemoglobin


Concent 33 g/dL


(31-37) 33 g/dL


(31-37)


 


Red Cell Distribution Width


 14.6 %


(11.5-14.5) 14.5 %


(11.5-14.5)


 


Platelet Count


 333 x10^3/uL


(140-400) 276 x10^3/uL


(140-400)


 


Neutrophils (%) (Auto) 87 % (31-73)  81 % (31-73) 


 


Lymphocytes (%) (Auto) 5 % (24-48)  10 % (24-48) 


 


Monocytes (%) (Auto) 7 % (0-9)  8 % (0-9) 


 


Eosinophils (%) (Auto) 0 % (0-3)  1 % (0-3) 


 


Basophils (%) (Auto) 1 % (0-3)  1 % (0-3) 


 


Neutrophils # (Auto)


 8.2 x10^3/uL


(1.8-7.7) 8.1 x10^3/uL


(1.8-7.7)


 


Lymphocytes # (Auto)


 0.5 x10^3/uL


(1.0-4.8) 1.0 x10^3/uL


(1.0-4.8)


 


Monocytes # (Auto)


 0.6 x10^3/uL


(0.0-1.1) 0.8 x10^3/uL


(0.0-1.1)


 


Eosinophils # (Auto)


 0.0 x10^3/uL


(0.0-0.7) 0.1 x10^3/uL


(0.0-0.7)


 


Basophils # (Auto)


 0.0 x10^3/uL


(0.0-0.2) 0.1 x10^3/uL


(0.0-0.2)


 


Segmented Neutrophils % 87 % (35-66)  


 


Band Neutrophils % 4 % (0-9)  


 


Lymphocytes % 3 % (24-48)  


 


Monocytes % 6 % (0-10)  


 


Platelet Estimate


 Adequate


(ADEQUATE) 





 


Sodium Level


 143 mmol/L


(136-145) 141 mmol/L


(136-145)


 


Potassium Level


 4.7 mmol/L


(3.5-5.1) 4.0 mmol/L


(3.5-5.1)


 


Chloride Level


 107 mmol/L


() 105 mmol/L


()


 


Carbon Dioxide Level


 28 mmol/L


(21-32) 28 mmol/L


(21-32)


 


Anion Gap 8 (6-14)  8 (6-14) 


 


Blood Urea Nitrogen


 19 mg/dL


(8-26) 15 mg/dL


(8-26)


 


Creatinine


 0.6 mg/dL


(0.7-1.3) 0.7 mg/dL


(0.7-1.3)


 


Estimated GFR


(Cockcroft-Gault) 134.8 


 112.8 





 


BUN/Creatinine Ratio 32 (6-20)  21 (6-20) 


 


Glucose Level


 115 mg/dL


(70-99) 105 mg/dL


(70-99)


 


Calcium Level


 8.3 mg/dL


(8.5-10.1) 8.1 mg/dL


(8.5-10.1)


 


Total Bilirubin


 0.4 mg/dL


(0.2-1.0) 0.4 mg/dL


(0.2-1.0)


 


Aspartate Amino Transf


(AST/SGOT) 140 U/L


(15-37) 89 U/L (15-37) 





 


Alanine Aminotransferase


(ALT/SGPT) 161 U/L


(16-63) 129 U/L


(16-63)


 


Alkaline Phosphatase


 108 U/L


() 89 U/L


()


 


Total Protein


 5.6 g/dL


(6.4-8.2) 6.1 g/dL


(6.4-8.2)


 


Albumin


 2.3 g/dL


(3.4-5.0) 2.1 g/dL


(3.4-5.0)


 


Albumin/Globulin Ratio 0.7 (1.0-1.7)  0.5 (1.0-1.7) 








Laboratory Tests








Test


 12/31/20


16:50 1/1/21


05:35


 


White Blood Count


 9.4 x10^3/uL


(4.0-11.0) 10.0 x10^3/uL


(4.0-11.0)


 


Red Blood Count


 3.38 x10^6/uL


(4.30-5.70) 3.27 x10^6/uL


(4.30-5.70)


 


Hemoglobin


 11.0 g/dL


(13.0-17.5) 10.6 g/dL


(13.0-17.5)


 


Hematocrit


 33.0 %


(39.0-53.0) 31.9 %


(39.0-53.0)


 


Mean Corpuscular Volume 97 fL ()  98 fL () 


 


Mean Corpuscular Hemoglobin 32 pg (25-35)  33 pg (25-35) 


 


Mean Corpuscular Hemoglobin


Concent 33 g/dL


(31-37) 33 g/dL


(31-37)


 


Red Cell Distribution Width


 14.6 %


(11.5-14.5) 14.5 %


(11.5-14.5)


 


Platelet Count


 333 x10^3/uL


(140-400) 276 x10^3/uL


(140-400)


 


Neutrophils (%) (Auto) 87 % (31-73)  81 % (31-73) 


 


Lymphocytes (%) (Auto) 5 % (24-48)  10 % (24-48) 


 


Monocytes (%) (Auto) 7 % (0-9)  8 % (0-9) 


 


Eosinophils (%) (Auto) 0 % (0-3)  1 % (0-3) 


 


Basophils (%) (Auto) 1 % (0-3)  1 % (0-3) 


 


Neutrophils # (Auto)


 8.2 x10^3/uL


(1.8-7.7) 8.1 x10^3/uL


(1.8-7.7)


 


Lymphocytes # (Auto)


 0.5 x10^3/uL


(1.0-4.8) 1.0 x10^3/uL


(1.0-4.8)


 


Monocytes # (Auto)


 0.6 x10^3/uL


(0.0-1.1) 0.8 x10^3/uL


(0.0-1.1)


 


Eosinophils # (Auto)


 0.0 x10^3/uL


(0.0-0.7) 0.1 x10^3/uL


(0.0-0.7)


 


Basophils # (Auto)


 0.0 x10^3/uL


(0.0-0.2) 0.1 x10^3/uL


(0.0-0.2)


 


Segmented Neutrophils % 87 % (35-66)  


 


Band Neutrophils % 4 % (0-9)  


 


Lymphocytes % 3 % (24-48)  


 


Monocytes % 6 % (0-10)  


 


Platelet Estimate


 Adequate


(ADEQUATE) 





 


Sodium Level


 143 mmol/L


(136-145) 141 mmol/L


(136-145)


 


Potassium Level


 4.7 mmol/L


(3.5-5.1) 4.0 mmol/L


(3.5-5.1)


 


Chloride Level


 107 mmol/L


() 105 mmol/L


()


 


Carbon Dioxide Level


 28 mmol/L


(21-32) 28 mmol/L


(21-32)


 


Anion Gap 8 (6-14)  8 (6-14) 


 


Blood Urea Nitrogen


 19 mg/dL


(8-26) 15 mg/dL


(8-26)


 


Creatinine


 0.6 mg/dL


(0.7-1.3) 0.7 mg/dL


(0.7-1.3)


 


Estimated GFR


(Cockcroft-Gault) 134.8 


 112.8 





 


BUN/Creatinine Ratio 32 (6-20)  21 (6-20) 


 


Glucose Level


 115 mg/dL


(70-99) 105 mg/dL


(70-99)


 


Calcium Level


 8.3 mg/dL


(8.5-10.1) 8.1 mg/dL


(8.5-10.1)


 


Total Bilirubin


 0.4 mg/dL


(0.2-1.0) 0.4 mg/dL


(0.2-1.0)


 


Aspartate Amino Transf


(AST/SGOT) 140 U/L


(15-37) 89 U/L (15-37) 





 


Alanine Aminotransferase


(ALT/SGPT) 161 U/L


(16-63) 129 U/L


(16-63)


 


Alkaline Phosphatase


 108 U/L


() 89 U/L


()


 


Total Protein


 5.6 g/dL


(6.4-8.2) 6.1 g/dL


(6.4-8.2)


 


Albumin


 2.3 g/dL


(3.4-5.0) 2.1 g/dL


(3.4-5.0)


 


Albumin/Globulin Ratio 0.7 (1.0-1.7)  0.5 (1.0-1.7) 








Medications





Active Scripts








 Medications  Dose


 Route/Sig


 Max Daily Dose Days Date Category


 


 Morphine Sulfate


 Er (Morphine


 Sulfate) 30 Mg


 Tablet.er  1 Tab


 PO BID


   12/23/20 Reported


 


 Tramadol Hcl 100


 Mg Tbmp.24hr  100 Mg


 PO PRN Q8HRS PRN


   12/23/20 Reported


 


 Ambien (Zolpidem


 Tartrate) 10 Mg


 Tablet  10 Mg


 PO PRN QHS PRN


   12/23/20 Reported


 


 Methotrexate


  (Methotrexate


 Sodium) 2.5 Mg


 Tablet  10 Tab


 PO WEEKLY


   12/23/20 Reported


 


 Lisinopril 10 Mg


 Tablet  1 Tab


 PO DAILY


   12/23/20 Reported


 


 Crestor


  (Rosuvastatin


 Calcium) 5 Mg


 Tablet  2 Tab


 PO DAILY


   12/23/20 Reported


 


 Hydrochlorothiazide


 Tablet


  (Hydrochlorothiazide)


 50 Mg Tablet  25 Mg


 PO DAILY


   12/23/20 Reported


 


 Escitalopram


 Oxalate 10 Mg


 Tablet  1 Tab


 PO DAILY


   12/23/20 Reported


 


 Sulfasalazine Dr


  (Sulfasalazine)


 500 Mg Tablet.dr  2 Tab


 PO TID


  30 12/23/20 Reported


 


 Colace (Docusate


 Sodium) 100 Mg


 Capsule  1 Cap


 PO DA


  30 12/23/20 Reported


 


 Acetaminophen


 Ext.release


  (Acetaminophen)


 650 Mg Tablet.er  650 Mg


 PO PRN Q8HRS PRN


   12/23/20 Reported


 


 Melatonin 5 Mg


 Capsule  1 Cap


 PO QHS


  30 12/23/20 Reported


 


 B-12


  (Cyanocobalamin


  (Vitamin B-12))


 500 Mcg Tablet  2 Tab


 PO DAILY


  30 12/23/20 Reported


 


 Vitamin D3


  (Cholecalciferol


  (Vitamin D3)) 50


 Mcg Capsule  50 Mcg


 PO DAILY


   12/23/20 Reported


 


 Aller-Sugn


  (Cetirizine Hcl)


 10 Mg Tablet  1 Tab


 PO DAILY


  30 12/23/20 Reported








Comments


CXR 1/1/21


IMPRESSION:


Severe predominantly bilateral alveolar airspace disease has progressed. 

Findings suggestive of acute respiratory distress syndrome. Consideration may be

given for multifocal pneumonia versus pulmonary edema.





Impression


.


IMPRESSION:


1.  Acute hypoxic respiratory failure secondary to highly suspected COVID-19


pneumonia and acute respiratory distress syndrome/acute lung injury.-- COVID-19 

positive 


2.  Abnormal CT chest with extensive widespread ground glass and alveolar and


interstitial infiltrates with reactive mild mediastinal/hilar adenopathy.  This


is likely related to COVID-19 pneumonia.


3.  Patient with history of psoriatic arthritis.  He is likely immunocompromise


as he has been on methotrexate.  covering for opportunistic infection with Abx


4.  History of tongue cancer with resection, details unknown.


5.  History of prostate cancer.


6.  History of Crohn's disease.





Plan


.


RECOMMENDATIONS:


Continue supplemental oxygen to keep sats above 92%, Continue Vapotherm, 

843JRH6/40 litres, with additional NRB mask 100%


Follow chest x-ray/ABG


Monitor for respiratory distress requiring intubation 


COVID-19 positive


Continue with empiric antibiotic


Continue with IV steroids with slow taper, will need full 10 day course 


Continue PPN for nutritional support  


DVT/GI PPX 





D/W RN and RT 


Critical care time [id number]











JUAN RHOADES MD               Jan 1, 2021 11:41

## 2021-01-01 NOTE — PDOC
PROGRESS NOTES


Date of Service:


DATE: 1/1/21 


TIME: 10:18





Chief Complaint


Chief Complaint


impression ====================





Acute hypoxic respiratory failure - no pulmonary history. With recent travel to 

and from [place] likely COVID 19 vs other atypical pneumonia. Cont empiric 

antibiotics, steroids. Consult Pulm. 


cute respiratory distress syndrome. Consideration may be given for multifocal 

pneumonia versus pulmonary edema.


COVID 19 positive - with pneumonia - given transaminitis and late presentation 

with high O2 requirements no indication for remdesivir


RYDER - likely vasomotor nephropathy - no known renal history, will hydrate


Pneumonia - likely atypical, gram negative vs viral. will cover 


Prostate Ca - s/p resection at [facility] in [place]


Hypokalemia - likely nutritional or loss from diarrhea, will replace


Elevated troponin - likely from type II demand ischemia, will trend troponins, 

maintain telemetry


Crohn's - sees rheumatology regularly, Dr Fan in [place], on sulfasalazine


Psoriatic arthritis - on pain medications 30mg MS Contin BID, tramadol and 25mg 

Methotrexate weekly as DMARD per rheumatology, Dr. Fan. Hold MTX while 

inpatient


GERD - PPI


HLD - statin


HTN - Lisinopril and HCTZ on hold for RYDER


Anemia - likely of chronic disease, will monitor


Cardiomegaly - notable on CT chest - no known cardiac history, though his mother

did have CHF and CAD


Right renal mass - will need US for further assessment


on 100% FIO2 via Vapotherm , tolerating 


HYPOTENSION 


history of psoriatic arthritis.  He is likely immunocompromise


as he has been on methotrexate. 








plan==============





FEN - NPO,  Vapotherm ,  NEB mask 


PPX - lovenox


FULL CODE


Dispo - ICU 


IVF bolus for hypotension, vasopressors if needed to keep MAP above 65 


start continuous IVF 


Monitor for respiratory distress requiring intubation 


COVID-19 positive


Continue with empiric antibiotic


Continue with IV steroids with slow taper


Continue PPN for nutritional support  


DVT/GI PPX 





1-01-21 100% FIO2 via Vapotherm and 100% NRB 














CC time 35 minutes








Hoa and son, Desmond, [phone]





History of Present Illness


History of Present Illness





PAST MEDICAL HISTORY:  Significant for:


1.  History of hypertension.


2.  Hyperlipidemia.


3.  GERD.


4.  Inflammatory bowel disease.


5.  History of psoriatic arthritis, on methotrexate.


6.  History of tongue cancer and prostate cancer, details are unknown.





PAST SURGICAL HISTORY:  He has history of surgery for prostate cancer and tongue


cancer,  details unknown.  Apparently, he had some tongue resection.





FAMILY HISTORY:  Mother with cancer and coronary artery disease.





SOCIAL HISTORY:  Reportedly, nonsmoker.




















Mr Nance is 66 yo male w/ past medical history of Crohn's, psoriatic arthritis,

GERD, HLD, HTN, prostate ca, tongue cancer who presents to the emergency 

department at [facility] concerned about shortness of breath that had been 

progressive. This started 12/18/2020 and has been getting worse since that time.

Of note patient also has loss of taste, loss of smell, cough, shortness of 

breath, fever. He recently went to [place] on a business trip and just 

returned 5 days prior to presentation.


Upon arrival to the emergency room patient had significant hypoxia with a pulse 

ox in the 40s.  He was placed initially on a nonrebreather and then placed on 

BiPAP.


He was also somewhat concerned about a Crohn's flareup.  Patient states he has 

been having abdominal pain with nausea and diarrhea.  These are not typical 

symptoms of his Crohn's.  He feels very tired and has body aches as well.


He was given steroids and Rocephin in ED. 


Chest radiograph concerning for bilateral interstitial infiltrates.


Labs significant for WBC 4.5, Hb 11.8, platelets 229, , K2.8, BUN 34, CR 

1.5, glucose 119, .9, albumin 3, , , lactic acid 1.6, D-

dimer 3.15, troponin 0.068.


ABG on 80% FiO2 7.53/42/64


CT negative for pulmonary embolism. Widespread airspace disease throughout both 

lungs, consistent with pneumonia. Mild mediastinal and bilateral hilar 

lymphadenopathy, likely reactive. Cardiomegaly with mild aneurysmal dilation of 

the ascending thoracic aorta. Indeterminate hyperdense nodule at the midpole 

right kidney. This could represent a solid mass or complex cyst.


Patient transferred to [facility] for pulmonary consultation and 

ICU admission.





12/24: Overnight BP improved with fluids. O2 saturations slightly increased. Did

not tolerate more than 1 hour off BIPAP even with non-rebreather O2 saturations 

were 92% at best. No pain currently, very slightly appetite.


12/25: COVID-19 returned positive.  Down to 65% on his BiPAP 18/8.  Was able to 

take it off for medications meal yesterday, but desaturates to 79% and recovers 

quickly.  Still very drowsy with little appetite.  Afebrile.


12/26: Afebrile with 100% O2 on BiPAP today.  Transition to Vapotherm with more 

ease of breathing.  He has almost no appetite.  Less drowsy today.  He is 

depressed mood but now has a cell phone  and is able to talk to his 

family.  No complaints of chest pain some abdominal pain no bowel movement as of

yet.  ABG 7.49/37/59


12-29 abg pending 





Afebrile overnight.  Tolerating Vapotherm at 100% FiO2 much better with O2 

saturations 96%.  Appetite is increased slightly.  Less drowsy.  He does note 

that he barely slept last night.  Pain is well controlled.  He has had 2 formed 

stools.  No chest pain.  Still with rough cough.  Blood pressure systolic in the

180s minimally responsive to labetalol.





Plan:





Seroquel prn sleep


Vasotec prn HTN


PICC





Vitals


Vitals





Vital Signs








  Date Time  Temp Pulse Resp B/P (MAP) Pulse Ox O2 Delivery O2 Flow Rate FiO2


 


1/1/21 08:21     90 Nasal Cannula 40.0 


 


1/1/21 08:00  98 26 185/85 (118)    


 


1/1/21 04:00 100.1       





 100.1       











Physical Exam


General:  


Heart:  Regular rate


Lungs:  Crackles


Abdomen:  Normal bowel sounds, Soft, No tenderness, No hepatosplenomegaly, No 

masses


Extremities:  No clubbing, No cyanosis, No edema, Normal pulses, No 

tenderness/swelling


Skin:  No rashes, No breakdown, No significant lesion


General:  Alert, Oriented X3, Cooperative, mild distress, moderate distress


Heart:  Regular rate


Lungs:  Crackles


Abdomen:  Normal bowel sounds, Soft, No tenderness, No hepatosplenomegaly, No 

masses


Extremities:  No clubbing, No cyanosis, No edema, Normal pulses, No tenderness

/swelling


Skin:  No rashes, No breakdown, No significant lesion





Labs


LABS





Labo


XR CHEST 1V 1/1/2021 6:29 AM





INDICATION: Pneumonia





COMPARISON: 12/30/2020





TECHNIQUE: Portable frontal view of the chest is provided.





FINDINGS:





The cardiomediastinal silhouette is similar in appearance. Right upper extremity

PICC is in similar position.





Severe predominantly alveolar airspace disease identified within the lungs 

bilaterally, progressed since the prior examination. There is relative sparing 

of the lung bases. No pneumothorax.





No suspicious osseous abnormality.





IMPRESSION:





Severe predominantly bilateral alveolar airspace disease has progressed. 

Findings suggestive of acute respiratory distress syndrome. Consideration may be

given for multifocal pneumonia versus pulmonary edema.





Electronically signed by: Kyleigh Patterson MD (1/1/2021 6:37 AM) [facility]














DICTATED and SIGNED BY:     KYLEIGH PATTERSON MD


DATE:     01/01/21 [id number]


ratory Tests








Test


 12/31/20


16:50 1/1/21


05:35


 


White Blood Count


 9.4 x10^3/uL


(4.0-11.0) 10.0 x10^3/uL


(4.0-11.0)


 


Red Blood Count


 3.38 x10^6/uL


(4.30-5.70) 3.27 x10^6/uL


(4.30-5.70)


 


Hemoglobin


 11.0 g/dL


(13.0-17.5) 10.6 g/dL


(13.0-17.5)


 


Hematocrit


 33.0 %


(39.0-53.0) 31.9 %


(39.0-53.0)


 


Mean Corpuscular Volume 97 fL ()  98 fL () 


 


Mean Corpuscular Hemoglobin 32 pg (25-35)  33 pg (25-35) 


 


Mean Corpuscular Hemoglobin


Concent 33 g/dL


(31-37) 33 g/dL


(31-37)


 


Red Cell Distribution Width


 14.6 %


(11.5-14.5) 14.5 %


(11.5-14.5)


 


Platelet Count


 333 x10^3/uL


(140-400) 276 x10^3/uL


(140-400)


 


Neutrophils (%) (Auto) 87 % (31-73)  81 % (31-73) 


 


Lymphocytes (%) (Auto) 5 % (24-48)  10 % (24-48) 


 


Monocytes (%) (Auto) 7 % (0-9)  8 % (0-9) 


 


Eosinophils (%) (Auto) 0 % (0-3)  1 % (0-3) 


 


Basophils (%) (Auto) 1 % (0-3)  1 % (0-3) 


 


Neutrophils # (Auto)


 8.2 x10^3/uL


(1.8-7.7) 8.1 x10^3/uL


(1.8-7.7)


 


Lymphocytes # (Auto)


 0.5 x10^3/uL


(1.0-4.8) 1.0 x10^3/uL


(1.0-4.8)


 


Monocytes # (Auto)


 0.6 x10^3/uL


(0.0-1.1) 0.8 x10^3/uL


(0.0-1.1)


 


Eosinophils # (Auto)


 0.0 x10^3/uL


(0.0-0.7) 0.1 x10^3/uL


(0.0-0.7)


 


Basophils # (Auto)


 0.0 x10^3/uL


(0.0-0.2) 0.1 x10^3/uL


(0.0-0.2)


 


Segmented Neutrophils % 87 % (35-66)  


 


Band Neutrophils % 4 % (0-9)  


 


Lymphocytes % 3 % (24-48)  


 


Monocytes % 6 % (0-10)  


 


Platelet Estimate


 Adequate


(ADEQUATE) 





 


Sodium Level


 143 mmol/L


(136-145) 141 mmol/L


(136-145)


 


Potassium Level


 4.7 mmol/L


(3.5-5.1) 4.0 mmol/L


(3.5-5.1)


 


Chloride Level


 107 mmol/L


() 105 mmol/L


()


 


Carbon Dioxide Level


 28 mmol/L


(21-32) 28 mmol/L


(21-32)


 


Anion Gap 8 (6-14)  8 (6-14) 


 


Blood Urea Nitrogen


 19 mg/dL


(8-26) 15 mg/dL


(8-26)


 


Creatinine


 0.6 mg/dL


(0.7-1.3) 0.7 mg/dL


(0.7-1.3)


 


Estimated GFR


(Cockcroft-Gault) 134.8 


 112.8 





 


BUN/Creatinine Ratio 32 (6-20)  21 (6-20) 


 


Glucose Level


 115 mg/dL


(70-99) 105 mg/dL


(70-99)


 


Calcium Level


 8.3 mg/dL


(8.5-10.1) 8.1 mg/dL


(8.5-10.1)


 


Total Bilirubin


 0.4 mg/dL


(0.2-1.0) 0.4 mg/dL


(0.2-1.0)


 


Aspartate Amino Transf


(AST/SGOT) 140 U/L


(15-37) 89 U/L (15-37) 





 


Alanine Aminotransferase


(ALT/SGPT) 161 U/L


(16-63) 129 U/L


(16-63)


 


Alkaline Phosphatase


 108 U/L


() 89 U/L


()


 


Total Protein


 5.6 g/dL


(6.4-8.2) 6.1 g/dL


(6.4-8.2)


 


Albumin


 2.3 g/dL


(3.4-5.0) 2.1 g/dL


(3.4-5.0)


 


Albumin/Globulin Ratio 0.7 (1.0-1.7)  0.5 (1.0-1.7) 











Comment


Review of Relevant


I have reviewed the following items jabier (where applicable) has been applied.


Labs





Laboratory Tests








Test


 12/31/20


16:50 1/1/21


05:35


 


White Blood Count


 9.4 x10^3/uL


(4.0-11.0) 10.0 x10^3/uL


(4.0-11.0)


 


Red Blood Count


 3.38 x10^6/uL


(4.30-5.70) 3.27 x10^6/uL


(4.30-5.70)


 


Hemoglobin


 11.0 g/dL


(13.0-17.5) 10.6 g/dL


(13.0-17.5)


 


Hematocrit


 33.0 %


(39.0-53.0) 31.9 %


(39.0-53.0)


 


Mean Corpuscular Volume 97 fL ()  98 fL () 


 


Mean Corpuscular Hemoglobin 32 pg (25-35)  33 pg (25-35) 


 


Mean Corpuscular Hemoglobin


Concent 33 g/dL


(31-37) 33 g/dL


(31-37)


 


Red Cell Distribution Width


 14.6 %


(11.5-14.5) 14.5 %


(11.5-14.5)


 


Platelet Count


 333 x10^3/uL


(140-400) 276 x10^3/uL


(140-400)


 


Neutrophils (%) (Auto) 87 % (31-73)  81 % (31-73) 


 


Lymphocytes (%) (Auto) 5 % (24-48)  10 % (24-48) 


 


Monocytes (%) (Auto) 7 % (0-9)  8 % (0-9) 


 


Eosinophils (%) (Auto) 0 % (0-3)  1 % (0-3) 


 


Basophils (%) (Auto) 1 % (0-3)  1 % (0-3) 


 


Neutrophils # (Auto)


 8.2 x10^3/uL


(1.8-7.7) 8.1 x10^3/uL


(1.8-7.7)


 


Lymphocytes # (Auto)


 0.5 x10^3/uL


(1.0-4.8) 1.0 x10^3/uL


(1.0-4.8)


 


Monocytes # (Auto)


 0.6 x10^3/uL


(0.0-1.1) 0.8 x10^3/uL


(0.0-1.1)


 


Eosinophils # (Auto)


 0.0 x10^3/uL


(0.0-0.7) 0.1 x10^3/uL


(0.0-0.7)


 


Basophils # (Auto)


 0.0 x10^3/uL


(0.0-0.2) 0.1 x10^3/uL


(0.0-0.2)


 


Segmented Neutrophils % 87 % (35-66)  


 


Band Neutrophils % 4 % (0-9)  


 


Lymphocytes % 3 % (24-48)  


 


Monocytes % 6 % (0-10)  


 


Platelet Estimate


 Adequate


(ADEQUATE) 





 


Sodium Level


 143 mmol/L


(136-145) 141 mmol/L


(136-145)


 


Potassium Level


 4.7 mmol/L


(3.5-5.1) 4.0 mmol/L


(3.5-5.1)


 


Chloride Level


 107 mmol/L


() 105 mmol/L


()


 


Carbon Dioxide Level


 28 mmol/L


(21-32) 28 mmol/L


(21-32)


 


Anion Gap 8 (6-14)  8 (6-14) 


 


Blood Urea Nitrogen


 19 mg/dL


(8-26) 15 mg/dL


(8-26)


 


Creatinine


 0.6 mg/dL


(0.7-1.3) 0.7 mg/dL


(0.7-1.3)


 


Estimated GFR


(Cockcroft-Gault) 134.8 


 112.8 





 


BUN/Creatinine Ratio 32 (6-20)  21 (6-20) 


 


Glucose Level


 115 mg/dL


(70-99) 105 mg/dL


(70-99)


 


Calcium Level


 8.3 mg/dL


(8.5-10.1) 8.1 mg/dL


(8.5-10.1)


 


Total Bilirubin


 0.4 mg/dL


(0.2-1.0) 0.4 mg/dL


(0.2-1.0)


 


Aspartate Amino Transf


(AST/SGOT) 140 U/L


(15-37) 89 U/L (15-37) 





 


Alanine Aminotransferase


(ALT/SGPT) 161 U/L


(16-63) 129 U/L


(16-63)


 


Alkaline Phosphatase


 108 U/L


() 89 U/L


()


 


Total Protein


 5.6 g/dL


(6.4-8.2) 6.1 g/dL


(6.4-8.2)


 


Albumin


 2.3 g/dL


(3.4-5.0) 2.1 g/dL


(3.4-5.0)


 


Albumin/Globulin Ratio 0.7 (1.0-1.7)  0.5 (1.0-1.7) 








Laboratory Tests








Test


 12/31/20


16:50 1/1/21


05:35


 


White Blood Count


 9.4 x10^3/uL


(4.0-11.0) 10.0 x10^3/uL


(4.0-11.0)


 


Red Blood Count


 3.38 x10^6/uL


(4.30-5.70) 3.27 x10^6/uL


(4.30-5.70)


 


Hemoglobin


 11.0 g/dL


(13.0-17.5) 10.6 g/dL


(13.0-17.5)


 


Hematocrit


 33.0 %


(39.0-53.0) 31.9 %


(39.0-53.0)


 


Mean Corpuscular Volume 97 fL ()  98 fL () 


 


Mean Corpuscular Hemoglobin 32 pg (25-35)  33 pg (25-35) 


 


Mean Corpuscular Hemoglobin


Concent 33 g/dL


(31-37) 33 g/dL


(31-37)


 


Red Cell Distribution Width


 14.6 %


(11.5-14.5) 14.5 %


(11.5-14.5)


 


Platelet Count


 333 x10^3/uL


(140-400) 276 x10^3/uL


(140-400)


 


Neutrophils (%) (Auto) 87 % (31-73)  81 % (31-73) 


 


Lymphocytes (%) (Auto) 5 % (24-48)  10 % (24-48) 


 


Monocytes (%) (Auto) 7 % (0-9)  8 % (0-9) 


 


Eosinophils (%) (Auto) 0 % (0-3)  1 % (0-3) 


 


Basophils (%) (Auto) 1 % (0-3)  1 % (0-3) 


 


Neutrophils # (Auto)


 8.2 x10^3/uL


(1.8-7.7) 8.1 x10^3/uL


(1.8-7.7)


 


Lymphocytes # (Auto)


 0.5 x10^3/uL


(1.0-4.8) 1.0 x10^3/uL


(1.0-4.8)


 


Monocytes # (Auto)


 0.6 x10^3/uL


(0.0-1.1) 0.8 x10^3/uL


(0.0-1.1)


 


Eosinophils # (Auto)


 0.0 x10^3/uL


(0.0-0.7) 0.1 x10^3/uL


(0.0-0.7)


 


Basophils # (Auto)


 0.0 x10^3/uL


(0.0-0.2) 0.1 x10^3/uL


(0.0-0.2)


 


Segmented Neutrophils % 87 % (35-66)  


 


Band Neutrophils % 4 % (0-9)  


 


Lymphocytes % 3 % (24-48)  


 


Monocytes % 6 % (0-10)  


 


Platelet Estimate


 Adequate


(ADEQUATE) 





 


Sodium Level


 143 mmol/L


(136-145) 141 mmol/L


(136-145)


 


Potassium Level


 4.7 mmol/L


(3.5-5.1) 4.0 mmol/L


(3.5-5.1)


 


Chloride Level


 107 mmol/L


() 105 mmol/L


()


 


Carbon Dioxide Level


 28 mmol/L


(21-32) 28 mmol/L


(21-32)


 


Anion Gap 8 (6-14)  8 (6-14) 


 


Blood Urea Nitrogen


 19 mg/dL


(8-26) 15 mg/dL


(8-26)


 


Creatinine


 0.6 mg/dL


(0.7-1.3) 0.7 mg/dL


(0.7-1.3)


 


Estimated GFR


(Cockcroft-Gault) 134.8 


 112.8 





 


BUN/Creatinine Ratio 32 (6-20)  21 (6-20) 


 


Glucose Level


 115 mg/dL


(70-99) 105 mg/dL


(70-99)


 


Calcium Level


 8.3 mg/dL


(8.5-10.1) 8.1 mg/dL


(8.5-10.1)


 


Total Bilirubin


 0.4 mg/dL


(0.2-1.0) 0.4 mg/dL


(0.2-1.0)


 


Aspartate Amino Transf


(AST/SGOT) 140 U/L


(15-37) 89 U/L (15-37) 





 


Alanine Aminotransferase


(ALT/SGPT) 161 U/L


(16-63) 129 U/L


(16-63)


 


Alkaline Phosphatase


 108 U/L


() 89 U/L


()


 


Total Protein


 5.6 g/dL


(6.4-8.2) 6.1 g/dL


(6.4-8.2)


 


Albumin


 2.3 g/dL


(3.4-5.0) 2.1 g/dL


(3.4-5.0)


 


Albumin/Globulin Ratio 0.7 (1.0-1.7)  0.5 (1.0-1.7) 








Medications





Current Medications


Sodium Chloride (Normal Saline Flush) 3 ml QSHIFT  PRN IV AFTER MEDS AND BLOOD 

DRAWS;  Start 12/23/20 at 07:45


Sodium Chloride 1,000 ml @  150 mls/hr Q6H40M IV  Last administered on 

12/23/20at 15:10;  Start 12/23/20 at 08:15;  Stop 12/23/20 at 21:34;  Status DC


Ondansetron HCl (Zofran) 4 mg PRN Q4HRS  PRN IV NAUSEA/VOMITING;  Start 12/23/20

at 08:15


Acetaminophen (Tylenol) 650 mg PRN Q4HRS  PRN PO TEMP OVER 100.4F OR MILD PAIN 

Last administered on 12/31/20at 01:02;  Start 12/23/20 at 08:15


Docusate Sodium (Colace) 100 mg PRN BID  PRN PO HARD STOOLS Last administered on

12/25/20at 17:03;  Start 12/23/20 at 08:15


Ceftriaxone Sodium (Rocephin) 1 gm Q24H IVP  Last administered on 12/31/20at 

10:17;  Start 12/24/20 at 09:00


Dexamethasone Sodium Phosphate (Decadron) 4 mg DAILY IVP  Last administered on 

1/1/21at 08:21;  Start 12/24/20 at 09:00


Amino Acids/ Glycerin/ Electrolytes 1,000 ml @  80 mls/hr [id number] IV  Last 

administered on 1/1/21at 05:11;  Start 12/23/20 at 08:45


Enoxaparin Sodium (Lovenox 40mg Syringe) 40 mg Q12HR SQ  Last administered on 

1/1/21at 08:22;  Start 12/23/20 at 09:00


Zinc Sulfate (Orazinc) 220 mg DAILY PO  Last administered on 1/1/21at 08:21;  

Start 12/23/20 at 09:00


Guaifenesin (Robitussin Dm) 10 ml PRN Q6HRS  PRN PO COUGH-2ND CHOICE Last 

administered on 1/1/21at 08:20;  Start 12/23/20 at 08:45


Acetaminophen (Tylenol Supp) 650 mg PRN Q6HRS  PRN NH MILD PAIN / TEMP > 

100.3'F;  Start 12/23/20 at 08:45


Morphine Sulfate (Morphine Sulfate) 2 mg PRN Q4HRS  PRN IV PAIN Last 

administered on 12/29/20at 23:14;  Start 12/23/20 at 08:45


Azithromycin 500 mg/Sodium Chloride 250 ml @  250 mls/hr 1X  ONCE IV  Last 

administered on 12/23/20at 09:59;  Start 12/23/20 at 10:00;  Stop 12/23/20 at 

10:59;  Status DC


Azithromycin 250 mg/Sodium Chloride 250 ml @  250 mls/hr Q24H IV  Last 

administered on 12/27/20at 08:55;  Start 12/24/20 at 09:00;  Stop 12/27/20 at 

09:59;  Status DC


Cetirizine HCl (ZyrTEC) 10 mg DAILY PO  Last administered on 1/1/21at 08:20;  

Start 12/24/20 at 09:00


Morphine Sulfate (Ms Contin) 15 mg BID PO  Last administered on 1/1/21at 08:21; 

Start 12/23/20 at 21:00


Cyanocobalamin (Vitamin B-12) 1,000 mcg DAILY PO  Last administered on 1/1/21at 

08:21;  Start 12/24/20 at 09:00


Citalopram Hydrobromide (CeleXA) 20 mg DAILY PO  Last administered on 1/1/21at 

08:21;  Start 12/24/20 at 09:00


Atorvastatin Calcium (Lipitor) 40 mg QHS PO  Last administered on 12/31/20at 

20:24;  Start 12/23/20 at 21:00


Sulfasalazine (Azulfidine) 1,000 mg BID PO  Last administered on 1/1/21at 08:20;

 Start 12/23/20 at 21:00


Tramadol HCl (Ultram) 100 mg PRN Q8HRS  PRN PO MODERATE PAIN, SEVERE PAIN Last 

administered on 1/1/21at 03:20;  Start 12/23/20 at 15:00


Zolpidem Tartrate (Ambien) 5 mg PRN QHS  PRN PO INSOMNIA Last administered on 

12/31/20at 01:02;  Start 12/23/20 at 15:00


Info ([facility] Electrolyte Protocol) 1 ea CONT PRN  PRN MC PER PROTOCOL;  Start 

12/24/20 at 14:15


Potassium Chloride (Klor-Con) 40 meq 1X  ONCE PO  Last administered on 

12/24/20at 14:24;  Start 12/24/20 at 14:15;  Stop 12/24/20 at 14:16;  Status DC


Sterile Water (WATER for RESP) 1,000 ml CONT  PRN INH VIA VAPOTHERM DEVICE Last 

administered on 12/31/20at 17:29;  Start 12/26/20 at 09:30


Benzonatate (Tessalon Perle) 100 mg [id number] PO  Last administered on 1/1/21at 

08:21;  Start 12/26/20 at 14:00


Labetalol HCl (Normodyne Iv Push) 10 mg PRN Q2HR  PRN IVP HYPERTENSION Last 

administered on 1/1/21at 02:05;  Start 12/26/20 at 18:30


Quetiapine Fumarate (SEROquel) 50 mg PRN QHS  PRN PO sleep Last administered on 

1/1/21at 00:24;  Start 12/27/20 at 21:00


Enalaprilat (Vasotec Inj) 2.5 mg PRN Q6HRS  PRN IVP HYPERTENSION Last 

administered on 12/28/20at 12:29;  Start 12/27/20 at 10:00


Olanzapine (ZyPREXA ZYDIS) 5 mg PRN BID  PRN PO ANXIETY / AGITATION Last 

administered on 1/1/21at 08:20;  Start 12/27/20 at 12:30


Guaifenesin (Robitussin) 400 mg PRN Q4HRS  PRN PO COUGH;  Start 12/27/20 at 

22:15;  Stop 12/27/20 at 22:27;  Status DC


Guaifenesin (Robitussin) 400 mg PRN Q4HRS  PRN PO COUGH Last administered on 

1/1/21at 05:30;  Start 12/27/20 at 22:30


Furosemide (Lasix) 40 mg 1X  ONCE IVP  Last administered on 12/30/20at 10:15;  

Start 12/30/20 at 10:00;  Stop 12/30/20 at 10:01;  Status DC


Sodium Chloride 1,000 ml @  100 mls/hr Q10H IV  Last administered on 1/1/21at 

08:20;  Start 12/31/20 at 10:15





Active Scripts


Active


Reported


Morphine Sulfate Er (Morphine Sulfate) 30 Mg Tablet.er 1 Tab PO BID


Tramadol Hcl 100 Mg Tbmp.24hr 100 Mg PO PRN Q8HRS PRN


Ambien (Zolpidem Tartrate) 10 Mg Tablet 10 Mg PO PRN QHS PRN


Methotrexate (Methotrexate Sodium) 2.5 Mg Tablet 10 Tab PO WEEKLY


Lisinopril 10 Mg Tablet 1 Tab PO DAILY


Crestor (Rosuvastatin Calcium) 5 Mg Tablet 2 Tab PO DAILY


Hydrochlorothiazide Tablet (Hydrochlorothiazide) 50 Mg Tablet 25 Mg PO DAILY


Escitalopram Oxalate 10 Mg Tablet 1 Tab PO DAILY


Sulfasalazine Dr (Sulfasalazine) 500 Mg Tablet.dr 2 Tab PO TID 30 Days


Colace (Docusate Sodium) 100 Mg Capsule 1 Cap PO DA 30 Days


Acetaminophen Ext.release (Acetaminophen) 650 Mg Tablet.er 650 Mg PO PRN Q8HRS 

PRN


Melatonin 5 Mg Capsule 1 Cap PO QHS 30 Days


B-12 (Cyanocobalamin (Vitamin B-12)) 500 Mcg Tablet 2 Tab PO DAILY 30 Days


Vitamin D3 (Cholecalciferol (Vitamin D3)) 50 Mcg Capsule 50 Mcg PO DAILY


Aller-Sung (Cetirizine Hcl) 10 Mg Tablet 1 Tab PO DAILY 30 Days


Vitals/I & O





Vital Sign - Last 24 Hours








 12/31/20 12/31/20 12/31/20 12/31/20





 11:00 11:15 12:00 12:00


 


Pulse 98  100 


 


Resp 28   


 


B/P (MAP) 121/73 (89)  158/71 (100) 


 


Pulse Ox  90  


 


O2 Delivery Nasal Cannula VapoTherm Nasal Cannula Nasal Cannula


 


O2 Flow Rate 40.0 40.0 40.0 40.0





 12/31/20 12/31/20 12/31/20 12/31/20





 13:00 13:00 14:00 15:00


 


Temp  99.0  





  99.0  


 


Pulse  110 100 104


 


B/P (MAP)  181/96 (124) 154/75 (101) 164/76 (105)


 


Pulse Ox 92   


 


O2 Delivery Nasal Cannula Nasal Cannula Nasal Cannula Nasal Cannula


 


O2 Flow Rate 40.0 40.0 40.0 40.0


 


    





    





 12/31/20 12/31/20 12/31/20 12/31/20





 15:34 16:00 16:00 17:00


 


Temp    98.8





    98.8


 


Pulse  96  98


 


B/P (MAP)  162/85 (110)  186/100 (128)


 


Pulse Ox 92   


 


O2 Delivery VapoTherm Nasal Cannula Nasal Cannula Nasal Cannula


 


O2 Flow Rate 40.0 40.0 40.0 40.0


 


    





    





 12/31/20 12/31/20 12/31/20 12/31/20





 18:00 19:00 20:00 20:00


 


Temp   98.0 





   98.0 


 


Pulse 100 98 104 


 


Resp  26 27 


 


B/P (MAP) 99/65 (76) 176/83 (114) 179/82 (114) 


 


Pulse Ox  91 91 


 


O2 Delivery Nasal Cannula Nasal Cannula Nasal Cannula Nasal Cannula


 


O2 Flow Rate 40.0 40.0 40.0 40.0


 


    





    





 12/31/20 12/31/20 12/31/20 12/31/20





 20:22 20:25 21:00 22:00


 


Pulse   108 104


 


Resp  27 28 27


 


B/P (MAP)   155/76 (102) 159/118 (132)


 


Pulse Ox 90 90 90 92


 


O2 Delivery VapoTherm Nasal Cannula Nasal Cannula Nasal Cannula


 


O2 Flow Rate 40.0 40.0 40.0 40.0





 12/31/20 1/1/21 1/1/21 1/1/21





 23:00 00:00 00:00 00:15


 


Temp  99.1  





  99.1  


 


Pulse 101 89  


 


Resp 28 18  


 


B/P (MAP) 174/114 (134) 168/96 (120)  


 


Pulse Ox 92 92  88


 


O2 Delivery Nasal Cannula Nasal Cannula Nasal Cannula VapoTherm


 


O2 Flow Rate 40.0 40.0 40.0 40.0


 


    





    





 1/1/21 1/1/21 1/1/21 1/1/21





 00:25 01:00 02:00 02:05


 


Pulse  90 88 88


 


Resp 20 15 26 


 


B/P (MAP)  155/83 (107) 167/79 (108) 167/79


 


Pulse Ox 92 94 91 


 


O2 Delivery Nasal Cannula Nasal Cannula Nasal Cannula 


 


O2 Flow Rate 40.0 40.0 40.0 





 1/1/21 1/1/21 1/1/21 1/1/21





 03:00 03:20 04:00 04:00


 


Temp   100.1 





   100.1 


 


Pulse 88  92 


 


Resp 28 20 28 


 


B/P (MAP) 171/82 (111)  148/81 (103) 


 


Pulse Ox 90 90 91 90


 


O2 Delivery Nasal Cannula Nasal Cannula Nasal Cannula Nasal Cannula


 


O2 Flow Rate 40.0 40.0 40.0 40.0


 


    





    





 1/1/21 1/1/21 1/1/21 1/1/21





 04:00 04:20 05:00 06:00


 


Pulse   106 111


 


Resp  28 28 27


 


B/P (MAP)   161/79 (106) 98/63 (75)


 


Pulse Ox  91 91 88


 


O2 Delivery Nasal Cannula Nasal Cannula Nasal Cannula Nasal Cannula


 


O2 Flow Rate 40.0 40.0 40.0 40.0





 1/1/21 1/1/21 1/1/21 





 07:32 08:00 08:21 


 


Pulse  98  


 


Resp  26  


 


B/P (MAP)  185/85 (118)  


 


Pulse Ox 90 85 90 


 


O2 Delivery Nasal Cannula vapotherm Nasal Cannula 


 


O2 Flow Rate 40.0 40.0 40.0 














Intake and Output   


 


 12/31/20 12/31/20 1/1/21





 15:00 23:00 07:00


 


Intake Total  1400 ml 2207 ml


 


Output Total  400 ml 720 ml


 


Balance  1000 ml 1487 ml











Justicifation of Admission Dx:


Justifications for Admission:


Justification of Admission Dx:  Yes


Comminuty Aquired Pneumonia:  Med-High Risk Pt











CLAIRE EVANGELISTA MD           Jan 1, 2021 10:18

## 2021-01-02 VITALS — SYSTOLIC BLOOD PRESSURE: 124 MMHG | DIASTOLIC BLOOD PRESSURE: 74 MMHG

## 2021-01-02 VITALS — DIASTOLIC BLOOD PRESSURE: 72 MMHG | SYSTOLIC BLOOD PRESSURE: 113 MMHG

## 2021-01-02 VITALS — DIASTOLIC BLOOD PRESSURE: 71 MMHG | SYSTOLIC BLOOD PRESSURE: 117 MMHG

## 2021-01-02 VITALS — SYSTOLIC BLOOD PRESSURE: 117 MMHG | DIASTOLIC BLOOD PRESSURE: 69 MMHG

## 2021-01-02 VITALS — DIASTOLIC BLOOD PRESSURE: 81 MMHG | SYSTOLIC BLOOD PRESSURE: 135 MMHG

## 2021-01-02 VITALS — SYSTOLIC BLOOD PRESSURE: 88 MMHG | DIASTOLIC BLOOD PRESSURE: 42 MMHG

## 2021-01-02 VITALS — SYSTOLIC BLOOD PRESSURE: 90 MMHG | DIASTOLIC BLOOD PRESSURE: 51 MMHG

## 2021-01-02 VITALS — SYSTOLIC BLOOD PRESSURE: 90 MMHG | DIASTOLIC BLOOD PRESSURE: 61 MMHG

## 2021-01-02 VITALS — DIASTOLIC BLOOD PRESSURE: 64 MMHG | SYSTOLIC BLOOD PRESSURE: 78 MMHG

## 2021-01-02 VITALS — DIASTOLIC BLOOD PRESSURE: 65 MMHG | SYSTOLIC BLOOD PRESSURE: 107 MMHG

## 2021-01-02 VITALS — DIASTOLIC BLOOD PRESSURE: 69 MMHG | SYSTOLIC BLOOD PRESSURE: 113 MMHG

## 2021-01-02 VITALS — DIASTOLIC BLOOD PRESSURE: 42 MMHG | SYSTOLIC BLOOD PRESSURE: 99 MMHG

## 2021-01-02 VITALS — SYSTOLIC BLOOD PRESSURE: 111 MMHG | DIASTOLIC BLOOD PRESSURE: 70 MMHG

## 2021-01-02 VITALS — DIASTOLIC BLOOD PRESSURE: 64 MMHG | SYSTOLIC BLOOD PRESSURE: 97 MMHG

## 2021-01-02 VITALS — SYSTOLIC BLOOD PRESSURE: 147 MMHG | DIASTOLIC BLOOD PRESSURE: 72 MMHG

## 2021-01-02 VITALS — DIASTOLIC BLOOD PRESSURE: 104 MMHG | SYSTOLIC BLOOD PRESSURE: 153 MMHG

## 2021-01-02 VITALS — SYSTOLIC BLOOD PRESSURE: 108 MMHG | DIASTOLIC BLOOD PRESSURE: 56 MMHG

## 2021-01-02 VITALS — SYSTOLIC BLOOD PRESSURE: 115 MMHG | DIASTOLIC BLOOD PRESSURE: 68 MMHG

## 2021-01-02 VITALS — DIASTOLIC BLOOD PRESSURE: 49 MMHG | SYSTOLIC BLOOD PRESSURE: 70 MMHG

## 2021-01-02 VITALS — SYSTOLIC BLOOD PRESSURE: 78 MMHG | DIASTOLIC BLOOD PRESSURE: 51 MMHG

## 2021-01-02 VITALS — SYSTOLIC BLOOD PRESSURE: 114 MMHG | DIASTOLIC BLOOD PRESSURE: 71 MMHG

## 2021-01-02 VITALS — SYSTOLIC BLOOD PRESSURE: 103 MMHG | DIASTOLIC BLOOD PRESSURE: 61 MMHG

## 2021-01-02 VITALS — SYSTOLIC BLOOD PRESSURE: 164 MMHG | DIASTOLIC BLOOD PRESSURE: 93 MMHG

## 2021-01-02 VITALS — SYSTOLIC BLOOD PRESSURE: 103 MMHG | DIASTOLIC BLOOD PRESSURE: 67 MMHG

## 2021-01-02 LAB
ANION GAP SERPL CALC-SCNC: 8 MMOL/L (ref 6–14)
BASE EXCESS ABG: -3 MMOL/L (ref -3–3)
BASE EXCESS ABG: 1 MMOL/L (ref -3–3)
BASOPHILS # BLD AUTO: 0.1 X10^3/UL (ref 0–0.2)
BASOPHILS NFR BLD: 1 % (ref 0–3)
BUN SERPL-MCNC: 23 MG/DL (ref 8–26)
CALCIUM SERPL-MCNC: 8.6 MG/DL (ref 8.5–10.1)
CHLORIDE SERPL-SCNC: 107 MMOL/L (ref 98–107)
CO2 SERPL-SCNC: 27 MMOL/L (ref 21–32)
CREAT SERPL-MCNC: 1 MG/DL (ref 0.7–1.3)
EOSINOPHIL NFR BLD: 0 % (ref 0–3)
EOSINOPHIL NFR BLD: 0 X10^3/UL (ref 0–0.7)
ERYTHROCYTE [DISTWIDTH] IN BLOOD BY AUTOMATED COUNT: 14.9 % (ref 11.5–14.5)
GFR SERPLBLD BASED ON 1.73 SQ M-ARVRAT: 74.8 ML/MIN
GLUCOSE SERPL-MCNC: 122 MG/DL (ref 70–99)
HCO3 BLDA-SCNC: 24 MMOL/L (ref 21–28)
HCO3 BLDA-SCNC: 26 MMOL/L (ref 21–28)
HCT VFR BLD CALC: 30.9 % (ref 39–53)
HGB BLD-MCNC: 10.1 G/DL (ref 13–17.5)
INSPIRATION SETTING TIME VENT: 100
INSPIRATION SETTING TIME VENT: 100
LYMPHOCYTES # BLD: 0.9 X10^3/UL (ref 1–4.8)
LYMPHOCYTES NFR BLD AUTO: 6 % (ref 24–48)
MCH RBC QN AUTO: 33 PG (ref 25–35)
MCHC RBC AUTO-ENTMCNC: 33 G/DL (ref 31–37)
MCV RBC AUTO: 100 FL (ref 79–100)
MONO #: 1.1 X10^3/UL (ref 0–1.1)
MONOCYTES NFR BLD: 7 % (ref 0–9)
NEUT #: 12.7 X10^3/UL (ref 1.8–7.7)
NEUTROPHILS NFR BLD AUTO: 86 % (ref 31–73)
PCO2 BLDA: 44 MMHG (ref 35–46)
PCO2 BLDA: 50 MMHG (ref 35–46)
PLATELET # BLD AUTO: 272 X10^3/UL (ref 140–400)
PO2 BLDA: 43 MMHG (ref 65–108)
PO2 BLDA: 96 MMHG (ref 65–108)
POTASSIUM SERPL-SCNC: 5 MMOL/L (ref 3.5–5.1)
RBC # BLD AUTO: 3.1 X10^6/UL (ref 4.3–5.7)
SAO2 % BLDA: 75 % (ref 92–99)
SAO2 % BLDA: 96 % (ref 92–99)
SODIUM SERPL-SCNC: 142 MMOL/L (ref 136–145)
WBC # BLD AUTO: 14.8 X10^3/UL (ref 4–11)

## 2021-01-02 PROCEDURE — 5A1955Z RESPIRATORY VENTILATION, GREATER THAN 96 CONSECUTIVE HOURS: ICD-10-PCS | Performed by: INTERNAL MEDICINE

## 2021-01-02 RX ADMIN — MORPHINE SULFATE SCH MG: 15 TABLET, EXTENDED RELEASE ORAL at 08:28

## 2021-01-02 RX ADMIN — MORPHINE SULFATE SCH MG: 15 TABLET, EXTENDED RELEASE ORAL at 21:00

## 2021-01-02 RX ADMIN — Medication PRN MLS/HR: at 20:23

## 2021-01-02 RX ADMIN — GLYCERIN, ISOLEUCINE, LEUCINE, LYSINE, METHIONINE, PHENYLALANINE, THREONINE, TRYPTOPHAN, VALINE, ALANINE, GLYCINE, ARGININE, HISTIDINE, PROLINE, SERINE, CYSTEINE, SODIUM ACETATE, MAGNESIUM ACETATE, CALCIUM ACETATE, SODIUM CHLORIDE, POTASSIUM CHLORIDE, PHOSPHORIC ACID, AND POTASSIUM METABISULFITE SCH MLS/HR
3; .21; .27; .22; .16; .17; .12; .046; .2; .21; .42; .29; .085; .34; .18; .014; .2; .054; .026; .12; .15; .041 INJECTION INTRAVENOUS at 20:23

## 2021-01-02 RX ADMIN — Medication PRN MLS/HR: at 05:07

## 2021-01-02 RX ADMIN — GLYCERIN, ISOLEUCINE, LEUCINE, LYSINE, METHIONINE, PHENYLALANINE, THREONINE, TRYPTOPHAN, VALINE, ALANINE, GLYCINE, ARGININE, HISTIDINE, PROLINE, SERINE, CYSTEINE, SODIUM ACETATE, MAGNESIUM ACETATE, CALCIUM ACETATE, SODIUM CHLORIDE, POTASSIUM CHLORIDE, PHOSPHORIC ACID, AND POTASSIUM METABISULFITE SCH MLS/HR
3; .21; .27; .22; .16; .17; .12; .046; .2; .21; .42; .29; .085; .34; .18; .014; .2; .054; .026; .12; .15; .041 INJECTION INTRAVENOUS at 05:04

## 2021-01-02 RX ADMIN — PROPOFOL PRN MLS/HR: 10 INJECTION, EMULSION INTRAVENOUS at 03:47

## 2021-01-02 RX ADMIN — MIDAZOLAM PRN MLS/HR: 5 INJECTION, SOLUTION INTRAMUSCULAR; INTRAVENOUS at 22:26

## 2021-01-02 RX ADMIN — CEFTRIAXONE SCH GM: 1 INJECTION, POWDER, FOR SOLUTION INTRAMUSCULAR; INTRAVENOUS at 08:58

## 2021-01-02 RX ADMIN — BACITRACIN SCH MLS/HR: 5000 INJECTION, POWDER, FOR SOLUTION INTRAMUSCULAR at 02:15

## 2021-01-02 RX ADMIN — BACITRACIN SCH MLS/HR: 5000 INJECTION, POWDER, FOR SOLUTION INTRAMUSCULAR at 13:53

## 2021-01-02 RX ADMIN — CHLORHEXIDINE GLUCONATE SCH ML: 1.2 RINSE ORAL at 13:54

## 2021-01-02 RX ADMIN — MIDAZOLAM PRN MLS/HR: 5 INJECTION, SOLUTION INTRAMUSCULAR; INTRAVENOUS at 15:18

## 2021-01-02 RX ADMIN — MIDAZOLAM PRN MLS/HR: 5 INJECTION, SOLUTION INTRAMUSCULAR; INTRAVENOUS at 02:10

## 2021-01-02 RX ADMIN — VECURONIUM BROMIDE PRN MG: 1 INJECTION, POWDER, LYOPHILIZED, FOR SOLUTION INTRAVENOUS at 09:58

## 2021-01-02 RX ADMIN — ZINC SULFATE CAP 220 MG (50 MG ELEMENTAL ZN) SCH MG: 220 (50 ZN) CAP at 08:58

## 2021-01-02 RX ADMIN — CETIRIZINE HYDROCHLORIDE SCH MG: 10 TABLET, FILM COATED ORAL at 08:29

## 2021-01-02 RX ADMIN — CYANOCOBALAMIN TAB 1000 MCG SCH MCG: 1000 TAB at 08:58

## 2021-01-02 RX ADMIN — ATORVASTATIN CALCIUM SCH MG: 40 TABLET, FILM COATED ORAL at 21:04

## 2021-01-02 RX ADMIN — MIDAZOLAM PRN MLS/HR: 5 INJECTION, SOLUTION INTRAMUSCULAR; INTRAVENOUS at 08:06

## 2021-01-02 RX ADMIN — CHLORHEXIDINE GLUCONATE SCH ML: 1.2 RINSE ORAL at 21:04

## 2021-01-02 RX ADMIN — BENZONATATE SCH MG: 100 CAPSULE, LIQUID FILLED ORAL at 08:28

## 2021-01-02 RX ADMIN — AMIODARONE HYDROCHLORIDE PRN MLS/HR: 50 INJECTION, SOLUTION INTRAVENOUS at 22:27

## 2021-01-02 RX ADMIN — ENOXAPARIN SODIUM SCH MG: 40 INJECTION SUBCUTANEOUS at 21:05

## 2021-01-02 RX ADMIN — PROPOFOL PRN MLS/HR: 10 INJECTION, EMULSION INTRAVENOUS at 01:57

## 2021-01-02 RX ADMIN — DEXAMETHASONE SODIUM PHOSPHATE SCH MG: 4 INJECTION, SOLUTION INTRAMUSCULAR; INTRAVENOUS at 08:58

## 2021-01-02 RX ADMIN — PROPOFOL PRN MLS/HR: 10 INJECTION, EMULSION INTRAVENOUS at 13:52

## 2021-01-02 RX ADMIN — ENOXAPARIN SODIUM SCH MG: 40 INJECTION SUBCUTANEOUS at 08:59

## 2021-01-02 RX ADMIN — AMIODARONE HYDROCHLORIDE PRN MLS/HR: 50 INJECTION, SOLUTION INTRAVENOUS at 07:35

## 2021-01-02 RX ADMIN — AMIODARONE HYDROCHLORIDE PRN MLS/HR: 50 INJECTION, SOLUTION INTRAVENOUS at 13:45

## 2021-01-02 RX ADMIN — BACITRACIN SCH MLS/HR: 5000 INJECTION, POWDER, FOR SOLUTION INTRAMUSCULAR at 22:23

## 2021-01-02 RX ADMIN — PROPOFOL PRN MLS/HR: 10 INJECTION, EMULSION INTRAVENOUS at 22:23

## 2021-01-02 RX ADMIN — CITALOPRAM HYDROBROMIDE SCH MG: 20 TABLET ORAL at 08:58

## 2021-01-02 NOTE — PDOC
PROGRESS NOTES


Date of Service:


DATE: 1/2/21 


TIME: 10:24





Chief Complaint


Chief Complaint


impression ====================





Acute hypoxic respiratory failure - no pulmonary history. With recent travel to 

and from [place] likely COVID 19 vs other atypical pneumonia. Cont empiric 

antibiotics, steroids. Consult Pulm. 


Improved aeration of the lungs with persistent moderate mixed interstitial and 

alveolar airspace disease.  1/02 


Pt. experienced hypoxia and respiratory decompensation overnight requiring 

intubation 


now on vent 100% PEEP of 10 


Hypotension as well now on pressors


acute respiratory distress syndrome. Consideration may be given for multifocal 

pneumonia versus pulmonary edema.


COVID 19 positive - with pneumonia - given transaminitis and late presentation 

with high O2 requirements no indication for remdesivir


RYDER - likely vasomotor nephropathy - no known renal history, will hydrate


Pneumonia - likely atypical, gram negative vs viral. will cover 


Prostate Ca - s/p resection at [facility] in [place]


Hypokalemia - likely nutritional or loss from diarrhea, will replace


Elevated troponin - likely from type II demand ischemia, will trend troponins, 

maintain telemetry


Crohn's - sees rheumatology regularly, Dr Fan in [place], on sulfasalazine


Psoriatic arthritis - on pain medications 30mg MS Contin BID, tramadol and 25mg 

Methotrexate weekly as DMARD per rheumatology, Dr. Fan. Hold MTX while 

inpatient


GERD - PPI


HLD - statin


HTN - Lisinopril and HCTZ on hold for RYDER


Anemia - likely of chronic disease, will monitor


Cardiomegaly - notable on CT chest - no known cardiac history, though his mother

did have CHF and CAD


Right renal mass - will need US for further assessment


on 100% FIO2 via Vapotherm , tolerating 


HYPOTENSION 


history of psoriatic arthritis.  He is likely immuno-compromised


as he has been on methotrexate. 








plan==============








PPX - lovenox


FULL CODE


Dispo - ICU 


IVF bolus for hypotension, vasopressors if needed to keep MAP above 65 


start continuous IVF 


Monitor for respiratory distress requiring intubation 


COVID-19 positive


Continue with empiric antibiotic


Continue with IV steroids with slow taper


Continue PPN for nutritional support  


DVT/GI PPX 


blood cultures


ID CONSULT


PROCALCITONIN











1-02-21 now on vent 100% PEEP of 10 














CC time 35 minutes








Hoa and son, Desmond, [phone]





History of Present Illness


History of Present Illness





PAST MEDICAL HISTORY:  Significant for:


1.  History of hypertension.


2.  Hyperlipidemia.


3.  GERD.


4.  Inflammatory bowel disease.


5.  History of psoriatic arthritis, on methotrexate.


6.  History of tongue cancer and prostate cancer, details are unknown.





PAST SURGICAL HISTORY:  He has history of surgery for prostate cancer and tongue


cancer,  details unknown.  Apparently, he had some tongue resection.





FAMILY HISTORY:  Mother with cancer and coronary artery disease.





SOCIAL HISTORY:  Reportedly, nonsmoker.




















Mr Nance is 66 yo male w/ past medical history of Crohn's, psoriatic arthritis,

GERD, HLD, HTN, prostate ca, tongue cancer who presents to the emergency 

department at [facility] concerned about shortness of breath that had been 

progressive. This started 12/18/2020 and has been getting worse since that time.

Of note patient also has loss of taste, loss of smell, cough, shortness of 

breath, fever. He recently went to [place] on a business trip and just 

returned 5 days prior to presentation.


Upon arrival to the emergency room patient had significant hypoxia with a pulse 

ox in the 40s.  He was placed initially on a nonrebreather and then placed on 

BiPAP.


He was also somewhat concerned about a Crohn's flareup.  Patient states he has 

been having abdominal pain with nausea and diarrhea.  These are not typical 

symptoms of his Crohn's.  He feels very tired and has body aches as well.


He was given steroids and Rocephin in ED. 


Chest radiograph concerning for bilateral interstitial infiltrates.


Labs significant for WBC 4.5, Hb 11.8, platelets 229, , K2.8, BUN 34, CR 

1.5, glucose 119, .9, albumin 3, , , lactic acid 1.6, D-

dimer 3.15, troponin 0.068.


ABG on 80% FiO2 7.53/42/64


CT negative for pulmonary embolism. Widespread airspace disease throughout both 

lungs, consistent with pneumonia. Mild mediastinal and bilateral hilar 

lymphadenopathy, likely reactive. Cardiomegaly with mild aneurysmal dilation of 

the ascending thoracic aorta. Indeterminate hyperdense nodule at the midpole 

right kidney. This could represent a solid mass or complex cyst.


Patient transferred to [facility] for pulmonary consultation and 

ICU admission.





12/24: Overnight BP improved with fluids. O2 saturations slightly increased. Did

not tolerate more than 1 hour off BIPAP even with non-rebreather O2 saturations 

were 92% at best. No pain currently, very slightly appetite.


12/25: COVID-19 returned positive.  Down to 65% on his BiPAP 18/8.  Was able to 

take it off for medications meal yesterday, but desaturates to 79% and recovers 

quickly.  Still very drowsy with little appetite.  Afebrile.


12/26: Afebrile with 100% O2 on BiPAP today.  Transition to Vapotherm with more 

ease of breathing.  He has almost no appetite.  Less drowsy today.  He is 

depressed mood but now has a cell phone  and is able to talk to his 

family.  No complaints of chest pain some abdominal pain no bowel movement as of

yet.  ABG 7.49/37/59


12-29 abg pending 





Afebrile overnight.  Tolerating Vapotherm at 100% FiO2 much better with O2 

saturations 96%.  Appetite is increased slightly.  Less drowsy.  He does note 

that he barely slept last night.  Pain is well controlled.  He has had 2 formed 

stools.  No chest pain.  Still with rough cough.  Blood pressure systolic in the

180s minimally responsive to labetalol.





Plan:





Seroquel prn sleep


Vasotec prn HTN


PICC





Vitals


Vitals





Vital Signs








  Date Time  Temp Pulse Resp B/P (MAP) Pulse Ox O2 Delivery O2 Flow Rate FiO2


 


1/2/21 10:00  78 28 90/61 (71) 98 Ventilator  


 


1/2/21 08:00 98.5       





 98.5       


 


1/2/21 00:24       40.0 











Physical Exam


General:  


Heart:  Regular rate


Lungs:  Crackles


Abdomen:  Normal bowel sounds, Soft, No tenderness, No hepatosplenomegaly, No 

masses


Extremities:  No clubbing, No cyanosis, No edema, Normal pulses, No 

tenderness/swelling


Skin:  No rashes, No breakdown, No significant lesion


General:  Alert, Oriented X3, Cooperative, mild distress, moderate distress


Heart:  Regular rate


Lungs:  Crackles


Abdomen:  Normal bowel sounds, Soft, No tenderness, No hepatosplenomegaly, No 

masses


Extremities:  No clubbing, No cyanosis, No edema, Normal pulses, No tender

ness/swelling


Skin:  No rashes, No breakdown, No significant lesion





Labs


LABS


XR CHEST 1V 1/2/2021 1:53 AM





INDICATION: Intubation, OG placement





COMPARISON: 1/1/2021





TECHNIQUE: Portable frontal view of the chest is provided.





FINDINGS/


IMPRESSION:





Right upper extremity PICC and endotracheal tube are in similar position. 





The cardiomediastinal silhouette is similar in appearance. Nasogastric tube is 

identified with the side port below the hemidiaphragm expected region of the 

gastroesophageal junction. This may be advanced 7 to 10 cm.





There is improved aeration of the lungs with persistent moderate mixed 

interstitial and alveolar airspace disease. No significant pleural effusions or 

pneumothorax. 





Electronically signed by: Kylegih Patterson MD (1/2/2021 2:30 AM) [facility]














DICTATED and SIGNED BY:     KYLEIGH PATTERSON MD


DATE:     01/02/21 [id number]


Laboratory Tests








Test


 1/2/21


01:00 1/2/21


06:00


 


O2 Saturation 75 % (92-99)  


 


Arterial Blood pH


 7.39


(7.35-7.45) 





 


Arterial Blood pCO2 at


Patient Temp 44 mmHg


(35-46) 





 


Arterial Blood pO2 at Patient


Temp 43 mmHg


() 





 


Arterial Blood HCO3


 26 mmol/L


(21-28) 





 


Arterial Blood Base Excess


 1 mmol/L


(-3-3) 





 


FiO2 100  


 


White Blood Count


 


 14.8 x10^3/uL


(4.0-11.0)


 


Red Blood Count


 


 3.10 x10^6/uL


(4.30-5.70)


 


Hemoglobin


 


 10.1 g/dL


(13.0-17.5)


 


Hematocrit


 


 30.9 %


(39.0-53.0)


 


Mean Corpuscular Volume


 


 100 fL


()


 


Mean Corpuscular Hemoglobin  33 pg (25-35) 


 


Mean Corpuscular Hemoglobin


Concent 


 33 g/dL


(31-37)


 


Red Cell Distribution Width


 


 14.9 %


(11.5-14.5)


 


Platelet Count


 


 272 x10^3/uL


(140-400)


 


Neutrophils (%) (Auto)  86 % (31-73) 


 


Lymphocytes (%) (Auto)  6 % (24-48) 


 


Monocytes (%) (Auto)  7 % (0-9) 


 


Eosinophils (%) (Auto)  0 % (0-3) 


 


Basophils (%) (Auto)  1 % (0-3) 


 


Neutrophils # (Auto)


 


 12.7 x10^3/uL


(1.8-7.7)


 


Lymphocytes # (Auto)


 


 0.9 x10^3/uL


(1.0-4.8)


 


Monocytes # (Auto)


 


 1.1 x10^3/uL


(0.0-1.1)


 


Eosinophils # (Auto)


 


 0.0 x10^3/uL


(0.0-0.7)


 


Basophils # (Auto)


 


 0.1 x10^3/uL


(0.0-0.2)


 


Sodium Level


 


 142 mmol/L


(136-145)


 


Potassium Level


 


 5.0 mmol/L


(3.5-5.1)


 


Chloride Level


 


 107 mmol/L


()


 


Carbon Dioxide Level


 


 27 mmol/L


(21-32)


 


Anion Gap  8 (6-14) 


 


Blood Urea Nitrogen


 


 23 mg/dL


(8-26)


 


Creatinine


 


 1.0 mg/dL


(0.7-1.3)


 


Estimated GFR


(Cockcroft-Gault) 


 74.8 





 


Glucose Level


 


 122 mg/dL


(70-99)


 


Calcium Level


 


 8.6 mg/dL


(8.5-10.1)











Comment


Review of Relevant


I have reviewed the following items jabier (where applicable) has been applied.


Labs





Laboratory Tests








Test


 12/31/20


16:50 1/1/21


05:35 1/2/21


01:00 1/2/21


06:00


 


White Blood Count


 9.4 x10^3/uL


(4.0-11.0) 10.0 x10^3/uL


(4.0-11.0) 


 14.8 x10^3/uL


(4.0-11.0)


 


Red Blood Count


 3.38 x10^6/uL


(4.30-5.70) 3.27 x10^6/uL


(4.30-5.70) 


 3.10 x10^6/uL


(4.30-5.70)


 


Hemoglobin


 11.0 g/dL


(13.0-17.5) 10.6 g/dL


(13.0-17.5) 


 10.1 g/dL


(13.0-17.5)


 


Hematocrit


 33.0 %


(39.0-53.0) 31.9 %


(39.0-53.0) 


 30.9 %


(39.0-53.0)


 


Mean Corpuscular Volume


 97 fL () 


 98 fL () 


 


 100 fL


()


 


Mean Corpuscular Hemoglobin 32 pg (25-35)  33 pg (25-35)   33 pg (25-35) 


 


Mean Corpuscular Hemoglobin


Concent 33 g/dL


(31-37) 33 g/dL


(31-37) 


 33 g/dL


(31-37)


 


Red Cell Distribution Width


 14.6 %


(11.5-14.5) 14.5 %


(11.5-14.5) 


 14.9 %


(11.5-14.5)


 


Platelet Count


 333 x10^3/uL


(140-400) 276 x10^3/uL


(140-400) 


 272 x10^3/uL


(140-400)


 


Neutrophils (%) (Auto) 87 % (31-73)  81 % (31-73)   86 % (31-73) 


 


Lymphocytes (%) (Auto) 5 % (24-48)  10 % (24-48)   6 % (24-48) 


 


Monocytes (%) (Auto) 7 % (0-9)  8 % (0-9)   7 % (0-9) 


 


Eosinophils (%) (Auto) 0 % (0-3)  1 % (0-3)   0 % (0-3) 


 


Basophils (%) (Auto) 1 % (0-3)  1 % (0-3)   1 % (0-3) 


 


Neutrophils # (Auto)


 8.2 x10^3/uL


(1.8-7.7) 8.1 x10^3/uL


(1.8-7.7) 


 12.7 x10^3/uL


(1.8-7.7)


 


Lymphocytes # (Auto)


 0.5 x10^3/uL


(1.0-4.8) 1.0 x10^3/uL


(1.0-4.8) 


 0.9 x10^3/uL


(1.0-4.8)


 


Monocytes # (Auto)


 0.6 x10^3/uL


(0.0-1.1) 0.8 x10^3/uL


(0.0-1.1) 


 1.1 x10^3/uL


(0.0-1.1)


 


Eosinophils # (Auto)


 0.0 x10^3/uL


(0.0-0.7) 0.1 x10^3/uL


(0.0-0.7) 


 0.0 x10^3/uL


(0.0-0.7)


 


Basophils # (Auto)


 0.0 x10^3/uL


(0.0-0.2) 0.1 x10^3/uL


(0.0-0.2) 


 0.1 x10^3/uL


(0.0-0.2)


 


Segmented Neutrophils % 87 % (35-66)    


 


Band Neutrophils % 4 % (0-9)    


 


Lymphocytes % 3 % (24-48)    


 


Monocytes % 6 % (0-10)    


 


Platelet Estimate


 Adequate


(ADEQUATE) 


 


 





 


Sodium Level


 143 mmol/L


(136-145) 141 mmol/L


(136-145) 


 142 mmol/L


(136-145)


 


Potassium Level


 4.7 mmol/L


(3.5-5.1) 4.0 mmol/L


(3.5-5.1) 


 5.0 mmol/L


(3.5-5.1)


 


Chloride Level


 107 mmol/L


() 105 mmol/L


() 


 107 mmol/L


()


 


Carbon Dioxide Level


 28 mmol/L


(21-32) 28 mmol/L


(21-32) 


 27 mmol/L


(21-32)


 


Anion Gap 8 (6-14)  8 (6-14)   8 (6-14) 


 


Blood Urea Nitrogen


 19 mg/dL


(8-26) 15 mg/dL


(8-26) 


 23 mg/dL


(8-26)


 


Creatinine


 0.6 mg/dL


(0.7-1.3) 0.7 mg/dL


(0.7-1.3) 


 1.0 mg/dL


(0.7-1.3)


 


Estimated GFR


(Cockcroft-Gault) 134.8 


 112.8 


 


 74.8 





 


BUN/Creatinine Ratio 32 (6-20)  21 (6-20)   


 


Glucose Level


 115 mg/dL


(70-99) 105 mg/dL


(70-99) 


 122 mg/dL


(70-99)


 


Calcium Level


 8.3 mg/dL


(8.5-10.1) 8.1 mg/dL


(8.5-10.1) 


 8.6 mg/dL


(8.5-10.1)


 


Total Bilirubin


 0.4 mg/dL


(0.2-1.0) 0.4 mg/dL


(0.2-1.0) 


 





 


Aspartate Amino Transf


(AST/SGOT) 140 U/L


(15-37) 89 U/L (15-37) 


 


 





 


Alanine Aminotransferase


(ALT/SGPT) 161 U/L


(16-63) 129 U/L


(16-63) 


 





 


Alkaline Phosphatase


 108 U/L


() 89 U/L


() 


 





 


Total Protein


 5.6 g/dL


(6.4-8.2) 6.1 g/dL


(6.4-8.2) 


 





 


Albumin


 2.3 g/dL


(3.4-5.0) 2.1 g/dL


(3.4-5.0) 


 





 


Albumin/Globulin Ratio 0.7 (1.0-1.7)  0.5 (1.0-1.7)   


 


O2 Saturation   75 % (92-99)  


 


Arterial Blood pH


 


 


 7.39


(7.35-7.45) 





 


Arterial Blood pCO2 at


Patient Temp 


 


 44 mmHg


(35-46) 





 


Arterial Blood pO2 at Patient


Temp 


 


 43 mmHg


() 





 


Arterial Blood HCO3


 


 


 26 mmol/L


(21-28) 





 


Arterial Blood Base Excess


 


 


 1 mmol/L


(-3-3) 





 


FiO2   100  








Laboratory Tests








Test


 1/2/21


01:00 1/2/21


06:00


 


O2 Saturation 75 % (92-99)  


 


Arterial Blood pH


 7.39


(7.35-7.45) 





 


Arterial Blood pCO2 at


Patient Temp 44 mmHg


(35-46) 





 


Arterial Blood pO2 at Patient


Temp 43 mmHg


() 





 


Arterial Blood HCO3


 26 mmol/L


(21-28) 





 


Arterial Blood Base Excess


 1 mmol/L


(-3-3) 





 


FiO2 100  


 


White Blood Count


 


 14.8 x10^3/uL


(4.0-11.0)


 


Red Blood Count


 


 3.10 x10^6/uL


(4.30-5.70)


 


Hemoglobin


 


 10.1 g/dL


(13.0-17.5)


 


Hematocrit


 


 30.9 %


(39.0-53.0)


 


Mean Corpuscular Volume


 


 100 fL


()


 


Mean Corpuscular Hemoglobin  33 pg (25-35) 


 


Mean Corpuscular Hemoglobin


Concent 


 33 g/dL


(31-37)


 


Red Cell Distribution Width


 


 14.9 %


(11.5-14.5)


 


Platelet Count


 


 272 x10^3/uL


(140-400)


 


Neutrophils (%) (Auto)  86 % (31-73) 


 


Lymphocytes (%) (Auto)  6 % (24-48) 


 


Monocytes (%) (Auto)  7 % (0-9) 


 


Eosinophils (%) (Auto)  0 % (0-3) 


 


Basophils (%) (Auto)  1 % (0-3) 


 


Neutrophils # (Auto)


 


 12.7 x10^3/uL


(1.8-7.7)


 


Lymphocytes # (Auto)


 


 0.9 x10^3/uL


(1.0-4.8)


 


Monocytes # (Auto)


 


 1.1 x10^3/uL


(0.0-1.1)


 


Eosinophils # (Auto)


 


 0.0 x10^3/uL


(0.0-0.7)


 


Basophils # (Auto)


 


 0.1 x10^3/uL


(0.0-0.2)


 


Sodium Level


 


 142 mmol/L


(136-145)


 


Potassium Level


 


 5.0 mmol/L


(3.5-5.1)


 


Chloride Level


 


 107 mmol/L


()


 


Carbon Dioxide Level


 


 27 mmol/L


(21-32)


 


Anion Gap  8 (6-14) 


 


Blood Urea Nitrogen


 


 23 mg/dL


(8-26)


 


Creatinine


 


 1.0 mg/dL


(0.7-1.3)


 


Estimated GFR


(Cockcroft-Gault) 


 74.8 





 


Glucose Level


 


 122 mg/dL


(70-99)


 


Calcium Level


 


 8.6 mg/dL


(8.5-10.1)








Medications





Current Medications


Sodium Chloride (Normal Saline Flush) 3 ml QSHIFT  PRN IV AFTER MEDS AND BLOOD 

DRAWS;  Start 12/23/20 at 07:45


Sodium Chloride 1,000 ml @  150 mls/hr Q6H40M IV  Last administered on 

12/23/20at 15:10;  Start 12/23/20 at 08:15;  Stop 12/23/20 at 21:34;  Status DC


Ondansetron HCl (Zofran) 4 mg PRN Q4HRS  PRN IV NAUSEA/VOMITING;  Start 12/23/20

at 08:15


Acetaminophen (Tylenol) 650 mg PRN Q4HRS  PRN PO TEMP OVER 100.4F OR MILD PAIN 

Last administered on 12/31/20at 01:02;  Start 12/23/20 at 08:15


Docusate Sodium (Colace) 100 mg PRN BID  PRN PO HARD STOOLS Last administered on

12/25/20at 17:03;  Start 12/23/20 at 08:15


Ceftriaxone Sodium (Rocephin) 1 gm Q24H IVP  Last administered on 1/2/21at 

08:58;  Start 12/24/20 at 09:00


Dexamethasone Sodium Phosphate (Decadron) 4 mg DAILY IVP  Last administered on 

1/2/21at 08:58;  Start 12/24/20 at 09:00


Amino Acids/ Glycerin/ Electrolytes 1,000 ml @  80 mls/hr [id number] IV  Last 

administered on 1/2/21at 05:04;  Start 12/23/20 at 08:45


Enoxaparin Sodium (Lovenox 40mg Syringe) 40 mg Q12HR SQ  Last administered on 

1/2/21at 08:59;  Start 12/23/20 at 09:00


Zinc Sulfate (Orazinc) 220 mg DAILY PO  Last administered on 1/2/21at 08:58;  

Start 12/23/20 at 09:00


Guaifenesin (Robitussin Dm) 10 ml PRN Q6HRS  PRN PO COUGH-2ND CHOICE Last 

administered on 1/1/21at 15:37;  Start 12/23/20 at 08:45


Acetaminophen (Tylenol Supp) 650 mg PRN Q6HRS  PRN AL MILD PAIN / TEMP > 100.3'F

;  Start 12/23/20 at 08:45


Morphine Sulfate (Morphine Sulfate) 2 mg PRN Q4HRS  PRN IV PAIN Last 

administered on 1/1/21at 16:12;  Start 12/23/20 at 08:45;  Stop 1/2/21 at 01:41;

 Status DC


Azithromycin 500 mg/Sodium Chloride 250 ml @  250 mls/hr 1X  ONCE IV  Last 

administered on 12/23/20at 09:59;  Start 12/23/20 at 10:00;  Stop 12/23/20 at 

10:59;  Status DC


Azithromycin 250 mg/Sodium Chloride 250 ml @  250 mls/hr Q24H IV  Last 

administered on 12/27/20at 08:55;  Start 12/24/20 at 09:00;  Stop 12/27/20 at 

09:59;  Status DC


Cetirizine HCl (ZyrTEC) 10 mg DAILY PO  Last administered on 1/1/21at 08:20;  

Start 12/24/20 at 09:00


Morphine Sulfate (Ms Contin) 15 mg BID PO  Last administered on 1/1/21at 20:52; 

Start 12/23/20 at 21:00


Cyanocobalamin (Vitamin B-12) 1,000 mcg DAILY PO  Last administered on 1/2/21at 

08:58;  Start 12/24/20 at 09:00


Citalopram Hydrobromide (CeleXA) 20 mg DAILY PO  Last administered on 1/2/21at 

08:58;  Start 12/24/20 at 09:00


Atorvastatin Calcium (Lipitor) 40 mg QHS PO  Last administered on 1/1/21at 

20:51;  Start 12/23/20 at 21:00


Sulfasalazine (Azulfidine) 1,000 mg BID PO  Last administered on 1/2/21at 08:58;

 Start 12/23/20 at 21:00


Tramadol HCl (Ultram) 100 mg PRN Q8HRS  PRN PO MODERATE PAIN, SEVERE PAIN Last 

administered on 1/1/21at 13:35;  Start 12/23/20 at 15:00


Zolpidem Tartrate (Ambien) 5 mg PRN QHS  PRN PO INSOMNIA Last administered on 

1/1/21at 22:05;  Start 12/23/20 at 15:00


Info ([facility] Electrolyte Protocol) 1 ea CONT PRN  PRN MC PER PROTOCOL;  Start 

12/24/20 at 14:15


Potassium Chloride (Klor-Con) 40 meq 1X  ONCE PO  Last administered on 

12/24/20at 14:24;  Start 12/24/20 at 14:15;  Stop 12/24/20 at 14:16;  Status DC


Sterile Water (WATER for RESP) 1,000 ml CONT  PRN INH VIA VAPOTHERM DEVICE Last 

administered on 1/1/21at 15:49;  Start 12/26/20 at 09:30


Benzonatate (Tessalon Perle) 100 mg [id number] PO  Last administered on 1/1/21at 

20:51;  Start 12/26/20 at 14:00


Labetalol HCl (Normodyne Iv Push) 10 mg PRN Q2HR  PRN IVP HYPERTENSION Last 

administered on 1/1/21at 02:05;  Start 12/26/20 at 18:30


Quetiapine Fumarate (SEROquel) 50 mg PRN QHS  PRN PO sleep 2ND CHOICE Last 

administered on 1/1/21at 00:24;  Start 12/27/20 at 21:00


Enalaprilat (Vasotec Inj) 2.5 mg PRN Q6HRS  PRN IVP HYPERTENSION Last 

administered on 12/28/20at 12:29;  Start 12/27/20 at 10:00


Olanzapine (ZyPREXA ZYDIS) 5 mg PRN BID  PRN PO ANXIETY / AGITATION Last 

administered on 1/1/21at 08:20;  Start 12/27/20 at 12:30


Guaifenesin (Robitussin) 400 mg PRN Q4HRS  PRN PO COUGH;  Start 12/27/20 at 

22:15;  Stop 12/27/20 at 22:27;  Status DC


Guaifenesin (Robitussin) 400 mg PRN Q4HRS  PRN PO COUGH Last administered on 

1/1/21at 23:00;  Start 12/27/20 at 22:30


Furosemide (Lasix) 40 mg 1X  ONCE IVP  Last administered on 12/30/20at 10:15;  

Start 12/30/20 at 10:00;  Stop 12/30/20 at 10:01;  Status DC


Sodium Chloride 1,000 ml @  100 mls/hr Q10H IV  Last administered on 1/1/21at 

16:15;  Start 12/31/20 at 10:15


Furosemide (Lasix) 20 mg 1X  ONCE IVP  Last administered on 1/1/21at 13:34;  

Start 1/1/21 at 12:30;  Stop 1/1/21 at 12:31;  Status DC


Alteplase, Recombinant (Cathflo For Central Catheter Clearance) 1 mg 1X  ONCE 

INT CAT  Last administered on 1/2/21at 02:10;  Start 1/1/21 at 16:15;  Stop 

1/1/21 at 16:16;  Status DC


Fentanyl Citrate 30 ml @ 0 mls/hr CONT  PRN IV SEE PROTOCOL Last administered on

1/2/21at 01:58;  Start 1/2/21 at 01:15;  Stop 1/2/21 at 02:36;  Status DC


Propofol 100 ml @ 0 mls/hr CONT  PRN IV PER PROTOCOL Last administered on 

1/2/21at 03:47;  Start 1/2/21 at 01:15


Chlorhexidine Gluconate (Peridex) 15 ml BID MM ;  Start 1/2/21 at 09:00


Midazolam HCl 100 ml @ 0 mls/hr CONT  PRN IV SEE PROTOCOL Last administered on 

1/2/21at 08:06;  Start 1/2/21 at 01:15


Succinylcholine Chloride (Anectine) 200 mg STK-MED ONCE .ROUTE ;  Start 1/2/21 

at 01:21;  Stop 1/2/21 at 01:21;  Status DC


Fentanyl Citrate 55 ml @ 0 mls/hr CONT PRN  PRN IV PAIN CONTROL Last 

administered on 1/2/21at 05:07;  Start 1/2/21 at 02:45


Norepinephrine Bitartrate 8 mg/ Dextrose 258 ml @  19.737 mls/ hr CONT  PRN IV 

PER PROTOCOL Last administered on 1/2/21at 07:35;  Start 1/2/21 at 07:30


Vecuronium Bromide (Norcuron Bolus) 6 mg PRN Q6HRS  PRN IV SEDATION Last 

administered on 1/2/21at 09:58;  Start 1/2/21 at 09:15





Active Scripts


Active


Reported


Morphine Sulfate Er (Morphine Sulfate) 30 Mg Tablet.er 1 Tab PO BID


Tramadol Hcl 100 Mg Tbmp.24hr 100 Mg PO PRN Q8HRS PRN


Ambien (Zolpidem Tartrate) 10 Mg Tablet 10 Mg PO PRN QHS PRN


Methotrexate (Methotrexate Sodium) 2.5 Mg Tablet 10 Tab PO WEEKLY


Lisinopril 10 Mg Tablet 1 Tab PO DAILY


Crestor (Rosuvastatin Calcium) 5 Mg Tablet 2 Tab PO DAILY


Hydrochlorothiazide Tablet (Hydrochlorothiazide) 50 Mg Tablet 25 Mg PO DAILY


Escitalopram Oxalate 10 Mg Tablet 1 Tab PO DAILY


Sulfasalazine Dr (Sulfasalazine) 500 Mg Tablet.dr 2 Tab PO TID 30 Days


Colace (Docusate Sodium) 100 Mg Capsule 1 Cap PO DA 30 Days


Acetaminophen Ext.release (Acetaminophen) 650 Mg Tablet.er 650 Mg PO PRN Q8HRS 

PRN


Melatonin 5 Mg Capsule 1 Cap PO QHS 30 Days


B-12 (Cyanocobalamin (Vitamin B-12)) 500 Mcg Tablet 2 Tab PO DAILY 30 Days


Vitamin D3 (Cholecalciferol (Vitamin D3)) 50 Mcg Capsule 50 Mcg PO DAILY


Aller-Sung (Cetirizine Hcl) 10 Mg Tablet 1 Tab PO DAILY 30 Days


Vitals/I & O





Vital Sign - Last 24 Hours








 1/1/21 1/1/21 1/1/21 1/1/21





 11:00 11:24 12:00 13:35


 


Pulse 110   


 


Resp 28   


 


B/P (MAP)    


 


Pulse Ox 87 89  89


 


O2 Delivery vapotherm Nasal Cannula Nasal Cannula Nasal Cannula


 


O2 Flow Rate 40.0 40.0 40.0 40.0





 1/1/21 1/1/21 1/1/21 1/1/21





 13:35 14:00 14:35 15:00


 


Pulse  118  108


 


Resp  30  29


 


B/P (MAP)  173/83 (113)  128/72 (90)


 


Pulse Ox 89 90 83 84


 


O2 Delivery Nasal Cannula vapotherm Nasal Cannula vapotherm


 


O2 Flow Rate 40.0 40.0 40.0 40.0





 1/1/21 1/1/21 1/1/21 1/1/21





 15:35 16:00 16:00 16:12


 


Pulse  106  


 


Resp  32  


 


B/P (MAP)  120/78 (92)  


 


Pulse Ox 83 90  83


 


O2 Delivery Nasal Cannula vapotherm Nasal Cannula Nasal Cannula


 


O2 Flow Rate 40.0 40.0 40.0 40.0





 1/1/21 1/1/21 1/1/21 1/1/21





 16:42 17:00 18:00 19:00


 


Temp  98.6  





  98.6  


 


Pulse  106 106 109


 


Resp  27 25 24


 


B/P (MAP)  150/84 (106) 142/82 (102) 177/88 (117)


 


Pulse Ox 83 91 92 89


 


O2 Delivery Nasal Cannula vapotherm vapotherm High Flow Nasal Cannula


 


O2 Flow Rate 40.0  40.0 40.0


 


    





    





 1/1/21 1/1/21 1/1/21 1/1/21





 20:00 20:00 20:31 20:52


 


Temp  97.9  





  97.9  


 


Pulse  107  


 


Resp  33  28


 


B/P (MAP)  190/81 (117)  


 


Pulse Ox  88 87 89


 


O2 Delivery Nasal Cannula High Flow Nasal Cannula Nasal Cannula High Flow Nasal 

Cannula


 


O2 Flow Rate 40.0 40.0 40.0 40.0


 


    





    





 1/1/21 1/1/21 1/1/21 1/2/21





 21:00 22:00 23:00 00:00


 


Temp    98.0





    98.0


 


Pulse 105 123 119 126


 


Resp 30 32 32 30


 


B/P (MAP) 184/90 (121) 157/83 (107) 144/62 (89) 153/104 (120)


 


Pulse Ox 88 88 88 88


 


O2 Delivery High Flow Nasal Cannula High Flow Nasal Cannula High Flow Nasal 

Cannula High Flow Nasal Cannula


 


O2 Flow Rate 40.0 40.0 40.0 40.0


 


    





    





 1/2/21 1/2/21 1/2/21 1/2/21





 00:00 00:24 01:35 02:00


 


Pulse    123


 


Resp    30


 


B/P (MAP)    147/72 (97)


 


Pulse Ox  85 85 90


 


O2 Delivery Nasal Cannula Nasal Cannula Ventilator Ventilator


 


O2 Flow Rate 40.0 40.0  





 1/2/21 1/2/21 1/2/21 1/2/21





 02:20 02:20 03:00 04:00


 


Pulse   106 


 


Resp   20 


 


B/P (MAP)   88/42 (57) 


 


Pulse Ox 90 90 94 94


 


O2 Delivery Ventilator Ventilator Ventilator Ventilator





 1/2/21 1/2/21 1/2/21 1/2/21





 04:00 04:00 05:00 05:07


 


Temp 98.7   





 98.7   


 


Pulse 105  98 


 


Resp 20  20 20


 


B/P (MAP) 99/42 (61)  78/51 (60) 


 


Pulse Ox 93  94 


 


O2 Delivery Ventilator Mechanical Ventilator Ventilator Ventilator


 


    





    





 1/2/21 1/2/21 1/2/21 1/2/21





 05:37 06:00 07:00 07:30


 


Pulse  105 106 98


 


Resp  20 30 30


 


B/P (MAP)  103/61 (75) 78/64 (69) 70/49 (56)


 


Pulse Ox 94 95 94 89


 


O2 Delivery Ventilator Ventilator Ventilator Ventilator





 1/2/21 1/2/21 1/2/21 1/2/21





 08:00 08:00 09:00 10:00


 


Temp  98.5  





  98.5  


 


Pulse  99 82 78


 


Resp  30 28 28


 


B/P (MAP)  107/65 (79) 97/64 (75) 90/61 (71)


 


Pulse Ox  99 99 98


 


O2 Delivery Mechanical Ventilator Ventilator Ventilator Ventilator














Intake and Output   


 


 1/1/21 1/1/21 1/2/21





 15:00 23:00 07:00


 


Intake Total 370 ml 200 ml 1087.3 ml


 


Output Total 800 ml 720 ml 


 


Balance -430 ml -520 ml 1087.3 ml











Justicifation of Admission Dx:


Justifications for Admission:


Justification of Admission Dx:  Yes


Comminuty Aquired Pneumonia:  Med-High Risk Pt











CLAIRE EVANGELISTA MD           Jan 2, 2021 10:24

## 2021-01-02 NOTE — PDOC
PULMONARY PROGRESS NOTES


DATE: 1/2/21 


TIME: 08:23


Subjective


Pt. experienced hypoxia and respiratory decompensation overnight requiring 

intubation 


now on vent 100% PEEP of 10 


Hypotension as well now on pressors


Vitals





Vital Signs








  Date Time  Temp Pulse Resp B/P (MAP) Pulse Ox O2 Delivery O2 Flow Rate FiO2


 


1/2/21 06:00  105 20 103/61 (75) 95 Ventilator  


 


1/2/21 04:00 98.7       





 98.7       


 


1/2/21 00:24       40.0 








Comments


Pt. seen during COVID-19 pandemic, visual exam preformed 


RRR


intubated 


no distress


no accessory muscle use 


No edema or rash


Lungs:  Crackles


Labs





Laboratory Tests








Test


 12/31/20


16:50 1/1/21


05:35 1/2/21


01:00 1/2/21


06:00


 


White Blood Count


 9.4 x10^3/uL


(4.0-11.0) 10.0 x10^3/uL


(4.0-11.0) 


 14.8 x10^3/uL


(4.0-11.0)


 


Red Blood Count


 3.38 x10^6/uL


(4.30-5.70) 3.27 x10^6/uL


(4.30-5.70) 


 3.10 x10^6/uL


(4.30-5.70)


 


Hemoglobin


 11.0 g/dL


(13.0-17.5) 10.6 g/dL


(13.0-17.5) 


 10.1 g/dL


(13.0-17.5)


 


Hematocrit


 33.0 %


(39.0-53.0) 31.9 %


(39.0-53.0) 


 30.9 %


(39.0-53.0)


 


Mean Corpuscular Volume


 97 fL () 


 98 fL () 


 


 100 fL


()


 


Mean Corpuscular Hemoglobin 32 pg (25-35)  33 pg (25-35)   33 pg (25-35) 


 


Mean Corpuscular Hemoglobin


Concent 33 g/dL


(31-37) 33 g/dL


(31-37) 


 33 g/dL


(31-37)


 


Red Cell Distribution Width


 14.6 %


(11.5-14.5) 14.5 %


(11.5-14.5) 


 14.9 %


(11.5-14.5)


 


Platelet Count


 333 x10^3/uL


(140-400) 276 x10^3/uL


(140-400) 


 272 x10^3/uL


(140-400)


 


Neutrophils (%) (Auto) 87 % (31-73)  81 % (31-73)   86 % (31-73) 


 


Lymphocytes (%) (Auto) 5 % (24-48)  10 % (24-48)   6 % (24-48) 


 


Monocytes (%) (Auto) 7 % (0-9)  8 % (0-9)   7 % (0-9) 


 


Eosinophils (%) (Auto) 0 % (0-3)  1 % (0-3)   0 % (0-3) 


 


Basophils (%) (Auto) 1 % (0-3)  1 % (0-3)   1 % (0-3) 


 


Neutrophils # (Auto)


 8.2 x10^3/uL


(1.8-7.7) 8.1 x10^3/uL


(1.8-7.7) 


 12.7 x10^3/uL


(1.8-7.7)


 


Lymphocytes # (Auto)


 0.5 x10^3/uL


(1.0-4.8) 1.0 x10^3/uL


(1.0-4.8) 


 0.9 x10^3/uL


(1.0-4.8)


 


Monocytes # (Auto)


 0.6 x10^3/uL


(0.0-1.1) 0.8 x10^3/uL


(0.0-1.1) 


 1.1 x10^3/uL


(0.0-1.1)


 


Eosinophils # (Auto)


 0.0 x10^3/uL


(0.0-0.7) 0.1 x10^3/uL


(0.0-0.7) 


 0.0 x10^3/uL


(0.0-0.7)


 


Basophils # (Auto)


 0.0 x10^3/uL


(0.0-0.2) 0.1 x10^3/uL


(0.0-0.2) 


 0.1 x10^3/uL


(0.0-0.2)


 


Segmented Neutrophils % 87 % (35-66)    


 


Band Neutrophils % 4 % (0-9)    


 


Lymphocytes % 3 % (24-48)    


 


Monocytes % 6 % (0-10)    


 


Platelet Estimate


 Adequate


(ADEQUATE) 


 


 





 


Sodium Level


 143 mmol/L


(136-145) 141 mmol/L


(136-145) 


 142 mmol/L


(136-145)


 


Potassium Level


 4.7 mmol/L


(3.5-5.1) 4.0 mmol/L


(3.5-5.1) 


 5.0 mmol/L


(3.5-5.1)


 


Chloride Level


 107 mmol/L


() 105 mmol/L


() 


 107 mmol/L


()


 


Carbon Dioxide Level


 28 mmol/L


(21-32) 28 mmol/L


(21-32) 


 27 mmol/L


(21-32)


 


Anion Gap 8 (6-14)  8 (6-14)   8 (6-14) 


 


Blood Urea Nitrogen


 19 mg/dL


(8-26) 15 mg/dL


(8-26) 


 23 mg/dL


(8-26)


 


Creatinine


 0.6 mg/dL


(0.7-1.3) 0.7 mg/dL


(0.7-1.3) 


 1.0 mg/dL


(0.7-1.3)


 


Estimated GFR


(Cockcroft-Gault) 134.8 


 112.8 


 


 74.8 





 


BUN/Creatinine Ratio 32 (6-20)  21 (6-20)   


 


Glucose Level


 115 mg/dL


(70-99) 105 mg/dL


(70-99) 


 122 mg/dL


(70-99)


 


Calcium Level


 8.3 mg/dL


(8.5-10.1) 8.1 mg/dL


(8.5-10.1) 


 8.6 mg/dL


(8.5-10.1)


 


Total Bilirubin


 0.4 mg/dL


(0.2-1.0) 0.4 mg/dL


(0.2-1.0) 


 





 


Aspartate Amino Transf


(AST/SGOT) 140 U/L


(15-37) 89 U/L (15-37) 


 


 





 


Alanine Aminotransferase


(ALT/SGPT) 161 U/L


(16-63) 129 U/L


(16-63) 


 





 


Alkaline Phosphatase


 108 U/L


() 89 U/L


() 


 





 


Total Protein


 5.6 g/dL


(6.4-8.2) 6.1 g/dL


(6.4-8.2) 


 





 


Albumin


 2.3 g/dL


(3.4-5.0) 2.1 g/dL


(3.4-5.0) 


 





 


Albumin/Globulin Ratio 0.7 (1.0-1.7)  0.5 (1.0-1.7)   


 


O2 Saturation   75 % (92-99)  


 


Arterial Blood pH


 


 


 7.39


(7.35-7.45) 





 


Arterial Blood pCO2 at


Patient Temp 


 


 44 mmHg


(35-46) 





 


Arterial Blood pO2 at Patient


Temp 


 


 43 mmHg


() 





 


Arterial Blood HCO3


 


 


 26 mmol/L


(21-28) 





 


Arterial Blood Base Excess


 


 


 1 mmol/L


(-3-3) 





 


FiO2   100  








Laboratory Tests








Test


 1/2/21


01:00 1/2/21


06:00


 


O2 Saturation 75 % (92-99)  


 


Arterial Blood pH


 7.39


(7.35-7.45) 





 


Arterial Blood pCO2 at


Patient Temp 44 mmHg


(35-46) 





 


Arterial Blood pO2 at Patient


Temp 43 mmHg


() 





 


Arterial Blood HCO3


 26 mmol/L


(21-28) 





 


Arterial Blood Base Excess


 1 mmol/L


(-3-3) 





 


FiO2 100  


 


White Blood Count


 


 14.8 x10^3/uL


(4.0-11.0)


 


Red Blood Count


 


 3.10 x10^6/uL


(4.30-5.70)


 


Hemoglobin


 


 10.1 g/dL


(13.0-17.5)


 


Hematocrit


 


 30.9 %


(39.0-53.0)


 


Mean Corpuscular Volume


 


 100 fL


()


 


Mean Corpuscular Hemoglobin  33 pg (25-35) 


 


Mean Corpuscular Hemoglobin


Concent 


 33 g/dL


(31-37)


 


Red Cell Distribution Width


 


 14.9 %


(11.5-14.5)


 


Platelet Count


 


 272 x10^3/uL


(140-400)


 


Neutrophils (%) (Auto)  86 % (31-73) 


 


Lymphocytes (%) (Auto)  6 % (24-48) 


 


Monocytes (%) (Auto)  7 % (0-9) 


 


Eosinophils (%) (Auto)  0 % (0-3) 


 


Basophils (%) (Auto)  1 % (0-3) 


 


Neutrophils # (Auto)


 


 12.7 x10^3/uL


(1.8-7.7)


 


Lymphocytes # (Auto)


 


 0.9 x10^3/uL


(1.0-4.8)


 


Monocytes # (Auto)


 


 1.1 x10^3/uL


(0.0-1.1)


 


Eosinophils # (Auto)


 


 0.0 x10^3/uL


(0.0-0.7)


 


Basophils # (Auto)


 


 0.1 x10^3/uL


(0.0-0.2)


 


Sodium Level


 


 142 mmol/L


(136-145)


 


Potassium Level


 


 5.0 mmol/L


(3.5-5.1)


 


Chloride Level


 


 107 mmol/L


()


 


Carbon Dioxide Level


 


 27 mmol/L


(21-32)


 


Anion Gap  8 (6-14) 


 


Blood Urea Nitrogen


 


 23 mg/dL


(8-26)


 


Creatinine


 


 1.0 mg/dL


(0.7-1.3)


 


Estimated GFR


(Cockcroft-Gault) 


 74.8 





 


Glucose Level


 


 122 mg/dL


(70-99)


 


Calcium Level


 


 8.6 mg/dL


(8.5-10.1)








Medications





Active Scripts








 Medications  Dose


 Route/Sig


 Max Daily Dose Days Date Category


 


 Morphine Sulfate


 Er (Morphine


 Sulfate) 30 Mg


 Tablet.er  1 Tab


 PO BID


   12/23/20 Reported


 


 Tramadol Hcl 100


 Mg Tbmp.24hr  100 Mg


 PO PRN Q8HRS PRN


   12/23/20 Reported


 


 Ambien (Zolpidem


 Tartrate) 10 Mg


 Tablet  10 Mg


 PO PRN QHS PRN


   12/23/20 Reported


 


 Methotrexate


  (Methotrexate


 Sodium) 2.5 Mg


 Tablet  10 Tab


 PO WEEKLY


   12/23/20 Reported


 


 Lisinopril 10 Mg


 Tablet  1 Tab


 PO DAILY


   12/23/20 Reported


 


 Crestor


  (Rosuvastatin


 Calcium) 5 Mg


 Tablet  2 Tab


 PO DAILY


   12/23/20 Reported


 


 Hydrochlorothiazide


 Tablet


  (Hydrochlorothiazide)


 50 Mg Tablet  25 Mg


 PO DAILY


   12/23/20 Reported


 


 Escitalopram


 Oxalate 10 Mg


 Tablet  1 Tab


 PO DAILY


   12/23/20 Reported


 


 Sulfasalazine Dr


  (Sulfasalazine)


 500 Mg Tablet.dr  2 Tab


 PO TID


  30 12/23/20 Reported


 


 Colace (Docusate


 Sodium) 100 Mg


 Capsule  1 Cap


 PO DA


  30 12/23/20 Reported


 


 Acetaminophen


 Ext.release


  (Acetaminophen)


 650 Mg Tablet.er  650 Mg


 PO PRN Q8HRS PRN


   12/23/20 Reported


 


 Melatonin 5 Mg


 Capsule  1 Cap


 PO QHS


  30 12/23/20 Reported


 


 B-12


  (Cyanocobalamin


  (Vitamin B-12))


 500 Mcg Tablet  2 Tab


 PO DAILY


  30 12/23/20 Reported


 


 Vitamin D3


  (Cholecalciferol


  (Vitamin D3)) 50


 Mcg Capsule  50 Mcg


 PO DAILY


   12/23/20 Reported


 


 Aller-Sung


  (Cetirizine Hcl)


 10 Mg Tablet  1 Tab


 PO DAILY


  30 12/23/20 Reported








Comments


CXR 1/2/21





FINDINGS/


IMPRESSION:





Right upper extremity PICC and endotracheal tube are in similar position. 





The cardiomediastinal silhouette is similar in appearance. Nasogastric tube is 

identified with the side port below the hemidiaphragm expected region of the 

gastroesophageal junction. This may be advanced 7 to 10 cm.





There is improved aeration of the lungs with persistent moderate mixed 

interstitial and alveolar airspace disease. No significant pleural effusions or 

pneumothorax.





Impression


.


IMPRESSION:


1.  Acute hypoxic respiratory failure secondary to highly suspected COVID-19


pneumonia and acute respiratory distress syndrome/acute lung injury.-- COVID-19 

positive 


2.  Abnormal CT chest with extensive widespread ground glass and alveolar and


interstitial infiltrates with reactive mild mediastinal/hilar adenopathy.  This


is likely related to COVID-19 pneumonia.


3.  Patient with history of psoriatic arthritis.  He is likely immunocompromise


as he has been on methotrexate.  covering for opportunistic infection with Abx


4.  History of tongue cancer with resection, details unknown.


5.  History of prostate cancer.


6.  History of Crohn's disease.





Plan


.


RECOMMENDATIONS:


Continue current vent support A/C mode, Fi02 100%, PEEP 10 


Follow chest x-ray/ABG-- change to 1:1 ratio and increase resp. rate to 26


ensure adequate sedation 


Vasopressors ass needed to Keep MAP greater than 65 


COVID-19 positive


Continue with empiric antibiotic:Rocephin, competed course of azithro


Continue with IV steroids with slow taper, will need full 10 day course started 

12/24 


Consult dietician for TF recs  


DVT/GI PPX 





D/W RN and RT 


Critical care time 0800-0830AM











JUAN RHOADES MD               Jan 2, 2021 08:29

## 2021-01-02 NOTE — RAD
XR CHEST 1V 1/2/2021 1:53 AM



INDICATION: Intubation, OG placement



COMPARISON: 1/1/2021



TECHNIQUE: Portable frontal view of the chest is provided.



FINDINGS/

IMPRESSION:



Right upper extremity PICC and endotracheal tube are in similar position. 



The cardiomediastinal silhouette is similar in appearance. Nasogastric tube is identified with the si
de port below the hemidiaphragm expected region of the gastroesophageal junction. This may be advance
d 7 to 10 cm.



There is improved aeration of the lungs with persistent moderate mixed interstitial and alveolar airs
pace disease. No significant pleural effusions or pneumothorax. 



Electronically signed by: Estrellita Ramey MD (1/2/2021 2:30 AM) [facility]

## 2021-01-03 VITALS — SYSTOLIC BLOOD PRESSURE: 113 MMHG | DIASTOLIC BLOOD PRESSURE: 69 MMHG

## 2021-01-03 VITALS — SYSTOLIC BLOOD PRESSURE: 116 MMHG | DIASTOLIC BLOOD PRESSURE: 62 MMHG

## 2021-01-03 VITALS — DIASTOLIC BLOOD PRESSURE: 72 MMHG | SYSTOLIC BLOOD PRESSURE: 122 MMHG

## 2021-01-03 VITALS — DIASTOLIC BLOOD PRESSURE: 85 MMHG | SYSTOLIC BLOOD PRESSURE: 157 MMHG

## 2021-01-03 VITALS — DIASTOLIC BLOOD PRESSURE: 62 MMHG | SYSTOLIC BLOOD PRESSURE: 97 MMHG

## 2021-01-03 VITALS — SYSTOLIC BLOOD PRESSURE: 154 MMHG | DIASTOLIC BLOOD PRESSURE: 82 MMHG

## 2021-01-03 VITALS — SYSTOLIC BLOOD PRESSURE: 116 MMHG | DIASTOLIC BLOOD PRESSURE: 69 MMHG

## 2021-01-03 VITALS — DIASTOLIC BLOOD PRESSURE: 44 MMHG | SYSTOLIC BLOOD PRESSURE: 78 MMHG

## 2021-01-03 VITALS — SYSTOLIC BLOOD PRESSURE: 151 MMHG | DIASTOLIC BLOOD PRESSURE: 81 MMHG

## 2021-01-03 VITALS — SYSTOLIC BLOOD PRESSURE: 103 MMHG | DIASTOLIC BLOOD PRESSURE: 60 MMHG

## 2021-01-03 VITALS — DIASTOLIC BLOOD PRESSURE: 70 MMHG | SYSTOLIC BLOOD PRESSURE: 117 MMHG

## 2021-01-03 VITALS — DIASTOLIC BLOOD PRESSURE: 74 MMHG | SYSTOLIC BLOOD PRESSURE: 161 MMHG

## 2021-01-03 VITALS — SYSTOLIC BLOOD PRESSURE: 163 MMHG | DIASTOLIC BLOOD PRESSURE: 81 MMHG

## 2021-01-03 VITALS — DIASTOLIC BLOOD PRESSURE: 65 MMHG | SYSTOLIC BLOOD PRESSURE: 98 MMHG

## 2021-01-03 VITALS — DIASTOLIC BLOOD PRESSURE: 56 MMHG | SYSTOLIC BLOOD PRESSURE: 94 MMHG

## 2021-01-03 VITALS — SYSTOLIC BLOOD PRESSURE: 102 MMHG | DIASTOLIC BLOOD PRESSURE: 65 MMHG

## 2021-01-03 VITALS — SYSTOLIC BLOOD PRESSURE: 87 MMHG | DIASTOLIC BLOOD PRESSURE: 55 MMHG

## 2021-01-03 VITALS — SYSTOLIC BLOOD PRESSURE: 107 MMHG | DIASTOLIC BLOOD PRESSURE: 63 MMHG

## 2021-01-03 VITALS — SYSTOLIC BLOOD PRESSURE: 93 MMHG | DIASTOLIC BLOOD PRESSURE: 59 MMHG

## 2021-01-03 VITALS — DIASTOLIC BLOOD PRESSURE: 69 MMHG | SYSTOLIC BLOOD PRESSURE: 140 MMHG

## 2021-01-03 VITALS — SYSTOLIC BLOOD PRESSURE: 92 MMHG | DIASTOLIC BLOOD PRESSURE: 60 MMHG

## 2021-01-03 VITALS — SYSTOLIC BLOOD PRESSURE: 103 MMHG | DIASTOLIC BLOOD PRESSURE: 61 MMHG

## 2021-01-03 VITALS — DIASTOLIC BLOOD PRESSURE: 67 MMHG | SYSTOLIC BLOOD PRESSURE: 99 MMHG

## 2021-01-03 VITALS — DIASTOLIC BLOOD PRESSURE: 66 MMHG | SYSTOLIC BLOOD PRESSURE: 95 MMHG

## 2021-01-03 VITALS — DIASTOLIC BLOOD PRESSURE: 59 MMHG | SYSTOLIC BLOOD PRESSURE: 105 MMHG

## 2021-01-03 VITALS — DIASTOLIC BLOOD PRESSURE: 58 MMHG | SYSTOLIC BLOOD PRESSURE: 91 MMHG

## 2021-01-03 VITALS — DIASTOLIC BLOOD PRESSURE: 77 MMHG | SYSTOLIC BLOOD PRESSURE: 140 MMHG

## 2021-01-03 LAB
BASE EXCESS ABG: -1 MMOL/L (ref -3–3)
CREAT SERPL-MCNC: 0.8 MG/DL (ref 0.7–1.3)
GFR SERPLBLD BASED ON 1.73 SQ M-ARVRAT: 96.7 ML/MIN
HCO3 BLDA-SCNC: 25 MMOL/L (ref 21–28)
INSPIRATION SETTING TIME VENT: 90
PCO2 BLDA: 47 MMHG (ref 35–46)
PO2 BLDA: 72 MMHG (ref 65–108)
SAO2 % BLDA: 93 % (ref 92–99)

## 2021-01-03 RX ADMIN — CEFEPIME SCH GM: 2 INJECTION, POWDER, FOR SOLUTION INTRAVENOUS at 21:45

## 2021-01-03 RX ADMIN — GLYCERIN, ISOLEUCINE, LEUCINE, LYSINE, METHIONINE, PHENYLALANINE, THREONINE, TRYPTOPHAN, VALINE, ALANINE, GLYCINE, ARGININE, HISTIDINE, PROLINE, SERINE, CYSTEINE, SODIUM ACETATE, MAGNESIUM ACETATE, CALCIUM ACETATE, SODIUM CHLORIDE, POTASSIUM CHLORIDE, PHOSPHORIC ACID, AND POTASSIUM METABISULFITE SCH MLS/HR
3; .21; .27; .22; .16; .17; .12; .046; .2; .21; .42; .29; .085; .34; .18; .014; .2; .054; .026; .12; .15; .041 INJECTION INTRAVENOUS at 05:54

## 2021-01-03 RX ADMIN — PROPOFOL PRN MLS/HR: 10 INJECTION, EMULSION INTRAVENOUS at 13:40

## 2021-01-03 RX ADMIN — ENOXAPARIN SODIUM SCH MG: 40 INJECTION SUBCUTANEOUS at 21:47

## 2021-01-03 RX ADMIN — CITALOPRAM HYDROBROMIDE SCH MG: 20 TABLET ORAL at 08:14

## 2021-01-03 RX ADMIN — MORPHINE SULFATE SCH MG: 15 TABLET, EXTENDED RELEASE ORAL at 08:13

## 2021-01-03 RX ADMIN — LINEZOLID SCH MG: 600 TABLET, FILM COATED ORAL at 21:46

## 2021-01-03 RX ADMIN — Medication PRN MLS/HR: at 08:25

## 2021-01-03 RX ADMIN — BACITRACIN SCH MLS/HR: 5000 INJECTION, POWDER, FOR SOLUTION INTRAMUSCULAR at 05:56

## 2021-01-03 RX ADMIN — PROPOFOL PRN MLS/HR: 10 INJECTION, EMULSION INTRAVENOUS at 05:57

## 2021-01-03 RX ADMIN — CEFTRIAXONE SCH GM: 1 INJECTION, POWDER, FOR SOLUTION INTRAMUSCULAR; INTRAVENOUS at 08:14

## 2021-01-03 RX ADMIN — ATORVASTATIN CALCIUM SCH MG: 40 TABLET, FILM COATED ORAL at 21:45

## 2021-01-03 RX ADMIN — MIDAZOLAM PRN MLS/HR: 5 INJECTION, SOLUTION INTRAMUSCULAR; INTRAVENOUS at 13:41

## 2021-01-03 RX ADMIN — CETIRIZINE HYDROCHLORIDE SCH MG: 10 TABLET, FILM COATED ORAL at 08:13

## 2021-01-03 RX ADMIN — MIDAZOLAM PRN MLS/HR: 5 INJECTION, SOLUTION INTRAMUSCULAR; INTRAVENOUS at 21:04

## 2021-01-03 RX ADMIN — BACITRACIN SCH MLS/HR: 5000 INJECTION, POWDER, FOR SOLUTION INTRAMUSCULAR at 18:15

## 2021-01-03 RX ADMIN — CHLORHEXIDINE GLUCONATE SCH ML: 1.2 RINSE ORAL at 21:45

## 2021-01-03 RX ADMIN — Medication PRN MLS/HR: at 21:50

## 2021-01-03 RX ADMIN — ENOXAPARIN SODIUM SCH MG: 40 INJECTION SUBCUTANEOUS at 08:14

## 2021-01-03 RX ADMIN — GLYCERIN, ISOLEUCINE, LEUCINE, LYSINE, METHIONINE, PHENYLALANINE, THREONINE, TRYPTOPHAN, VALINE, ALANINE, GLYCINE, ARGININE, HISTIDINE, PROLINE, SERINE, CYSTEINE, SODIUM ACETATE, MAGNESIUM ACETATE, CALCIUM ACETATE, SODIUM CHLORIDE, POTASSIUM CHLORIDE, PHOSPHORIC ACID, AND POTASSIUM METABISULFITE SCH MLS/HR
3; .21; .27; .22; .16; .17; .12; .046; .2; .21; .42; .29; .085; .34; .18; .014; .2; .054; .026; .12; .15; .041 INJECTION INTRAVENOUS at 10:22

## 2021-01-03 RX ADMIN — DEXAMETHASONE SODIUM PHOSPHATE SCH MG: 4 INJECTION, SOLUTION INTRAMUSCULAR; INTRAVENOUS at 08:14

## 2021-01-03 RX ADMIN — CEFEPIME SCH GM: 2 INJECTION, POWDER, FOR SOLUTION INTRAVENOUS at 14:10

## 2021-01-03 RX ADMIN — ZINC SULFATE CAP 220 MG (50 MG ELEMENTAL ZN) SCH MG: 220 (50 ZN) CAP at 08:13

## 2021-01-03 RX ADMIN — CHLORHEXIDINE GLUCONATE SCH ML: 1.2 RINSE ORAL at 08:13

## 2021-01-03 RX ADMIN — MORPHINE SULFATE SCH MG: 15 TABLET, EXTENDED RELEASE ORAL at 21:46

## 2021-01-03 RX ADMIN — PROPOFOL PRN MLS/HR: 10 INJECTION, EMULSION INTRAVENOUS at 22:59

## 2021-01-03 RX ADMIN — MIDAZOLAM PRN MLS/HR: 5 INJECTION, SOLUTION INTRAMUSCULAR; INTRAVENOUS at 06:41

## 2021-01-03 RX ADMIN — CYANOCOBALAMIN TAB 1000 MCG SCH MCG: 1000 TAB at 08:13

## 2021-01-03 NOTE — PDOC
PULMONARY PROGRESS NOTES


DATE: 1/3/21 


TIME: 10:16


Subjective


Patient remains on ventilatory support, FiO2 90% and a PEEP of 10


Remains on pressors


Low-grade fever overnight


Other concerns from nursing


Vitals





Vital Signs








  Date Time  Temp Pulse Resp B/P (MAP) Pulse Ox O2 Delivery O2 Flow Rate FiO2


 


1/3/21 09:36     93 Ventilator 40.0 


 


1/3/21 09:00  64 26 103/60 (74)    


 


1/3/21 08:00 99.4       





 99.4       








Comments


Pt. seen during COVID-19 pandemic, visual exam preformed 


RRR


intubated 


no distress


no accessory muscle use 


No edema or rash


Lungs:  Clear


Labs





Laboratory Tests








Test


 1/2/21


01:00 1/2/21


06:00 1/2/21


09:40 1/2/21


17:40


 


O2 Saturation 75 % (92-99)   96 % (92-99)  


 


Arterial Blood pH


 7.39


(7.35-7.45) 


 7.30


(7.35-7.45) 





 


Arterial Blood pCO2 at


Patient Temp 44 mmHg


(35-46) 


 50 mmHg


(35-46) 





 


Arterial Blood pO2 at Patient


Temp 43 mmHg


() 


 96 mmHg


() 





 


Arterial Blood HCO3


 26 mmol/L


(21-28) 


 24 mmol/L


(21-28) 





 


Arterial Blood Base Excess


 1 mmol/L


(-3-3) 


 -3 mmol/L


(-3-3) 





 


FiO2 100   100  


 


White Blood Count


 


 14.8 x10^3/uL


(4.0-11.0) 


 





 


Red Blood Count


 


 3.10 x10^6/uL


(4.30-5.70) 


 





 


Hemoglobin


 


 10.1 g/dL


(13.0-17.5) 


 





 


Hematocrit


 


 30.9 %


(39.0-53.0) 


 





 


Mean Corpuscular Volume


 


 100 fL


() 


 





 


Mean Corpuscular Hemoglobin  33 pg (25-35)   


 


Mean Corpuscular Hemoglobin


Concent 


 33 g/dL


(31-37) 


 





 


Red Cell Distribution Width


 


 14.9 %


(11.5-14.5) 


 





 


Platelet Count


 


 272 x10^3/uL


(140-400) 


 





 


Neutrophils (%) (Auto)  86 % (31-73)   


 


Lymphocytes (%) (Auto)  6 % (24-48)   


 


Monocytes (%) (Auto)  7 % (0-9)   


 


Eosinophils (%) (Auto)  0 % (0-3)   


 


Basophils (%) (Auto)  1 % (0-3)   


 


Neutrophils # (Auto)


 


 12.7 x10^3/uL


(1.8-7.7) 


 





 


Lymphocytes # (Auto)


 


 0.9 x10^3/uL


(1.0-4.8) 


 





 


Monocytes # (Auto)


 


 1.1 x10^3/uL


(0.0-1.1) 


 





 


Eosinophils # (Auto)


 


 0.0 x10^3/uL


(0.0-0.7) 


 





 


Basophils # (Auto)


 


 0.1 x10^3/uL


(0.0-0.2) 


 





 


Sodium Level


 


 142 mmol/L


(136-145) 


 





 


Potassium Level


 


 5.0 mmol/L


(3.5-5.1) 


 





 


Chloride Level


 


 107 mmol/L


() 


 





 


Carbon Dioxide Level


 


 27 mmol/L


(21-32) 


 





 


Anion Gap  8 (6-14)   


 


Blood Urea Nitrogen


 


 23 mg/dL


(8-26) 


 





 


Creatinine


 


 1.0 mg/dL


(0.7-1.3) 


 





 


Estimated GFR


(Cockcroft-Gault) 


 74.8 


 


 





 


Glucose Level


 


 122 mg/dL


(70-99) 


 





 


Calcium Level


 


 8.6 mg/dL


(8.5-10.1) 


 





 


Procalcitonin


 


 0.42 ng/mL


(0.00-0.10) 


 





 


Lactic Acid Level


 


 


 


 1.3 mmol/L


(0.4-2.0)


 


Test


 1/3/21


06:10 1/3/21


08:30 


 





 


Creatinine


 0.8 mg/dL


(0.7-1.3) 


 


 





 


Estimated GFR


(Cockcroft-Gault) 96.7 


 


 


 





 


O2 Saturation  93 % (92-99)   


 


Arterial Blood pH


 


 7.34


(7.35-7.45) 


 





 


Arterial Blood pCO2 at


Patient Temp 


 47 mmHg


(35-46) 


 





 


Arterial Blood pO2 at Patient


Temp 


 72 mmHg


() 


 





 


Arterial Blood HCO3


 


 25 mmol/L


(21-28) 


 





 


Arterial Blood Base Excess


 


 -1 mmol/L


(-3-3) 


 





 


FiO2  90   








Laboratory Tests








Test


 1/2/21


17:40 1/3/21


06:10 1/3/21


08:30


 


Lactic Acid Level


 1.3 mmol/L


(0.4-2.0) 


 





 


Creatinine


 


 0.8 mg/dL


(0.7-1.3) 





 


Estimated GFR


(Cockcroft-Gault) 


 96.7 


 





 


O2 Saturation   93 % (92-99) 


 


Arterial Blood pH


 


 


 7.34


(7.35-7.45)


 


Arterial Blood pCO2 at


Patient Temp 


 


 47 mmHg


(35-46)


 


Arterial Blood pO2 at Patient


Temp 


 


 72 mmHg


()


 


Arterial Blood HCO3


 


 


 25 mmol/L


(21-28)


 


Arterial Blood Base Excess


 


 


 -1 mmol/L


(-3-3)


 


FiO2   90 








Medications





Active Scripts








 Medications  Dose


 Route/Sig


 Max Daily Dose Days Date Category


 


 Morphine Sulfate


 Er (Morphine


 Sulfate) 30 Mg


 Tablet.er  1 Tab


 PO BID


   12/23/20 Reported


 


 Tramadol Hcl 100


 Mg Tbmp.24hr  100 Mg


 PO PRN Q8HRS PRN


   12/23/20 Reported


 


 Ambien (Zolpidem


 Tartrate) 10 Mg


 Tablet  10 Mg


 PO PRN QHS PRN


   12/23/20 Reported


 


 Methotrexate


  (Methotrexate


 Sodium) 2.5 Mg


 Tablet  10 Tab


 PO WEEKLY


   12/23/20 Reported


 


 Lisinopril 10 Mg


 Tablet  1 Tab


 PO DAILY


   12/23/20 Reported


 


 Crestor


  (Rosuvastatin


 Calcium) 5 Mg


 Tablet  2 Tab


 PO DAILY


   12/23/20 Reported


 


 Hydrochlorothiazide


 Tablet


  (Hydrochlorothiazide)


 50 Mg Tablet  25 Mg


 PO DAILY


   12/23/20 Reported


 


 Escitalopram


 Oxalate 10 Mg


 Tablet  1 Tab


 PO DAILY


   12/23/20 Reported


 


 Sulfasalazine Dr


  (Sulfasalazine)


 500 Mg Tablet.dr  2 Tab


 PO TID


  30 12/23/20 Reported


 


 Colace (Docusate


 Sodium) 100 Mg


 Capsule  1 Cap


 PO DA


  30 12/23/20 Reported


 


 Acetaminophen


 Ext.release


  (Acetaminophen)


 650 Mg Tablet.er  650 Mg


 PO PRN Q8HRS PRN


   12/23/20 Reported


 


 Melatonin 5 Mg


 Capsule  1 Cap


 PO QHS


  30 12/23/20 Reported


 


 B-12


  (Cyanocobalamin


  (Vitamin B-12))


 500 Mcg Tablet  2 Tab


 PO DAILY


  30 12/23/20 Reported


 


 Vitamin D3


  (Cholecalciferol


  (Vitamin D3)) 50


 Mcg Capsule  50 Mcg


 PO DAILY


   12/23/20 Reported


 


 Aller-Sung


  (Cetirizine Hcl)


 10 Mg Tablet  1 Tab


 PO DAILY


  30 12/23/20 Reported








Comments


CXR 1/2/21





FINDINGS/


IMPRESSION:





Right upper extremity PICC and endotracheal tube are in similar position. 





The cardiomediastinal silhouette is similar in appearance. Nasogastric tube is 

identified with the side port below the hemidiaphragm expected region of the 

gastroesophageal junction. This may be advanced 7 to 10 cm.





There is improved aeration of the lungs with persistent moderate mixed 

interstitial and alveolar airspace disease. No significant pleural effusions or 

pneumothorax.





Impression


.


IMPRESSION:


1.  Acute hypoxic respiratory failure secondary to highly suspected COVID-19


pneumonia and acute respiratory distress syndrome/acute lung injury.-- COVID-19 

positive 


2.  Abnormal CT chest with extensive widespread ground glass and alveolar and


interstitial infiltrates with reactive mild mediastinal/hilar adenopathy.  This


is likely related to COVID-19 pneumonia.


3.  Patient with history of psoriatic arthritis.  He is likely immunocompromise


as he has been on methotrexate.  covering for opportunistic infection with Abx


4.  History of tongue cancer with resection, details unknown.


5.  History of prostate cancer.


6.  History of Crohn's disease.





Plan


.


RECOMMENDATIONS:


Continue current vent support A/C mode, 26/450/10/90%


Follow chest x-ray/ABG-- increase resp. rate to 28


ensure adequate sedation 


Vasopressors ass needed to Keep MAP greater than 65 --continue levophed


COVID-19 positive


Continue with empiric antibiotic:Rocephin,Vanco 


Continue with IV steroids with slow taper, will need full 10 day course started 

12/24 -- D/C on 1/4


Consult dietician for TF recs --continue PPN for nutritional support until tube 

feeding is at goal


DVT/GI PPX 





D/W RN and RT 


Critical care time 0900-0930AM











JUAN RHOADES MD               [place] [facility], 2021 10:20

## 2021-01-03 NOTE — PDOC
PROGRESS NOTES


Date of Service:


DATE: 1/3/21 


TIME: 11:02





Chief Complaint


Chief Complaint


impression ====================





Acute hypoxic respiratory failure - no pulmonary history. With recent travel to 

and from [place] likely COVID 19 vs other atypical pneumonia. Cont empiric 

antibiotics, steroids. Consult Pulm. 


Improved aeration of the lungs with persistent moderate mixed interstitial and 

alveolar airspace disease.  1/02 


Pt. experienced hypoxia and respiratory decompensation overnight requiring 

intubation 


now on vent 100% PEEP of 10 


Hypotension as well now on pressors


acute respiratory distress syndrome. Consideration may be given for multifocal 

pneumonia versus pulmonary edema.


COVID 19 positive - with pneumonia - given transaminitis and late presentation 

with high O2 requirements no indication for remdesivir


RYDER - likely vasomotor nephropathy - no known renal history, will hydrate


Pneumonia - likely atypical, gram negative vs viral. will cover 


Prostate Ca - s/p resection at [facility] in [place]


Hypokalemia - likely nutritional or loss from diarrhea, will replace


Elevated troponin - likely from type II demand ischemia, will trend troponins, 

maintain telemetry


Crohn's - sees rheumatology regularly, Dr Fan in [place], on sulfasalazine


Psoriatic arthritis - on pain medications 30mg MS Contin BID, tramadol and 25mg 

Methotrexate weekly as DMARD per rheumatology, Dr. Fan. Hold MTX while 

inpatient


GERD - PPI


HLD - statin


HTN - Lisinopril and HCTZ on hold for RYDER


Anemia - likely of chronic disease, will monitor


Cardiomegaly - notable on CT chest - no known cardiac history, though his mother

did have CHF and CAD


Right renal mass - will need US for further assessment


on 100% FIO2 via Vapotherm , tolerating 


HYPOTENSION 


history of psoriatic arthritis.  He is likely immuno-compromised


as he has been on methotrexate. 








plan==============








PPX - lovenox


FULL CODE


Dispo - ICU 


IVF bolus for hypotension, vasopressors if needed to keep MAP above 65 


start continuous IVF 


Monitor for respiratory distress requiring intubation 


COVID-19 positive


Continue with empiric antibiotic


Continue with IV steroids with slow taper


Continue PPN for nutritional support  


DVT/GI PPX 


blood cultures


ID CONSULT


PROCALCITONIN











1-02-21 now on vent 100% PEEP of 10 














CC time 35 minutes








Hoa and son, Desmond, [phone]





History of Present Illness


History of Present Illness





PAST MEDICAL HISTORY:  Significant for:


1.  History of hypertension.


2.  Hyperlipidemia.


3.  GERD.


4.  Inflammatory bowel disease.


5.  History of psoriatic arthritis, on methotrexate.


6.  History of tongue cancer and prostate cancer, details are unknown.





PAST SURGICAL HISTORY:  He has history of surgery for prostate cancer and tongue


cancer,  details unknown.  Apparently, he had some tongue resection.





FAMILY HISTORY:  Mother with cancer and coronary artery disease.





SOCIAL HISTORY:  Reportedly, nonsmoker.




















Mr Nance is 64 yo male w/ past medical history of Crohn's, psoriatic arthritis,

GERD, HLD, HTN, prostate ca, tongue cancer who presents to the emergency 

department at [facility] concerned about shortness of breath that had been 

progressive. This started 12/18/2020 and has been getting worse since that time.

Of note patient also has loss of taste, loss of smell, cough, shortness of 

breath, fever. He recently went to [place] on a business trip and just 

returned 5 days prior to presentation.


Upon arrival to the emergency room patient had significant hypoxia with a pulse 

ox in the 40s.  He was placed initially on a nonrebreather and then placed on 

BiPAP.


He was also somewhat concerned about a Crohn's flareup.  Patient states he has 

been having abdominal pain with nausea and diarrhea.  These are not typical 

symptoms of his Crohn's.  He feels very tired and has body aches as well.


He was given steroids and Rocephin in ED. 


Chest radiograph concerning for bilateral interstitial infiltrates.


Labs significant for WBC 4.5, Hb 11.8, platelets 229, , K2.8, BUN 34, CR 

1.5, glucose 119, .9, albumin 3, , , lactic acid 1.6, D-

dimer 3.15, troponin 0.068.


ABG on 80% FiO2 7.53/42/64


CT negative for pulmonary embolism. Widespread airspace disease throughout both 

lungs, consistent with pneumonia. Mild mediastinal and bilateral hilar 

lymphadenopathy, likely reactive. Cardiomegaly with mild aneurysmal dilation of 

the ascending thoracic aorta. Indeterminate hyperdense nodule at the midpole 

right kidney. This could represent a solid mass or complex cyst.


Patient transferred to [facility] for pulmonary consultation and 

ICU admission.





12/24: Overnight BP improved with fluids. O2 saturations slightly increased. Did

not tolerate more than 1 hour off BIPAP even with non-rebreather O2 saturations 

were 92% at best. No pain currently, very slightly appetite.


12/25: COVID-19 returned positive.  Down to 65% on his BiPAP 18/8.  Was able to 

take it off for medications meal yesterday, but desaturates to 79% and recovers 

quickly.  Still very drowsy with little appetite.  Afebrile.


12/26: Afebrile with 100% O2 on BiPAP today.  Transition to Vapotherm with more 

ease of breathing.  He has almost no appetite.  Less drowsy today.  He is 

depressed mood but now has a cell phone  and is able to talk to his 

family.  No complaints of chest pain some abdominal pain no bowel movement as of

yet.  ABG 7.49/37/59


12-29 abg pending 





Afebrile overnight.  Tolerating Vapotherm at 100% FiO2 much better with O2 

saturations 96%.  Appetite is increased slightly.  Less drowsy.  He does note 

that he barely slept last night.  Pain is well controlled.  He has had 2 formed 

stools.  No chest pain.  Still with rough cough.  Blood pressure systolic in the

180s minimally responsive to labetalol.





Plan:





Seroquel prn sleep


Vasotec prn HTN


PICC





Vitals


Vitals





Vital Signs








  Date Time  Temp Pulse Resp B/P (MAP) Pulse Ox O2 Delivery O2 Flow Rate FiO2


 


1/3/21 10:00  66 26 116/62 (80) 93 Ventilator  


 


1/3/21 09:36       40.0 


 


1/3/21 08:00 99.4       





 99.4       











Physical Exam


General:  


Heart:  Regular rate


Lungs:  Crackles


Abdomen:  Normal bowel sounds, Soft, No tenderness, No hepatosplenomegaly, No 

masses


Extremities:  No clubbing, No cyanosis, No edema, Normal pulses, No 

tenderness/swelling


Skin:  No rashes, No breakdown, No significant lesion


General:  Alert, Oriented X3, Cooperative, mild distress, moderate distress


Heart:  Regular rate


Lungs:  Clear


Abdomen:  Normal bowel sounds, Soft, No tenderness, No hepatosplenomegaly, No 

masses


Extremities:  No clubbing, No cyanosis, No edema, Normal pulses, No tenderne

ss/swelling


Skin:  No rashes, No breakdown, No significant lesion





Labs


LABS





Laboratory Tests








Test


 1/2/21


17:40 1/3/21


06:10 1/3/21


08:30


 


Lactic Acid Level


 1.3 mmol/L


(0.4-2.0) 


 





 


Creatinine


 


 0.8 mg/dL


(0.7-1.3) 





 


Estimated GFR


(Cockcroft-Gault) 


 96.7 


 





 


O2 Saturation   93 % (92-99) 


 


Arterial Blood pH


 


 


 7.34


(7.35-7.45)


 


Arterial Blood pCO2 at


Patient Temp 


 


 47 mmHg


(35-46)


 


Arterial Blood pO2 at Patient


Temp 


 


 72 mmHg


()


 


Arterial Blood HCO3


 


 


 25 mmol/L


(21-28)


 


Arterial Blood Base Excess


 


 


 -1 mmol/L


(-3-3)


 


FiO2   90 











Comment


Review of Relevant


I have reviewed the following items jabier (where applicable) has been applied.


Labs





Laboratory Tests








Test


 1/2/21


01:00 1/2/21


06:00 1/2/21


09:40 1/2/21


17:40


 


O2 Saturation 75 % (92-99)   96 % (92-99)  


 


Arterial Blood pH


 7.39


(7.35-7.45) 


 7.30


(7.35-7.45) 





 


Arterial Blood pCO2 at


Patient Temp 44 mmHg


(35-46) 


 50 mmHg


(35-46) 





 


Arterial Blood pO2 at Patient


Temp 43 mmHg


() 


 96 mmHg


() 





 


Arterial Blood HCO3


 26 mmol/L


(21-28) 


 24 mmol/L


(21-28) 





 


Arterial Blood Base Excess


 1 mmol/L


(-3-3) 


 -3 mmol/L


(-3-3) 





 


FiO2 100   100  


 


White Blood Count


 


 14.8 x10^3/uL


(4.0-11.0) 


 





 


Red Blood Count


 


 3.10 x10^6/uL


(4.30-5.70) 


 





 


Hemoglobin


 


 10.1 g/dL


(13.0-17.5) 


 





 


Hematocrit


 


 30.9 %


(39.0-53.0) 


 





 


Mean Corpuscular Volume


 


 100 fL


() 


 





 


Mean Corpuscular Hemoglobin  33 pg (25-35)   


 


Mean Corpuscular Hemoglobin


Concent 


 33 g/dL


(31-37) 


 





 


Red Cell Distribution Width


 


 14.9 %


(11.5-14.5) 


 





 


Platelet Count


 


 272 x10^3/uL


(140-400) 


 





 


Neutrophils (%) (Auto)  86 % (31-73)   


 


Lymphocytes (%) (Auto)  6 % (24-48)   


 


Monocytes (%) (Auto)  7 % (0-9)   


 


Eosinophils (%) (Auto)  0 % (0-3)   


 


Basophils (%) (Auto)  1 % (0-3)   


 


Neutrophils # (Auto)


 


 12.7 x10^3/uL


(1.8-7.7) 


 





 


Lymphocytes # (Auto)


 


 0.9 x10^3/uL


(1.0-4.8) 


 





 


Monocytes # (Auto)


 


 1.1 x10^3/uL


(0.0-1.1) 


 





 


Eosinophils # (Auto)


 


 0.0 x10^3/uL


(0.0-0.7) 


 





 


Basophils # (Auto)


 


 0.1 x10^3/uL


(0.0-0.2) 


 





 


Sodium Level


 


 142 mmol/L


(136-145) 


 





 


Potassium Level


 


 5.0 mmol/L


(3.5-5.1) 


 





 


Chloride Level


 


 107 mmol/L


() 


 





 


Carbon Dioxide Level


 


 27 mmol/L


(21-32) 


 





 


Anion Gap  8 (6-14)   


 


Blood Urea Nitrogen


 


 23 mg/dL


(8-26) 


 





 


Creatinine


 


 1.0 mg/dL


(0.7-1.3) 


 





 


Estimated GFR


(Cockcroft-Gault) 


 74.8 


 


 





 


Glucose Level


 


 122 mg/dL


(70-99) 


 





 


Calcium Level


 


 8.6 mg/dL


(8.5-10.1) 


 





 


Procalcitonin


 


 0.42 ng/mL


(0.00-0.10) 


 





 


Lactic Acid Level


 


 


 


 1.3 mmol/L


(0.4-2.0)


 


Test


 1/3/21


06:10 1/3/21


08:30 


 





 


Creatinine


 0.8 mg/dL


(0.7-1.3) 


 


 





 


Estimated GFR


(Cockcroft-Gault) 96.7 


 


 


 





 


O2 Saturation  93 % (92-99)   


 


Arterial Blood pH


 


 7.34


(7.35-7.45) 


 





 


Arterial Blood pCO2 at


Patient Temp 


 47 mmHg


(35-46) 


 





 


Arterial Blood pO2 at Patient


Temp 


 72 mmHg


() 


 





 


Arterial Blood HCO3


 


 25 mmol/L


(21-28) 


 





 


Arterial Blood Base Excess


 


 -1 mmol/L


(-3-3) 


 





 


FiO2  90   








Laboratory Tests








Test


 1/2/21


17:40 1/3/21


06:10 1/3/21


08:30


 


Lactic Acid Level


 1.3 mmol/L


(0.4-2.0) 


 





 


Creatinine


 


 0.8 mg/dL


(0.7-1.3) 





 


Estimated GFR


(Cockcroft-Gault) 


 96.7 


 





 


O2 Saturation   93 % (92-99) 


 


Arterial Blood pH


 


 


 7.34


(7.35-7.45)


 


Arterial Blood pCO2 at


Patient Temp 


 


 47 mmHg


(35-46)


 


Arterial Blood pO2 at Patient


Temp 


 


 72 mmHg


()


 


Arterial Blood HCO3


 


 


 25 mmol/L


(21-28)


 


Arterial Blood Base Excess


 


 


 -1 mmol/L


(-3-3)


 


FiO2   90 








Medications





Current Medications


Sodium Chloride (Normal Saline Flush) 3 ml QSHIFT  PRN IV AFTER MEDS AND BLOOD 

DRAWS;  Start 12/23/20 at 07:45


Sodium Chloride 1,000 ml @  150 mls/hr Q6H40M IV  Last administered on 

12/23/20at 15:10;  Start 12/23/20 at 08:15;  Stop 12/23/20 at 21:34;  Status DC


Ondansetron HCl (Zofran) 4 mg PRN Q4HRS  PRN IV NAUSEA/VOMITING;  Start 12/23/20

at 08:15


Acetaminophen (Tylenol) 650 mg PRN Q4HRS  PRN PO TEMP OVER 100.4F OR MILD PAIN 

Last administered on 12/31/20at 01:02;  Start 12/23/20 at 08:15


Docusate Sodium (Colace) 100 mg PRN BID  PRN PO HARD STOOLS Last administered on

12/25/20at 17:03;  Start 12/23/20 at 08:15


Ceftriaxone Sodium (Rocephin) 1 gm Q24H IVP  Last administered on 1/3/21at 

08:14;  Start 12/24/20 at 09:00


Dexamethasone Sodium Phosphate (Decadron) 4 mg DAILY IVP  Last administered on 

1/3/21at 08:14;  Start 12/24/20 at 09:00


Amino Acids/ Glycerin/ Electrolytes 1,000 ml @  80 mls/hr [id number] IV  Last 

administered on 1/3/21at 10:22;  Start 12/23/20 at 08:45


Enoxaparin Sodium (Lovenox 40mg Syringe) 40 mg Q12HR SQ  Last administered on 

1/3/21at 08:14;  Start 12/23/20 at 09:00


Zinc Sulfate (Orazinc) 220 mg DAILY PO  Last administered on 1/3/21at 08:13;  

Start 12/23/20 at 09:00


Guaifenesin (Robitussin Dm) 10 ml PRN Q6HRS  PRN PO COUGH-2ND CHOICE Last 

administered on 1/1/21at 15:37;  Start 12/23/20 at 08:45


Acetaminophen (Tylenol Supp) 650 mg PRN Q6HRS  PRN DC MILD PAIN / TEMP > 

100.3'F;  Start 12/23/20 at 08:45


Morphine Sulfate (Morphine Sulfate) 2 mg PRN Q4HRS  PRN IV PAIN Last 

administered on 1/1/21at 16:12;  Start 12/23/20 at 08:45;  Stop 1/2/21 at 01:41;

 Status DC


Azithromycin 500 mg/Sodium Chloride 250 ml @  250 mls/hr 1X  ONCE IV  Last 

administered on 12/23/20at 09:59;  Start 12/23/20 at 10:00;  Stop 12/23/20 at 

10:59;  Status DC


Azithromycin 250 mg/Sodium Chloride 250 ml @  250 mls/hr Q24H IV  Last 

administered on 12/27/20at 08:55;  Start 12/24/20 at 09:00;  Stop 12/27/20 at 

09:59;  Status DC


Cetirizine HCl (ZyrTEC) 10 mg DAILY PO  Last administered on 1/3/21at 08:13;  

Start 12/24/20 at 09:00


Morphine Sulfate (Ms Contin) 15 mg BID PO  Last administered on 1/3/21at 08:13; 

Start 12/23/20 at 21:00


Cyanocobalamin (Vitamin B-12) 1,000 mcg DAILY PO  Last administered on 1/3/21at 

08:13;  Start 12/24/20 at 09:00


Citalopram Hydrobromide (CeleXA) 20 mg DAILY PO  Last administered on 1/3/21at 

08:14;  Start 12/24/20 at 09:00


Atorvastatin Calcium (Lipitor) 40 mg QHS PO  Last administered on 1/2/21at 

21:04;  Start 12/23/20 at 21:00


Sulfasalazine (Azulfidine) 1,000 mg BID PO  Last administered on 1/3/21at 08:13;

 Start 12/23/20 at 21:00


Tramadol HCl (Ultram) 100 mg PRN Q8HRS  PRN PO MODERATE PAIN, SEVERE PAIN Last 

administered on 1/1/21at 13:35;  Start 12/23/20 at 15:00


Zolpidem Tartrate (Ambien) 5 mg PRN QHS  PRN PO INSOMNIA Last administered on 

1/1/21at 22:05;  Start 12/23/20 at 15:00


Info (Icu Electrolyte Protocol) 1 ea CONT PRN  PRN MC PER PROTOCOL;  Start 

12/24/20 at 14:15


Potassium Chloride (Klor-Con) 40 meq 1X  ONCE PO  Last administered on 

12/24/20at 14:24;  Start 12/24/20 at 14:15;  Stop 12/24/20 at 14:16;  Status DC


Sterile Water (WATER for RESP) 1,000 ml CONT  PRN INH VIA VAPOTHERM DEVICE Last 

administered on 1/1/21at 15:49;  Start 12/26/20 at 09:30


Benzonatate (Tessalon Perle) 100 mg OVS450 PO  Last administered on 1/1/21at 

20:51;  Start 12/26/20 at 14:00;  Stop 1/2/21 at 10:35;  Status DC


Labetalol HCl (Normodyne Iv Push) 10 mg PRN Q2HR  PRN IVP HYPERTENSION Last 

administered on 1/1/21at 02:05;  Start 12/26/20 at 18:30


Quetiapine Fumarate (SEROquel) 50 mg PRN QHS  PRN PO sleep 2ND CHOICE Last 

administered on 1/1/21at 00:24;  Start 12/27/20 at 21:00


Enalaprilat (Vasotec Inj) 2.5 mg PRN Q6HRS  PRN IVP HYPERTENSION Last 

administered on 12/28/20at 12:29;  Start 12/27/20 at 10:00


Olanzapine (ZyPREXA ZYDIS) 5 mg PRN BID  PRN PO ANXIETY / AGITATION Last 

administered on 1/1/21at 08:20;  Start 12/27/20 at 12:30


Guaifenesin (Robitussin) 400 mg PRN Q4HRS  PRN PO COUGH;  Start 12/27/20 at 

22:15;  Stop 12/27/20 at 22:27;  Status DC


Guaifenesin (Robitussin) 400 mg PRN Q4HRS  PRN PO COUGH Last administered on 

1/1/21at 23:00;  Start 12/27/20 at 22:30


Furosemide (Lasix) 40 mg 1X  ONCE IVP  Last administered on 12/30/20at 10:15;  

Start 12/30/20 at 10:00;  Stop 12/30/20 at 10:01;  Status DC


Sodium Chloride 1,000 ml @  100 mls/hr Q10H IV  Last administered on 1/3/21at 

05:56;  Start 12/31/20 at 10:15


Furosemide (Lasix) 20 mg 1X  ONCE IVP  Last administered on 1/1/21at 13:34;  

Start 1/1/21 at 12:30;  Stop 1/1/21 at 12:31;  Status DC


Alteplase, Recombinant (Cathflo For Central Catheter Clearance) 1 mg 1X  ONCE 

INT CAT  Last administered on 1/2/21at 02:10;  Start 1/1/21 at 16:15;  Stop 

1/1/21 at 16:16;  Status DC


Fentanyl Citrate 30 ml @ 0 mls/hr CONT  PRN IV SEE PROTOCOL Last administered on

1/2/21at 01:58;  Start 1/2/21 at 01:15;  Stop 1/2/21 at 02:36;  Status DC


Propofol 100 ml @ 0 mls/hr CONT  PRN IV PER PROTOCOL Last administered on 

1/3/21at 05:57;  Start 1/2/21 at 01:15


Chlorhexidine Gluconate (Peridex) 15 ml BID MM  Last administered on 1/3/21at 

08:13;  Start 1/2/21 at 09:00


Midazolam HCl 100 ml @ 0 mls/hr CONT  PRN IV SEE PROTOCOL Last administered on 

1/3/21at 06:41;  Start 1/2/21 at 01:15


Succinylcholine Chloride (Anectine) 200 mg STK-MED ONCE .ROUTE ;  Start 1/2/21 

at 01:21;  Stop 1/2/21 at 01:21;  Status DC


Fentanyl Citrate 55 ml @ 0 mls/hr CONT PRN  PRN IV PAIN CONTROL Last 

administered on 1/3/21at 08:25;  Start 1/2/21 at 02:45


Norepinephrine Bitartrate 8 mg/ Dextrose 258 ml @  19.737 mls/ hr CONT  PRN IV 

PER PROTOCOL Last administered on 1/2/21at 22:27;  Start 1/2/21 at 07:30


Vecuronium Bromide (Norcuron Bolus) 6 mg PRN Q6HRS  PRN IV SEDATION Last 

administered on 1/2/21at 09:58;  Start 1/2/21 at 09:15


Vancomycin HCl (Vanco Per Pharmacy) 1 each PRN DAILY  PRN MC SEE COMMENTS Last 

administered on 1/2/21at 20:47;  Start 1/2/21 at 16:00


Vancomycin HCl 2 gm/Sodium Chloride 500 ml @  250 mls/hr 1X  ONCE IV  Last 

administered on 1/2/21at 17:43;  Start 1/2/21 at 17:00;  Stop 1/2/21 at 18:59;  

Status DC


Vancomycin HCl 1.5 gm/Sodium Chloride 500 ml @  250 mls/hr Q12H IV  Last 

administered on 1/3/21at 05:53;  Start 1/3/21 at 05:00


Vancomycin HCl (Vancomycin Trough Level) 1 each 1X  ONCE MC ;  Start 1/4/21 at 

04:30;  Stop 1/4/21 at 04:31





Active Scripts


Active


Reported


Morphine Sulfate Er (Morphine Sulfate) 30 Mg Tablet.er 1 Tab PO BID


Tramadol Hcl 100 Mg Tbmp.24hr 100 Mg PO PRN Q8HRS PRN


Ambien (Zolpidem Tartrate) 10 Mg Tablet 10 Mg PO PRN QHS PRN


Methotrexate (Methotrexate Sodium) 2.5 Mg Tablet 10 Tab PO WEEKLY


Lisinopril 10 Mg Tablet 1 Tab PO DAILY


Crestor (Rosuvastatin Calcium) 5 Mg Tablet 2 Tab PO DAILY


Hydrochlorothiazide Tablet (Hydrochlorothiazide) 50 Mg Tablet 25 Mg PO DAILY


Escitalopram Oxalate 10 Mg Tablet 1 Tab PO DAILY


Sulfasalazine Dr (Sulfasalazine) 500 Mg Tablet.dr 2 Tab PO TID 30 Days


Colace (Docusate Sodium) 100 Mg Capsule 1 Cap PO DA 30 Days


Acetaminophen Ext.release (Acetaminophen) 650 Mg Tablet.er 650 Mg PO PRN Q8HRS 

PRN


Melatonin 5 Mg Capsule 1 Cap PO QHS 30 Days


B-12 (Cyanocobalamin (Vitamin B-12)) 500 Mcg Tablet 2 Tab PO DAILY 30 Days


Vitamin D3 (Cholecalciferol (Vitamin D3)) 50 Mcg Capsule 50 Mcg PO DAILY


Aller-Sung (Cetirizine Hcl) 10 Mg Tablet 1 Tab PO DAILY 30 Days


Vitals/I & O





Vital Sign - Last 24 Hours








 1/2/21 1/2/21 1/2/21 1/2/21





 11:40 12:00 12:07 13:00


 


Temp   98.5 





   98.5 


 


Pulse   87 78


 


Resp   27 27


 


B/P (MAP)   115/68 (84) 124/74 (91)


 


Pulse Ox 98  99 96


 


O2 Delivery Ventilator Mechanical Ventilator Ventilator Ventilator


 


    





    





 1/2/21 1/2/21 1/2/21 1/2/21





 14:00 15:00 16:00 16:00


 


Temp    98.5





    98.5


 


Pulse 72 71  69


 


Resp 26 26 26


 


B/P (MAP) 103/67 (79) 113/72 (86)  117/69 (85)


 


Pulse Ox 100 100  98


 


O2 Delivery Ventilator Ventilator Mechanical Ventilator Ventilator


 


    





    





 1/2/21 1/2/21 1/2/21 1/2/21





 16:14 17:00 18:00 19:00


 


Pulse  74 70 72


 


Resp  26 26 26


 


B/P (MAP)  117/71 (86) 114/71 (85) 113/69 (84)


 


Pulse Ox 100 99 100 100


 


O2 Delivery Ventilator Ventilator Ventilator Ventilator





 1/2/21 1/2/21 1/2/21 1/2/21





 20:00 20:00 20:10 20:23


 


Temp  98.9  





  98.9  


 


Pulse  57  


 


Resp  26 26


 


B/P (MAP)  108/56 (73)  


 


Pulse Ox  100 99 100


 


O2 Delivery Mechanical Ventilator Ventilator Ventilator Ventilator


 


    





    





 1/2/21 1/2/21 1/2/21 1/2/21





 21:00 21:04 22:00 23:00


 


Pulse 58  57 74


 


Resp 26 26 26 26


 


B/P (MAP) 135/81 (99)  90/51 (64) 164/93 (116)


 


Pulse Ox 98 99 97 98


 


O2 Delivery Ventilator Ventilator Ventilator Ventilator





 1/3/21 1/3/21 1/3/21 1/3/21





 00:00 00:00 00:45 01:00


 


Temp 99.0   





 99.0   


 


Pulse 63   64


 


Resp 26 26


 


B/P (MAP) 78/44 (55)   113/69 (84)


 


Pulse Ox 98  97 96


 


O2 Delivery Ventilator Mechanical Ventilator Ventilator Ventilator


 


    





    





 1/3/21 1/3/21 1/3/21 1/3/21





 02:00 03:00 04:00 04:30


 


Temp   99.4 





   99.4 


 


Pulse 62 62 71 


 


Resp 26 26 26 


 


B/P (MAP) 107/63 (78) 97/62 (74) 99/67 (78) 


 


Pulse Ox 95 96 94 97


 


O2 Delivery Ventilator Ventilator Ventilator Ventilator


 


    





    





 1/3/21 1/3/21 1/3/21 1/3/21





 05:00 05:00 06:00 07:00


 


Pulse 71  68 68


 


Resp 26  26 26


 


B/P (MAP) 98/65 (76)  102/65 (77) 103/60 (74)


 


Pulse Ox 94  94 94


 


O2 Delivery Ventilator Mechanical Ventilator Ventilator Ventilator





 1/3/21 1/3/21 1/3/21 1/3/21





 08:00 08:00 08:13 08:25


 


Temp 99.4   





 99.4   


 


Pulse 63   


 


Resp 26   


 


B/P (MAP) 103/61 (75)   


 


Pulse Ox 94  94 94


 


O2 Delivery Ventilator Mechanical Ventilator Ventilator Ventilator


 


O2 Flow Rate   40.0 40.0


 


    





    





 1/3/21 1/3/21 1/3/21 1/3/21





 08:30 09:00 09:36 10:00


 


Pulse  64  66


 


Resp  26  26


 


B/P (MAP)  103/60 (74)  116/62 (80)


 


Pulse Ox 94 93 93 93


 


O2 Delivery Ventilator Ventilator Ventilator Ventilator


 


O2 Flow Rate   40.0 














Intake and Output   


 


 1/2/21 1/2/21 1/3/21





 14:59 22:59 06:59


 


Intake Total  2607 ml 2992.3 ml


 


Output Total 250 ml 600 ml 275 ml


 


Balance -250 ml 2007 ml 2717.3 ml











Justicifation of Admission Dx:


Justifications for Admission:


Justification of Admission Dx:  Yes


Comminuty Aquired Pneumonia:  Med-High Risk Pt











CLAIRE EVANGELISTA MD           Pantera 3, 2021 11:03

## 2021-01-03 NOTE — CONS
DATE OF CONSULTATION:  01/03/2021



REFERRING PHYSICIAN:  Red Douglas MD



REASON FOR CONSULTATION:  Sepsis, antibiotic management.



HISTORY OF PRESENT ILLNESS:  A 65-year-old male with past medical history of

psoriatic arthritis, on methotrexate; history of Crohn's disease; history of

prostate and tongue cancer; GERD; hypertension, was diagnosed with COVID-19 on

12/23/2020 when he presented at [facility] because of shortness of

breath, loss of taste and loss of smell.  The patient also had cough.  The

patient is currently intubated.  He had a fever.  He was started on IV

vancomycin and ceftriaxone.  ID consultation is requested for antibiotic

management.



PAST MEDICAL HISTORY:  Hypertension; hyperlipidemia; GERD; inflammatory bowel

disease; history of psoriatic arthritis, on methotrexate; history of tongue

cancer and prostate cancer.



PAST SURGICAL HISTORY:  As per HPI.



FAMILY HISTORY:  Noncontributory.



SOCIAL HISTORY:  Nonsmoker, no alcohol.



ALLERGIES:  No known drug allergies.



CURRENT MEDICATIONS:  The patient was previously treated with azithromycin and

Zithromax, dexamethasone, ____, Diprivan, fentanyl, norepinephrine, Versed,

cetirizine, chlorhexidine, citalopram, cyanocobalamin, dexamethasone, docusate,

enoxaparin, labetalol, olanzapine, quetiapine.



PHYSICAL EXAMINATION:

VITAL SIGNS:  Temperature 99.4, pulse 63, respiratory rate 26, blood pressure

103/61, oxygen saturation 94% on 40% FiO2.

GENERAL:  Intubated, sedated.

HEENT:  Normocephalic, atraumatic.  Anicteric.  ETT and OGT tube in place.

NECK:  Supple.

LUNGS:  Decreased breath sounds at the bases.  No wheezing.

HEART:  S1, S2.

ABDOMEN:  Soft, nontender, nondistended.

EXTREMITIES:  No edema, no cyanosis.

GENITOURINARY:  Hart in place.

CENTRAL NERVOUS SYSTEM:  Intubated, sedated.

PSYCHIATRIC:  Unable to assess.

LINES:  Right upper extremity PICC line clean.



IMPRESSION:

1.  Febrile illness.

2.  Leukocytosis, on steroids.

3.  Sepsis.

4.  Acute hypoxic respiratory failure.

5.  COVID-19 infection.

6.  History of psoriasis.

7.  History of Crohn's.

8.  Immunosuppression.

9.  Anemia.

10.  History of tongue and prostate cancer.



RECOMMENDATIONS:

1.  Change ceftriaxone to cefepime.

2.  Discontinue vancomycin and start Zyvox.

3.  Follow up blood culture results.

4.  Continue supportive care.

5.  The patient remains on steroids.

6.  Critically ill.

7.  Prognosis is guarded.

Discussed with RN.



CCT time spent 35 minutes.



Thank you for allowing me to participate in this patient's care.  If you have

any questions, do not hesitate to contact me.

 



______________________________

NISA KNAPP MD



DR:  AYAH/brionna  JOB#:  [id number] / [id number]

DD:  01/03/2021 13:14  DT:  01/03/2021 15:27

SUDHEER

## 2021-01-04 VITALS — DIASTOLIC BLOOD PRESSURE: 60 MMHG | SYSTOLIC BLOOD PRESSURE: 106 MMHG

## 2021-01-04 VITALS — SYSTOLIC BLOOD PRESSURE: 100 MMHG | DIASTOLIC BLOOD PRESSURE: 60 MMHG

## 2021-01-04 VITALS — SYSTOLIC BLOOD PRESSURE: 135 MMHG | DIASTOLIC BLOOD PRESSURE: 64 MMHG

## 2021-01-04 VITALS — SYSTOLIC BLOOD PRESSURE: 112 MMHG | DIASTOLIC BLOOD PRESSURE: 57 MMHG

## 2021-01-04 VITALS — SYSTOLIC BLOOD PRESSURE: 140 MMHG | DIASTOLIC BLOOD PRESSURE: 77 MMHG

## 2021-01-04 VITALS — SYSTOLIC BLOOD PRESSURE: 117 MMHG | DIASTOLIC BLOOD PRESSURE: 74 MMHG

## 2021-01-04 VITALS — SYSTOLIC BLOOD PRESSURE: 163 MMHG | DIASTOLIC BLOOD PRESSURE: 79 MMHG

## 2021-01-04 VITALS — DIASTOLIC BLOOD PRESSURE: 82 MMHG | SYSTOLIC BLOOD PRESSURE: 179 MMHG

## 2021-01-04 VITALS — DIASTOLIC BLOOD PRESSURE: 66 MMHG | SYSTOLIC BLOOD PRESSURE: 111 MMHG

## 2021-01-04 VITALS — DIASTOLIC BLOOD PRESSURE: 54 MMHG | SYSTOLIC BLOOD PRESSURE: 109 MMHG

## 2021-01-04 VITALS — DIASTOLIC BLOOD PRESSURE: 63 MMHG | SYSTOLIC BLOOD PRESSURE: 125 MMHG

## 2021-01-04 VITALS — SYSTOLIC BLOOD PRESSURE: 101 MMHG | DIASTOLIC BLOOD PRESSURE: 62 MMHG

## 2021-01-04 VITALS — SYSTOLIC BLOOD PRESSURE: 140 MMHG | DIASTOLIC BLOOD PRESSURE: 71 MMHG

## 2021-01-04 VITALS — DIASTOLIC BLOOD PRESSURE: 91 MMHG | SYSTOLIC BLOOD PRESSURE: 133 MMHG

## 2021-01-04 VITALS — DIASTOLIC BLOOD PRESSURE: 46 MMHG | SYSTOLIC BLOOD PRESSURE: 97 MMHG

## 2021-01-04 VITALS — DIASTOLIC BLOOD PRESSURE: 70 MMHG | SYSTOLIC BLOOD PRESSURE: 119 MMHG

## 2021-01-04 VITALS — DIASTOLIC BLOOD PRESSURE: 60 MMHG | SYSTOLIC BLOOD PRESSURE: 104 MMHG

## 2021-01-04 VITALS — DIASTOLIC BLOOD PRESSURE: 65 MMHG | SYSTOLIC BLOOD PRESSURE: 115 MMHG

## 2021-01-04 VITALS — SYSTOLIC BLOOD PRESSURE: 111 MMHG | DIASTOLIC BLOOD PRESSURE: 60 MMHG

## 2021-01-04 VITALS — DIASTOLIC BLOOD PRESSURE: 56 MMHG | SYSTOLIC BLOOD PRESSURE: 102 MMHG

## 2021-01-04 VITALS — SYSTOLIC BLOOD PRESSURE: 119 MMHG | DIASTOLIC BLOOD PRESSURE: 65 MMHG

## 2021-01-04 VITALS — DIASTOLIC BLOOD PRESSURE: 63 MMHG | SYSTOLIC BLOOD PRESSURE: 106 MMHG

## 2021-01-04 VITALS — SYSTOLIC BLOOD PRESSURE: 96 MMHG | DIASTOLIC BLOOD PRESSURE: 51 MMHG

## 2021-01-04 VITALS — SYSTOLIC BLOOD PRESSURE: 103 MMHG | DIASTOLIC BLOOD PRESSURE: 58 MMHG

## 2021-01-04 VITALS — SYSTOLIC BLOOD PRESSURE: 96 MMHG | DIASTOLIC BLOOD PRESSURE: 55 MMHG

## 2021-01-04 VITALS — SYSTOLIC BLOOD PRESSURE: 146 MMHG | DIASTOLIC BLOOD PRESSURE: 75 MMHG

## 2021-01-04 VITALS — SYSTOLIC BLOOD PRESSURE: 104 MMHG | DIASTOLIC BLOOD PRESSURE: 57 MMHG

## 2021-01-04 VITALS — SYSTOLIC BLOOD PRESSURE: 101 MMHG | DIASTOLIC BLOOD PRESSURE: 59 MMHG

## 2021-01-04 LAB
BASE EXCESS ABG: -2 MMOL/L (ref -3–3)
HCO3 BLDA-SCNC: 24 MMOL/L (ref 21–28)
INSPIRATION SETTING TIME VENT: (no result)
PCO2 BLDA: 47 MMHG (ref 35–46)
PO2 BLDA: 79 MMHG (ref 65–108)
SAO2 % BLDA: 94 % (ref 92–99)

## 2021-01-04 RX ADMIN — BACITRACIN SCH MLS/HR: 5000 INJECTION, POWDER, FOR SOLUTION INTRAMUSCULAR at 11:38

## 2021-01-04 RX ADMIN — GLYCERIN, ISOLEUCINE, LEUCINE, LYSINE, METHIONINE, PHENYLALANINE, THREONINE, TRYPTOPHAN, VALINE, ALANINE, GLYCINE, ARGININE, HISTIDINE, PROLINE, SERINE, CYSTEINE, SODIUM ACETATE, MAGNESIUM ACETATE, CALCIUM ACETATE, SODIUM CHLORIDE, POTASSIUM CHLORIDE, PHOSPHORIC ACID, AND POTASSIUM METABISULFITE SCH MLS/HR
3; .21; .27; .22; .16; .17; .12; .046; .2; .21; .42; .29; .085; .34; .18; .014; .2; .054; .026; .12; .15; .041 INJECTION INTRAVENOUS at 01:13

## 2021-01-04 RX ADMIN — CHLORHEXIDINE GLUCONATE SCH ML: 1.2 RINSE ORAL at 08:48

## 2021-01-04 RX ADMIN — Medication PRN MLS/HR: at 22:59

## 2021-01-04 RX ADMIN — MORPHINE SULFATE SCH MG: 15 TABLET, EXTENDED RELEASE ORAL at 08:49

## 2021-01-04 RX ADMIN — DEXAMETHASONE SODIUM PHOSPHATE SCH MG: 4 INJECTION, SOLUTION INTRAMUSCULAR; INTRAVENOUS at 08:48

## 2021-01-04 RX ADMIN — ATORVASTATIN CALCIUM SCH MG: 40 TABLET, FILM COATED ORAL at 21:08

## 2021-01-04 RX ADMIN — CYANOCOBALAMIN TAB 1000 MCG SCH MCG: 1000 TAB at 08:48

## 2021-01-04 RX ADMIN — MIDAZOLAM PRN MLS/HR: 5 INJECTION, SOLUTION INTRAMUSCULAR; INTRAVENOUS at 09:55

## 2021-01-04 RX ADMIN — CEFEPIME SCH GM: 2 INJECTION, POWDER, FOR SOLUTION INTRAVENOUS at 08:47

## 2021-01-04 RX ADMIN — LINEZOLID SCH MG: 600 TABLET, FILM COATED ORAL at 21:08

## 2021-01-04 RX ADMIN — BACITRACIN SCH MLS/HR: 5000 INJECTION, POWDER, FOR SOLUTION INTRAMUSCULAR at 01:18

## 2021-01-04 RX ADMIN — ENOXAPARIN SODIUM SCH MG: 40 INJECTION SUBCUTANEOUS at 21:08

## 2021-01-04 RX ADMIN — Medication PRN MLS/HR: at 10:32

## 2021-01-04 RX ADMIN — GLYCERIN, ISOLEUCINE, LEUCINE, LYSINE, METHIONINE, PHENYLALANINE, THREONINE, TRYPTOPHAN, VALINE, ALANINE, GLYCINE, ARGININE, HISTIDINE, PROLINE, SERINE, CYSTEINE, SODIUM ACETATE, MAGNESIUM ACETATE, CALCIUM ACETATE, SODIUM CHLORIDE, POTASSIUM CHLORIDE, PHOSPHORIC ACID, AND POTASSIUM METABISULFITE SCH MLS/HR
3; .21; .27; .22; .16; .17; .12; .046; .2; .21; .42; .29; .085; .34; .18; .014; .2; .054; .026; .12; .15; .041 INJECTION INTRAVENOUS at 14:53

## 2021-01-04 RX ADMIN — PROPOFOL PRN MLS/HR: 10 INJECTION, EMULSION INTRAVENOUS at 15:30

## 2021-01-04 RX ADMIN — CITALOPRAM HYDROBROMIDE SCH MG: 20 TABLET ORAL at 08:48

## 2021-01-04 RX ADMIN — ENOXAPARIN SODIUM SCH MG: 40 INJECTION SUBCUTANEOUS at 08:49

## 2021-01-04 RX ADMIN — BACITRACIN SCH MLS/HR: 5000 INJECTION, POWDER, FOR SOLUTION INTRAMUSCULAR at 21:31

## 2021-01-04 RX ADMIN — MORPHINE SULFATE SCH MG: 15 TABLET, EXTENDED RELEASE ORAL at 21:08

## 2021-01-04 RX ADMIN — ZINC SULFATE CAP 220 MG (50 MG ELEMENTAL ZN) SCH MG: 220 (50 ZN) CAP at 08:48

## 2021-01-04 RX ADMIN — CETIRIZINE HYDROCHLORIDE SCH MG: 10 TABLET, FILM COATED ORAL at 08:48

## 2021-01-04 RX ADMIN — CEFEPIME SCH GM: 2 INJECTION, POWDER, FOR SOLUTION INTRAVENOUS at 21:08

## 2021-01-04 RX ADMIN — CHLORHEXIDINE GLUCONATE SCH ML: 1.2 RINSE ORAL at 21:07

## 2021-01-04 RX ADMIN — LINEZOLID SCH MG: 600 TABLET, FILM COATED ORAL at 09:59

## 2021-01-04 NOTE — PDOC
Infectious Disease Note


Subjective


Subjective


intubated on vent





ROS


ROS


no n/v/d/





Vital Sign


Vital Signs





Vital Signs








  Date Time  Temp Pulse Resp B/P (MAP) Pulse Ox O2 Delivery O2 Flow Rate FiO2


 


1/4/21 08:00 98.5 78 28 179/82 (114) 100 Ventilator  





 98.5       


 


1/3/21 13:48       40.0 











Physical Exam


PHYSICAL EXAM


GENERAL:  Intubated, sedated.


HEENT:  Normocephalic, atraumatic.  Anicteric.  ETT and OGT tube in place.


NECK:  Supple.


LUNGS:  Decreased breath sounds at the bases.  No wheezing.


HEART:  S1, S2.


ABDOMEN:  Soft, nontender, nondistended.


EXTREMITIES:  No edema, no cyanosis.


GENITOURINARY:  Hart in place.


CENTRAL NERVOUS SYSTEM:  Intubated, sedated.


PSYCHIATRIC:  Unable to assess.


LINES:  Right upper extremity PICC line clean.





Labs


Lab





Laboratory Tests








Test


 1/3/21


08:30


 


O2 Saturation 93 % (92-99) 


 


Arterial Blood pH


 7.34


(7.35-7.45)


 


Arterial Blood pCO2 at


Patient Temp 47 mmHg


(35-46)


 


Arterial Blood pO2 at Patient


Temp 72 mmHg


()


 


Arterial Blood HCO3


 25 mmol/L


(21-28)


 


Arterial Blood Base Excess


 -1 mmol/L


(-3-3)


 


FiO2 90 








Micro





Microbiology


1/2/21 Blood Culture - Preliminary, Resulted


         NO GROWTH AFTER 1 DAY





Objective


Assessment





IMPRESSION:


1.  Febrile illness.


2.  Leukocytosis, on steroids.


3.  Sepsis.


4.  Acute hypoxic respiratory failure.


5.  COVID-19 infection.


6.  History of psoriasis.


7.  History of Crohn's.


8.  Immunosuppression.


9.  Anemia.


10.  History of tongue and prostate cancer.





Plan


Plan of Care


1.  cefepime.


2.   Zyvox.


3.  Follow up blood culture results.


4.  Continue supportive care.


5.  The patient remains on steroids.


6.  Critically ill.


7.  Prognosis is guarded.


8.  Discussed with JOSE.











ANAT KNAPP MD                Jan 4, 2021 08:15

## 2021-01-04 NOTE — PDOC
[facility] PROGRESS NOTE


Date of Service


DOS:


DATE: 1/4/21 


TIME: 08:36





Chief Complaint


Chief Complaint


impression ====================





Acute hypoxic respiratory failure - no pulmonary history. With recent travel to 

and from [place] likely COVID 19 vs other atypical pneumonia. Cont empiric 

antibiotics, steroids. Consult Pulm. 


Improved aeration of the lungs with persistent moderate mixed interstitial and 

alveolar airspace disease.  1/02 


Pt. experienced hypoxia and respiratory decompensation overnight requiring 

intubation 


now on vent 100% PEEP of 10 


Hypotension as well now on pressors


acute respiratory distress syndrome. Consideration may be given for multifocal 

pneumonia versus pulmonary edema.


COVID 19 positive - with pneumonia - given transaminitis and late presentation 

with high O2 requirements no indication for remdesivir


RYDER - likely vasomotor nephropathy - no known renal history, will hydrate


Pneumonia - likely atypical, gram negative vs viral. will cover 


Prostate Ca - s/p resection at [facility] in [place]


Hypokalemia - likely nutritional or loss from diarrhea, will replace


Elevated troponin - likely from type II demand ischemia, will trend troponins, 

maintain telemetry


Crohn's - sees rheumatology regularly, Dr Fan in [place], on sulfasalazine


Psoriatic arthritis - on pain medications 30mg MS Contin BID, tramadol and 25mg 

Methotrexate weekly as DMARD per rheumatology, Dr. Fan. Hold MTX while 

inpatient


GERD - PPI


HLD - statin


HTN - Lisinopril and HCTZ on hold for RYDER


Anemia - likely of chronic disease, will monitor


Cardiomegaly - notable on CT chest - no known cardiac history, though his mother

did have CHF and CAD


Right renal mass - will need US for further assessment


on 100% FIO2 via Vapotherm , tolerating 


HYPOTENSION 


history of psoriatic arthritis.  He is likely immuno-compromised


as he has been on methotrexate. 








plan==============








PPX - lovenox


FULL CODE


Dispo - ICU 


IVF bolus for hypotension, vasopressors if needed to keep MAP above 65 


start continuous IVF 


Monitor for respiratory distress requiring intubation 


COVID-19 positive


Continue with empiric antibiotic


Continue with IV steroids with slow taper


Continue PPN for nutritional support  


DVT/GI PPX 


blood cultures


ID CONSULT


PROCALCITONIN











1-02-21 now on vent 100% PEEP of 10 














CC time 35 minutes








Hoa and son, Desmond, [phone]





History of Present Illness


History of Present Illness





PAST MEDICAL HISTORY:  Significant for:


1.  History of hypertension.


2.  Hyperlipidemia.


3.  GERD.


4.  Inflammatory bowel disease.


5.  History of psoriatic arthritis, on methotrexate.


6.  History of tongue cancer and prostate cancer, details are unknown.





PAST SURGICAL HISTORY:  He has history of surgery for prostate cancer and tongue


cancer,  details unknown.  Apparently, he had some tongue resection.





FAMILY HISTORY:  Mother with cancer and coronary artery disease.





SOCIAL HISTORY:  Reportedly, nonsmoker.




















Mr Nance is 66 yo male w/ past medical history of Crohn's, psoriatic arthritis,

GERD, HLD, HTN, prostate ca, tongue cancer who presents to the emergency 

department at [facility] concerned about shortness of breath that had been 

progressive. This started 12/18/2020 and has been getting worse since that time.

Of note patient also has loss of taste, loss of smell, cough, shortness of 

breath, fever. He recently went to [place] on a business trip and just 

returned 5 days prior to presentation.


Upon arrival to the emergency room patient had significant hypoxia with a pulse 

ox in the 40s.  He was placed initially on a nonrebreather and then placed on 

BiPAP.


He was also somewhat concerned about a Crohn's flareup.  Patient states he has 

been having abdominal pain with nausea and diarrhea.  These are not typical 

symptoms of his Crohn's.  He feels very tired and has body aches as well.


He was given steroids and Rocephin in ED. 


Chest radiograph concerning for bilateral interstitial infiltrates.


Labs significant for WBC 4.5, Hb 11.8, platelets 229, , K2.8, BUN 34, CR 

1.5, glucose 119, .9, albumin 3, , , lactic acid 1.6, D-

dimer 3.15, troponin 0.068.


ABG on 80% FiO2 7.53/42/64


CT negative for pulmonary embolism. Widespread airspace disease throughout both 

lungs, consistent with pneumonia. Mild mediastinal and bilateral hilar 

lymphadenopathy, likely reactive. Cardiomegaly with mild aneurysmal dilation of 

the ascending thoracic aorta. Indeterminate hyperdense nodule at the midpole 

right kidney. This could represent a solid mass or complex cyst.


Patient transferred to [facility] for pulmonary consultation and 

ICU admission.





12/24: Overnight BP improved with fluids. O2 saturations slightly increased. Did

not tolerate more than 1 hour off BIPAP even with non-rebreather O2 saturations 

were 92% at best. No pain currently, very slightly appetite.


12/25: COVID-Blanco returned positive.  Down to 65% on his BiPAP 18/8.  Was able to 

take it off for medications meal yesterday, but desaturates to 79% and recovers 

quickly.  Still very drowsy with little appetite.  Afebrile.


12/26: Afebrile with 100% O2 on BiPAP today.  Transition to Vapotherm with more 

ease of breathing.  He has almost no appetite.  Less drowsy today.  He is 

depressed mood but now has a cell phone  and is able to talk to his 

family.  No complaints of chest pain some abdominal pain no bowel movement as of

yet.  ABG 7.49/37/59


12-29 abg pending 





1/4: Patient seen in [place] [address] ICU.  Breathing on vent, FiO2 90%, PEEP 10.  He is

afebrile.  Continue treatment with cefepime, Zyvox, and steroids.  Present labs 

reviewed, discussed with RN.





Vitals/I&O


Vitals/I&O:





                                   Vital Signs








  Date Time  Temp Pulse Resp B/P (MAP) Pulse Ox O2 Delivery O2 Flow Rate FiO2


 


1/4/21 08:00 98.5 78 28 179/82 (114) 100 Ventilator  





 98.5       


 


1/3/21 13:48       40.0 














                                    I & O   


 


 1/3/21 1/3/21 1/4/21





 15:00 23:00 07:00


 


Intake Total  2598 ml 2333 ml


 


Output Total 450 ml 450 ml 350 ml


 


Balance -450 ml 2148 ml 1983 ml











Physical Exam


Physical Exam:


GENERAL:  Intubated, sedated.


HEENT:  Normocephalic, atraumatic.  Anicteric.  ETT and OGT tube in place.


NECK:  Supple.


LUNGS:  Decreased breath sounds at the bases.  No wheezing.


HEART:  S1, S2.


ABDOMEN:  Soft, nontender, nondistended.


EXTREMITIES:  No edema, no cyanosis.


GENITOURINARY:  Hart in place.


CENTRAL NERVOUS SYSTEM:  Intubated, sedated.


PSYCHIATRIC:  Unable to assess.


LINES:  Right upper extremity PICC line clean.


General:  No acute distress


Heart:  Regular rate


Lungs:  Clear


Abdomen:  Normal bowel sounds, Soft, No tenderness, No hepatosplenomegaly, No 

masses


Extremities:  No clubbing, No cyanosis, No edema, Normal pulses, No 

tenderness/swelling


Skin:  No rashes, No breakdown, No significant lesion





Comment


Review of Relevant


I have reviewed the following items jabier (where applicable) has been applied.


Medications:





Current Medications








 Medications


  (Trade)  Dose


 Ordered  Sig/Kayli


 Route


 PRN Reason  Start Time


 Stop Time Status Last Admin


Dose Admin


 


 Cefepime HCl


  (Maxipime)  2 gm  Q12HR


 IVP


   1/3/21 14:00


    1/3/21 21:45





 


 Linezolid


  (Zyvox)  600 mg  BID


 PO


   1/3/21 21:00


    1/3/21 21:46














Justifications for Admission


Other Justification














WALESKA BOB MD             Jan 4, 2021 08:39

## 2021-01-04 NOTE — RAD
EXAM: Chest, single view.



HISTORY: Covid pneumonia.



COMPARISON: 1/2/2021.



FINDINGS: A frontal view of the chest is obtained. There has been no significant change in multifocal
 partially consolidated infiltrate superimposed on diffuse interstitial infiltrate. There are trace p
leural effusions. There is no pneumothorax. There is a stable cardiac silhouette. There is an endotra
cheal tube within the mid trachea. There is a nasogastric tube within the stomach. There is a right P
ICC with the tip in the superior vena cava.



IMPRESSION: No significant change in multifocal consolidation superimposed on diffuse interstitial in
filtrate.



Electronically signed by: Es Gan MD (1/4/2021 8:58 AM) [id number]

## 2021-01-04 NOTE — PDOC
PULMONARY PROGRESS NOTES


DATE: 1/4/21 


TIME: 11:27


Subjective


Patient remains on ventilatory support, FiO2 90% and a PEEP of 10


off pressors 


afebrile 


Other concerns from nursing


Vitals





Vital Signs








  Date Time  Temp Pulse Resp B/P (MAP) Pulse Ox O2 Delivery O2 Flow Rate FiO2


 


1/4/21 10:32     99 Ventilator  


 


1/4/21 10:00  68 28 111/60 (77)    


 


1/4/21 08:00 98.5       





 98.5       


 


1/3/21 13:48       40.0 








Comments


Pt. seen during COVID-19 pandemic, visual exam preformed 


RRR


intubated 


no distress


no accessory muscle use 


No edema or rash


Lungs:  Clear


Labs





Laboratory Tests








Test


 1/2/21


17:40 1/3/21


06:10 1/3/21


08:30 1/4/21


08:25


 


Lactic Acid Level


 1.3 mmol/L


(0.4-2.0) 


 


 





 


Creatinine


 


 0.8 mg/dL


(0.7-1.3) 


 





 


Estimated GFR


(Cockcroft-Gault) 


 96.7 


 


 





 


O2 Saturation   93 % (92-99)  94 % (92-99) 


 


Arterial Blood pH


 


 


 7.34


(7.35-7.45) 7.33


(7.35-7.45)


 


Arterial Blood pCO2 at


Patient Temp 


 


 47 mmHg


(35-46) 47 mmHg


(35-46)


 


Arterial Blood pO2 at Patient


Temp 


 


 72 mmHg


() 79 mmHg


()


 


Arterial Blood HCO3


 


 


 25 mmol/L


(21-28) 24 mmol/L


(21-28)


 


Arterial Blood Base Excess


 


 


 -1 mmol/L


(-3-3) -2 mmol/L


(-3-3)


 


FiO2


 


 


 90 


 90% vent +10


peep








Laboratory Tests








Test


 1/4/21


08:25


 


O2 Saturation 94 % (92-99) 


 


Arterial Blood pH


 7.33


(7.35-7.45)


 


Arterial Blood pCO2 at


Patient Temp 47 mmHg


(35-46)


 


Arterial Blood pO2 at Patient


Temp 79 mmHg


()


 


Arterial Blood HCO3


 24 mmol/L


(21-28)


 


Arterial Blood Base Excess


 -2 mmol/L


(-3-3)


 


FiO2


 90% vent +10


peep








Medications





Active Scripts








 Medications  Dose


 Route/Sig


 Max Daily Dose Days Date Category


 


 Morphine Sulfate


 Er (Morphine


 Sulfate) 30 Mg


 Tablet.er  1 Tab


 PO BID


   12/23/20 Reported


 


 Tramadol Hcl 100


 Mg Tbmp.24hr  100 Mg


 PO PRN Q8HRS PRN


   12/23/20 Reported


 


 Ambien (Zolpidem


 Tartrate) 10 Mg


 Tablet  10 Mg


 PO PRN QHS PRN


   12/23/20 Reported


 


 Methotrexate


  (Methotrexate


 Sodium) 2.5 Mg


 Tablet  10 Tab


 PO WEEKLY


   12/23/20 Reported


 


 Lisinopril 10 Mg


 Tablet  1 Tab


 PO DAILY


   12/23/20 Reported


 


 Crestor


  (Rosuvastatin


 Calcium) 5 Mg


 Tablet  2 Tab


 PO DAILY


   12/23/20 Reported


 


 Hydrochlorothiazide


 Tablet


  (Hydrochlorothiazide)


 50 Mg Tablet  25 Mg


 PO DAILY


   12/23/20 Reported


 


 Escitalopram


 Oxalate 10 Mg


 Tablet  1 Tab


 PO DAILY


   12/23/20 Reported


 


 Sulfasalazine Dr


  (Sulfasalazine)


 500 Mg Tablet.dr  2 Tab


 PO TID


  30 12/23/20 Reported


 


 Colace (Docusate


 Sodium) 100 Mg


 Capsule  1 Cap


 PO DA


  30 12/23/20 Reported


 


 Acetaminophen


 Ext.release


  (Acetaminophen)


 650 Mg Tablet.er  650 Mg


 PO PRN Q8HRS PRN


   12/23/20 Reported


 


 Melatonin 5 Mg


 Capsule  1 Cap


 PO QHS


  30 12/23/20 Reported


 


 B-12


  (Cyanocobalamin


  (Vitamin B-12))


 500 Mcg Tablet  2 Tab


 PO DAILY


  30 12/23/20 Reported


 


 Vitamin D3


  (Cholecalciferol


  (Vitamin D3)) 50


 Mcg Capsule  50 Mcg


 PO DAILY


   12/23/20 Reported


 


 Aller-Sung


  (Cetirizine Hcl)


 10 Mg Tablet  1 Tab


 PO DAILY


  30 12/23/20 Reported








Comments


CXR 1/4/21


IMPRESSION: No significant change in multifocal consolidation superimposed on 

diffuse interstitial infiltrate.





Impression


.


IMPRESSION:


1.  Acute hypoxic respiratory failure secondary to highly suspected COVID-19


pneumonia and acute respiratory distress syndrome/acute lung injury.-- COVID-19 

positive 


2.  Abnormal CT chest with extensive widespread ground glass and alveolar and


interstitial infiltrates with reactive mild mediastinal/hilar adenopathy.  This


is likely related to COVID-19 pneumonia.


3.  Patient with history of psoriatic arthritis.  He is likely immunocompromise


as he has been on methotrexate.  covering for opportunistic infection with Abx


4.  History of tongue cancer with resection, details unknown.


5.  History of prostate cancer.


6.  History of Crohn's disease.





Plan


.


RECOMMENDATIONS:


Continue current vent support Fi02 90% and PEEP of 10


Follow chest x-ray/ABG-- no changes 


ensure adequate sedation 


Vasopressors ass needed to Keep MAP greater than 65 --off pressors 


COVID-19 positive


Continue ABX per ID, cefepime and zyvox 


D/C steroids has completed full 10 day course 


Continue PPN for nutritional support until tube feeding is at goal


DVT/GI PPX 





D/W RN and RT 


Critical care time [id number] 





Spoke with his Son Desmond, gave clinical update and all questions answered to the 

best of my ability 





PT. is FULL CODE











JUAN RHOADES MD               Jan 4, 2021 11:27

## 2021-01-05 VITALS — DIASTOLIC BLOOD PRESSURE: 67 MMHG | SYSTOLIC BLOOD PRESSURE: 94 MMHG

## 2021-01-05 VITALS — DIASTOLIC BLOOD PRESSURE: 97 MMHG | SYSTOLIC BLOOD PRESSURE: 135 MMHG

## 2021-01-05 VITALS — SYSTOLIC BLOOD PRESSURE: 117 MMHG | DIASTOLIC BLOOD PRESSURE: 62 MMHG

## 2021-01-05 VITALS — DIASTOLIC BLOOD PRESSURE: 56 MMHG | SYSTOLIC BLOOD PRESSURE: 106 MMHG

## 2021-01-05 VITALS — DIASTOLIC BLOOD PRESSURE: 63 MMHG | SYSTOLIC BLOOD PRESSURE: 108 MMHG

## 2021-01-05 VITALS — SYSTOLIC BLOOD PRESSURE: 87 MMHG | DIASTOLIC BLOOD PRESSURE: 54 MMHG

## 2021-01-05 VITALS — DIASTOLIC BLOOD PRESSURE: 60 MMHG | SYSTOLIC BLOOD PRESSURE: 110 MMHG

## 2021-01-05 VITALS — DIASTOLIC BLOOD PRESSURE: 58 MMHG | SYSTOLIC BLOOD PRESSURE: 96 MMHG

## 2021-01-05 VITALS — SYSTOLIC BLOOD PRESSURE: 89 MMHG | DIASTOLIC BLOOD PRESSURE: 53 MMHG

## 2021-01-05 VITALS — DIASTOLIC BLOOD PRESSURE: 65 MMHG | SYSTOLIC BLOOD PRESSURE: 106 MMHG

## 2021-01-05 VITALS — DIASTOLIC BLOOD PRESSURE: 60 MMHG | SYSTOLIC BLOOD PRESSURE: 106 MMHG

## 2021-01-05 VITALS — DIASTOLIC BLOOD PRESSURE: 68 MMHG | SYSTOLIC BLOOD PRESSURE: 131 MMHG

## 2021-01-05 VITALS — SYSTOLIC BLOOD PRESSURE: 111 MMHG | DIASTOLIC BLOOD PRESSURE: 56 MMHG

## 2021-01-05 VITALS — DIASTOLIC BLOOD PRESSURE: 60 MMHG | SYSTOLIC BLOOD PRESSURE: 94 MMHG

## 2021-01-05 VITALS — SYSTOLIC BLOOD PRESSURE: 95 MMHG | DIASTOLIC BLOOD PRESSURE: 57 MMHG

## 2021-01-05 VITALS — SYSTOLIC BLOOD PRESSURE: 96 MMHG | DIASTOLIC BLOOD PRESSURE: 58 MMHG

## 2021-01-05 VITALS — SYSTOLIC BLOOD PRESSURE: 102 MMHG | DIASTOLIC BLOOD PRESSURE: 48 MMHG

## 2021-01-05 VITALS — DIASTOLIC BLOOD PRESSURE: 67 MMHG | SYSTOLIC BLOOD PRESSURE: 113 MMHG

## 2021-01-05 VITALS — DIASTOLIC BLOOD PRESSURE: 61 MMHG | SYSTOLIC BLOOD PRESSURE: 105 MMHG

## 2021-01-05 VITALS — SYSTOLIC BLOOD PRESSURE: 105 MMHG | DIASTOLIC BLOOD PRESSURE: 64 MMHG

## 2021-01-05 VITALS — SYSTOLIC BLOOD PRESSURE: 157 MMHG | DIASTOLIC BLOOD PRESSURE: 61 MMHG

## 2021-01-05 VITALS — DIASTOLIC BLOOD PRESSURE: 61 MMHG | SYSTOLIC BLOOD PRESSURE: 111 MMHG

## 2021-01-05 VITALS — DIASTOLIC BLOOD PRESSURE: 65 MMHG | SYSTOLIC BLOOD PRESSURE: 140 MMHG

## 2021-01-05 VITALS — DIASTOLIC BLOOD PRESSURE: 70 MMHG | SYSTOLIC BLOOD PRESSURE: 131 MMHG

## 2021-01-05 LAB
BASE EXCESS ABG: -2 MMOL/L (ref -3–3)
HCO3 BLDA-SCNC: 24 MMOL/L (ref 21–28)
INSPIRATION SETTING TIME VENT: (no result)
PCO2 BLDA: 44 MMHG (ref 35–46)
PO2 BLDA: 76 MMHG (ref 65–108)
SAO2 % BLDA: 94 % (ref 92–99)

## 2021-01-05 RX ADMIN — MORPHINE SULFATE SCH MG: 15 TABLET, EXTENDED RELEASE ORAL at 08:36

## 2021-01-05 RX ADMIN — CETIRIZINE HYDROCHLORIDE SCH MG: 10 TABLET, FILM COATED ORAL at 08:36

## 2021-01-05 RX ADMIN — CEFEPIME SCH GM: 2 INJECTION, POWDER, FOR SOLUTION INTRAVENOUS at 21:11

## 2021-01-05 RX ADMIN — BACITRACIN SCH MLS/HR: 5000 INJECTION, POWDER, FOR SOLUTION INTRAMUSCULAR at 07:22

## 2021-01-05 RX ADMIN — ATORVASTATIN CALCIUM SCH MG: 40 TABLET, FILM COATED ORAL at 21:11

## 2021-01-05 RX ADMIN — MIDAZOLAM PRN MLS/HR: 5 INJECTION, SOLUTION INTRAMUSCULAR; INTRAVENOUS at 19:54

## 2021-01-05 RX ADMIN — BACITRACIN SCH MLS/HR: 5000 INJECTION, POWDER, FOR SOLUTION INTRAMUSCULAR at 18:00

## 2021-01-05 RX ADMIN — DEXAMETHASONE SODIUM PHOSPHATE SCH MG: 4 INJECTION, SOLUTION INTRAMUSCULAR; INTRAVENOUS at 08:36

## 2021-01-05 RX ADMIN — ENOXAPARIN SODIUM SCH MG: 40 INJECTION SUBCUTANEOUS at 08:36

## 2021-01-05 RX ADMIN — LINEZOLID SCH MG: 600 TABLET, FILM COATED ORAL at 21:10

## 2021-01-05 RX ADMIN — LINEZOLID SCH MG: 600 TABLET, FILM COATED ORAL at 08:36

## 2021-01-05 RX ADMIN — MIDAZOLAM PRN MLS/HR: 5 INJECTION, SOLUTION INTRAMUSCULAR; INTRAVENOUS at 07:23

## 2021-01-05 RX ADMIN — CEFEPIME SCH GM: 2 INJECTION, POWDER, FOR SOLUTION INTRAVENOUS at 08:38

## 2021-01-05 RX ADMIN — PROPOFOL PRN MLS/HR: 10 INJECTION, EMULSION INTRAVENOUS at 11:49

## 2021-01-05 RX ADMIN — MORPHINE SULFATE SCH MG: 15 TABLET, EXTENDED RELEASE ORAL at 21:11

## 2021-01-05 RX ADMIN — CYANOCOBALAMIN TAB 1000 MCG SCH MCG: 1000 TAB at 08:36

## 2021-01-05 RX ADMIN — CHLORHEXIDINE GLUCONATE SCH ML: 1.2 RINSE ORAL at 08:36

## 2021-01-05 RX ADMIN — Medication PRN MLS/HR: at 12:28

## 2021-01-05 RX ADMIN — ENOXAPARIN SODIUM SCH MG: 40 INJECTION SUBCUTANEOUS at 22:08

## 2021-01-05 RX ADMIN — CHLORHEXIDINE GLUCONATE SCH ML: 1.2 RINSE ORAL at 21:11

## 2021-01-05 RX ADMIN — ZINC SULFATE CAP 220 MG (50 MG ELEMENTAL ZN) SCH MG: 220 (50 ZN) CAP at 08:36

## 2021-01-05 RX ADMIN — CITALOPRAM HYDROBROMIDE SCH MG: 20 TABLET ORAL at 08:36

## 2021-01-05 NOTE — PDOC
Infectious Disease Note


Subjective


Subjective


intubated on vent





ROS


ROS


No nausea vomiting diarrhea fever





Vital Sign


Vital Signs





Vital Signs








  Date Time  Temp Pulse Resp B/P (MAP) Pulse Ox O2 Delivery O2 Flow Rate FiO2


 


1/5/21 07:00  63 28 102/48 (66) 100 Ventilator  


 


1/5/21 04:00 97.8       





 97.8       


 


1/5/21 01:08       40.0 











Physical Exam


PHYSICAL EXAM


GENERAL:  Intubated, sedated.


HEENT:  Normocephalic, atraumatic.  Anicteric.  ETT and OGT tube in place.


NECK:  Supple.


LUNGS:  Decreased breath sounds at the bases.  No wheezing.


HEART:  S1, S2.


ABDOMEN:  Soft, nontender, nondistended.


EXTREMITIES:  No edema, no cyanosis.


GENITOURINARY:  Hart in place.


CENTRAL NERVOUS SYSTEM:  Intubated, sedated.


PSYCHIATRIC:  Unable to assess.


LINES:  Right upper extremity PICC line clean.





Labs


Lab





Laboratory Tests








Test


 1/4/21


08:25


 


O2 Saturation 94 % (92-99) 


 


Arterial Blood pH


 7.33


(7.35-7.45)


 


Arterial Blood pCO2 at


Patient Temp 47 mmHg


(35-46)


 


Arterial Blood pO2 at Patient


Temp 79 mmHg


()


 


Arterial Blood HCO3


 24 mmol/L


(21-28)


 


Arterial Blood Base Excess


 -2 mmol/L


(-3-3)


 


FiO2


 90% vent +10


peep








Micro





Microbiology


1/2/21 Blood Culture - Preliminary, Resulted


         NO GROWTH AFTER 1 DAY





Objective


Assessment





IMPRESSION:


1.  Febrile illness.


2.  Leukocytosis, on steroids.


3.  Sepsis.


4.  Acute hypoxic respiratory failure.


5.  COVID-19 infection.


6.  History of psoriasis.


7.  History of Crohn's.


8.  Immunosuppression.


9.  Anemia.


10.  History of tongue and prostate cancer.





Plan


Plan of Care


1.  cefepime.


2.   Zyvox.


3.  Follow up blood culture results.


4.  Continue supportive care.


5.  The patient remains on steroids.


6.  Critically ill.


7.  Prognosis is guarded.


8.  Discussed with JOSE.











ANAT KNAPP MD                Jan 5, 2021 08:14

## 2021-01-05 NOTE — PDOC
[employer] PROGRESS NOTE


Date of Service


DOS:


DATE: 1/5/21 


TIME: 07:46





Chief Complaint


Chief Complaint


impression ====================





Acute hypoxic respiratory failure - no pulmonary history. With recent travel to 

and from [place] likely COVID 19 vs other atypical pneumonia. Cont empiric 

antibiotics, steroids. Consult Pulm. 


Improved aeration of the lungs with persistent moderate mixed interstitial and 

alveolar airspace disease.  1/02 


Pt. experienced hypoxia and respiratory decompensation overnight requiring 

intubation 


now on vent 100% PEEP of 10 


Hypotension as well now on pressors


acute respiratory distress syndrome. Consideration may be given for multifocal 

pneumonia versus pulmonary edema.


COVID 19 positive - with pneumonia - given transaminitis and late presentation 

with high O2 requirements no indication for remdesivir


RYDER - likely vasomotor nephropathy - no known renal history, will hydrate


Pneumonia - likely atypical, gram negative vs viral. will cover 


Prostate Ca - s/p resection at [facility] in [place]


Hypokalemia - likely nutritional or loss from diarrhea, will replace


Elevated troponin - likely from type II demand ischemia, will trend troponins, 

maintain telemetry


Crohn's - sees rheumatology regularly, Dr Fan in [place], on sulfasalazine


Psoriatic arthritis - on pain medications 30mg MS Contin BID, tramadol and 25mg 

Methotrexate weekly as DMARD per rheumatology, Dr. Fan. Hold MTX while 

inpatient


GERD - PPI


HLD - statin


HTN - Lisinopril and HCTZ on hold for RYDER


Anemia - likely of chronic disease, will monitor


Cardiomegaly - notable on CT chest - no known cardiac history, though his mother

did have CHF and CAD


Right renal mass - will need US for further assessment


on 100% FIO2 via Vapotherm , tolerating 


HYPOTENSION 


history of psoriatic arthritis.  He is likely immuno-compromised


as he has been on methotrexate. 








plan==============








PPX - lovenox


FULL CODE


Dispo - ICU 


IVF bolus for hypotension, vasopressors if needed to keep MAP above 65 


start continuous IVF 


Monitor for respiratory distress requiring intubation 


COVID-19 positive


Continue with empiric antibiotic


Continue with IV steroids with slow taper


Continue PPN for nutritional support  


DVT/GI PPX 


blood cultures


ID CONSULT


PROCALCITONIN











1-02-21 now on vent 100% PEEP of 10 














CC time 35 minutes








Hoa and son, Desmond, [phone]





History of Present Illness


History of Present Illness





PAST MEDICAL HISTORY:  Significant for:


1.  History of hypertension.


2.  Hyperlipidemia.


3.  GERD.


4.  Inflammatory bowel disease.


5.  History of psoriatic arthritis, on methotrexate.


6.  History of tongue cancer and prostate cancer, details are unknown.





PAST SURGICAL HISTORY:  He has history of surgery for prostate cancer and tongue


cancer,  details unknown.  Apparently, he had some tongue resection.





FAMILY HISTORY:  Mother with cancer and coronary artery disease.





SOCIAL HISTORY:  Reportedly, nonsmoker.




















Mr Nance is 64 yo male w/ past medical history of Crohn's, psoriatic arthritis,

GERD, HLD, HTN, prostate ca, tongue cancer who presents to the emergency 

department at [facility] concerned about shortness of breath that had been 

progressive. This started 12/18/2020 and has been getting worse since that time.

Of note patient also has loss of taste, loss of smell, cough, shortness of 

breath, fever. He recently went to [place] on a business trip and just 

returned 5 days prior to presentation.


Upon arrival to the emergency room patient had significant hypoxia with a pulse 

ox in the 40s.  He was placed initially on a nonrebreather and then placed on 

BiPAP.


He was also somewhat concerned about a Crohn's flareup.  Patient states he has 

been having abdominal pain with nausea and diarrhea.  These are not typical 

symptoms of his Crohn's.  He feels very tired and has body aches as well.


He was given steroids and Rocephin in ED. 


Chest radiograph concerning for bilateral interstitial infiltrates.


Labs significant for WBC 4.5, Hb 11.8, platelets 229, , K2.8, BUN 34, CR 

1.5, glucose 119, .9, albumin 3, , , lactic acid 1.6, D-

dimer 3.15, troponin 0.068.


ABG on 80% FiO2 7.53/42/64


CT negative for pulmonary embolism. Widespread airspace disease throughout both 

lungs, consistent with pneumonia. Mild mediastinal and bilateral hilar 

lymphadenopathy, likely reactive. Cardiomegaly with mild aneurysmal dilation of 

the ascending thoracic aorta. Indeterminate hyperdense nodule at the midpole 

right kidney. This could represent a solid mass or complex cyst.


Patient transferred to [facility] for pulmonary consultation and 

ICU admission.





12/24: Overnight BP improved with fluids. O2 saturations slightly increased. Did

not tolerate more than 1 hour off BIPAP even with non-rebreather O2 saturations 

were 92% at best. No pain currently, very slightly appetite.


12/25: COVID-19 returned positive.  Down to 65% on his BiPAP 18/8.  Was able to 

take it off for medications meal yesterday, but desaturates to 79% and recovers 

quickly.  Still very drowsy with little appetite.  Afebrile.


12/26: Afebrile with 100% O2 on BiPAP today.  Transition to Vapotherm with more 

ease of breathing.  He has almost no appetite.  Less drowsy today.  He is 

depressed mood but now has a cell phone  and is able to talk to his 

family.  No complaints of chest pain some abdominal pain no bowel movement as of

yet.  ABG 7.49/37/59


12-29 abg pending 





1/4: Patient seen in [place] [address] ICU.  Breathing on vent, FiO2 90%, PEEP 10.  He is

afebrile.  Continue treatment with cefepime, Zyvox, and steroids.  Present labs 

reviewed, discussed with RN.





1/5: Seen in [place] [address] ICU.  Still on vent, FiO2 90%, PEEP 10.  Afebrile.  

Continue supportive care and steroids.  Continue antibiotics cefepime and 

linezolid.  Discussed with RN.





Vitals/I&O


Vitals/I&O:





                                   Vital Signs








  Date Time  Temp Pulse Resp B/P (MAP) Pulse Ox O2 Delivery O2 Flow Rate FiO2


 


1/5/21 07:00  63 28 102/48 (66) 100 Ventilator  


 


1/5/21 04:00 97.8       





 97.8       


 


1/5/21 01:08       40.0 














                                    I & O   


 


 1/4/21 1/4/21 1/5/21





 15:00 23:00 07:00


 


Intake Total  2091 ml 2566 ml


 


Output Total 410 ml 365 ml 385 ml


 


Balance -410 ml 1726 ml 2181 ml











Physical Exam


Physical Exam:


GENERAL:  Intubated, sedated.


HEENT:  Normocephalic, atraumatic.  Anicteric.  ETT and OGT tube in place.


NECK:  Supple.


LUNGS:  Decreased breath sounds at the bases.  No wheezing.


HEART:  S1, S2.


ABDOMEN:  Soft, nontender, nondistended.


EXTREMITIES:  No edema, no cyanosis.


GENITOURINARY:  Hart in place.


CENTRAL NERVOUS SYSTEM:  Intubated, sedated.


PSYCHIATRIC:  Unable to assess.


LINES:  Right upper extremity PICC line clean.


General:  No acute distress


Heart:  Regular rate


Lungs:  Clear


Abdomen:  Normal bowel sounds, Soft, No tenderness, No hepatosplenomegaly, No 

masses


Extremities:  No clubbing, No cyanosis, No edema, Normal pulses, No 

tenderness/swelling


Skin:  No rashes, No breakdown, No significant lesion





Labs


Labs:





Laboratory Tests








Test


 1/4/21


08:25


 


O2 Saturation 94 % (92-99) 


 


Arterial Blood pH


 7.33


(7.35-7.45)


 


Arterial Blood pCO2 at


Patient Temp 47 mmHg


(35-46)


 


Arterial Blood pO2 at Patient


Temp 79 mmHg


()


 


Arterial Blood HCO3


 24 mmol/L


(21-28)


 


Arterial Blood Base Excess


 -2 mmol/L


(-3-3)


 


FiO2


 90% vent +10


peep











Comment


Review of Relevant


I have reviewed the following items jabier (where applicable) has been applied.


Medications:





Current Medications








 Medications


  (Trade)  Dose


 Ordered  Sig/Kayli


 Route


 PRN Reason  Start Time


 Stop Time Status Last Admin


Dose Admin


 


 Dexamethasone


 Sodium Phosphate


  (Decadron)  6 mg  DAILY


 IVP


   1/4/21 09:00


    1/4/21 08:48














Justifications for Admission


Other Justification














WALESKA BOB MD             Jan 5, 2021 07:48

## 2021-01-05 NOTE — PDOC
PULMONARY PROGRESS NOTES


DATE: 1/5/21 


TIME: 08:51


Subjective


Patient remains on ventilatory support, FiO2 90% and a PEEP of 10


Other concerns from nursing


Vitals





Vital Signs








  Date Time  Temp Pulse Resp B/P (MAP) Pulse Ox O2 Delivery O2 Flow Rate FiO2


 


1/5/21 07:00  63 28 102/48 (66) 100 Ventilator  


 


1/5/21 04:00 97.8       





 97.8       


 


1/5/21 01:08       40.0 








Comments


Pt. seen during COVID-19 pandemic, visual exam preformed 


RRR


intubated 


no distress


no accessory muscle use 


No edema or rash


Lungs:  Clear


Labs





Laboratory Tests








Test


 1/4/21


08:25


 


O2 Saturation 94 % (92-99) 


 


Arterial Blood pH


 7.33


(7.35-7.45)


 


Arterial Blood pCO2 at


Patient Temp 47 mmHg


(35-46)


 


Arterial Blood pO2 at Patient


Temp 79 mmHg


()


 


Arterial Blood HCO3


 24 mmol/L


(21-28)


 


Arterial Blood Base Excess


 -2 mmol/L


(-3-3)


 


FiO2


 90% vent +10


peep








Medications





Active Scripts








 Medications  Dose


 Route/Sig


 Max Daily Dose Days Date Category


 


 Morphine Sulfate


 Er (Morphine


 Sulfate) 30 Mg


 Tablet.er  1 Tab


 PO BID


   12/23/20 Reported


 


 Tramadol Hcl 100


 Mg Tbmp.24hr  100 Mg


 PO PRN Q8HRS PRN


   12/23/20 Reported


 


 Ambien (Zolpidem


 Tartrate) 10 Mg


 Tablet  10 Mg


 PO PRN QHS PRN


   12/23/20 Reported


 


 Methotrexate


  (Methotrexate


 Sodium) 2.5 Mg


 Tablet  10 Tab


 PO WEEKLY


   12/23/20 Reported


 


 Lisinopril 10 Mg


 Tablet  1 Tab


 PO DAILY


   12/23/20 Reported


 


 Crestor


  (Rosuvastatin


 Calcium) 5 Mg


 Tablet  2 Tab


 PO DAILY


   12/23/20 Reported


 


 Hydrochlorothiazide


 Tablet


  (Hydrochlorothiazide)


 50 Mg Tablet  25 Mg


 PO DAILY


   12/23/20 Reported


 


 Escitalopram


 Oxalate 10 Mg


 Tablet  1 Tab


 PO DAILY


   12/23/20 Reported


 


 Sulfasalazine Dr


  (Sulfasalazine)


 500 Mg Tablet.dr  2 Tab


 PO TID


  30 12/23/20 Reported


 


 Colace (Docusate


 Sodium) 100 Mg


 Capsule  1 Cap


 PO DA


  30 12/23/20 Reported


 


 Acetaminophen


 Ext.release


  (Acetaminophen)


 650 Mg Tablet.er  650 Mg


 PO PRN Q8HRS PRN


   12/23/20 Reported


 


 Melatonin 5 Mg


 Capsule  1 Cap


 PO QHS


  30 12/23/20 Reported


 


 B-12


  (Cyanocobalamin


  (Vitamin B-12))


 500 Mcg Tablet  2 Tab


 PO DAILY


  30 12/23/20 Reported


 


 Vitamin D3


  (Cholecalciferol


  (Vitamin D3)) 50


 Mcg Capsule  50 Mcg


 PO DAILY


   12/23/20 Reported


 


 Aller-Sung


  (Cetirizine Hcl)


 10 Mg Tablet  1 Tab


 PO DAILY


  30 12/23/20 Reported








Comments


CXR 1/4/21


IMPRESSION: No significant change in multifocal consolidation superimposed on 

diffuse interstitial infiltrate.





Impression


.


IMPRESSION:


1.  Acute hypoxic respiratory failure secondary to highly suspected COVID-19


pneumonia and acute respiratory distress syndrome/acute lung injury.-- COVID-19 

positive 


2.  Abnormal CT chest with extensive widespread ground glass and alveolar and


interstitial infiltrates with reactive mild mediastinal/hilar adenopathy.  This


is likely related to COVID-19 pneumonia.


3.  Patient with history of psoriatic arthritis.  He is likely immunocompromise


as he has been on methotrexate.  covering for opportunistic infection with Abx


4.  History of tongue cancer with resection, details unknown.


5.  History of prostate cancer.


6.  History of Crohn's disease.





Plan


.


RECOMMENDATIONS:


Continue current vent support Fi02 90% and PEEP of 10


Follow chest x-ray/ABG-- reduce Fi02 to 85 % today 


Ensure adequate sedation 


Vasopressors ass needed to Keep MAP greater than 65 --off pressors 


COVID-19 positive


Continue ABX per ID, cefepime and zyvox 


D/C steroids has completed full 10 day course-- no benefit in ongoing steroids 


Continue TF for nutritional support 


DVT/GI PPX 





D/W RN and RT 


Critical care time 0900-0930AM 








PT. is FULL CODE











ABI MENDEZ MD                  Jan 5, 2021 08:56

## 2021-01-06 VITALS — DIASTOLIC BLOOD PRESSURE: 59 MMHG | SYSTOLIC BLOOD PRESSURE: 105 MMHG

## 2021-01-06 VITALS — DIASTOLIC BLOOD PRESSURE: 90 MMHG | SYSTOLIC BLOOD PRESSURE: 164 MMHG

## 2021-01-06 VITALS — DIASTOLIC BLOOD PRESSURE: 72 MMHG | SYSTOLIC BLOOD PRESSURE: 150 MMHG

## 2021-01-06 VITALS — SYSTOLIC BLOOD PRESSURE: 117 MMHG | DIASTOLIC BLOOD PRESSURE: 62 MMHG

## 2021-01-06 VITALS — SYSTOLIC BLOOD PRESSURE: 111 MMHG | DIASTOLIC BLOOD PRESSURE: 63 MMHG

## 2021-01-06 VITALS — DIASTOLIC BLOOD PRESSURE: 62 MMHG | SYSTOLIC BLOOD PRESSURE: 99 MMHG

## 2021-01-06 VITALS — SYSTOLIC BLOOD PRESSURE: 98 MMHG | DIASTOLIC BLOOD PRESSURE: 57 MMHG

## 2021-01-06 VITALS — DIASTOLIC BLOOD PRESSURE: 61 MMHG | SYSTOLIC BLOOD PRESSURE: 102 MMHG

## 2021-01-06 VITALS — DIASTOLIC BLOOD PRESSURE: 92 MMHG | SYSTOLIC BLOOD PRESSURE: 176 MMHG

## 2021-01-06 VITALS — SYSTOLIC BLOOD PRESSURE: 96 MMHG | DIASTOLIC BLOOD PRESSURE: 57 MMHG

## 2021-01-06 VITALS — SYSTOLIC BLOOD PRESSURE: 136 MMHG | DIASTOLIC BLOOD PRESSURE: 75 MMHG

## 2021-01-06 VITALS — DIASTOLIC BLOOD PRESSURE: 51 MMHG | SYSTOLIC BLOOD PRESSURE: 82 MMHG

## 2021-01-06 VITALS — SYSTOLIC BLOOD PRESSURE: 89 MMHG | DIASTOLIC BLOOD PRESSURE: 54 MMHG

## 2021-01-06 VITALS — SYSTOLIC BLOOD PRESSURE: 130 MMHG | DIASTOLIC BLOOD PRESSURE: 71 MMHG

## 2021-01-06 VITALS — DIASTOLIC BLOOD PRESSURE: 59 MMHG | SYSTOLIC BLOOD PRESSURE: 104 MMHG

## 2021-01-06 VITALS — SYSTOLIC BLOOD PRESSURE: 84 MMHG | DIASTOLIC BLOOD PRESSURE: 52 MMHG

## 2021-01-06 VITALS — SYSTOLIC BLOOD PRESSURE: 145 MMHG | DIASTOLIC BLOOD PRESSURE: 73 MMHG

## 2021-01-06 VITALS — SYSTOLIC BLOOD PRESSURE: 93 MMHG | DIASTOLIC BLOOD PRESSURE: 57 MMHG

## 2021-01-06 VITALS — DIASTOLIC BLOOD PRESSURE: 77 MMHG | SYSTOLIC BLOOD PRESSURE: 131 MMHG

## 2021-01-06 VITALS — SYSTOLIC BLOOD PRESSURE: 99 MMHG | DIASTOLIC BLOOD PRESSURE: 55 MMHG

## 2021-01-06 VITALS — SYSTOLIC BLOOD PRESSURE: 103 MMHG | DIASTOLIC BLOOD PRESSURE: 64 MMHG

## 2021-01-06 VITALS — SYSTOLIC BLOOD PRESSURE: 109 MMHG | DIASTOLIC BLOOD PRESSURE: 67 MMHG

## 2021-01-06 VITALS — DIASTOLIC BLOOD PRESSURE: 56 MMHG | SYSTOLIC BLOOD PRESSURE: 93 MMHG

## 2021-01-06 VITALS — SYSTOLIC BLOOD PRESSURE: 147 MMHG | DIASTOLIC BLOOD PRESSURE: 68 MMHG

## 2021-01-06 LAB
ALBUMIN SERPL-MCNC: 1.6 G/DL (ref 3.4–5)
ALP SERPL-CCNC: 64 U/L (ref 46–116)
ALT SERPL-CCNC: 55 U/L (ref 16–63)
ANION GAP SERPL CALC-SCNC: 3 MMOL/L (ref 6–14)
AST SERPL-CCNC: 40 U/L (ref 15–37)
BASE EXCESS ABG: -1 MMOL/L (ref -3–3)
BASOPHILS # BLD AUTO: 0.1 X10^3/UL (ref 0–0.2)
BASOPHILS NFR BLD: 1 % (ref 0–3)
BILIRUB DIRECT SERPL-MCNC: 0.1 MG/DL (ref 0–0.2)
BILIRUB SERPL-MCNC: 0.2 MG/DL (ref 0.2–1)
BUN SERPL-MCNC: 23 MG/DL (ref 8–26)
CALCIUM SERPL-MCNC: 8 MG/DL (ref 8.5–10.1)
CHLORIDE SERPL-SCNC: 109 MMOL/L (ref 98–107)
CO2 SERPL-SCNC: 27 MMOL/L (ref 21–32)
CREAT SERPL-MCNC: 0.7 MG/DL (ref 0.7–1.3)
EOSINOPHIL NFR BLD: 0 % (ref 0–3)
EOSINOPHIL NFR BLD: 0 X10^3/UL (ref 0–0.7)
ERYTHROCYTE [DISTWIDTH] IN BLOOD BY AUTOMATED COUNT: 15.1 % (ref 11.5–14.5)
GFR SERPLBLD BASED ON 1.73 SQ M-ARVRAT: 112.8 ML/MIN
GLUCOSE SERPL-MCNC: 72 MG/DL (ref 70–99)
HCO3 BLDA-SCNC: 25 MMOL/L (ref 21–28)
HCT VFR BLD CALC: 23.7 % (ref 39–53)
HGB BLD-MCNC: 7.8 G/DL (ref 13–17.5)
INSPIRATION SETTING TIME VENT: 85
LYMPHOCYTES # BLD: 1.1 X10^3/UL (ref 1–4.8)
LYMPHOCYTES NFR BLD AUTO: 18 % (ref 24–48)
MCH RBC QN AUTO: 33 PG (ref 25–35)
MCHC RBC AUTO-ENTMCNC: 33 G/DL (ref 31–37)
MCV RBC AUTO: 99 FL (ref 79–100)
MONO #: 0.8 X10^3/UL (ref 0–1.1)
MONOCYTES NFR BLD: 14 % (ref 0–9)
NEUT #: 4.2 X10^3/UL (ref 1.8–7.7)
NEUTROPHILS NFR BLD AUTO: 67 % (ref 31–73)
PCO2 BLDA: 46 MMHG (ref 35–46)
PLATELET # BLD AUTO: 295 X10^3/UL (ref 140–400)
PO2 BLDA: 82 MMHG (ref 65–108)
POTASSIUM SERPL-SCNC: 4 MMOL/L (ref 3.5–5.1)
PROT SERPL-MCNC: 5.1 G/DL (ref 6.4–8.2)
RBC # BLD AUTO: 2.4 X10^6/UL (ref 4.3–5.7)
SAO2 % BLDA: 95 % (ref 92–99)
SODIUM SERPL-SCNC: 139 MMOL/L (ref 136–145)
WBC # BLD AUTO: 6.2 X10^3/UL (ref 4–11)

## 2021-01-06 RX ADMIN — PROPOFOL PRN MLS/HR: 10 INJECTION, EMULSION INTRAVENOUS at 02:46

## 2021-01-06 RX ADMIN — NYSTATIN SCH APP: 100000 POWDER TOPICAL at 21:00

## 2021-01-06 RX ADMIN — CEFEPIME SCH GM: 2 INJECTION, POWDER, FOR SOLUTION INTRAVENOUS at 20:38

## 2021-01-06 RX ADMIN — CEFEPIME SCH GM: 2 INJECTION, POWDER, FOR SOLUTION INTRAVENOUS at 08:33

## 2021-01-06 RX ADMIN — PROPOFOL PRN MLS/HR: 10 INJECTION, EMULSION INTRAVENOUS at 21:32

## 2021-01-06 RX ADMIN — ATORVASTATIN CALCIUM SCH MG: 40 TABLET, FILM COATED ORAL at 20:38

## 2021-01-06 RX ADMIN — Medication PRN MLS/HR: at 01:57

## 2021-01-06 RX ADMIN — LINEZOLID SCH MG: 600 TABLET, FILM COATED ORAL at 20:38

## 2021-01-06 RX ADMIN — MORPHINE SULFATE SCH MG: 15 TABLET, EXTENDED RELEASE ORAL at 08:34

## 2021-01-06 RX ADMIN — PROPOFOL PRN MLS/HR: 10 INJECTION, EMULSION INTRAVENOUS at 13:08

## 2021-01-06 RX ADMIN — LABETALOL HYDROCHLORIDE PRN MG: 5 INJECTION, SOLUTION INTRAVENOUS at 17:55

## 2021-01-06 RX ADMIN — BACITRACIN SCH MLS/HR: 5000 INJECTION, POWDER, FOR SOLUTION INTRAMUSCULAR at 14:03

## 2021-01-06 RX ADMIN — LINEZOLID SCH MG: 600 TABLET, FILM COATED ORAL at 08:34

## 2021-01-06 RX ADMIN — MIDAZOLAM PRN MLS/HR: 5 INJECTION, SOLUTION INTRAMUSCULAR; INTRAVENOUS at 21:32

## 2021-01-06 RX ADMIN — Medication PRN MLS/HR: at 15:09

## 2021-01-06 RX ADMIN — CYANOCOBALAMIN TAB 1000 MCG SCH MCG: 1000 TAB at 08:34

## 2021-01-06 RX ADMIN — ENOXAPARIN SODIUM SCH MG: 40 INJECTION SUBCUTANEOUS at 20:38

## 2021-01-06 RX ADMIN — CITALOPRAM HYDROBROMIDE SCH MG: 20 TABLET ORAL at 08:34

## 2021-01-06 RX ADMIN — MIDAZOLAM PRN MLS/HR: 5 INJECTION, SOLUTION INTRAMUSCULAR; INTRAVENOUS at 08:35

## 2021-01-06 RX ADMIN — BACITRACIN SCH MLS/HR: 5000 INJECTION, POWDER, FOR SOLUTION INTRAMUSCULAR at 02:46

## 2021-01-06 RX ADMIN — POLYETHYLENE GLYCOL 3350 SCH GM: 17 POWDER, FOR SOLUTION ORAL at 14:38

## 2021-01-06 RX ADMIN — CHLORHEXIDINE GLUCONATE SCH ML: 1.2 RINSE ORAL at 20:39

## 2021-01-06 RX ADMIN — NYSTATIN SCH APP: 100000 POWDER TOPICAL at 17:19

## 2021-01-06 RX ADMIN — CETIRIZINE HYDROCHLORIDE SCH MG: 10 TABLET, FILM COATED ORAL at 08:39

## 2021-01-06 RX ADMIN — ENOXAPARIN SODIUM SCH MG: 40 INJECTION SUBCUTANEOUS at 08:34

## 2021-01-06 RX ADMIN — ZINC SULFATE CAP 220 MG (50 MG ELEMENTAL ZN) SCH MG: 220 (50 ZN) CAP at 08:33

## 2021-01-06 RX ADMIN — CHLORHEXIDINE GLUCONATE SCH ML: 1.2 RINSE ORAL at 08:33

## 2021-01-06 NOTE — PDOC
Infectious Disease Note


Subjective


Subjective


intubated on vent





ROS


ROS


no n/v/d/





Vital Sign


Vital Signs





Vital Signs








  Date Time  Temp Pulse Resp B/P (MAP) Pulse Ox O2 Delivery O2 Flow Rate FiO2


 


1/6/21 07:00  56 28 104/59 (74) 98 Ventilator  


 


1/6/21 04:00 98.2       





 98.2       


 


1/6/21 01:57       40.0 











Physical Exam


PHYSICAL EXAM


GENERAL:  Intubated, sedated.


HEENT:  Normocephalic, atraumatic.  Anicteric.  ETT and OGT tube in place.


NECK:  Supple.


LUNGS:  Decreased breath sounds at the bases.  No wheezing.


HEART:  S1, S2.


ABDOMEN:  Soft, nontender, nondistended.


EXTREMITIES:  No edema, no cyanosis.


GENITOURINARY:  Hart in place.


CENTRAL NERVOUS SYSTEM:  Intubated, sedated.


PSYCHIATRIC:  Unable to assess.


LINES:  Right upper extremity PICC line clean.





Labs


Lab





Laboratory Tests








Test


 1/6/21


05:18


 


White Blood Count


 6.2 x10^3/uL


(4.0-11.0)


 


Red Blood Count


 2.40 x10^6/uL


(4.30-5.70)


 


Hemoglobin


 7.8 g/dL


(13.0-17.5)


 


Hematocrit


 23.7 %


(39.0-53.0)


 


Mean Corpuscular Volume 99 fL () 


 


Mean Corpuscular Hemoglobin 33 pg (25-35) 


 


Mean Corpuscular Hemoglobin


Concent 33 g/dL


(31-37)


 


Red Cell Distribution Width


 15.1 %


(11.5-14.5)


 


Platelet Count


 295 x10^3/uL


(140-400)


 


Neutrophils (%) (Auto) 67 % (31-73) 


 


Lymphocytes (%) (Auto) 18 % (24-48) 


 


Monocytes (%) (Auto) 14 % (0-9) 


 


Eosinophils (%) (Auto) 0 % (0-3) 


 


Basophils (%) (Auto) 1 % (0-3) 


 


Neutrophils # (Auto)


 4.2 x10^3/uL


(1.8-7.7)


 


Lymphocytes # (Auto)


 1.1 x10^3/uL


(1.0-4.8)


 


Monocytes # (Auto)


 0.8 x10^3/uL


(0.0-1.1)


 


Eosinophils # (Auto)


 0.0 x10^3/uL


(0.0-0.7)


 


Basophils # (Auto)


 0.1 x10^3/uL


(0.0-0.2)


 


Sodium Level


 139 mmol/L


(136-145)


 


Potassium Level


 4.0 mmol/L


(3.5-5.1)


 


Chloride Level


 109 mmol/L


()


 


Carbon Dioxide Level


 27 mmol/L


(21-32)


 


Anion Gap 3 (6-14) 


 


Blood Urea Nitrogen


 23 mg/dL


(8-26)


 


Creatinine


 0.7 mg/dL


(0.7-1.3)


 


Estimated GFR


(Cockcroft-Gault) 112.8 





 


Glucose Level


 72 mg/dL


(70-99)


 


Calcium Level


 8.0 mg/dL


(8.5-10.1)








Micro





Microbiology


1/2/21 Blood Culture - Preliminary, Resulted


         NO GROWTH AFTER 1 DAY





Objective


Assessment





IMPRESSION:


1.  Fever, improved


2.  Leukocytosis, on steroids.


3.  Sepsis.


4.  Acute hypoxic respiratory failure.


5.  COVID-19 infection.


6.  History of psoriasis.


7.  History of Crohn's.


8.  Immunosuppression.


9.  Anemia.


10.  History of tongue and prostate cancer.





Plan


Plan of Care


1.  cefepime.


2.   Zyvox.


3.  Follow up blood culture results.


4.  Continue supportive care.


5.  The patient remains on steroids.


6.  Critically ill.


7.  Prognosis is guarded.


8.  Discussed with JOSE.











ANAT KNAPP MD                Jan 6, 2021 08:17

## 2021-01-06 NOTE — PDOC
PULMONARY PROGRESS NOTES


DATE: 1/6/21 


TIME: 10:40


Subjective


Patient remains on ventilatory support, FiO2 80% and a PEEP of 10


Other concerns from nursing


Vitals





Vital Signs








  Date Time  Temp Pulse Resp B/P (MAP) Pulse Ox O2 Delivery O2 Flow Rate FiO2


 


1/6/21 10:00  55 28 89/54 (66) 98 Ventilator  


 


1/6/21 08:00 98.5       





 98.5       


 


1/6/21 01:57       40.0 








Comments


Pt. seen during COVID-19 pandemic, visual exam preformed 


RRR


intubated 


no distress


no accessory muscle use 


No edema or rash


Labs





Laboratory Tests








Test


 1/5/21


08:10 1/6/21


05:18 1/6/21


08:05


 


O2 Saturation 94 % (92-99)   95 % (92-99) 


 


Arterial Blood pH


 7.36


(7.35-7.45) 


 7.35


(7.35-7.45)


 


Arterial Blood pCO2 at


Patient Temp 44 mmHg


(35-46) 


 46 mmHg


(35-46)


 


Arterial Blood pO2 at Patient


Temp 76 mmHg


() 


 82 mmHg


()


 


Arterial Blood HCO3


 24 mmol/L


(21-28) 


 25 mmol/L


(21-28)


 


Arterial Blood Base Excess


 -2 mmol/L


(-3-3) 


 -1 mmol/L


(-3-3)


 


FiO2 90% vent   85 


 


White Blood Count


 


 6.2 x10^3/uL


(4.0-11.0) 





 


Red Blood Count


 


 2.40 x10^6/uL


(4.30-5.70) 





 


Hemoglobin


 


 7.8 g/dL


(13.0-17.5) 





 


Hematocrit


 


 23.7 %


(39.0-53.0) 





 


Mean Corpuscular Volume  99 fL ()  


 


Mean Corpuscular Hemoglobin  33 pg (25-35)  


 


Mean Corpuscular Hemoglobin


Concent 


 33 g/dL


(31-37) 





 


Red Cell Distribution Width


 


 15.1 %


(11.5-14.5) 





 


Platelet Count


 


 295 x10^3/uL


(140-400) 





 


Neutrophils (%) (Auto)  67 % (31-73)  


 


Lymphocytes (%) (Auto)  18 % (24-48)  


 


Monocytes (%) (Auto)  14 % (0-9)  


 


Eosinophils (%) (Auto)  0 % (0-3)  


 


Basophils (%) (Auto)  1 % (0-3)  


 


Neutrophils # (Auto)


 


 4.2 x10^3/uL


(1.8-7.7) 





 


Lymphocytes # (Auto)


 


 1.1 x10^3/uL


(1.0-4.8) 





 


Monocytes # (Auto)


 


 0.8 x10^3/uL


(0.0-1.1) 





 


Eosinophils # (Auto)


 


 0.0 x10^3/uL


(0.0-0.7) 





 


Basophils # (Auto)


 


 0.1 x10^3/uL


(0.0-0.2) 





 


Sodium Level


 


 139 mmol/L


(136-145) 





 


Potassium Level


 


 4.0 mmol/L


(3.5-5.1) 





 


Chloride Level


 


 109 mmol/L


() 





 


Carbon Dioxide Level


 


 27 mmol/L


(21-32) 





 


Anion Gap  3 (6-14)  


 


Blood Urea Nitrogen


 


 23 mg/dL


(8-26) 





 


Creatinine


 


 0.7 mg/dL


(0.7-1.3) 





 


Estimated GFR


(Cockcroft-Gault) 


 112.8 


 





 


Glucose Level


 


 72 mg/dL


(70-99) 





 


Calcium Level


 


 8.0 mg/dL


(8.5-10.1) 











Laboratory Tests








Test


 1/6/21


05:18 1/6/21


08:05


 


White Blood Count


 6.2 x10^3/uL


(4.0-11.0) 





 


Red Blood Count


 2.40 x10^6/uL


(4.30-5.70) 





 


Hemoglobin


 7.8 g/dL


(13.0-17.5) 





 


Hematocrit


 23.7 %


(39.0-53.0) 





 


Mean Corpuscular Volume 99 fL ()  


 


Mean Corpuscular Hemoglobin 33 pg (25-35)  


 


Mean Corpuscular Hemoglobin


Concent 33 g/dL


(31-37) 





 


Red Cell Distribution Width


 15.1 %


(11.5-14.5) 





 


Platelet Count


 295 x10^3/uL


(140-400) 





 


Neutrophils (%) (Auto) 67 % (31-73)  


 


Lymphocytes (%) (Auto) 18 % (24-48)  


 


Monocytes (%) (Auto) 14 % (0-9)  


 


Eosinophils (%) (Auto) 0 % (0-3)  


 


Basophils (%) (Auto) 1 % (0-3)  


 


Neutrophils # (Auto)


 4.2 x10^3/uL


(1.8-7.7) 





 


Lymphocytes # (Auto)


 1.1 x10^3/uL


(1.0-4.8) 





 


Monocytes # (Auto)


 0.8 x10^3/uL


(0.0-1.1) 





 


Eosinophils # (Auto)


 0.0 x10^3/uL


(0.0-0.7) 





 


Basophils # (Auto)


 0.1 x10^3/uL


(0.0-0.2) 





 


Sodium Level


 139 mmol/L


(136-145) 





 


Potassium Level


 4.0 mmol/L


(3.5-5.1) 





 


Chloride Level


 109 mmol/L


() 





 


Carbon Dioxide Level


 27 mmol/L


(21-32) 





 


Anion Gap 3 (6-14)  


 


Blood Urea Nitrogen


 23 mg/dL


(8-26) 





 


Creatinine


 0.7 mg/dL


(0.7-1.3) 





 


Estimated GFR


(Cockcroft-Gault) 112.8 


 





 


Glucose Level


 72 mg/dL


(70-99) 





 


Calcium Level


 8.0 mg/dL


(8.5-10.1) 





 


O2 Saturation  95 % (92-99) 


 


Arterial Blood pH


 


 7.35


(7.35-7.45)


 


Arterial Blood pCO2 at


Patient Temp 


 46 mmHg


(35-46)


 


Arterial Blood pO2 at Patient


Temp 


 82 mmHg


()


 


Arterial Blood HCO3


 


 25 mmol/L


(21-28)


 


Arterial Blood Base Excess


 


 -1 mmol/L


(-3-3)


 


FiO2  85 








Medications





Active Scripts








 Medications  Dose


 Route/Sig


 Max Daily Dose Days Date Category


 


 Morphine Sulfate


 Er (Morphine


 Sulfate) 30 Mg


 Tablet.er  1 Tab


 PO BID


   12/23/20 Reported


 


 Tramadol Hcl 100


 Mg Tbmp.24hr  100 Mg


 PO PRN Q8HRS PRN


   12/23/20 Reported


 


 Ambien (Zolpidem


 Tartrate) 10 Mg


 Tablet  10 Mg


 PO PRN QHS PRN


   12/23/20 Reported


 


 Methotrexate


  (Methotrexate


 Sodium) 2.5 Mg


 Tablet  10 Tab


 PO WEEKLY


   12/23/20 Reported


 


 Lisinopril 10 Mg


 Tablet  1 Tab


 PO DAILY


   12/23/20 Reported


 


 Crestor


  (Rosuvastatin


 Calcium) 5 Mg


 Tablet  2 Tab


 PO DAILY


   12/23/20 Reported


 


 Hydrochlorothiazide


 Tablet


  (Hydrochlorothiazide)


 50 Mg Tablet  25 Mg


 PO DAILY


   12/23/20 Reported


 


 Escitalopram


 Oxalate 10 Mg


 Tablet  1 Tab


 PO DAILY


   12/23/20 Reported


 


 Sulfasalazine Dr


  (Sulfasalazine)


 500 Mg Tablet.dr  2 Tab


 PO TID


  30 12/23/20 Reported


 


 Colace (Docusate


 Sodium) 100 Mg


 Capsule  1 Cap


 PO DA


  30 12/23/20 Reported


 


 Acetaminophen


 Ext.release


  (Acetaminophen)


 650 Mg Tablet.er  650 Mg


 PO PRN Q8HRS PRN


   12/23/20 Reported


 


 Melatonin 5 Mg


 Capsule  1 Cap


 PO QHS


  30 12/23/20 Reported


 


 B-12


  (Cyanocobalamin


  (Vitamin B-12))


 500 Mcg Tablet  2 Tab


 PO DAILY


  30 12/23/20 Reported


 


 Vitamin D3


  (Cholecalciferol


  (Vitamin D3)) 50


 Mcg Capsule  50 Mcg


 PO DAILY


   12/23/20 Reported


 


 Aller-Sung


  (Cetirizine Hcl)


 10 Mg Tablet  1 Tab


 PO DAILY


  30 12/23/20 Reported








Comments


CXR 1/4/21


IMPRESSION: No significant change in multifocal consolidation superimposed on 

diffuse interstitial infiltrate.





Impression


.


IMPRESSION:


1.  Acute hypoxic respiratory failure secondary to highly suspected COVID-19


pneumonia and acute respiratory distress syndrome/acute lung injury.-- COVID-19 

positive 


2.  Abnormal CT chest with extensive widespread ground glass and alveolar and


interstitial infiltrates with reactive mild mediastinal/hilar adenopathy.  This


is likely related to COVID-19 pneumonia.


3.  Patient with history of psoriatic arthritis.  He is likely immunocompromise


as he has been on methotrexate.  covering for opportunistic infection with Abx


4.  History of tongue cancer with resection, details unknown.


5.  History of prostate cancer.


6.  History of Crohn's disease.





Plan


.


RECOMMENDATIONS:


Continue current vent support Fi02 80% and PEEP of 10


Follow chest x-ray/ABG-- reduce Fi02 to 75 % today 


Ensure adequate sedation 


Vasopressors ass needed to Keep MAP greater than 65 --off pressors 


COVID-19 positive


Continue ABX per ID, cefepime and zyvox 


D/C steroids has completed full 10 day course-- no benefit in ongoing steroids 


Continue TF for nutritional support 


DVT/GI PPX 





D/W RN and RT 


Critical care time 0900-0930AM 








PT. is FULL CODE











ABI MENDEZ MD                  Jan 6, 2021 10:42

## 2021-01-06 NOTE — PDOC
[employer] PROGRESS NOTE


Date of Service


DOS:


DATE: 1/6/21 


TIME: 07:46





Chief Complaint


Chief Complaint


impression ====================





Acute hypoxic respiratory failure - no pulmonary history. With recent travel to 

and from [place] likely COVID 19 vs other atypical pneumonia. Cont empiric 

antibiotics, steroids. Consult Pulm. 


Improved aeration of the lungs with persistent moderate mixed interstitial and 

alveolar airspace disease.  1/02 


Pt. experienced hypoxia and respiratory decompensation overnight requiring 

intubation 


now on vent 100% PEEP of 10 


Hypotension as well now on pressors


acute respiratory distress syndrome. Consideration may be given for multifocal 

pneumonia versus pulmonary edema.


COVID 19 positive - with pneumonia - given transaminitis and late presentation 

with high O2 requirements no indication for remdesivir


RYDER - likely vasomotor nephropathy - no known renal history, will hydrate


Pneumonia - likely atypical, gram negative vs viral. will cover 


Prostate Ca - s/p resection at [facility] in [place]


Hypokalemia - likely nutritional or loss from diarrhea, will replace


Elevated troponin - likely from type II demand ischemia, will trend troponins, 

maintain telemetry


Crohn's - sees rheumatology regularly, Dr Fan in [place], on sulfasalazine


Psoriatic arthritis - on pain medications 30mg MS Contin BID, tramadol and 25mg 

Methotrexate weekly as DMARD per rheumatology, Dr. Fan. Hold MTX while 

inpatient


GERD - PPI


HLD - statin


HTN - Lisinopril and HCTZ on hold for RYDER


Anemia - likely of chronic disease, will monitor


Cardiomegaly - notable on CT chest - no known cardiac history, though his mother

did have CHF and CAD


Right renal mass - will need US for further assessment


on 100% FIO2 via Vapotherm , tolerating 


HYPOTENSION 


history of psoriatic arthritis.  He is likely immuno-compromised


as he has been on methotrexate. 








plan==============








PPX - lovenox


FULL CODE


Dispo - ICU 


IVF bolus for hypotension, vasopressors if needed to keep MAP above 65 


start continuous IVF 


Monitor for respiratory distress requiring intubation 


COVID-19 positive


Continue with empiric antibiotic


Continue with IV steroids with slow taper


Continue PPN for nutritional support  


DVT/GI PPX 


blood cultures


ID CONSULT


PROCALCITONIN











1-02-21 now on vent 100% PEEP of 10





History of Present Illness


History of Present Illness





Mr Nance is 64 yo male w/ past medical history of Crohn's, psoriatic arthritis,

GERD, HLD, HTN, prostate ca, tongue cancer who presents to the emergency 

department at [facility] concerned about shortness of breath that had been 

progressive. This started 12/18/2020 and has been getting worse since that time.

Of note patient also has loss of taste, loss of smell, cough, shortness of 

breath, fever. He recently went to [place] on a business trip and just 

returned 5 days prior to presentation.


Upon arrival to the emergency room patient had significant hypoxia with a pulse 

ox in the 40s.  He was placed initially on a nonrebreather and then placed on 

BiPAP.


He was also somewhat concerned about a Crohn's flareup.  Patient states he has 

been having abdominal pain with nausea and diarrhea.  These are not typical 

symptoms of his Crohn's.  He feels very tired and has body aches as well.


He was given steroids and Rocephin in ED. 


Chest radiograph concerning for bilateral interstitial infiltrates.


Labs significant for WBC 4.5, Hb 11.8, platelets 229, , K2.8, BUN 34, CR 

1.5, glucose 119, .9, albumin 3, , , lactic acid 1.6, D-

dimer 3.15, troponin 0.068.


ABG on 80% FiO2 7.53/42/64


CT negative for pulmonary embolism. Widespread airspace disease throughout both 

lungs, consistent with pneumonia. Mild mediastinal and bilateral hilar lymp

hadenopathy, likely reactive. Cardiomegaly with mild aneurysmal dilation of the 

ascending thoracic aorta. Indeterminate hyperdense nodule at the midpole right 

kidney. This could represent a solid mass or complex cyst.


Patient transferred to [facility] for pulmonary consultation and 

ICU admission.





12/24: Overnight BP improved with fluids. O2 saturations slightly increased. Did

not tolerate more than 1 hour off BIPAP even with non-rebreather O2 saturations 

were 92% at best. No pain currently, very slightly appetite.


12/25: COVID-19 returned positive.  Down to 65% on his BiPAP 18/8.  Was able to 

take it off for medications meal yesterday, but desaturates to 79% and recovers 

quickly.  Still very drowsy with little appetite.  Afebrile.


12/26: Afebrile with 100% O2 on BiPAP today.  Transition to Vapotherm with more 

ease of breathing.  He has almost no appetite.  Less drowsy today.  He is 

depressed mood but now has a cell phone  and is able to talk to his 

family.  No complaints of chest pain some abdominal pain no bowel movement as of

yet.  ABG 7.49/37/59


12-29 abg pending 





1/4: Patient seen in [place] [id number] ICU.  Breathing on vent, FiO2 90%, PEEP 10.  He is

afebrile.  Continue treatment with cefepime, Zyvox, and steroids.  Present labs 

reviewed, discussed with RN.


1/5: Seen in [place] [id number] ICU.  Still on vent, FiO2 90%, PEEP 10.  Afebrile.  

Continue supportive care and steroids.  Continue antibiotics cefepime and 

linezolid.  Discussed with RN.





1/6: Afebrile.  Intubated, FiO2 85%, PEEP 10.  Hemoglobin is dropping.  Continue

to monitor hemoglobin, transfuse as needed.  Continue supportive care, steroids.

 Antibiotics, per ID.  Discussed with RN.





Vitals/I&O


Vitals/I&O:





                                   Vital Signs








  Date Time  Temp Pulse Resp B/P (MAP) Pulse Ox O2 Delivery O2 Flow Rate FiO2


 


1/6/21 07:00  56 28 104/59 (74) 98 Ventilator  


 


1/6/21 04:00 98.2       





 98.2       


 


1/6/21 01:57       40.0 














                                    I & O   


 


 1/5/21 1/5/21 1/6/21





 15:00 23:00 07:00


 


Intake Total 200 ml 1913 ml 1453 ml


 


Output Total 480 ml 500 ml 270 ml


 


Balance -280 ml 1413 ml 1183 ml











Physical Exam


Physical Exam:


GENERAL:  Intubated, sedated.


HEENT:  Normocephalic, atraumatic.  Anicteric.  ETT and OGT tube in place.


NECK:  Supple.


LUNGS:  Decreased breath sounds at the bases.  No wheezing.


HEART:  S1, S2.


ABDOMEN:  Soft, nontender, nondistended.


EXTREMITIES:  No edema, no cyanosis.


GENITOURINARY:  Hart in place.


CENTRAL NERVOUS SYSTEM:  Intubated, sedated.


PSYCHIATRIC:  Unable to assess.


LINES:  Right upper extremity PICC line clean.


General:  No acute distress


Heart:  Regular rate


Lungs:  Clear


Abdomen:  Soft, No masses


Extremities:  No clubbing, No cyanosis, No edema, Normal pulses


Skin:  No rashes, No breakdown, No significant lesion





Labs


Labs:





Laboratory Tests








Test


 1/5/21


08:10 1/6/21


05:18


 


O2 Saturation 94 % (92-99)  


 


Arterial Blood pH


 7.36


(7.35-7.45) 





 


Arterial Blood pCO2 at


Patient Temp 44 mmHg


(35-46) 





 


Arterial Blood pO2 at Patient


Temp 76 mmHg


() 





 


Arterial Blood HCO3


 24 mmol/L


(21-28) 





 


Arterial Blood Base Excess


 -2 mmol/L


(-3-3) 





 


FiO2 90% vent  


 


White Blood Count


 


 6.2 x10^3/uL


(4.0-11.0)


 


Red Blood Count


 


 2.40 x10^6/uL


(4.30-5.70)


 


Hemoglobin


 


 7.8 g/dL


(13.0-17.5)


 


Hematocrit


 


 23.7 %


(39.0-53.0)


 


Mean Corpuscular Volume  99 fL () 


 


Mean Corpuscular Hemoglobin  33 pg (25-35) 


 


Mean Corpuscular Hemoglobin


Concent 


 33 g/dL


(31-37)


 


Red Cell Distribution Width


 


 15.1 %


(11.5-14.5)


 


Platelet Count


 


 295 x10^3/uL


(140-400)


 


Neutrophils (%) (Auto)  67 % (31-73) 


 


Lymphocytes (%) (Auto)  18 % (24-48) 


 


Monocytes (%) (Auto)  14 % (0-9) 


 


Eosinophils (%) (Auto)  0 % (0-3) 


 


Basophils (%) (Auto)  1 % (0-3) 


 


Neutrophils # (Auto)


 


 4.2 x10^3/uL


(1.8-7.7)


 


Lymphocytes # (Auto)


 


 1.1 x10^3/uL


(1.0-4.8)


 


Monocytes # (Auto)


 


 0.8 x10^3/uL


(0.0-1.1)


 


Eosinophils # (Auto)


 


 0.0 x10^3/uL


(0.0-0.7)


 


Basophils # (Auto)


 


 0.1 x10^3/uL


(0.0-0.2)


 


Sodium Level


 


 139 mmol/L


(136-145)


 


Potassium Level


 


 4.0 mmol/L


(3.5-5.1)


 


Chloride Level


 


 109 mmol/L


()


 


Carbon Dioxide Level


 


 27 mmol/L


(21-32)


 


Anion Gap  3 (6-14) 


 


Blood Urea Nitrogen


 


 23 mg/dL


(8-26)


 


Creatinine


 


 0.7 mg/dL


(0.7-1.3)


 


Estimated GFR


(Cockcroft-Gault) 


 112.8 





 


Glucose Level


 


 72 mg/dL


(70-99)


 


Calcium Level


 


 8.0 mg/dL


(8.5-10.1)











Comment


Review of Relevant


I have reviewed the following items jabier (where applicable) has been applied.





Justifications for Admission


Other Justification














WALESKA BOB MD             Jan 6, 2021 07:49

## 2021-01-07 VITALS — SYSTOLIC BLOOD PRESSURE: 103 MMHG | DIASTOLIC BLOOD PRESSURE: 61 MMHG

## 2021-01-07 VITALS — DIASTOLIC BLOOD PRESSURE: 91 MMHG | SYSTOLIC BLOOD PRESSURE: 232 MMHG

## 2021-01-07 VITALS — SYSTOLIC BLOOD PRESSURE: 121 MMHG | DIASTOLIC BLOOD PRESSURE: 68 MMHG

## 2021-01-07 VITALS — DIASTOLIC BLOOD PRESSURE: 63 MMHG | SYSTOLIC BLOOD PRESSURE: 119 MMHG

## 2021-01-07 VITALS — SYSTOLIC BLOOD PRESSURE: 131 MMHG | DIASTOLIC BLOOD PRESSURE: 78 MMHG

## 2021-01-07 VITALS — SYSTOLIC BLOOD PRESSURE: 125 MMHG | DIASTOLIC BLOOD PRESSURE: 68 MMHG

## 2021-01-07 VITALS — DIASTOLIC BLOOD PRESSURE: 72 MMHG | SYSTOLIC BLOOD PRESSURE: 151 MMHG

## 2021-01-07 VITALS — DIASTOLIC BLOOD PRESSURE: 64 MMHG | SYSTOLIC BLOOD PRESSURE: 126 MMHG

## 2021-01-07 VITALS — DIASTOLIC BLOOD PRESSURE: 63 MMHG | SYSTOLIC BLOOD PRESSURE: 117 MMHG

## 2021-01-07 VITALS — DIASTOLIC BLOOD PRESSURE: 77 MMHG | SYSTOLIC BLOOD PRESSURE: 139 MMHG

## 2021-01-07 VITALS — SYSTOLIC BLOOD PRESSURE: 124 MMHG | DIASTOLIC BLOOD PRESSURE: 59 MMHG

## 2021-01-07 VITALS — SYSTOLIC BLOOD PRESSURE: 118 MMHG | DIASTOLIC BLOOD PRESSURE: 59 MMHG

## 2021-01-07 VITALS — SYSTOLIC BLOOD PRESSURE: 134 MMHG | DIASTOLIC BLOOD PRESSURE: 67 MMHG

## 2021-01-07 VITALS — SYSTOLIC BLOOD PRESSURE: 131 MMHG | DIASTOLIC BLOOD PRESSURE: 72 MMHG

## 2021-01-07 VITALS — DIASTOLIC BLOOD PRESSURE: 67 MMHG | SYSTOLIC BLOOD PRESSURE: 127 MMHG

## 2021-01-07 VITALS — SYSTOLIC BLOOD PRESSURE: 187 MMHG | DIASTOLIC BLOOD PRESSURE: 85 MMHG

## 2021-01-07 VITALS — SYSTOLIC BLOOD PRESSURE: 121 MMHG | DIASTOLIC BLOOD PRESSURE: 72 MMHG

## 2021-01-07 VITALS — DIASTOLIC BLOOD PRESSURE: 64 MMHG | SYSTOLIC BLOOD PRESSURE: 131 MMHG

## 2021-01-07 VITALS — SYSTOLIC BLOOD PRESSURE: 175 MMHG | DIASTOLIC BLOOD PRESSURE: 80 MMHG

## 2021-01-07 VITALS — DIASTOLIC BLOOD PRESSURE: 63 MMHG | SYSTOLIC BLOOD PRESSURE: 116 MMHG

## 2021-01-07 VITALS — DIASTOLIC BLOOD PRESSURE: 60 MMHG | SYSTOLIC BLOOD PRESSURE: 122 MMHG

## 2021-01-07 VITALS — SYSTOLIC BLOOD PRESSURE: 111 MMHG | DIASTOLIC BLOOD PRESSURE: 59 MMHG

## 2021-01-07 VITALS — DIASTOLIC BLOOD PRESSURE: 63 MMHG | SYSTOLIC BLOOD PRESSURE: 140 MMHG

## 2021-01-07 VITALS — DIASTOLIC BLOOD PRESSURE: 65 MMHG | SYSTOLIC BLOOD PRESSURE: 143 MMHG

## 2021-01-07 LAB
ANION GAP SERPL CALC-SCNC: 2 MMOL/L (ref 6–14)
BASE EXCESS ABG: 0 MMOL/L (ref -3–3)
BASOPHILS # BLD AUTO: 0.1 X10^3/UL (ref 0–0.2)
BASOPHILS NFR BLD: 1 % (ref 0–3)
BUN SERPL-MCNC: 21 MG/DL (ref 8–26)
CALCIUM SERPL-MCNC: 8.1 MG/DL (ref 8.5–10.1)
CHLORIDE SERPL-SCNC: 110 MMOL/L (ref 98–107)
CO2 SERPL-SCNC: 27 MMOL/L (ref 21–32)
CREAT SERPL-MCNC: 0.7 MG/DL (ref 0.7–1.3)
EOSINOPHIL NFR BLD: 0.1 X10^3/UL (ref 0–0.7)
EOSINOPHIL NFR BLD: 1 % (ref 0–3)
ERYTHROCYTE [DISTWIDTH] IN BLOOD BY AUTOMATED COUNT: 15.4 % (ref 11.5–14.5)
GFR SERPLBLD BASED ON 1.73 SQ M-ARVRAT: 112.8 ML/MIN
GLUCOSE SERPL-MCNC: 80 MG/DL (ref 70–99)
HCO3 BLDA-SCNC: 26 MMOL/L (ref 21–28)
HCT VFR BLD CALC: 24.2 % (ref 39–53)
HGB BLD-MCNC: 8 G/DL (ref 13–17.5)
INSPIRATION SETTING TIME VENT: (no result)
LYMPHOCYTES # BLD: 1 X10^3/UL (ref 1–4.8)
LYMPHOCYTES NFR BLD AUTO: 16 % (ref 24–48)
MCH RBC QN AUTO: 32 PG (ref 25–35)
MCHC RBC AUTO-ENTMCNC: 33 G/DL (ref 31–37)
MCV RBC AUTO: 98 FL (ref 79–100)
MONO #: 0.8 X10^3/UL (ref 0–1.1)
MONOCYTES NFR BLD: 13 % (ref 0–9)
NEUT #: 4.3 X10^3/UL (ref 1.8–7.7)
NEUTROPHILS NFR BLD AUTO: 69 % (ref 31–73)
PCO2 BLDA: 48 MMHG (ref 35–46)
PLATELET # BLD AUTO: 324 X10^3/UL (ref 140–400)
PO2 BLDA: 85 MMHG (ref 65–108)
POTASSIUM SERPL-SCNC: 3.8 MMOL/L (ref 3.5–5.1)
RBC # BLD AUTO: 2.48 X10^6/UL (ref 4.3–5.7)
SAO2 % BLDA: 96 % (ref 92–99)
SODIUM SERPL-SCNC: 139 MMOL/L (ref 136–145)
WBC # BLD AUTO: 6.2 X10^3/UL (ref 4–11)

## 2021-01-07 RX ADMIN — ZINC SULFATE CAP 220 MG (50 MG ELEMENTAL ZN) SCH MG: 220 (50 ZN) CAP at 08:34

## 2021-01-07 RX ADMIN — ENOXAPARIN SODIUM SCH MG: 40 INJECTION SUBCUTANEOUS at 08:35

## 2021-01-07 RX ADMIN — CETIRIZINE HYDROCHLORIDE SCH MG: 10 TABLET, FILM COATED ORAL at 08:34

## 2021-01-07 RX ADMIN — CYANOCOBALAMIN TAB 1000 MCG SCH MCG: 1000 TAB at 08:34

## 2021-01-07 RX ADMIN — CITALOPRAM HYDROBROMIDE SCH MG: 20 TABLET ORAL at 08:34

## 2021-01-07 RX ADMIN — POLYETHYLENE GLYCOL 3350 SCH GM: 17 POWDER, FOR SOLUTION ORAL at 08:34

## 2021-01-07 RX ADMIN — MIDAZOLAM PRN MLS/HR: 5 INJECTION, SOLUTION INTRAMUSCULAR; INTRAVENOUS at 23:45

## 2021-01-07 RX ADMIN — BACITRACIN SCH MLS/HR: 5000 INJECTION, POWDER, FOR SOLUTION INTRAMUSCULAR at 13:12

## 2021-01-07 RX ADMIN — MIDAZOLAM PRN MLS/HR: 5 INJECTION, SOLUTION INTRAMUSCULAR; INTRAVENOUS at 11:13

## 2021-01-07 RX ADMIN — ENOXAPARIN SODIUM SCH MG: 40 INJECTION SUBCUTANEOUS at 20:42

## 2021-01-07 RX ADMIN — CEFEPIME SCH GM: 2 INJECTION, POWDER, FOR SOLUTION INTRAVENOUS at 09:10

## 2021-01-07 RX ADMIN — Medication PRN MLS/HR: at 04:11

## 2021-01-07 RX ADMIN — PROPOFOL PRN MLS/HR: 10 INJECTION, EMULSION INTRAVENOUS at 23:07

## 2021-01-07 RX ADMIN — NYSTATIN SCH APP: 100000 POWDER TOPICAL at 09:06

## 2021-01-07 RX ADMIN — CHLORHEXIDINE GLUCONATE SCH ML: 1.2 RINSE ORAL at 20:42

## 2021-01-07 RX ADMIN — PROPOFOL PRN MLS/HR: 10 INJECTION, EMULSION INTRAVENOUS at 08:35

## 2021-01-07 RX ADMIN — CEFEPIME SCH GM: 2 INJECTION, POWDER, FOR SOLUTION INTRAVENOUS at 20:42

## 2021-01-07 RX ADMIN — Medication PRN MLS/HR: at 17:23

## 2021-01-07 RX ADMIN — PROPOFOL PRN MLS/HR: 10 INJECTION, EMULSION INTRAVENOUS at 14:16

## 2021-01-07 RX ADMIN — NYSTATIN SCH APP: 100000 POWDER TOPICAL at 20:42

## 2021-01-07 RX ADMIN — BACITRACIN SCH MLS/HR: 5000 INJECTION, POWDER, FOR SOLUTION INTRAMUSCULAR at 00:03

## 2021-01-07 RX ADMIN — ATORVASTATIN CALCIUM SCH MG: 40 TABLET, FILM COATED ORAL at 20:42

## 2021-01-07 RX ADMIN — CHLORHEXIDINE GLUCONATE SCH ML: 1.2 RINSE ORAL at 08:34

## 2021-01-07 NOTE — PDOC
[employer] PROGRESS NOTE


Date of Service


DOS:


DATE: 1/7/21 


TIME: 09:37





Chief Complaint


Chief Complaint


impression ====================





Acute hypoxic respiratory failure - no pulmonary history. With recent travel to 

and from [place] likely COVID 19 vs other atypical pneumonia. Cont empiric 

antibiotics, steroids. Consult Pulm. 


Improved aeration of the lungs with persistent moderate mixed interstitial and 

alveolar airspace disease.  1/02 


Pt. experienced hypoxia and respiratory decompensation overnight requiring 

intubation 


now on vent 100% PEEP of 10 


Hypotension as well now on pressors


acute respiratory distress syndrome. Consideration may be given for multifocal 

pneumonia versus pulmonary edema.


COVID 19 positive - with pneumonia - given transaminitis and late presentation 

with high O2 requirements no indication for remdesivir


RYDER - likely vasomotor nephropathy - no known renal history, will hydrate


Pneumonia - likely atypical, gram negative vs viral. will cover 


Prostate Ca - s/p resection at [facility] in [place]


Hypokalemia - likely nutritional or loss from diarrhea, will replace


Elevated troponin - likely from type II demand ischemia, will trend troponins, 

maintain telemetry


Crohn's - sees rheumatology regularly, Dr Fan in [place], on sulfasalazine


Psoriatic arthritis - on pain medications 30mg MS Contin BID, tramadol and 25mg 

Methotrexate weekly as DMARD per rheumatology, Dr. Fan. Hold MTX while 

inpatient


GERD - PPI


HLD - statin


HTN - Lisinopril and HCTZ on hold for RYDER


Anemia - likely of chronic disease, will monitor


Cardiomegaly - notable on CT chest - no known cardiac history, though his mother

did have CHF and CAD


Right renal mass - will need US for further assessment


on 100% FIO2 via Vapotherm , tolerating 


HYPOTENSION 


history of psoriatic arthritis.  He is likely immuno-compromised


as he has been on methotrexate


Severe malnutrition








plan==============








PPX - lovenox


FULL CODE


Dispo - ICU 


IVF bolus for hypotension, vasopressors if needed to keep MAP above 65 


start continuous IVF 


Monitor for respiratory distress requiring intubation 


COVID-19 positive


Continue with empiric antibiotic


Continue with IV steroids with slow taper


Continue PPN for nutritional support  


DVT/GI PPX 


blood cultures


ID CONSULT


PROCALCITONIN











1-02-21 now on vent 100% PEEP of 10





History of Present Illness


History of Present Illness





Mr Nance is 66 yo male w/ past medical history of Crohn's, psoriatic arthritis,

GERD, HLD, HTN, prostate ca, tongue cancer who presents to the emergency 

department at [facility] concerned about shortness of breath that had been 

progressive. This started 12/18/2020 and has been getting worse since that time.

Of note patient also has loss of taste, loss of smell, cough, shortness of 

breath, fever. He recently went to [place] on a business trip and just 

returned 5 days prior to presentation.


Upon arrival to the emergency room patient had significant hypoxia with a pulse 

ox in the 40s.  He was placed initially on a nonrebreather and then placed on 

BiPAP.


He was also somewhat concerned about a Crohn's flareup.  Patient states he has 

been having abdominal pain with nausea and diarrhea.  These are not typical 

symptoms of his Crohn's.  He feels very tired and has body aches as well.


He was given steroids and Rocephin in ED. 


Chest radiograph concerning for bilateral interstitial infiltrates.


Labs significant for WBC 4.5, Hb 11.8, platelets 229, , K2.8, BUN 34, CR 

1.5, glucose 119, .9, albumin 3, , , lactic acid 1.6, D-

dimer 3.15, troponin 0.068.


ABG on 80% FiO2 7.53/42/64


CT negative for pulmonary embolism. Widespread airspace disease throughout both 

lungs, consistent with pneumonia. Mild mediastinal and bilateral hilar 

lymphadenopathy, likely reactive. Cardiomegaly with mild aneurysmal dilation of 

the ascending thoracic aorta. Indeterminate hyperdense nodule at the midpole 

right kidney. This could represent a solid mass or complex cyst.


Patient transferred to [facility] for pulmonary consultation and 

ICU admission.





12/24: Overnight BP improved with fluids. O2 saturations slightly increased. Did

not tolerate more than 1 hour off BIPAP even with non-rebreather O2 saturations 

were 92% at best. No pain currently, very slightly appetite.


12/25: COVID-19 returned positive.  Down to 65% on his BiPAP 18/8.  Was able to 

take it off for medications meal yesterday, but desaturates to 79% and recovers 

quickly.  Still very drowsy with little appetite.  Afebrile.


12/26: Afebrile with 100% O2 on BiPAP today.  Transition to Vapotherm with more 

ease of breathing.  He has almost no appetite.  Less drowsy today.  He is 

depressed mood but now has a cell phone  and is able to talk to his 

family.  No complaints of chest pain some abdominal pain no bowel movement as of

yet.  ABG 7.49/37/59


12-29 abg pending 





1/4: Patient seen in [place] [address] ICU.  Breathing on vent, FiO2 90%, PEEP 10.  He is

afebrile.  Continue treatment with cefepime, Zyvox, and steroids.  Present labs 

reviewed, discussed with RN.


1/5: Seen in [place] [address] ICU.  Still on vent, FiO2 90%, PEEP 10.  Afebrile.  

Continue supportive care and steroids.  Continue antibiotics cefepime and 

linezolid.  Discussed with RN.


1/6: Afebrile.  Intubated, FiO2 85%, PEEP 10.  Hemoglobin is dropping.  Continue

to monitor hemoglobin, transfuse as needed.  Continue supportive care, steroids.

 Antibiotics, per ID.  Discussed with RN.





1/7: Patient seen in [place] [address] ICU.  Remains on ventilator, FiO2 35%, PEEP 10.  

Hemoglobin appears to have stabilized continue steroids, antibiotics, supportive

care.  Discussed with RN





Vitals/I&O


Vitals/I&O:





                                   Vital Signs








  Date Time  Temp Pulse Resp B/P (MAP) Pulse Ox O2 Delivery O2 Flow Rate FiO2


 


1/7/21 09:00  70 28 143/65 (91) 99 Ventilator  


 


1/7/21 08:00 98.6       





 98.6       


 


1/7/21 04:41       40.0 














                                    I & O   


 


 1/6/21 1/6/21 1/7/21





 15:00 23:00 07:00


 


Intake Total 60 ml 1573 ml 1710 ml


 


Output Total 355 ml 825 ml 370 ml


 


Balance -295 ml 748 ml 1340 ml











Physical Exam


Physical Exam:


GENERAL:  Intubated, sedated.


HEENT:  Normocephalic, atraumatic.  Anicteric.  ETT and OGT tube in place.


NECK:  Supple.


LUNGS:  Decreased breath sounds at the bases.  No wheezing.


HEART:  S1, S2.


ABDOMEN:  Soft, nontender, nondistended.


EXTREMITIES:  No edema, no cyanosis.


GENITOURINARY:  Hart in place.


CENTRAL NERVOUS SYSTEM:  Intubated, sedated.


PSYCHIATRIC:  Unable to assess.


LINES:  Right upper extremity PICC line clean.


General:  No acute distress


Heart:  Regular rate


Abdomen:  Soft, No masses


Extremities:  No clubbing, No cyanosis, No edema, Normal pulses


Skin:  No rashes, No breakdown, No significant lesion





Labs


Labs:





Laboratory Tests








Test


 1/7/21


05:45 1/7/21


08:00


 


White Blood Count


 6.2 x10^3/uL


(4.0-11.0) 





 


Red Blood Count


 2.48 x10^6/uL


(4.30-5.70) 





 


Hemoglobin


 8.0 g/dL


(13.0-17.5) 





 


Hematocrit


 24.2 %


(39.0-53.0) 





 


Mean Corpuscular Volume 98 fL ()  


 


Mean Corpuscular Hemoglobin 32 pg (25-35)  


 


Mean Corpuscular Hemoglobin


Concent 33 g/dL


(31-37) 





 


Red Cell Distribution Width


 15.4 %


(11.5-14.5) 





 


Platelet Count


 324 x10^3/uL


(140-400) 





 


Neutrophils (%) (Auto) 69 % (31-73)  


 


Lymphocytes (%) (Auto) 16 % (24-48)  


 


Monocytes (%) (Auto) 13 % (0-9)  


 


Eosinophils (%) (Auto) 1 % (0-3)  


 


Basophils (%) (Auto) 1 % (0-3)  


 


Neutrophils # (Auto)


 4.3 x10^3/uL


(1.8-7.7) 





 


Lymphocytes # (Auto)


 1.0 x10^3/uL


(1.0-4.8) 





 


Monocytes # (Auto)


 0.8 x10^3/uL


(0.0-1.1) 





 


Eosinophils # (Auto)


 0.1 x10^3/uL


(0.0-0.7) 





 


Basophils # (Auto)


 0.1 x10^3/uL


(0.0-0.2) 





 


Sodium Level


 139 mmol/L


(136-145) 





 


Potassium Level


 3.8 mmol/L


(3.5-5.1) 





 


Chloride Level


 110 mmol/L


() 





 


Carbon Dioxide Level


 27 mmol/L


(21-32) 





 


Anion Gap 2 (6-14)  


 


Blood Urea Nitrogen


 21 mg/dL


(8-26) 





 


Creatinine


 0.7 mg/dL


(0.7-1.3) 





 


Estimated GFR


(Cockcroft-Gault) 112.8 


 





 


Glucose Level


 80 mg/dL


(70-99) 





 


Calcium Level


 8.1 mg/dL


(8.5-10.1) 





 


O2 Saturation  96 % (92-99) 


 


Arterial Blood pH


 


 7.35


(7.35-7.45)


 


Arterial Blood pCO2 at


Patient Temp 


 48 mmHg


(35-46)


 


Arterial Blood pO2 at Patient


Temp 


 85 mmHg


()


 


Arterial Blood HCO3


 


 26 mmol/L


(21-28)


 


Arterial Blood Base Excess


 


 0 mmol/L


(-3-3)


 


FiO2  75%+10 











Comment


Review of Relevant


I have reviewed the following items jabier (where applicable) has been applied.


Medications:





Current Medications








 Medications


  (Trade)  Dose


 Ordered  Sig/Kayli


 Route


 PRN Reason  Start Time


 Stop Time Status Last Admin


Dose Admin


 


 Polyethylene


 Glycol


  (miraLAX PACKET)  17 gm  DAILY


 PO


   1/6/21 14:30


    1/7/21 08:34





 


 Nystatin


  (Nystop)  1 lindsay  BID


 TP


   1/6/21 16:00


    1/7/21 09:06














Justifications for Admission


Other Justification














WALESKA BOB MD             Jan 7, 2021 09:42

## 2021-01-07 NOTE — PDOC
PULMONARY PROGRESS NOTES


DATE: 1/7/21 


TIME: 11:08


Subjective


Patient remains on ventilatory support, FiO2 75% and a PEEP of 10


Other concerns from nursing


Vitals





Vital Signs








  Date Time  Temp Pulse Resp B/P (MAP) Pulse Ox O2 Delivery O2 Flow Rate FiO2


 


1/7/21 10:00  66 28 117/62 (80) 99 Ventilator  


 


1/7/21 08:00 98.6       





 98.6       


 


1/7/21 04:41       40.0 








Comments


Pt. seen during COVID-19 pandemic, visual exam preformed 


RRR


intubated 


no distress


no accessory muscle use 


No edema or rash


Labs





Laboratory Tests








Test


 1/6/21


05:18 1/6/21


08:05 1/7/21


05:45 1/7/21


08:00


 


White Blood Count


 6.2 x10^3/uL


(4.0-11.0) 


 6.2 x10^3/uL


(4.0-11.0) 





 


Red Blood Count


 2.40 x10^6/uL


(4.30-5.70) 


 2.48 x10^6/uL


(4.30-5.70) 





 


Hemoglobin


 7.8 g/dL


(13.0-17.5) 


 8.0 g/dL


(13.0-17.5) 





 


Hematocrit


 23.7 %


(39.0-53.0) 


 24.2 %


(39.0-53.0) 





 


Mean Corpuscular Volume 99 fL ()   98 fL ()  


 


Mean Corpuscular Hemoglobin 33 pg (25-35)   32 pg (25-35)  


 


Mean Corpuscular Hemoglobin


Concent 33 g/dL


(31-37) 


 33 g/dL


(31-37) 





 


Red Cell Distribution Width


 15.1 %


(11.5-14.5) 


 15.4 %


(11.5-14.5) 





 


Platelet Count


 295 x10^3/uL


(140-400) 


 324 x10^3/uL


(140-400) 





 


Neutrophils (%) (Auto) 67 % (31-73)   69 % (31-73)  


 


Lymphocytes (%) (Auto) 18 % (24-48)   16 % (24-48)  


 


Monocytes (%) (Auto) 14 % (0-9)   13 % (0-9)  


 


Eosinophils (%) (Auto) 0 % (0-3)   1 % (0-3)  


 


Basophils (%) (Auto) 1 % (0-3)   1 % (0-3)  


 


Neutrophils # (Auto)


 4.2 x10^3/uL


(1.8-7.7) 


 4.3 x10^3/uL


(1.8-7.7) 





 


Lymphocytes # (Auto)


 1.1 x10^3/uL


(1.0-4.8) 


 1.0 x10^3/uL


(1.0-4.8) 





 


Monocytes # (Auto)


 0.8 x10^3/uL


(0.0-1.1) 


 0.8 x10^3/uL


(0.0-1.1) 





 


Eosinophils # (Auto)


 0.0 x10^3/uL


(0.0-0.7) 


 0.1 x10^3/uL


(0.0-0.7) 





 


Basophils # (Auto)


 0.1 x10^3/uL


(0.0-0.2) 


 0.1 x10^3/uL


(0.0-0.2) 





 


Sodium Level


 139 mmol/L


(136-145) 


 139 mmol/L


(136-145) 





 


Potassium Level


 4.0 mmol/L


(3.5-5.1) 


 3.8 mmol/L


(3.5-5.1) 





 


Chloride Level


 109 mmol/L


() 


 110 mmol/L


() 





 


Carbon Dioxide Level


 27 mmol/L


(21-32) 


 27 mmol/L


(21-32) 





 


Anion Gap 3 (6-14)   2 (6-14)  


 


Blood Urea Nitrogen


 23 mg/dL


(8-26) 


 21 mg/dL


(8-26) 





 


Creatinine


 0.7 mg/dL


(0.7-1.3) 


 0.7 mg/dL


(0.7-1.3) 





 


Estimated GFR


(Cockcroft-Gault) 112.8 


 


 112.8 


 





 


Glucose Level


 72 mg/dL


(70-99) 


 80 mg/dL


(70-99) 





 


Calcium Level


 8.0 mg/dL


(8.5-10.1) 


 8.1 mg/dL


(8.5-10.1) 





 


Total Bilirubin


 0.2 mg/dL


(0.2-1.0) 


 


 





 


Direct Bilirubin


 0.1 mg/dL


(0.0-0.2) 


 


 





 


Aspartate Amino Transf


(AST/SGOT) 40 U/L (15-37) 


 


 


 





 


Alanine Aminotransferase


(ALT/SGPT) 55 U/L (16-63) 


 


 


 





 


Alkaline Phosphatase


 64 U/L


() 


 


 





 


Total Protein


 5.1 g/dL


(6.4-8.2) 


 


 





 


Albumin


 1.6 g/dL


(3.4-5.0) 


 


 





 


O2 Saturation  95 % (92-99)   96 % (92-99) 


 


Arterial Blood pH


 


 7.35


(7.35-7.45) 


 7.35


(7.35-7.45)


 


Arterial Blood pCO2 at


Patient Temp 


 46 mmHg


(35-46) 


 48 mmHg


(35-46)


 


Arterial Blood pO2 at Patient


Temp 


 82 mmHg


() 


 85 mmHg


()


 


Arterial Blood HCO3


 


 25 mmol/L


(21-28) 


 26 mmol/L


(21-28)


 


Arterial Blood Base Excess


 


 -1 mmol/L


(-3-3) 


 0 mmol/L


(-3-3)


 


FiO2  85   75%+10 








Laboratory Tests








Test


 1/7/21


05:45 1/7/21


08:00


 


White Blood Count


 6.2 x10^3/uL


(4.0-11.0) 





 


Red Blood Count


 2.48 x10^6/uL


(4.30-5.70) 





 


Hemoglobin


 8.0 g/dL


(13.0-17.5) 





 


Hematocrit


 24.2 %


(39.0-53.0) 





 


Mean Corpuscular Volume 98 fL ()  


 


Mean Corpuscular Hemoglobin 32 pg (25-35)  


 


Mean Corpuscular Hemoglobin


Concent 33 g/dL


(31-37) 





 


Red Cell Distribution Width


 15.4 %


(11.5-14.5) 





 


Platelet Count


 324 x10^3/uL


(140-400) 





 


Neutrophils (%) (Auto) 69 % (31-73)  


 


Lymphocytes (%) (Auto) 16 % (24-48)  


 


Monocytes (%) (Auto) 13 % (0-9)  


 


Eosinophils (%) (Auto) 1 % (0-3)  


 


Basophils (%) (Auto) 1 % (0-3)  


 


Neutrophils # (Auto)


 4.3 x10^3/uL


(1.8-7.7) 





 


Lymphocytes # (Auto)


 1.0 x10^3/uL


(1.0-4.8) 





 


Monocytes # (Auto)


 0.8 x10^3/uL


(0.0-1.1) 





 


Eosinophils # (Auto)


 0.1 x10^3/uL


(0.0-0.7) 





 


Basophils # (Auto)


 0.1 x10^3/uL


(0.0-0.2) 





 


Sodium Level


 139 mmol/L


(136-145) 





 


Potassium Level


 3.8 mmol/L


(3.5-5.1) 





 


Chloride Level


 110 mmol/L


() 





 


Carbon Dioxide Level


 27 mmol/L


(21-32) 





 


Anion Gap 2 (6-14)  


 


Blood Urea Nitrogen


 21 mg/dL


(8-26) 





 


Creatinine


 0.7 mg/dL


(0.7-1.3) 





 


Estimated GFR


(Cockcroft-Gault) 112.8 


 





 


Glucose Level


 80 mg/dL


(70-99) 





 


Calcium Level


 8.1 mg/dL


(8.5-10.1) 





 


O2 Saturation  96 % (92-99) 


 


Arterial Blood pH


 


 7.35


(7.35-7.45)


 


Arterial Blood pCO2 at


Patient Temp 


 48 mmHg


(35-46)


 


Arterial Blood pO2 at Patient


Temp 


 85 mmHg


()


 


Arterial Blood HCO3


 


 26 mmol/L


(21-28)


 


Arterial Blood Base Excess


 


 0 mmol/L


(-3-3)


 


FiO2  75%+10 








Medications





Active Scripts








 Medications  Dose


 Route/Sig


 Max Daily Dose Days Date Category


 


 Morphine Sulfate


 Er (Morphine


 Sulfate) 30 Mg


 Tablet.er  1 Tab


 PO BID


   12/23/20 Reported


 


 Tramadol Hcl 100


 Mg Tbmp.24hr  100 Mg


 PO PRN Q8HRS PRN


   12/23/20 Reported


 


 Ambien (Zolpidem


 Tartrate) 10 Mg


 Tablet  10 Mg


 PO PRN QHS PRN


   12/23/20 Reported


 


 Methotrexate


  (Methotrexate


 Sodium) 2.5 Mg


 Tablet  10 Tab


 PO WEEKLY


   12/23/20 Reported


 


 Lisinopril 10 Mg


 Tablet  1 Tab


 PO DAILY


   12/23/20 Reported


 


 Crestor


  (Rosuvastatin


 Calcium) 5 Mg


 Tablet  2 Tab


 PO DAILY


   12/23/20 Reported


 


 Hydrochlorothiazide


 Tablet


  (Hydrochlorothiazide)


 50 Mg Tablet  25 Mg


 PO DAILY


   12/23/20 Reported


 


 Escitalopram


 Oxalate 10 Mg


 Tablet  1 Tab


 PO DAILY


   12/23/20 Reported


 


 Sulfasalazine Dr


  (Sulfasalazine)


 500 Mg Tablet.dr  2 Tab


 PO TID


  30 12/23/20 Reported


 


 Colace (Docusate


 Sodium) 100 Mg


 Capsule  1 Cap


 PO DA


  30 12/23/20 Reported


 


 Acetaminophen


 Ext.release


  (Acetaminophen)


 650 Mg Tablet.er  650 Mg


 PO PRN Q8HRS PRN


   12/23/20 Reported


 


 Melatonin 5 Mg


 Capsule  1 Cap


 PO QHS


  30 12/23/20 Reported


 


 B-12


  (Cyanocobalamin


  (Vitamin B-12))


 500 Mcg Tablet  2 Tab


 PO DAILY


  30 12/23/20 Reported


 


 Vitamin D3


  (Cholecalciferol


  (Vitamin D3)) 50


 Mcg Capsule  50 Mcg


 PO DAILY


   12/23/20 Reported


 


 Aller-Sung


  (Cetirizine Hcl)


 10 Mg Tablet  1 Tab


 PO DAILY


  30 12/23/20 Reported








Comments


CXR 1/4/21


IMPRESSION: No significant change in multifocal consolidation superimposed on 

diffuse interstitial infiltrate.





Impression


.


IMPRESSION:


1.  Acute hypoxic respiratory failure secondary to highly suspected COVID-19


pneumonia and acute respiratory distress syndrome/acute lung injury.-- COVID-19 

positive 


2.  Abnormal CT chest with extensive widespread ground glass and alveolar and


interstitial infiltrates with reactive mild mediastinal/hilar adenopathy.  This


is likely related to COVID-19 pneumonia.


3.  Patient with history of psoriatic arthritis.  He is likely immunocompromise


as he has been on methotrexate.  covering for opportunistic infection with Abx


4.  History of tongue cancer with resection, details unknown.


5.  History of prostate cancer.


6.  History of Crohn's disease.





Plan


.


RECOMMENDATIONS:


Continue current vent support. wean  Fi02 70% and PEEP of 9


Follow chest x-ray/ABG-- 


Ensure adequate sedation 


Vasopressors ass needed to Keep MAP greater than 65 --off pressors 


COVID-19 positive


Continue ABX per ID, cefepime and zyvox 


D/C steroids has completed full 10 day course-- no benefit in ongoing steroids 


Continue TF for nutritional support 


DVT/GI PPX 





D/W RN and RT 











PT. is FULL CODE











ABI MENDEZ MD                  Jan 7, 2021 11:10

## 2021-01-07 NOTE — PDOC
Infectious Disease Note


Subjective


Subjective


intubated on vent





ROS


ROS


no n/v/d/





Vital Sign


Vital Signs





Vital Signs








  Date Time  Temp Pulse Resp B/P (MAP) Pulse Ox O2 Delivery O2 Flow Rate FiO2


 


1/7/21 07:00  68 28 127/67 (87) 100 Ventilator  


 


1/7/21 04:41       40.0 


 


1/7/21 04:00 98.8       





 98.8       











Physical Exam


PHYSICAL EXAM


GENERAL:  Intubated, sedated.


HEENT:  Normocephalic, atraumatic.  Anicteric.  ETT and OGT tube in place.


NECK:  Supple.


LUNGS:  Decreased breath sounds at the bases.  No wheezing.


HEART:  S1, S2.


ABDOMEN:  Soft, nontender, nondistended.


EXTREMITIES:  No edema, no cyanosis.


GENITOURINARY:  Hart in place.


CENTRAL NERVOUS SYSTEM:  Intubated, sedated.


PSYCHIATRIC:  Unable to assess.


LINES:  Right upper extremity PICC line clean.





Labs


Lab





Laboratory Tests








Test


 1/6/21


08:05 1/7/21


05:45


 


O2 Saturation 95 % (92-99)  


 


Arterial Blood pH


 7.35


(7.35-7.45) 





 


Arterial Blood pCO2 at


Patient Temp 46 mmHg


(35-46) 





 


Arterial Blood pO2 at Patient


Temp 82 mmHg


() 





 


Arterial Blood HCO3


 25 mmol/L


(21-28) 





 


Arterial Blood Base Excess


 -1 mmol/L


(-3-3) 





 


FiO2 85  


 


White Blood Count


 


 6.2 x10^3/uL


(4.0-11.0)


 


Red Blood Count


 


 2.48 x10^6/uL


(4.30-5.70)


 


Hemoglobin


 


 8.0 g/dL


(13.0-17.5)


 


Hematocrit


 


 24.2 %


(39.0-53.0)


 


Mean Corpuscular Volume  98 fL () 


 


Mean Corpuscular Hemoglobin  32 pg (25-35) 


 


Mean Corpuscular Hemoglobin


Concent 


 33 g/dL


(31-37)


 


Red Cell Distribution Width


 


 15.4 %


(11.5-14.5)


 


Platelet Count


 


 324 x10^3/uL


(140-400)


 


Neutrophils (%) (Auto)  69 % (31-73) 


 


Lymphocytes (%) (Auto)  16 % (24-48) 


 


Monocytes (%) (Auto)  13 % (0-9) 


 


Eosinophils (%) (Auto)  1 % (0-3) 


 


Basophils (%) (Auto)  1 % (0-3) 


 


Neutrophils # (Auto)


 


 4.3 x10^3/uL


(1.8-7.7)


 


Lymphocytes # (Auto)


 


 1.0 x10^3/uL


(1.0-4.8)


 


Monocytes # (Auto)


 


 0.8 x10^3/uL


(0.0-1.1)


 


Eosinophils # (Auto)


 


 0.1 x10^3/uL


(0.0-0.7)


 


Basophils # (Auto)


 


 0.1 x10^3/uL


(0.0-0.2)


 


Sodium Level


 


 139 mmol/L


(136-145)


 


Potassium Level


 


 3.8 mmol/L


(3.5-5.1)


 


Chloride Level


 


 110 mmol/L


()


 


Carbon Dioxide Level


 


 27 mmol/L


(21-32)


 


Anion Gap  2 (6-14) 


 


Blood Urea Nitrogen


 


 21 mg/dL


(8-26)


 


Creatinine


 


 0.7 mg/dL


(0.7-1.3)


 


Estimated GFR


(Cockcroft-Gault) 


 112.8 





 


Glucose Level


 


 80 mg/dL


(70-99)


 


Calcium Level


 


 8.1 mg/dL


(8.5-10.1)








Micro





Microbiology


1/2/21 Blood Culture - Preliminary, Resulted


         NO GROWTH AFTER 1 DAY





Objective


Assessment





IMPRESSION:


1.  Fever, improved


2.  Leukocytosis, on steroids.


3.  Sepsis.


4.  Acute hypoxic respiratory failure.


5.  COVID-19 infection.


6.  History of psoriasis.


7.  History of Crohn's.


8.  Immunosuppression.


9.  Anemia.


10.  History of tongue and prostate cancer.





Plan


Plan of Care


1.  cefepime.


2.   dc Zyvox.


3.  Follow up blood culture results.


4.  Continue supportive care.


5.  The patient remains on steroids.


6.  Critically ill.


7.  Prognosis is guarded.


8.  Discussed with RN.











ANAT KNAPP MD                Jan 7, 2021 07:58

## 2021-01-08 VITALS — DIASTOLIC BLOOD PRESSURE: 54 MMHG | SYSTOLIC BLOOD PRESSURE: 116 MMHG

## 2021-01-08 VITALS — DIASTOLIC BLOOD PRESSURE: 57 MMHG | SYSTOLIC BLOOD PRESSURE: 102 MMHG

## 2021-01-08 VITALS — DIASTOLIC BLOOD PRESSURE: 59 MMHG | SYSTOLIC BLOOD PRESSURE: 109 MMHG

## 2021-01-08 VITALS — DIASTOLIC BLOOD PRESSURE: 62 MMHG | SYSTOLIC BLOOD PRESSURE: 97 MMHG

## 2021-01-08 VITALS — SYSTOLIC BLOOD PRESSURE: 107 MMHG | DIASTOLIC BLOOD PRESSURE: 61 MMHG

## 2021-01-08 VITALS — DIASTOLIC BLOOD PRESSURE: 54 MMHG | SYSTOLIC BLOOD PRESSURE: 110 MMHG

## 2021-01-08 VITALS — SYSTOLIC BLOOD PRESSURE: 107 MMHG | DIASTOLIC BLOOD PRESSURE: 55 MMHG

## 2021-01-08 VITALS — SYSTOLIC BLOOD PRESSURE: 116 MMHG | DIASTOLIC BLOOD PRESSURE: 70 MMHG

## 2021-01-08 VITALS — SYSTOLIC BLOOD PRESSURE: 93 MMHG | DIASTOLIC BLOOD PRESSURE: 57 MMHG

## 2021-01-08 VITALS — DIASTOLIC BLOOD PRESSURE: 59 MMHG | SYSTOLIC BLOOD PRESSURE: 98 MMHG

## 2021-01-08 VITALS — DIASTOLIC BLOOD PRESSURE: 67 MMHG | SYSTOLIC BLOOD PRESSURE: 109 MMHG

## 2021-01-08 VITALS — DIASTOLIC BLOOD PRESSURE: 62 MMHG | SYSTOLIC BLOOD PRESSURE: 105 MMHG

## 2021-01-08 VITALS — DIASTOLIC BLOOD PRESSURE: 79 MMHG | SYSTOLIC BLOOD PRESSURE: 144 MMHG

## 2021-01-08 VITALS — SYSTOLIC BLOOD PRESSURE: 135 MMHG | DIASTOLIC BLOOD PRESSURE: 77 MMHG

## 2021-01-08 VITALS — DIASTOLIC BLOOD PRESSURE: 67 MMHG | SYSTOLIC BLOOD PRESSURE: 121 MMHG

## 2021-01-08 VITALS — DIASTOLIC BLOOD PRESSURE: 60 MMHG | SYSTOLIC BLOOD PRESSURE: 100 MMHG

## 2021-01-08 VITALS — SYSTOLIC BLOOD PRESSURE: 113 MMHG | DIASTOLIC BLOOD PRESSURE: 65 MMHG

## 2021-01-08 VITALS — DIASTOLIC BLOOD PRESSURE: 61 MMHG | SYSTOLIC BLOOD PRESSURE: 116 MMHG

## 2021-01-08 VITALS — DIASTOLIC BLOOD PRESSURE: 63 MMHG | SYSTOLIC BLOOD PRESSURE: 108 MMHG

## 2021-01-08 VITALS — DIASTOLIC BLOOD PRESSURE: 64 MMHG | SYSTOLIC BLOOD PRESSURE: 102 MMHG

## 2021-01-08 VITALS — DIASTOLIC BLOOD PRESSURE: 76 MMHG | SYSTOLIC BLOOD PRESSURE: 131 MMHG

## 2021-01-08 VITALS — DIASTOLIC BLOOD PRESSURE: 48 MMHG | SYSTOLIC BLOOD PRESSURE: 83 MMHG

## 2021-01-08 VITALS — SYSTOLIC BLOOD PRESSURE: 97 MMHG | DIASTOLIC BLOOD PRESSURE: 61 MMHG

## 2021-01-08 LAB
% BANDS: 1 % (ref 0–9)
% LYMPHS: 15 % (ref 24–48)
% MONOS: 13 % (ref 0–10)
% MYELOS: 1 % (ref 0–0)
% SEGS: 66 % (ref 35–66)
ANION GAP SERPL CALC-SCNC: 5 MMOL/L (ref 6–14)
BASE EXCESS ABG: 1 MMOL/L (ref -3–3)
BASOPHILS # BLD AUTO: 0.1 X10^3/UL (ref 0–0.2)
BASOPHILS NFR BLD AUTO: 4 % (ref 0–3)
BASOPHILS NFR BLD: 1 % (ref 0–3)
BUN SERPL-MCNC: 22 MG/DL (ref 8–26)
CALCIUM SERPL-MCNC: 8.5 MG/DL (ref 8.5–10.1)
CHLORIDE SERPL-SCNC: 108 MMOL/L (ref 98–107)
CO2 SERPL-SCNC: 28 MMOL/L (ref 21–32)
CREAT SERPL-MCNC: 0.6 MG/DL (ref 0.7–1.3)
EOSINOPHIL NFR BLD: 0.1 X10^3/UL (ref 0–0.7)
EOSINOPHIL NFR BLD: 2 % (ref 0–3)
ERYTHROCYTE [DISTWIDTH] IN BLOOD BY AUTOMATED COUNT: 15.4 % (ref 11.5–14.5)
GFR SERPLBLD BASED ON 1.73 SQ M-ARVRAT: 134.8 ML/MIN
GLUCOSE SERPL-MCNC: 105 MG/DL (ref 70–99)
HCO3 BLDA-SCNC: 27 MMOL/L (ref 21–28)
HCT VFR BLD CALC: 25.1 % (ref 39–53)
HGB BLD-MCNC: 8.3 G/DL (ref 13–17.5)
LYMPHOCYTES # BLD: 1 X10^3/UL (ref 1–4.8)
LYMPHOCYTES NFR BLD AUTO: 17 % (ref 24–48)
MCH RBC QN AUTO: 33 PG (ref 25–35)
MCHC RBC AUTO-ENTMCNC: 33 G/DL (ref 31–37)
MCV RBC AUTO: 98 FL (ref 79–100)
MONO #: 0.7 X10^3/UL (ref 0–1.1)
MONOCYTES NFR BLD: 12 % (ref 0–9)
NEUT #: 4.2 X10^3/UL (ref 1.8–7.7)
NEUTROPHILS NFR BLD AUTO: 69 % (ref 31–73)
NRBC # BLD MANUAL: 1 10*3/UL
PCO2 BLDA: 53 MMHG (ref 35–46)
PLATELET # BLD AUTO: 326 X10^3/UL (ref 140–400)
PLATELET # BLD EST: ADEQUATE 10*3/UL
PO2 BLDA: 61 MMHG (ref 65–108)
POTASSIUM SERPL-SCNC: 3.5 MMOL/L (ref 3.5–5.1)
RBC # BLD AUTO: 2.56 X10^6/UL (ref 4.3–5.7)
SAO2 % BLDA: 89 % (ref 92–99)
SODIUM SERPL-SCNC: 141 MMOL/L (ref 136–145)
WBC # BLD AUTO: 6.1 X10^3/UL (ref 4–11)

## 2021-01-08 RX ADMIN — Medication PRN MLS/HR: at 06:38

## 2021-01-08 RX ADMIN — CEFEPIME SCH GM: 2 INJECTION, POWDER, FOR SOLUTION INTRAVENOUS at 08:00

## 2021-01-08 RX ADMIN — POLYETHYLENE GLYCOL 3350 SCH GM: 17 POWDER, FOR SOLUTION ORAL at 07:59

## 2021-01-08 RX ADMIN — CHLORHEXIDINE GLUCONATE SCH ML: 1.2 RINSE ORAL at 20:31

## 2021-01-08 RX ADMIN — FAMOTIDINE SCH MG: 10 INJECTION, SOLUTION INTRAVENOUS at 20:32

## 2021-01-08 RX ADMIN — ZINC SULFATE CAP 220 MG (50 MG ELEMENTAL ZN) SCH MG: 220 (50 ZN) CAP at 08:00

## 2021-01-08 RX ADMIN — CEFEPIME SCH GM: 2 INJECTION, POWDER, FOR SOLUTION INTRAVENOUS at 20:38

## 2021-01-08 RX ADMIN — NYSTATIN SCH APP: 100000 POWDER TOPICAL at 20:33

## 2021-01-08 RX ADMIN — ATORVASTATIN CALCIUM SCH MG: 40 TABLET, FILM COATED ORAL at 20:32

## 2021-01-08 RX ADMIN — PROPOFOL PRN MLS/HR: 10 INJECTION, EMULSION INTRAVENOUS at 12:09

## 2021-01-08 RX ADMIN — CHLORHEXIDINE GLUCONATE SCH ML: 1.2 RINSE ORAL at 07:59

## 2021-01-08 RX ADMIN — Medication PRN MLS/HR: at 20:01

## 2021-01-08 RX ADMIN — CYANOCOBALAMIN TAB 1000 MCG SCH MCG: 1000 TAB at 08:00

## 2021-01-08 RX ADMIN — MIDAZOLAM PRN MLS/HR: 5 INJECTION, SOLUTION INTRAMUSCULAR; INTRAVENOUS at 23:29

## 2021-01-08 RX ADMIN — CITALOPRAM HYDROBROMIDE SCH MG: 20 TABLET ORAL at 08:00

## 2021-01-08 RX ADMIN — MIDAZOLAM PRN MLS/HR: 5 INJECTION, SOLUTION INTRAMUSCULAR; INTRAVENOUS at 10:17

## 2021-01-08 RX ADMIN — PROPOFOL PRN MLS/HR: 10 INJECTION, EMULSION INTRAVENOUS at 19:03

## 2021-01-08 RX ADMIN — NYSTATIN SCH APP: 100000 POWDER TOPICAL at 08:00

## 2021-01-08 RX ADMIN — PROPOFOL PRN MLS/HR: 10 INJECTION, EMULSION INTRAVENOUS at 03:56

## 2021-01-08 RX ADMIN — CETIRIZINE HYDROCHLORIDE SCH MG: 10 TABLET, FILM COATED ORAL at 07:59

## 2021-01-08 RX ADMIN — ENOXAPARIN SODIUM SCH MG: 40 INJECTION SUBCUTANEOUS at 20:32

## 2021-01-08 RX ADMIN — ENOXAPARIN SODIUM SCH MG: 40 INJECTION SUBCUTANEOUS at 08:00

## 2021-01-08 NOTE — PDOC
PULMONARY PROGRESS NOTES


DATE: 1/8/21 


TIME: 09:01


Subjective


Patient remains on ventilatory support, FiO2 65% and a PEEP of 9


afebrile 


Other concerns from nursing


Vitals





Vital Signs








  Date Time  Temp Pulse Resp B/P (MAP) Pulse Ox O2 Delivery O2 Flow Rate FiO2


 


1/8/21 07:08     97 Ventilator  


 


1/8/21 07:00  64 28 107/61 (76)    


 


1/8/21 06:38       40.0 


 


1/8/21 05:00 98.9       





 98.9       








Comments


Pt. seen during COVID-19 pandemic, visual exam preformed 


RRR


intubated 


no distress


no accessory muscle use 


No edema or rash


Labs





Laboratory Tests








Test


 1/7/21


05:45 1/7/21


08:00 1/8/21


05:40 1/8/21


08:40


 


White Blood Count


 6.2 x10^3/uL


(4.0-11.0) 


 6.1 x10^3/uL


(4.0-11.0) 





 


Red Blood Count


 2.48 x10^6/uL


(4.30-5.70) 


 2.56 x10^6/uL


(4.30-5.70) 





 


Hemoglobin


 8.0 g/dL


(13.0-17.5) 


 8.3 g/dL


(13.0-17.5) 





 


Hematocrit


 24.2 %


(39.0-53.0) 


 25.1 %


(39.0-53.0) 





 


Mean Corpuscular Volume 98 fL ()   98 fL ()  


 


Mean Corpuscular Hemoglobin 32 pg (25-35)   33 pg (25-35)  


 


Mean Corpuscular Hemoglobin


Concent 33 g/dL


(31-37) 


 33 g/dL


(31-37) 





 


Red Cell Distribution Width


 15.4 %


(11.5-14.5) 


 15.4 %


(11.5-14.5) 





 


Platelet Count


 324 x10^3/uL


(140-400) 


 326 x10^3/uL


(140-400) 





 


Neutrophils (%) (Auto) 69 % (31-73)   69 % (31-73)  


 


Lymphocytes (%) (Auto) 16 % (24-48)   17 % (24-48)  


 


Monocytes (%) (Auto) 13 % (0-9)   12 % (0-9)  


 


Eosinophils (%) (Auto) 1 % (0-3)   2 % (0-3)  


 


Basophils (%) (Auto) 1 % (0-3)   1 % (0-3)  


 


Neutrophils # (Auto)


 4.3 x10^3/uL


(1.8-7.7) 


 4.2 x10^3/uL


(1.8-7.7) 





 


Lymphocytes # (Auto)


 1.0 x10^3/uL


(1.0-4.8) 


 1.0 x10^3/uL


(1.0-4.8) 





 


Monocytes # (Auto)


 0.8 x10^3/uL


(0.0-1.1) 


 0.7 x10^3/uL


(0.0-1.1) 





 


Eosinophils # (Auto)


 0.1 x10^3/uL


(0.0-0.7) 


 0.1 x10^3/uL


(0.0-0.7) 





 


Basophils # (Auto)


 0.1 x10^3/uL


(0.0-0.2) 


 0.1 x10^3/uL


(0.0-0.2) 





 


Sodium Level


 139 mmol/L


(136-145) 


 141 mmol/L


(136-145) 





 


Potassium Level


 3.8 mmol/L


(3.5-5.1) 


 3.5 mmol/L


(3.5-5.1) 





 


Chloride Level


 110 mmol/L


() 


 108 mmol/L


() 





 


Carbon Dioxide Level


 27 mmol/L


(21-32) 


 28 mmol/L


(21-32) 





 


Anion Gap 2 (6-14)   5 (6-14)  


 


Blood Urea Nitrogen


 21 mg/dL


(8-26) 


 22 mg/dL


(8-26) 





 


Creatinine


 0.7 mg/dL


(0.7-1.3) 


 0.6 mg/dL


(0.7-1.3) 





 


Estimated GFR


(Cockcroft-Gault) 112.8 


 


 134.8 


 





 


Glucose Level


 80 mg/dL


(70-99) 


 105 mg/dL


(70-99) 





 


Calcium Level


 8.1 mg/dL


(8.5-10.1) 


 8.5 mg/dL


(8.5-10.1) 





 


O2 Saturation  96 % (92-99)   89 % (92-99) 


 


Arterial Blood pH


 


 7.35


(7.35-7.45) 


 7.33


(7.35-7.45)


 


Arterial Blood pCO2 at


Patient Temp 


 48 mmHg


(35-46) 


 53 mmHg


(35-46)


 


Arterial Blood pO2 at Patient


Temp 


 85 mmHg


() 


 61 mmHg


()


 


Arterial Blood HCO3


 


 26 mmol/L


(21-28) 


 27 mmol/L


(21-28)


 


Arterial Blood Base Excess


 


 0 mmol/L


(-3-3) 


 1 mmol/L


(-3-3)


 


FiO2  75%+10   65% 


 


Segmented Neutrophils %   66 % (35-66)  


 


Band Neutrophils %   1 % (0-9)  


 


Lymphocytes %   15 % (24-48)  


 


Monocytes %   13 % (0-10)  


 


Basophils %   4 % (0-3)  


 


Myelocytes %   1 % (0-0)  


 


Nucleated Red Blood Cells   1  


 


Platelet Estimate


 


 


 Adequate


(ADEQUATE) 











Laboratory Tests








Test


 1/8/21


05:40 1/8/21


08:40


 


White Blood Count


 6.1 x10^3/uL


(4.0-11.0) 





 


Red Blood Count


 2.56 x10^6/uL


(4.30-5.70) 





 


Hemoglobin


 8.3 g/dL


(13.0-17.5) 





 


Hematocrit


 25.1 %


(39.0-53.0) 





 


Mean Corpuscular Volume 98 fL ()  


 


Mean Corpuscular Hemoglobin 33 pg (25-35)  


 


Mean Corpuscular Hemoglobin


Concent 33 g/dL


(31-37) 





 


Red Cell Distribution Width


 15.4 %


(11.5-14.5) 





 


Platelet Count


 326 x10^3/uL


(140-400) 





 


Neutrophils (%) (Auto) 69 % (31-73)  


 


Lymphocytes (%) (Auto) 17 % (24-48)  


 


Monocytes (%) (Auto) 12 % (0-9)  


 


Eosinophils (%) (Auto) 2 % (0-3)  


 


Basophils (%) (Auto) 1 % (0-3)  


 


Neutrophils # (Auto)


 4.2 x10^3/uL


(1.8-7.7) 





 


Lymphocytes # (Auto)


 1.0 x10^3/uL


(1.0-4.8) 





 


Monocytes # (Auto)


 0.7 x10^3/uL


(0.0-1.1) 





 


Eosinophils # (Auto)


 0.1 x10^3/uL


(0.0-0.7) 





 


Basophils # (Auto)


 0.1 x10^3/uL


(0.0-0.2) 





 


Segmented Neutrophils % 66 % (35-66)  


 


Band Neutrophils % 1 % (0-9)  


 


Lymphocytes % 15 % (24-48)  


 


Monocytes % 13 % (0-10)  


 


Basophils % 4 % (0-3)  


 


Myelocytes % 1 % (0-0)  


 


Nucleated Red Blood Cells 1  


 


Platelet Estimate


 Adequate


(ADEQUATE) 





 


Sodium Level


 141 mmol/L


(136-145) 





 


Potassium Level


 3.5 mmol/L


(3.5-5.1) 





 


Chloride Level


 108 mmol/L


() 





 


Carbon Dioxide Level


 28 mmol/L


(21-32) 





 


Anion Gap 5 (6-14)  


 


Blood Urea Nitrogen


 22 mg/dL


(8-26) 





 


Creatinine


 0.6 mg/dL


(0.7-1.3) 





 


Estimated GFR


(Cockcroft-Gault) 134.8 


 





 


Glucose Level


 105 mg/dL


(70-99) 





 


Calcium Level


 8.5 mg/dL


(8.5-10.1) 





 


O2 Saturation  89 % (92-99) 


 


Arterial Blood pH


 


 7.33


(7.35-7.45)


 


Arterial Blood pCO2 at


Patient Temp 


 53 mmHg


(35-46)


 


Arterial Blood pO2 at Patient


Temp 


 61 mmHg


()


 


Arterial Blood HCO3


 


 27 mmol/L


(21-28)


 


Arterial Blood Base Excess


 


 1 mmol/L


(-3-3)


 


FiO2  65% 








Medications





Active Scripts








 Medications  Dose


 Route/Sig


 Max Daily Dose Days Date Category


 


 Morphine Sulfate


 Er (Morphine


 Sulfate) 30 Mg


 Tablet.er  1 Tab


 PO BID


   12/23/20 Reported


 


 Tramadol Hcl 100


 Mg Tbmp.24hr  100 Mg


 PO PRN Q8HRS PRN


   12/23/20 Reported


 


 Ambien (Zolpidem


 Tartrate) 10 Mg


 Tablet  10 Mg


 PO PRN QHS PRN


   12/23/20 Reported


 


 Methotrexate


  (Methotrexate


 Sodium) 2.5 Mg


 Tablet  10 Tab


 PO WEEKLY


   12/23/20 Reported


 


 Lisinopril 10 Mg


 Tablet  1 Tab


 PO DAILY


   12/23/20 Reported


 


 Crestor


  (Rosuvastatin


 Calcium) 5 Mg


 Tablet  2 Tab


 PO DAILY


   12/23/20 Reported


 


 Hydrochlorothiazide


 Tablet


  (Hydrochlorothiazide)


 50 Mg Tablet  25 Mg


 PO DAILY


   12/23/20 Reported


 


 Escitalopram


 Oxalate 10 Mg


 Tablet  1 Tab


 PO DAILY


   12/23/20 Reported


 


 Sulfasalazine Dr


  (Sulfasalazine)


 500 Mg Tablet.dr  2 Tab


 PO TID


  30 12/23/20 Reported


 


 Colace (Docusate


 Sodium) 100 Mg


 Capsule  1 Cap


 PO DA


  30 12/23/20 Reported


 


 Acetaminophen


 Ext.release


  (Acetaminophen)


 650 Mg Tablet.er  650 Mg


 PO PRN Q8HRS PRN


   12/23/20 Reported


 


 Melatonin 5 Mg


 Capsule  1 Cap


 PO QHS


  30 12/23/20 Reported


 


 B-12


  (Cyanocobalamin


  (Vitamin B-12))


 500 Mcg Tablet  2 Tab


 PO DAILY


  30 12/23/20 Reported


 


 Vitamin D3


  (Cholecalciferol


  (Vitamin D3)) 50


 Mcg Capsule  50 Mcg


 PO DAILY


   12/23/20 Reported


 


 Aller-Sung


  (Cetirizine Hcl)


 10 Mg Tablet  1 Tab


 PO DAILY


  30 12/23/20 Reported








Comments


CXR 1/4/21


IMPRESSION: No significant change in multifocal consolidation superimposed on 

diffuse interstitial infiltrate.





Impression


.


IMPRESSION:


1.  Acute hypoxic respiratory failure secondary to highly suspected COVID-19


pneumonia and acute respiratory distress syndrome/acute lung injury.-- COVID-19 

positive 


2.  Abnormal CT chest with extensive widespread ground glass and alveolar and


interstitial infiltrates with reactive mild mediastinal/hilar adenopathy.  This


is likely related to COVID-19 pneumonia.


3.  Patient with history of psoriatic arthritis.  He is likely immunocompromise


as he has been on methotrexate.  covering for opportunistic infection with Abx


4.  History of tongue cancer with resection, details unknown.


5.  History of prostate cancer.


6.  History of Crohn's disease.





Plan


.


RECOMMENDATIONS:


Continue current vent support. wean  Fi02 65% and PEEP of 9


Follow chest x-ray/ABG-- 


Ensure adequate sedation 


Vasopressors ass needed to Keep MAP greater than 65 --off pressors 


COVID-19 positive


Continue ABX per ID, cefepime


D/C steroids has completed full 10 day course-- no benefit in ongoing steroids 


Continue TF for nutritional support 


DVT/GI PPX 





D/W RN and RT 











PT. is FULL CODE





Critical Care time [id number]











ABI MENDEZ MD                  Jan 8, 2021 09:02

## 2021-01-08 NOTE — PDOC
[employer] PROGRESS NOTE


Date of Service


DOS:


DATE: 1/8/21 


TIME: 15:49





Chief Complaint


Chief Complaint


impression ====================





Acute hypoxic respiratory failure - no pulmonary history. With recent travel to 

and from [place] likely COVID 19 vs other atypical pneumonia. Cont empiric 

antibiotics, steroids. Consult Pulm. 


Improved aeration of the lungs with persistent moderate mixed interstitial and 

alveolar airspace disease.  1/02 


Pt. experienced hypoxia and respiratory decompensation overnight requiring 

intubation 


now on vent 100% PEEP of 10 


Hypotension as well now on pressors


acute respiratory distress syndrome. Consideration may be given for multifocal 

pneumonia versus pulmonary edema.


COVID 19 positive - with pneumonia - given transaminitis and late presentation 

with high O2 requirements no indication for remdesivir


RYDER - likely vasomotor nephropathy - no known renal history, will hydrate


Pneumonia - likely atypical, gram negative vs viral. will cover 


Prostate Ca - s/p resection at [facility] in [place]


Hypokalemia - likely nutritional or loss from diarrhea, will replace


Elevated troponin - likely from type II demand ischemia, will trend troponins, 

maintain telemetry


Crohn's - sees rheumatology regularly, Dr Fan in [place], on sulfasalazine


Psoriatic arthritis - on pain medications 30mg MS Contin BID, tramadol and 25mg 

Methotrexate weekly as DMARD per rheumatology, Dr. Fan. Hold MTX while 

inpatient


GERD - PPI


HLD - statin


HTN - Lisinopril and HCTZ on hold for RYDER


Anemia - likely of chronic disease, will monitor


Cardiomegaly - notable on CT chest - no known cardiac history, though his mother

did have CHF and CAD


Right renal mass - will need US for further assessment


on 100% FIO2 via Vapotherm , tolerating 


HYPOTENSION 


history of psoriatic arthritis.  He is likely immuno-compromised


as he has been on methotrexate


Severe malnutrition








plan==============








PPX - lovenox


FULL CODE


Dispo - ICU 


IVF bolus for hypotension, vasopressors if needed to keep MAP above 65 


start continuous IVF 


Monitor for respiratory distress requiring intubation 


COVID-19 positive


Continue with empiric antibiotic


Continue with IV steroids with slow taper


Continue PPN for nutritional support  


DVT/GI PPX 


blood cultures


ID CONSULT


PROCALCITONIN











1-02-21 now on vent 100% PEEP of 10





History of Present Illness


History of Present Illness





Mr Nance is 66 yo male w/ past medical history of Crohn's, psoriatic arthritis,

GERD, HLD, HTN, prostate ca, tongue cancer who presents to the emergency 

department at [facility] concerned about shortness of breath that had been 

progressive. This started 12/18/2020 and has been getting worse since that time.

Of note patient also has loss of taste, loss of smell, cough, shortness of 

breath, fever. He recently went to [place] on a business trip and just 

returned 5 days prior to presentation.


Upon arrival to the emergency room patient had significant hypoxia with a pulse 

ox in the 40s.  He was placed initially on a nonrebreather and then placed on 

BiPAP.


He was also somewhat concerned about a Crohn's flareup.  Patient states he has 

been having abdominal pain with nausea and diarrhea.  These are not typical 

symptoms of his Crohn's.  He feels very tired and has body aches as well.


He was given steroids and Rocephin in ED. 


Chest radiograph concerning for bilateral interstitial infiltrates.


Labs significant for WBC 4.5, Hb 11.8, platelets 229, , K2.8, BUN 34, CR 

1.5, glucose 119, .9, albumin 3, , , lactic acid 1.6, D-

dimer 3.15, troponin 0.068.


ABG on 80% FiO2 7.53/42/64


CT negative for pulmonary embolism. Widespread airspace disease throughout both 

lungs, consistent with pneumonia. Mild mediastinal and bilateral hilar 

lymphadenopathy, likely reactive. Cardiomegaly with mild aneurysmal dilation of 

the ascending thoracic aorta. Indeterminate hyperdense nodule at the midpole 

right kidney. This could represent a solid mass or complex cyst.


Patient transferred to [facility] for pulmonary consultation and 

ICU admission.





12/24: Overnight BP improved with fluids. O2 saturations slightly increased. Did

not tolerate more than 1 hour off BIPAP even with non-rebreather O2 saturations 

were 92% at best. No pain currently, very slightly appetite.


12/25: COVID-19 returned positive.  Down to 65% on his BiPAP 18/8.  Was able to 

take it off for medications meal yesterday, but desaturates to 79% and recovers 

quickly.  Still very drowsy with little appetite.  Afebrile.


12/26: Afebrile with 100% O2 on BiPAP today.  Transition to Vapotherm with more 

ease of breathing.  He has almost no appetite.  Less drowsy today.  He is 

depressed mood but now has a cell phone  and is able to talk to his 

family.  No complaints of chest pain some abdominal pain no bowel movement as of

yet.  ABG 7.49/37/59


12-29 abg pending 





1/4: Patient seen in [place] [address] ICU.  Breathing on vent, FiO2 90%, PEEP 10.  He is

afebrile.  Continue treatment with cefepime, Zyvox, and steroids.  Present labs 

reviewed, discussed with RN.


1/5: Seen in [place] [address] ICU.  Still on vent, FiO2 90%, PEEP 10.  Afebrile.  

Continue supportive care and steroids.  Continue antibiotics cefepime and 

linezolid.  Discussed with RN.


1/6: Afebrile.  Intubated, FiO2 85%, PEEP 10.  Hemoglobin is dropping.  Continue

to monitor hemoglobin, transfuse as needed.  Continue supportive care, steroids.

 Antibiotics, per ID.  Discussed with RN.


1/7: Patient seen in [place] [address] ICU.  Remains on ventilator, FiO2 35%, PEEP 10.  

Hemoglobin appears to have stabilized.  Continue steroids, antibiotics, 

supportive care.  Discussed with RN


1/8: Seen in [place] [address] ICU.  Afebrile.  Intubated, FiO2 65%, PEEP 9.  Discussed 

with RN, no acute events overnight.  Continue steroids, antibiotics, supportive 

care.





Vitals/I&O


Vitals/I&O:





                                   Vital Signs








  Date Time  Temp Pulse Resp B/P (MAP) Pulse Ox O2 Delivery O2 Flow Rate FiO2


 


1/8/21 15:20     96 Ventilator  


 


1/8/21 15:00  87 28 121/67 (85)    


 


1/8/21 12:00 98.5       





 98.5       


 


1/8/21 06:38       40.0 














                                    I & O   


 


 1/7/21 1/7/21 1/8/21





 15:00 23:00 07:00


 


Intake Total 420 ml 2116.18 ml 1072 ml


 


Output Total 410 ml 360 ml 335 ml


 


Balance 10 ml 1756.18 ml 737 ml











Physical Exam


Physical Exam:


GENERAL:  Intubated, sedated.


HEENT:  Normocephalic, atraumatic.  Anicteric.  ETT and OGT tube in place.


NECK:  Supple.


LUNGS:  Decreased breath sounds at the bases.  No wheezing.


HEART:  S1, S2.


ABDOMEN:  Soft, nontender, nondistended.


EXTREMITIES:  No edema, no cyanosis.


GENITOURINARY:  Hart in place.


CENTRAL NERVOUS SYSTEM:  Intubated, sedated.


PSYCHIATRIC:  Unable to assess.


LINES:  Right upper extremity PICC line clean.


General:  No acute distress


Heart:  Regular rate


Abdomen:  Soft, No masses


Extremities:  No clubbing, No cyanosis, No edema, Normal pulses


Skin:  No rashes, No breakdown, No significant lesion





Labs


Labs:





Laboratory Tests








Test


 1/8/21


05:40 1/8/21


08:40


 


White Blood Count


 6.1 x10^3/uL


(4.0-11.0) 





 


Red Blood Count


 2.56 x10^6/uL


(4.30-5.70) 





 


Hemoglobin


 8.3 g/dL


(13.0-17.5) 





 


Hematocrit


 25.1 %


(39.0-53.0) 





 


Mean Corpuscular Volume 98 fL ()  


 


Mean Corpuscular Hemoglobin 33 pg (25-35)  


 


Mean Corpuscular Hemoglobin


Concent 33 g/dL


(31-37) 





 


Red Cell Distribution Width


 15.4 %


(11.5-14.5) 





 


Platelet Count


 326 x10^3/uL


(140-400) 





 


Neutrophils (%) (Auto) 69 % (31-73)  


 


Lymphocytes (%) (Auto) 17 % (24-48)  


 


Monocytes (%) (Auto) 12 % (0-9)  


 


Eosinophils (%) (Auto) 2 % (0-3)  


 


Basophils (%) (Auto) 1 % (0-3)  


 


Neutrophils # (Auto)


 4.2 x10^3/uL


(1.8-7.7) 





 


Lymphocytes # (Auto)


 1.0 x10^3/uL


(1.0-4.8) 





 


Monocytes # (Auto)


 0.7 x10^3/uL


(0.0-1.1) 





 


Eosinophils # (Auto)


 0.1 x10^3/uL


(0.0-0.7) 





 


Basophils # (Auto)


 0.1 x10^3/uL


(0.0-0.2) 





 


Segmented Neutrophils % 66 % (35-66)  


 


Band Neutrophils % 1 % (0-9)  


 


Lymphocytes % 15 % (24-48)  


 


Monocytes % 13 % (0-10)  


 


Basophils % 4 % (0-3)  


 


Myelocytes % 1 % (0-0)  


 


Nucleated Red Blood Cells 1  


 


Platelet Estimate


 Adequate


(ADEQUATE) 





 


Sodium Level


 141 mmol/L


(136-145) 





 


Potassium Level


 3.5 mmol/L


(3.5-5.1) 





 


Chloride Level


 108 mmol/L


() 





 


Carbon Dioxide Level


 28 mmol/L


(21-32) 





 


Anion Gap 5 (6-14)  


 


Blood Urea Nitrogen


 22 mg/dL


(8-26) 





 


Creatinine


 0.6 mg/dL


(0.7-1.3) 





 


Estimated GFR


(Cockcroft-Gault) 134.8 


 





 


Glucose Level


 105 mg/dL


(70-99) 





 


Calcium Level


 8.5 mg/dL


(8.5-10.1) 





 


O2 Saturation  89 % (92-99) 


 


Arterial Blood pH


 


 7.33


(7.35-7.45)


 


Arterial Blood pCO2 at


Patient Temp 


 53 mmHg


(35-46)


 


Arterial Blood pO2 at Patient


Temp 


 61 mmHg


()


 


Arterial Blood HCO3


 


 27 mmol/L


(21-28)


 


Arterial Blood Base Excess


 


 1 mmol/L


(-3-3)


 


FiO2  65% 











Comment


Review of Relevant


I have reviewed the following items jabier (where applicable) has been applied.





Justifications for Admission


Other Justification














WALESKA BOB MD             Jan 8, 2021 15:52

## 2021-01-08 NOTE — PDOC
Infectious Disease Note


Subjective


Subjective


intubated on vent





ROS


ROS


no n/v/d/fever





Vital Sign


Vital Signs





Vital Signs








  Date Time  Temp Pulse Resp B/P (MAP) Pulse Ox O2 Delivery O2 Flow Rate FiO2


 


1/8/21 07:08     97 Ventilator  


 


1/8/21 07:00  64 28 107/61 (76)    


 


1/8/21 06:38       40.0 


 


1/8/21 05:00 98.9       





 98.9       











Physical Exam


PHYSICAL EXAM


GENERAL:  Intubated, sedated.


HEENT:  Normocephalic, atraumatic.  Anicteric.  ETT and OGT tube in place.


NECK:  Supple.


LUNGS:  Decreased breath sounds at the bases.  No wheezing.


HEART:  S1, S2.


ABDOMEN:  Soft, nontender, nondistended.


EXTREMITIES:  No edema, no cyanosis.


GENITOURINARY:  Hart in place.


CENTRAL NERVOUS SYSTEM:  Intubated, sedated.


PSYCHIATRIC:  Unable to assess.


LINES:  Right upper extremity PICC line clean.





Labs


Lab





Laboratory Tests








Test


 1/8/21


05:40


 


White Blood Count


 6.1 x10^3/uL


(4.0-11.0)


 


Red Blood Count


 2.56 x10^6/uL


(4.30-5.70)


 


Hemoglobin


 8.3 g/dL


(13.0-17.5)


 


Hematocrit


 25.1 %


(39.0-53.0)


 


Mean Corpuscular Volume 98 fL () 


 


Mean Corpuscular Hemoglobin 33 pg (25-35) 


 


Mean Corpuscular Hemoglobin


Concent 33 g/dL


(31-37)


 


Red Cell Distribution Width


 15.4 %


(11.5-14.5)


 


Platelet Count


 326 x10^3/uL


(140-400)


 


Neutrophils (%) (Auto) 69 % (31-73) 


 


Lymphocytes (%) (Auto) 17 % (24-48) 


 


Monocytes (%) (Auto) 12 % (0-9) 


 


Eosinophils (%) (Auto) 2 % (0-3) 


 


Basophils (%) (Auto) 1 % (0-3) 


 


Neutrophils # (Auto)


 4.2 x10^3/uL


(1.8-7.7)


 


Lymphocytes # (Auto)


 1.0 x10^3/uL


(1.0-4.8)


 


Monocytes # (Auto)


 0.7 x10^3/uL


(0.0-1.1)


 


Eosinophils # (Auto)


 0.1 x10^3/uL


(0.0-0.7)


 


Basophils # (Auto)


 0.1 x10^3/uL


(0.0-0.2)


 


Segmented Neutrophils % 66 % (35-66) 


 


Band Neutrophils % 1 % (0-9) 


 


Lymphocytes % 15 % (24-48) 


 


Monocytes % 13 % (0-10) 


 


Basophils % 4 % (0-3) 


 


Myelocytes % 1 % (0-0) 


 


Nucleated Red Blood Cells 1 


 


Platelet Estimate


 Adequate


(ADEQUATE)


 


Sodium Level


 141 mmol/L


(136-145)


 


Potassium Level


 3.5 mmol/L


(3.5-5.1)


 


Chloride Level


 108 mmol/L


()


 


Carbon Dioxide Level


 28 mmol/L


(21-32)


 


Anion Gap 5 (6-14) 


 


Blood Urea Nitrogen


 22 mg/dL


(8-26)


 


Creatinine


 0.6 mg/dL


(0.7-1.3)


 


Estimated GFR


(Cockcroft-Gault) 134.8 





 


Glucose Level


 105 mg/dL


(70-99)


 


Calcium Level


 8.5 mg/dL


(8.5-10.1)








Micro





Microbiology


1/2/21 Blood Culture - Preliminary, Resulted


         NO GROWTH AFTER 1 DAY





Objective


Assessment





IMPRESSION:


1.  Fever, improved


2.  Leukocytosis, on steroids.


3.  Sepsis.


4.  Acute hypoxic respiratory failure.


5.  COVID-19 infection.


6.  History of psoriasis.


7.  History of Crohn's.


8.  Immunosuppression.


9.  Anemia.


10.  History of tongue and prostate cancer.





Plan


Plan of Care


1.  cefepime.


2.  


3.  Follow up blood culture results.


4.  Continue supportive care.


5.  The patient remains on steroids.


6.  Critically ill.


7.  Prognosis is guarded.


8.  Discussed with JOSE.











ANAT KNAPP MD                Jan 8, 2021 08:02

## 2021-01-09 VITALS — DIASTOLIC BLOOD PRESSURE: 86 MMHG | SYSTOLIC BLOOD PRESSURE: 171 MMHG

## 2021-01-09 VITALS — DIASTOLIC BLOOD PRESSURE: 75 MMHG | SYSTOLIC BLOOD PRESSURE: 128 MMHG

## 2021-01-09 VITALS — DIASTOLIC BLOOD PRESSURE: 70 MMHG | SYSTOLIC BLOOD PRESSURE: 124 MMHG

## 2021-01-09 VITALS — DIASTOLIC BLOOD PRESSURE: 89 MMHG | SYSTOLIC BLOOD PRESSURE: 157 MMHG

## 2021-01-09 VITALS — DIASTOLIC BLOOD PRESSURE: 62 MMHG | SYSTOLIC BLOOD PRESSURE: 107 MMHG

## 2021-01-09 VITALS — DIASTOLIC BLOOD PRESSURE: 56 MMHG | SYSTOLIC BLOOD PRESSURE: 86 MMHG

## 2021-01-09 VITALS — SYSTOLIC BLOOD PRESSURE: 147 MMHG | DIASTOLIC BLOOD PRESSURE: 79 MMHG

## 2021-01-09 VITALS — SYSTOLIC BLOOD PRESSURE: 93 MMHG | DIASTOLIC BLOOD PRESSURE: 53 MMHG

## 2021-01-09 VITALS — SYSTOLIC BLOOD PRESSURE: 171 MMHG | DIASTOLIC BLOOD PRESSURE: 96 MMHG

## 2021-01-09 VITALS — DIASTOLIC BLOOD PRESSURE: 62 MMHG | SYSTOLIC BLOOD PRESSURE: 96 MMHG

## 2021-01-09 VITALS — DIASTOLIC BLOOD PRESSURE: 73 MMHG | SYSTOLIC BLOOD PRESSURE: 143 MMHG

## 2021-01-09 VITALS — SYSTOLIC BLOOD PRESSURE: 102 MMHG | DIASTOLIC BLOOD PRESSURE: 62 MMHG

## 2021-01-09 VITALS — DIASTOLIC BLOOD PRESSURE: 54 MMHG | SYSTOLIC BLOOD PRESSURE: 91 MMHG

## 2021-01-09 VITALS — SYSTOLIC BLOOD PRESSURE: 97 MMHG | DIASTOLIC BLOOD PRESSURE: 60 MMHG

## 2021-01-09 VITALS — DIASTOLIC BLOOD PRESSURE: 60 MMHG | SYSTOLIC BLOOD PRESSURE: 98 MMHG

## 2021-01-09 VITALS — DIASTOLIC BLOOD PRESSURE: 66 MMHG | SYSTOLIC BLOOD PRESSURE: 115 MMHG

## 2021-01-09 VITALS — SYSTOLIC BLOOD PRESSURE: 199 MMHG | DIASTOLIC BLOOD PRESSURE: 99 MMHG

## 2021-01-09 VITALS — DIASTOLIC BLOOD PRESSURE: 61 MMHG | SYSTOLIC BLOOD PRESSURE: 103 MMHG

## 2021-01-09 VITALS — DIASTOLIC BLOOD PRESSURE: 65 MMHG | SYSTOLIC BLOOD PRESSURE: 107 MMHG

## 2021-01-09 VITALS — SYSTOLIC BLOOD PRESSURE: 116 MMHG | DIASTOLIC BLOOD PRESSURE: 59 MMHG

## 2021-01-09 VITALS — DIASTOLIC BLOOD PRESSURE: 67 MMHG | SYSTOLIC BLOOD PRESSURE: 124 MMHG

## 2021-01-09 VITALS — SYSTOLIC BLOOD PRESSURE: 158 MMHG | DIASTOLIC BLOOD PRESSURE: 87 MMHG

## 2021-01-09 VITALS — SYSTOLIC BLOOD PRESSURE: 157 MMHG | DIASTOLIC BLOOD PRESSURE: 84 MMHG

## 2021-01-09 VITALS — SYSTOLIC BLOOD PRESSURE: 104 MMHG | DIASTOLIC BLOOD PRESSURE: 62 MMHG

## 2021-01-09 VITALS — DIASTOLIC BLOOD PRESSURE: 76 MMHG | SYSTOLIC BLOOD PRESSURE: 134 MMHG

## 2021-01-09 LAB
ANION GAP SERPL CALC-SCNC: 4 MMOL/L (ref 6–14)
BASE EXCESS ABG: 2 MMOL/L (ref -3–3)
BASOPHILS # BLD AUTO: 0.1 X10^3/UL (ref 0–0.2)
BASOPHILS NFR BLD: 1 % (ref 0–3)
BUN SERPL-MCNC: 20 MG/DL (ref 8–26)
CALCIUM SERPL-MCNC: 8.3 MG/DL (ref 8.5–10.1)
CHLORIDE SERPL-SCNC: 109 MMOL/L (ref 98–107)
CO2 SERPL-SCNC: 28 MMOL/L (ref 21–32)
CREAT SERPL-MCNC: 0.6 MG/DL (ref 0.7–1.3)
EOSINOPHIL NFR BLD: 0.1 X10^3/UL (ref 0–0.7)
EOSINOPHIL NFR BLD: 3 % (ref 0–3)
ERYTHROCYTE [DISTWIDTH] IN BLOOD BY AUTOMATED COUNT: 15.8 % (ref 11.5–14.5)
GFR SERPLBLD BASED ON 1.73 SQ M-ARVRAT: 134.8 ML/MIN
GLUCOSE SERPL-MCNC: 100 MG/DL (ref 70–99)
HCO3 BLDA-SCNC: 27 MMOL/L (ref 21–28)
HCT VFR BLD CALC: 24.2 % (ref 39–53)
HGB BLD-MCNC: 8 G/DL (ref 13–17.5)
INSPIRATION SETTING TIME VENT: (no result)
LYMPHOCYTES # BLD: 1.2 X10^3/UL (ref 1–4.8)
LYMPHOCYTES NFR BLD AUTO: 21 % (ref 24–48)
MCH RBC QN AUTO: 32 PG (ref 25–35)
MCHC RBC AUTO-ENTMCNC: 33 G/DL (ref 31–37)
MCV RBC AUTO: 98 FL (ref 79–100)
MONO #: 0.7 X10^3/UL (ref 0–1.1)
MONOCYTES NFR BLD: 12 % (ref 0–9)
NEUT #: 3.5 X10^3/UL (ref 1.8–7.7)
NEUTROPHILS NFR BLD AUTO: 63 % (ref 31–73)
PCO2 BLDA: 49 MMHG (ref 35–46)
PLATELET # BLD AUTO: 325 X10^3/UL (ref 140–400)
PO2 BLDA: 68 MMHG (ref 65–108)
POTASSIUM SERPL-SCNC: 3.5 MMOL/L (ref 3.5–5.1)
RBC # BLD AUTO: 2.48 X10^6/UL (ref 4.3–5.7)
SAO2 % BLDA: 92 % (ref 92–99)
SODIUM SERPL-SCNC: 141 MMOL/L (ref 136–145)
WBC # BLD AUTO: 5.5 X10^3/UL (ref 4–11)

## 2021-01-09 RX ADMIN — CHLORHEXIDINE GLUCONATE SCH ML: 1.2 RINSE ORAL at 09:21

## 2021-01-09 RX ADMIN — MIDAZOLAM PRN MLS/HR: 5 INJECTION, SOLUTION INTRAMUSCULAR; INTRAVENOUS at 12:45

## 2021-01-09 RX ADMIN — PROPOFOL PRN MLS/HR: 10 INJECTION, EMULSION INTRAVENOUS at 10:52

## 2021-01-09 RX ADMIN — FAMOTIDINE SCH MG: 10 INJECTION, SOLUTION INTRAVENOUS at 09:21

## 2021-01-09 RX ADMIN — CYANOCOBALAMIN TAB 1000 MCG SCH MCG: 1000 TAB at 09:21

## 2021-01-09 RX ADMIN — FAMOTIDINE SCH MG: 10 INJECTION, SOLUTION INTRAVENOUS at 20:50

## 2021-01-09 RX ADMIN — CEFEPIME SCH GM: 2 INJECTION, POWDER, FOR SOLUTION INTRAVENOUS at 20:49

## 2021-01-09 RX ADMIN — CETIRIZINE HYDROCHLORIDE SCH MG: 10 TABLET, FILM COATED ORAL at 09:21

## 2021-01-09 RX ADMIN — PROPOFOL PRN MLS/HR: 10 INJECTION, EMULSION INTRAVENOUS at 03:44

## 2021-01-09 RX ADMIN — NYSTATIN SCH APP: 100000 POWDER TOPICAL at 20:50

## 2021-01-09 RX ADMIN — ATORVASTATIN CALCIUM SCH MG: 40 TABLET, FILM COATED ORAL at 20:50

## 2021-01-09 RX ADMIN — NYSTATIN SCH APP: 100000 POWDER TOPICAL at 09:00

## 2021-01-09 RX ADMIN — Medication PRN MLS/HR: at 09:52

## 2021-01-09 RX ADMIN — CEFEPIME SCH GM: 2 INJECTION, POWDER, FOR SOLUTION INTRAVENOUS at 09:21

## 2021-01-09 RX ADMIN — ZINC SULFATE CAP 220 MG (50 MG ELEMENTAL ZN) SCH MG: 220 (50 ZN) CAP at 09:21

## 2021-01-09 RX ADMIN — POLYETHYLENE GLYCOL 3350 SCH GM: 17 POWDER, FOR SOLUTION ORAL at 09:20

## 2021-01-09 RX ADMIN — PROPOFOL PRN MLS/HR: 10 INJECTION, EMULSION INTRAVENOUS at 20:07

## 2021-01-09 RX ADMIN — ENOXAPARIN SODIUM SCH MG: 40 INJECTION SUBCUTANEOUS at 09:22

## 2021-01-09 RX ADMIN — VECURONIUM BROMIDE PRN MG: 1 INJECTION, POWDER, LYOPHILIZED, FOR SOLUTION INTRAVENOUS at 17:07

## 2021-01-09 RX ADMIN — CITALOPRAM HYDROBROMIDE SCH MG: 20 TABLET ORAL at 09:21

## 2021-01-09 RX ADMIN — ENOXAPARIN SODIUM SCH MG: 40 INJECTION SUBCUTANEOUS at 20:50

## 2021-01-09 RX ADMIN — Medication PRN MLS/HR: at 23:02

## 2021-01-09 NOTE — PDOC
PULMONARY PROGRESS NOTES


DATE: 1/9/21 


TIME: 09:28


Subjective


Patient remains on ventilatory support, FiO2 65% and a PEEP of 9


afebrile 


Other concerns from nursing


Vitals





Vital Signs








  Date Time  Temp Pulse Resp B/P (MAP) Pulse Ox O2 Delivery O2 Flow Rate FiO2


 


1/9/21 08:06     98 Ventilator  


 


1/9/21 07:00  62 28 91/54 (66)    


 


1/9/21 04:00 98.5       





 98.5       








Comments


Pt. seen during COVID-19 pandemic, visual exam preformed 


RRR


intubated 


no distress


no accessory muscle use 


No edema or rash


Labs





Laboratory Tests








Test


 1/8/21


05:40 1/8/21


08:40 1/9/21


06:15 1/9/21


09:00


 


White Blood Count


 6.1 x10^3/uL


(4.0-11.0) 


 5.5 x10^3/uL


(4.0-11.0) 





 


Red Blood Count


 2.56 x10^6/uL


(4.30-5.70) 


 2.48 x10^6/uL


(4.30-5.70) 





 


Hemoglobin


 8.3 g/dL


(13.0-17.5) 


 8.0 g/dL


(13.0-17.5) 





 


Hematocrit


 25.1 %


(39.0-53.0) 


 24.2 %


(39.0-53.0) 





 


Mean Corpuscular Volume 98 fL ()   98 fL ()  


 


Mean Corpuscular Hemoglobin 33 pg (25-35)   32 pg (25-35)  


 


Mean Corpuscular Hemoglobin


Concent 33 g/dL


(31-37) 


 33 g/dL


(31-37) 





 


Red Cell Distribution Width


 15.4 %


(11.5-14.5) 


 15.8 %


(11.5-14.5) 





 


Platelet Count


 326 x10^3/uL


(140-400) 


 325 x10^3/uL


(140-400) 





 


Neutrophils (%) (Auto) 69 % (31-73)   63 % (31-73)  


 


Lymphocytes (%) (Auto) 17 % (24-48)   21 % (24-48)  


 


Monocytes (%) (Auto) 12 % (0-9)   12 % (0-9)  


 


Eosinophils (%) (Auto) 2 % (0-3)   3 % (0-3)  


 


Basophils (%) (Auto) 1 % (0-3)   1 % (0-3)  


 


Neutrophils # (Auto)


 4.2 x10^3/uL


(1.8-7.7) 


 3.5 x10^3/uL


(1.8-7.7) 





 


Lymphocytes # (Auto)


 1.0 x10^3/uL


(1.0-4.8) 


 1.2 x10^3/uL


(1.0-4.8) 





 


Monocytes # (Auto)


 0.7 x10^3/uL


(0.0-1.1) 


 0.7 x10^3/uL


(0.0-1.1) 





 


Eosinophils # (Auto)


 0.1 x10^3/uL


(0.0-0.7) 


 0.1 x10^3/uL


(0.0-0.7) 





 


Basophils # (Auto)


 0.1 x10^3/uL


(0.0-0.2) 


 0.1 x10^3/uL


(0.0-0.2) 





 


Segmented Neutrophils % 66 % (35-66)    


 


Band Neutrophils % 1 % (0-9)    


 


Lymphocytes % 15 % (24-48)    


 


Monocytes % 13 % (0-10)    


 


Basophils % 4 % (0-3)    


 


Myelocytes % 1 % (0-0)    


 


Nucleated Red Blood Cells 1    


 


Platelet Estimate


 Adequate


(ADEQUATE) 


 


 





 


Sodium Level


 141 mmol/L


(136-145) 


 141 mmol/L


(136-145) 





 


Potassium Level


 3.5 mmol/L


(3.5-5.1) 


 3.5 mmol/L


(3.5-5.1) 





 


Chloride Level


 108 mmol/L


() 


 109 mmol/L


() 





 


Carbon Dioxide Level


 28 mmol/L


(21-32) 


 28 mmol/L


(21-32) 





 


Anion Gap 5 (6-14)   4 (6-14)  


 


Blood Urea Nitrogen


 22 mg/dL


(8-26) 


 20 mg/dL


(8-26) 





 


Creatinine


 0.6 mg/dL


(0.7-1.3) 


 0.6 mg/dL


(0.7-1.3) 





 


Estimated GFR


(Cockcroft-Gault) 134.8 


 


 134.8 


 





 


Glucose Level


 105 mg/dL


(70-99) 


 100 mg/dL


(70-99) 





 


Calcium Level


 8.5 mg/dL


(8.5-10.1) 


 8.3 mg/dL


(8.5-10.1) 





 


O2 Saturation  89 % (92-99)   92 % (92-99) 


 


Arterial Blood pH


 


 7.33


(7.35-7.45) 


 7.36


(7.35-7.45)


 


Arterial Blood pCO2 at


Patient Temp 


 53 mmHg


(35-46) 


 49 mmHg


(35-46)


 


Arterial Blood pO2 at Patient


Temp 


 61 mmHg


() 


 68 mmHg


()


 


Arterial Blood HCO3


 


 27 mmol/L


(21-28) 


 27 mmol/L


(21-28)


 


Arterial Blood Base Excess


 


 1 mmol/L


(-3-3) 


 2 mmol/L


(-3-3)


 


FiO2  65%   65/vent 


 


Triglycerides Level


 


 


 133 mg/dL


(0-150) 











Laboratory Tests








Test


 1/9/21


06:15 1/9/21


09:00


 


White Blood Count


 5.5 x10^3/uL


(4.0-11.0) 





 


Red Blood Count


 2.48 x10^6/uL


(4.30-5.70) 





 


Hemoglobin


 8.0 g/dL


(13.0-17.5) 





 


Hematocrit


 24.2 %


(39.0-53.0) 





 


Mean Corpuscular Volume 98 fL ()  


 


Mean Corpuscular Hemoglobin 32 pg (25-35)  


 


Mean Corpuscular Hemoglobin


Concent 33 g/dL


(31-37) 





 


Red Cell Distribution Width


 15.8 %


(11.5-14.5) 





 


Platelet Count


 325 x10^3/uL


(140-400) 





 


Neutrophils (%) (Auto) 63 % (31-73)  


 


Lymphocytes (%) (Auto) 21 % (24-48)  


 


Monocytes (%) (Auto) 12 % (0-9)  


 


Eosinophils (%) (Auto) 3 % (0-3)  


 


Basophils (%) (Auto) 1 % (0-3)  


 


Neutrophils # (Auto)


 3.5 x10^3/uL


(1.8-7.7) 





 


Lymphocytes # (Auto)


 1.2 x10^3/uL


(1.0-4.8) 





 


Monocytes # (Auto)


 0.7 x10^3/uL


(0.0-1.1) 





 


Eosinophils # (Auto)


 0.1 x10^3/uL


(0.0-0.7) 





 


Basophils # (Auto)


 0.1 x10^3/uL


(0.0-0.2) 





 


Sodium Level


 141 mmol/L


(136-145) 





 


Potassium Level


 3.5 mmol/L


(3.5-5.1) 





 


Chloride Level


 109 mmol/L


() 





 


Carbon Dioxide Level


 28 mmol/L


(21-32) 





 


Anion Gap 4 (6-14)  


 


Blood Urea Nitrogen


 20 mg/dL


(8-26) 





 


Creatinine


 0.6 mg/dL


(0.7-1.3) 





 


Estimated GFR


(Cockcroft-Gault) 134.8 


 





 


Glucose Level


 100 mg/dL


(70-99) 





 


Calcium Level


 8.3 mg/dL


(8.5-10.1) 





 


Triglycerides Level


 133 mg/dL


(0-150) 





 


O2 Saturation  92 % (92-99) 


 


Arterial Blood pH


 


 7.36


(7.35-7.45)


 


Arterial Blood pCO2 at


Patient Temp 


 49 mmHg


(35-46)


 


Arterial Blood pO2 at Patient


Temp 


 68 mmHg


()


 


Arterial Blood HCO3


 


 27 mmol/L


(21-28)


 


Arterial Blood Base Excess


 


 2 mmol/L


(-3-3)


 


FiO2  65/vent 








Medications





Active Scripts








 Medications  Dose


 Route/Sig


 Max Daily Dose Days Date Category


 


 Morphine Sulfate


 Er (Morphine


 Sulfate) 30 Mg


 Tablet.er  1 Tab


 PO BID


   12/23/20 Reported


 


 Tramadol Hcl 100


 Mg Tbmp.24hr  100 Mg


 PO PRN Q8HRS PRN


   12/23/20 Reported


 


 Ambien (Zolpidem


 Tartrate) 10 Mg


 Tablet  10 Mg


 PO PRN QHS PRN


   12/23/20 Reported


 


 Methotrexate


  (Methotrexate


 Sodium) 2.5 Mg


 Tablet  10 Tab


 PO WEEKLY


   12/23/20 Reported


 


 Lisinopril 10 Mg


 Tablet  1 Tab


 PO DAILY


   12/23/20 Reported


 


 Crestor


  (Rosuvastatin


 Calcium) 5 Mg


 Tablet  2 Tab


 PO DAILY


   12/23/20 Reported


 


 Hydrochlorothiazide


 Tablet


  (Hydrochlorothiazide)


 50 Mg Tablet  25 Mg


 PO DAILY


   12/23/20 Reported


 


 Escitalopram


 Oxalate 10 Mg


 Tablet  1 Tab


 PO DAILY


   12/23/20 Reported


 


 Sulfasalazine Dr


  (Sulfasalazine)


 500 Mg Tablet.dr  2 Tab


 PO TID


  30 12/23/20 Reported


 


 Colace (Docusate


 Sodium) 100 Mg


 Capsule  1 Cap


 PO DA


  30 12/23/20 Reported


 


 Acetaminophen


 Ext.release


  (Acetaminophen)


 650 Mg Tablet.er  650 Mg


 PO PRN Q8HRS PRN


   12/23/20 Reported


 


 Melatonin 5 Mg


 Capsule  1 Cap


 PO QHS


  30 12/23/20 Reported


 


 B-12


  (Cyanocobalamin


  (Vitamin B-12))


 500 Mcg Tablet  2 Tab


 PO DAILY


  30 12/23/20 Reported


 


 Vitamin D3


  (Cholecalciferol


  (Vitamin D3)) 50


 Mcg Capsule  50 Mcg


 PO DAILY


   12/23/20 Reported


 


 Aller-Sung


  (Cetirizine Hcl)


 10 Mg Tablet  1 Tab


 PO DAILY


  30 12/23/20 Reported








Comments


CXR 1/4/21


IMPRESSION: No significant change in multifocal consolidation superimposed on 

diffuse interstitial infiltrate.





Impression


.


IMPRESSION:


1.  Acute hypoxic respiratory failure secondary to highly suspected COVID-19


pneumonia and acute respiratory distress syndrome/acute lung injury.-- COVID-19 

positive 


2.  Abnormal CT chest with extensive widespread ground glass and alveolar and


interstitial infiltrates with reactive mild mediastinal/hilar adenopathy.  This


is likely related to COVID-19 pneumonia.


3.  Patient with history of psoriatic arthritis.  He is likely immunocompromise


as he has been on methotrexate.  covering for opportunistic infection with Abx


4.  History of tongue cancer with resection, details unknown.


5.  History of prostate cancer.


6.  History of Crohn's disease.





Plan


.


RECOMMENDATIONS:


Continue current vent support. wean  Fi02 65% and PEEP of 9


Follow chest x-ray/ABG-- no changes today 


Ensure adequate sedation 


Vasopressors ass needed to Keep MAP greater than 65 --off pressors 


COVID-19 positive


Continue ABX per ID, cefepime


Continue TF for nutritional support 


DVT/GI PPX 





D/W RN and RT 











PT. is FULL CODE





Critical Care time 0900-0930AM











ABI MENDEZ MD                  Jan 9, 2021 09:29

## 2021-01-09 NOTE — PDOC
[employer] PROGRESS NOTE


Date of Service


DOS:


DATE: 1/9/21 


TIME: 11:17





Chief Complaint


Chief Complaint


impression ====================





Acute hypoxic respiratory failure - no pulmonary history. With recent travel to 

and from [place] likely COVID 19 vs other atypical pneumonia. Cont empiric 

antibiotics, steroids. Consult Pulm. 


Improved aeration of the lungs with persistent moderate mixed interstitial and 

alveolar airspace disease.  1/02 


Pt. experienced hypoxia and respiratory decompensation overnight requiring 

intubation 


now on vent 100% PEEP of 10 


Hypotension as well now on pressors


acute respiratory distress syndrome. Consideration may be given for multifocal 

pneumonia versus pulmonary edema.


COVID 19 positive - with pneumonia - given transaminitis and late presentation 

with high O2 requirements no indication for remdesivir


RYDER - likely vasomotor nephropathy - no known renal history, will hydrate


Pneumonia - likely atypical, gram negative vs viral. will cover 


Prostate Ca - s/p resection at [facility] in [place]


Hypokalemia - likely nutritional or loss from diarrhea, will replace


Elevated troponin - likely from type II demand ischemia, will trend troponins, 

maintain telemetry


Crohn's - sees rheumatology regularly, Dr Fan in [place], on sulfasalazine


Psoriatic arthritis - on pain medications 30mg MS Contin BID, tramadol and 25mg 

Methotrexate weekly as DMARD per rheumatology, Dr. Fan. Hold MTX while 

inpatient


GERD - PPI


HLD - statin


HTN - Lisinopril and HCTZ on hold for RYDER


Anemia - likely of chronic disease, will monitor


Cardiomegaly - notable on CT chest - no known cardiac history, though his mother

did have CHF and CAD


Right renal mass - will need US for further assessment


on 100% FIO2 via Vapotherm , tolerating 


HYPOTENSION 


history of psoriatic arthritis.  He is likely immuno-compromised


as he has been on methotrexate


Severe malnutrition








plan==============








PPX - lovenox


FULL CODE


Dispo - ICU 


IVF bolus for hypotension, vasopressors if needed to keep MAP above 65 


start continuous IVF 


Monitor for respiratory distress requiring intubation 


COVID-19 positive


Continue with empiric antibiotic


Continue with IV steroids with slow taper


Continue PPN for nutritional support  


DVT/GI PPX 


blood cultures


ID CONSULT


PROCALCITONIN











1-02-21 now on vent 100% PEEP of 10





History of Present Illness


History of Present Illness





Mr Nance is 64 yo male w/ past medical history of Crohn's, psoriatic arthritis,

GERD, HLD, HTN, prostate ca, tongue cancer who presents to the emergency 

department at [facility] concerned about shortness of breath that had been 

progressive. This started 12/18/2020 and has been getting worse since that time.

Of note patient also has loss of taste, loss of smell, cough, shortness of 

breath, fever. He recently went to [place] on a business trip and just 

returned 5 days prior to presentation.


Upon arrival to the emergency room patient had significant hypoxia with a pulse 

ox in the 40s.  He was placed initially on a nonrebreather and then placed on 

BiPAP.


He was also somewhat concerned about a Crohn's flareup.  Patient states he has 

been having abdominal pain with nausea and diarrhea.  These are not typical 

symptoms of his Crohn's.  He feels very tired and has body aches as well.


He was given steroids and Rocephin in ED. 


Chest radiograph concerning for bilateral interstitial infiltrates.


Labs significant for WBC 4.5, Hb 11.8, platelets 229, , K2.8, BUN 34, CR 

1.5, glucose 119, .9, albumin 3, , , lactic acid 1.6, D-

dimer 3.15, troponin 0.068.


ABG on 80% FiO2 7.53/42/64


CT negative for pulmonary embolism. Widespread airspace disease throughout both 

lungs, consistent with pneumonia. Mild mediastinal and bilateral hilar 

lymphadenopathy, likely reactive. Cardiomegaly with mild aneurysmal dilation of 

the ascending thoracic aorta. Indeterminate hyperdense nodule at the midpole 

right kidney. This could represent a solid mass or complex cyst.


Patient transferred to [facility] for pulmonary consultation and 

ICU admission.





12/24: Overnight BP improved with fluids. O2 saturations slightly increased. Did

not tolerate more than 1 hour off BIPAP even with non-rebreather O2 saturations 

were 92% at best. No pain currently, very slightly appetite.


12/25: COVID-19 returned positive.  Down to 65% on his BiPAP 18/8.  Was able to 

take it off for medications meal yesterday, but desaturates to 79% and recovers 

quickly.  Still very drowsy with little appetite.  Afebrile.


12/26: Afebrile with 100% O2 on BiPAP today.  Transition to Vapotherm with more 

ease of breathing.  He has almost no appetite.  Less drowsy today.  He is 

depressed mood but now has a cell phone  and is able to talk to his 

family.  No complaints of chest pain some abdominal pain no bowel movement as of

yet.  ABG 7.49/37/59


12-29 abg pending 





1/4: Patient seen in [place] [address] ICU.  Breathing on vent, FiO2 90%, PEEP 10.  He is

afebrile.  Continue treatment with cefepime, Zyvox, and steroids.  Present labs 

reviewed, discussed with RN.


1/5: Seen in [place] [address] ICU.  Still on vent, FiO2 90%, PEEP 10.  Afebrile.  

Continue supportive care and steroids.  Continue antibiotics cefepime and 

linezolid.  Discussed with RN.


1/6: Afebrile.  Intubated, FiO2 85%, PEEP 10.  Hemoglobin is dropping.  Continue

to monitor hemoglobin, transfuse as needed.  Continue supportive care, steroids.

 Antibiotics, per ID.  Discussed with RN.


1/7: Patient seen in [place] [address] ICU.  Remains on ventilator, FiO2 35%, PEEP 10.  

Hemoglobin appears to have stabilized.  Continue steroids, antibiotics, 

supportive care.  Discussed with RN


1/8: Seen in [place] [address] ICU.  Afebrile.  Intubated, FiO2 65%, PEEP 9.  Discussed 

with RN, no acute events overnight.  Continue steroids, antibiotics, supportive 

care.


1/9: Afebrile.  On vent with FiO2 65%, PEEP 9.  No acute events overnight.  

Continue cefepime and supportive care.  Discussed with RN.





Vitals/I&O


Vitals/I&O:





                                   Vital Signs








  Date Time  Temp Pulse Resp B/P (MAP) Pulse Ox O2 Delivery O2 Flow Rate FiO2


 


1/9/21 10:53     96 Ventilator  


 


1/9/21 10:45       40.0 


 


1/9/21 10:00  72 28 124/70 (88)    


 


1/9/21 08:00 98.8       





 98.8       














                                    I & O   


 


 1/8/21 1/8/21 1/9/21





 15:00 23:00 07:00


 


Intake Total 200 ml 630.6 ml 1177 ml


 


Output Total 320 ml 400 ml 425 ml


 


Balance -120 ml 230.6 ml 752 ml











Physical Exam


Physical Exam:


GENERAL:  Intubated, sedated.


HEENT:  Normocephalic, atraumatic.  Anicteric.  ETT and OGT tube in place.


NECK:  Supple.


LUNGS:  Decreased breath sounds at the bases.  No wheezing.


HEART:  S1, S2.


ABDOMEN:  Soft, nontender, nondistended.


EXTREMITIES:  No edema, no cyanosis.


GENITOURINARY:  Hart in place.


CENTRAL NERVOUS SYSTEM:  Intubated, sedated.


PSYCHIATRIC:  Unable to assess.


LINES:  Right upper extremity PICC line clean.


General:  No acute distress


Heart:  Regular rate


Abdomen:  Soft, No masses


Extremities:  No clubbing, No cyanosis, No edema, Normal pulses


Skin:  No rashes, No breakdown, No significant lesion





Labs


Labs:





Laboratory Tests








Test


 1/9/21


06:15 1/9/21


09:00


 


White Blood Count


 5.5 x10^3/uL


(4.0-11.0) 





 


Red Blood Count


 2.48 x10^6/uL


(4.30-5.70) 





 


Hemoglobin


 8.0 g/dL


(13.0-17.5) 





 


Hematocrit


 24.2 %


(39.0-53.0) 





 


Mean Corpuscular Volume 98 fL ()  


 


Mean Corpuscular Hemoglobin 32 pg (25-35)  


 


Mean Corpuscular Hemoglobin


Concent 33 g/dL


(31-37) 





 


Red Cell Distribution Width


 15.8 %


(11.5-14.5) 





 


Platelet Count


 325 x10^3/uL


(140-400) 





 


Neutrophils (%) (Auto) 63 % (31-73)  


 


Lymphocytes (%) (Auto) 21 % (24-48)  


 


Monocytes (%) (Auto) 12 % (0-9)  


 


Eosinophils (%) (Auto) 3 % (0-3)  


 


Basophils (%) (Auto) 1 % (0-3)  


 


Neutrophils # (Auto)


 3.5 x10^3/uL


(1.8-7.7) 





 


Lymphocytes # (Auto)


 1.2 x10^3/uL


(1.0-4.8) 





 


Monocytes # (Auto)


 0.7 x10^3/uL


(0.0-1.1) 





 


Eosinophils # (Auto)


 0.1 x10^3/uL


(0.0-0.7) 





 


Basophils # (Auto)


 0.1 x10^3/uL


(0.0-0.2) 





 


Sodium Level


 141 mmol/L


(136-145) 





 


Potassium Level


 3.5 mmol/L


(3.5-5.1) 





 


Chloride Level


 109 mmol/L


() 





 


Carbon Dioxide Level


 28 mmol/L


(21-32) 





 


Anion Gap 4 (6-14)  


 


Blood Urea Nitrogen


 20 mg/dL


(8-26) 





 


Creatinine


 0.6 mg/dL


(0.7-1.3) 





 


Estimated GFR


(Cockcroft-Gault) 134.8 


 





 


Glucose Level


 100 mg/dL


(70-99) 





 


Calcium Level


 8.3 mg/dL


(8.5-10.1) 





 


Triglycerides Level


 133 mg/dL


(0-150) 





 


O2 Saturation  92 % (92-99) 


 


Arterial Blood pH


 


 7.36


(7.35-7.45)


 


Arterial Blood pCO2 at


Patient Temp 


 49 mmHg


(35-46)


 


Arterial Blood pO2 at Patient


Temp 


 68 mmHg


()


 


Arterial Blood HCO3


 


 27 mmol/L


(21-28)


 


Arterial Blood Base Excess


 


 2 mmol/L


(-3-3)


 


FiO2  65/vent 











Comment


Review of Relevant


I have reviewed the following items jabier (where applicable) has been applied.


Medications:





Current Medications








 Medications


  (Trade)  Dose


 Ordered  Sig/Kayli


 Route


 PRN Reason  Start Time


 Stop Time Status Last Admin


Dose Admin


 


 Famotidine


  (Pepcid Vial)  20 mg  BID


 IVP


   1/8/21 21:00


    1/9/21 09:21














Justifications for Admission


Other Justification














WALESKA BOB MD             Jan 9, 2021 11:19

## 2021-01-10 VITALS — SYSTOLIC BLOOD PRESSURE: 103 MMHG | DIASTOLIC BLOOD PRESSURE: 53 MMHG

## 2021-01-10 VITALS — DIASTOLIC BLOOD PRESSURE: 66 MMHG | SYSTOLIC BLOOD PRESSURE: 142 MMHG

## 2021-01-10 VITALS — SYSTOLIC BLOOD PRESSURE: 103 MMHG | DIASTOLIC BLOOD PRESSURE: 57 MMHG

## 2021-01-10 VITALS — DIASTOLIC BLOOD PRESSURE: 61 MMHG | SYSTOLIC BLOOD PRESSURE: 100 MMHG

## 2021-01-10 VITALS — SYSTOLIC BLOOD PRESSURE: 89 MMHG | DIASTOLIC BLOOD PRESSURE: 49 MMHG

## 2021-01-10 VITALS — DIASTOLIC BLOOD PRESSURE: 51 MMHG | SYSTOLIC BLOOD PRESSURE: 105 MMHG

## 2021-01-10 VITALS — DIASTOLIC BLOOD PRESSURE: 53 MMHG | SYSTOLIC BLOOD PRESSURE: 97 MMHG

## 2021-01-10 VITALS — SYSTOLIC BLOOD PRESSURE: 98 MMHG | DIASTOLIC BLOOD PRESSURE: 57 MMHG

## 2021-01-10 VITALS — SYSTOLIC BLOOD PRESSURE: 86 MMHG | DIASTOLIC BLOOD PRESSURE: 51 MMHG

## 2021-01-10 VITALS — SYSTOLIC BLOOD PRESSURE: 105 MMHG | DIASTOLIC BLOOD PRESSURE: 62 MMHG

## 2021-01-10 VITALS — DIASTOLIC BLOOD PRESSURE: 56 MMHG | SYSTOLIC BLOOD PRESSURE: 99 MMHG

## 2021-01-10 VITALS — DIASTOLIC BLOOD PRESSURE: 53 MMHG | SYSTOLIC BLOOD PRESSURE: 100 MMHG

## 2021-01-10 VITALS — SYSTOLIC BLOOD PRESSURE: 83 MMHG | DIASTOLIC BLOOD PRESSURE: 47 MMHG

## 2021-01-10 VITALS — SYSTOLIC BLOOD PRESSURE: 95 MMHG | DIASTOLIC BLOOD PRESSURE: 54 MMHG

## 2021-01-10 VITALS — DIASTOLIC BLOOD PRESSURE: 50 MMHG | SYSTOLIC BLOOD PRESSURE: 96 MMHG

## 2021-01-10 VITALS — DIASTOLIC BLOOD PRESSURE: 51 MMHG | SYSTOLIC BLOOD PRESSURE: 85 MMHG

## 2021-01-10 VITALS — SYSTOLIC BLOOD PRESSURE: 83 MMHG | DIASTOLIC BLOOD PRESSURE: 49 MMHG

## 2021-01-10 VITALS — DIASTOLIC BLOOD PRESSURE: 53 MMHG | SYSTOLIC BLOOD PRESSURE: 93 MMHG

## 2021-01-10 VITALS — DIASTOLIC BLOOD PRESSURE: 74 MMHG | SYSTOLIC BLOOD PRESSURE: 140 MMHG

## 2021-01-10 VITALS — DIASTOLIC BLOOD PRESSURE: 46 MMHG | SYSTOLIC BLOOD PRESSURE: 87 MMHG

## 2021-01-10 VITALS — DIASTOLIC BLOOD PRESSURE: 52 MMHG | SYSTOLIC BLOOD PRESSURE: 81 MMHG

## 2021-01-10 VITALS — DIASTOLIC BLOOD PRESSURE: 67 MMHG | SYSTOLIC BLOOD PRESSURE: 122 MMHG

## 2021-01-10 VITALS — SYSTOLIC BLOOD PRESSURE: 95 MMHG | DIASTOLIC BLOOD PRESSURE: 52 MMHG

## 2021-01-10 VITALS — SYSTOLIC BLOOD PRESSURE: 126 MMHG | DIASTOLIC BLOOD PRESSURE: 67 MMHG

## 2021-01-10 LAB
ANION GAP SERPL CALC-SCNC: 5 MMOL/L (ref 6–14)
BASE EXCESS ABG: 3 MMOL/L (ref -3–3)
BASOPHILS # BLD AUTO: 0.1 X10^3/UL (ref 0–0.2)
BASOPHILS NFR BLD: 1 % (ref 0–3)
BUN SERPL-MCNC: 21 MG/DL (ref 8–26)
CALCIUM SERPL-MCNC: 7.8 MG/DL (ref 8.5–10.1)
CHLORIDE SERPL-SCNC: 108 MMOL/L (ref 98–107)
CO2 SERPL-SCNC: 30 MMOL/L (ref 21–32)
CREAT SERPL-MCNC: 0.7 MG/DL (ref 0.7–1.3)
EOSINOPHIL NFR BLD: 0.1 X10^3/UL (ref 0–0.7)
EOSINOPHIL NFR BLD: 2 % (ref 0–3)
ERYTHROCYTE [DISTWIDTH] IN BLOOD BY AUTOMATED COUNT: 15.7 % (ref 11.5–14.5)
GFR SERPLBLD BASED ON 1.73 SQ M-ARVRAT: 112.8 ML/MIN
GLUCOSE SERPL-MCNC: 92 MG/DL (ref 70–99)
HCO3 BLDA-SCNC: 29 MMOL/L (ref 21–28)
HCT VFR BLD CALC: 24.5 % (ref 39–53)
HGB BLD-MCNC: 8 G/DL (ref 13–17.5)
INSPIRATION SETTING TIME VENT: (no result)
LYMPHOCYTES # BLD: 1.2 X10^3/UL (ref 1–4.8)
LYMPHOCYTES NFR BLD AUTO: 23 % (ref 24–48)
MCH RBC QN AUTO: 32 PG (ref 25–35)
MCHC RBC AUTO-ENTMCNC: 33 G/DL (ref 31–37)
MCV RBC AUTO: 98 FL (ref 79–100)
MONO #: 0.7 X10^3/UL (ref 0–1.1)
MONOCYTES NFR BLD: 13 % (ref 0–9)
NEUT #: 3.2 X10^3/UL (ref 1.8–7.7)
NEUTROPHILS NFR BLD AUTO: 60 % (ref 31–73)
PCO2 BLDA: 54 MMHG (ref 35–46)
PLATELET # BLD AUTO: 333 X10^3/UL (ref 140–400)
PO2 BLDA: 55 MMHG (ref 65–108)
POTASSIUM SERPL-SCNC: 3.9 MMOL/L (ref 3.5–5.1)
RBC # BLD AUTO: 2.5 X10^6/UL (ref 4.3–5.7)
SAO2 % BLDA: 86 % (ref 92–99)
SODIUM SERPL-SCNC: 143 MMOL/L (ref 136–145)
WBC # BLD AUTO: 5.2 X10^3/UL (ref 4–11)

## 2021-01-10 RX ADMIN — ENOXAPARIN SODIUM SCH MG: 40 INJECTION SUBCUTANEOUS at 20:38

## 2021-01-10 RX ADMIN — CYANOCOBALAMIN TAB 1000 MCG SCH MCG: 1000 TAB at 07:56

## 2021-01-10 RX ADMIN — FAMOTIDINE SCH MG: 10 INJECTION, SOLUTION INTRAVENOUS at 20:38

## 2021-01-10 RX ADMIN — CEFEPIME SCH GM: 2 INJECTION, POWDER, FOR SOLUTION INTRAVENOUS at 07:56

## 2021-01-10 RX ADMIN — ATORVASTATIN CALCIUM SCH MG: 40 TABLET, FILM COATED ORAL at 20:38

## 2021-01-10 RX ADMIN — NYSTATIN SCH APP: 100000 POWDER TOPICAL at 20:38

## 2021-01-10 RX ADMIN — CITALOPRAM HYDROBROMIDE SCH MG: 20 TABLET ORAL at 07:56

## 2021-01-10 RX ADMIN — Medication PRN MLS/HR: at 12:35

## 2021-01-10 RX ADMIN — CEFEPIME SCH GM: 2 INJECTION, POWDER, FOR SOLUTION INTRAVENOUS at 20:43

## 2021-01-10 RX ADMIN — MIDAZOLAM PRN MLS/HR: 5 INJECTION, SOLUTION INTRAMUSCULAR; INTRAVENOUS at 06:58

## 2021-01-10 RX ADMIN — POLYETHYLENE GLYCOL 3350 SCH GM: 17 POWDER, FOR SOLUTION ORAL at 07:55

## 2021-01-10 RX ADMIN — ENOXAPARIN SODIUM SCH MG: 40 INJECTION SUBCUTANEOUS at 07:57

## 2021-01-10 RX ADMIN — PROPOFOL PRN MLS/HR: 10 INJECTION, EMULSION INTRAVENOUS at 12:21

## 2021-01-10 RX ADMIN — PROPOFOL PRN MLS/HR: 10 INJECTION, EMULSION INTRAVENOUS at 00:13

## 2021-01-10 RX ADMIN — CETIRIZINE HYDROCHLORIDE SCH MG: 10 TABLET, FILM COATED ORAL at 07:56

## 2021-01-10 RX ADMIN — FAMOTIDINE SCH MG: 10 INJECTION, SOLUTION INTRAVENOUS at 07:56

## 2021-01-10 RX ADMIN — NYSTATIN SCH APP: 100000 POWDER TOPICAL at 08:02

## 2021-01-10 RX ADMIN — PROPOFOL PRN MLS/HR: 10 INJECTION, EMULSION INTRAVENOUS at 19:29

## 2021-01-10 RX ADMIN — ZINC SULFATE CAP 220 MG (50 MG ELEMENTAL ZN) SCH MG: 220 (50 ZN) CAP at 07:55

## 2021-01-10 RX ADMIN — PROPOFOL PRN MLS/HR: 10 INJECTION, EMULSION INTRAVENOUS at 06:50

## 2021-01-10 NOTE — PDOC
[employer] PROGRESS NOTE


Date of Service


DOS:


DATE: 1/10/21 


TIME: 09:57





Chief Complaint


Chief Complaint


impression ====================





Acute hypoxic respiratory failure - no pulmonary history. With recent travel to 

and from [place] likely COVID 19 vs other atypical pneumonia. Cont empiric 

antibiotics, steroids. Consult Pulm. 


Improved aeration of the lungs with persistent moderate mixed interstitial and 

alveolar airspace disease.  1/02 


Pt. experienced hypoxia and respiratory decompensation overnight requiring 

intubation 


now on vent 100% PEEP of 10 


Hypotension as well now on pressors


acute respiratory distress syndrome. Consideration may be given for multifocal 

pneumonia versus pulmonary edema.


COVID 19 positive - with pneumonia - given transaminitis and late presentation 

with high O2 requirements no indication for remdesivir


RYDER - likely vasomotor nephropathy - no known renal history, will hydrate


Pneumonia - likely atypical, gram negative vs viral. will cover 


Prostate Ca - s/p resection at [facility] in [place]


Hypokalemia - likely nutritional or loss from diarrhea, will replace


Elevated troponin - likely from type II demand ischemia, will trend troponins, 

maintain telemetry


Crohn's - sees rheumatology regularly, Dr Fan in [place], on sulfasalazine


Psoriatic arthritis - on pain medications 30mg MS Contin BID, tramadol and 25mg 

Methotrexate weekly as DMARD per rheumatology, Dr. Fan. Hold MTX while 

inpatient


GERD - PPI


HLD - statin


HTN - Lisinopril and HCTZ on hold for RYDER


Anemia - likely of chronic disease, will monitor


Cardiomegaly - notable on CT chest - no known cardiac history, though his mother

did have CHF and CAD


Right renal mass - will need US for further assessment


on 100% FIO2 via Vapotherm , tolerating 


HYPOTENSION 


history of psoriatic arthritis.  He is likely immuno-compromised


as he has been on methotrexate


Severe malnutrition








plan==============








PPX - lovenox


FULL CODE


Dispo - ICU 


IVF bolus for hypotension, vasopressors if needed to keep MAP above 65 


start continuous IVF 


Monitor for respiratory distress requiring intubation 


COVID-19 positive


Continue with empiric antibiotic


Continue with IV steroids with slow taper


Continue PPN for nutritional support  


DVT/GI PPX 


blood cultures


ID CONSULT


PROCALCITONIN











1-02-21 now on vent 100% PEEP of 10





History of Present Illness


History of Present Illness





Mr Nance is 64 yo male w/ past medical history of Crohn's, psoriatic arthritis,

GERD, HLD, HTN, prostate ca, tongue cancer who presents to the emergency 

department at [facility] concerned about shortness of breath that had been 

progressive. This started 12/18/2020 and has been getting worse since that time.

Of note patient also has loss of taste, loss of smell, cough, shortness of 

breath, fever. He recently went to [place] on a business trip and just 

returned 5 days prior to presentation.


Upon arrival to the emergency room patient had significant hypoxia with a pulse 

ox in the 40s.  He was placed initially on a nonrebreather and then placed on 

BiPAP.


He was also somewhat concerned about a Crohn's flareup.  Patient states he has 

been having abdominal pain with nausea and diarrhea.  These are not typical 

symptoms of his Crohn's.  He feels very tired and has body aches as well.


He was given steroids and Rocephin in ED. 


Chest radiograph concerning for bilateral interstitial infiltrates.


Labs significant for WBC 4.5, Hb 11.8, platelets 229, , K2.8, BUN 34, CR 

1.5, glucose 119, .9, albumin 3, , , lactic acid 1.6, D-

dimer 3.15, troponin 0.068.


ABG on 80% FiO2 7.53/42/64


CT negative for pulmonary embolism. Widespread airspace disease throughout both 

lungs, consistent with pneumonia. Mild mediastinal and bilateral hilar 

lymphadenopathy, likely reactive. Cardiomegaly with mild aneurysmal dilation of 

the ascending thoracic aorta. Indeterminate hyperdense nodule at the midpole 

right kidney. This could represent a solid mass or complex cyst.


Patient transferred to [facility] for pulmonary consultation and 

ICU admission.





12/24: Overnight BP improved with fluids. O2 saturations slightly increased. Did

not tolerate more than 1 hour off BIPAP even with non-rebreather O2 saturations 

were 92% at best. No pain currently, very slightly appetite.


12/25: COVID-19 returned positive.  Down to 65% on his BiPAP 18/8.  Was able to 

take it off for medications meal yesterday, but desaturates to 79% and recovers 

quickly.  Still very drowsy with little appetite.  Afebrile.


12/26: Afebrile with 100% O2 on BiPAP today.  Transition to Vapotherm with more 

ease of breathing.  He has almost no appetite.  Less drowsy today.  He is 

depressed mood but now has a cell phone  and is able to talk to his 

family.  No complaints of chest pain some abdominal pain no bowel movement as of

yet.  ABG 7.49/37/59


12-29 abg pending 





1/4: Patient seen in [place] [address] ICU.  Breathing on vent, FiO2 90%, PEEP 10.  He is

afebrile.  Continue treatment with cefepime, Zyvox, and steroids.  Present labs 

reviewed, discussed with RN.


1/5: Seen in [place] [address] ICU.  Still on vent, FiO2 90%, PEEP 10.  Afebrile.  

Continue supportive care and steroids.  Continue antibiotics cefepime and 

linezolid.  Discussed with RN.


1/6: Afebrile.  Intubated, FiO2 85%, PEEP 10.  Hemoglobin is dropping.  Continue

to monitor hemoglobin, transfuse as needed.  Continue supportive care, steroids.

 Antibiotics, per ID.  Discussed with RN.


1/7: Patient seen in [place] [address] ICU.  Remains on ventilator, FiO2 35%, PEEP 10.  

Hemoglobin appears to have stabilized.  Continue steroids, antibiotics, 

supportive care.  Discussed with RN


1/8: Seen in [place] [address] ICU.  Afebrile.  Intubated, FiO2 65%, PEEP 9.  Discussed 

with RN, no acute events overnight.  Continue steroids, antibiotics, supportive 

care.


1/9: Afebrile.  On vent with FiO2 65%, PEEP 9.  No acute events overnight.  

Continue cefepime and supportive care.  Discussed with RN.


1/10: Patient seen in ICU.  On vent, FiO2 55%, PEEP 9.  Discussed with RN, no 

acute vents overnight.  No significant improvement.  Charts and labs reviewed.  

Continue cefepime and supportive care.





Vitals/I&O


Vitals/I&O:





                                   Vital Signs








  Date Time  Temp Pulse Resp B/P (MAP) Pulse Ox O2 Delivery O2 Flow Rate FiO2


 


1/10/21 09:00  58 28 89/49 (62) 97 Ventilator  


 


1/10/21 08:00 99.0       





 99.0       


 


1/9/21 10:45       40.0 














                                    I & O   


 


 1/9/21 1/9/21 1/10/21





 14:59 22:59 06:59


 


Intake Total 200 ml 1085 ml 1100 ml


 


Output Total 280 ml 210 ml 485 ml


 


Balance -80 ml 875 ml 615 ml











Physical Exam


Physical Exam:


GENERAL:  Intubated, sedated.


HEENT:  Normocephalic, atraumatic.  Anicteric.  ETT and OGT tube in place.


NECK:  Supple.


LUNGS:  Decreased breath sounds at the bases.  No wheezing.


HEART:  S1, S2.


ABDOMEN:  Soft, nontender, nondistended.


EXTREMITIES:  No edema, no cyanosis.


GENITOURINARY:  Hart in place.


CENTRAL NERVOUS SYSTEM:  Intubated, sedated.


PSYCHIATRIC:  Unable to assess.


LINES:  Right upper extremity PICC line clean.


General:  No acute distress


Heart:  Regular rate


Abdomen:  Soft, No masses


Extremities:  No clubbing, No cyanosis, No edema, Normal pulses


Skin:  No rashes, No breakdown, No significant lesion





Labs


Labs:





Laboratory Tests








Test


 1/10/21


05:45 1/10/21


07:45


 


White Blood Count


 5.2 x10^3/uL


(4.0-11.0) 





 


Red Blood Count


 2.50 x10^6/uL


(4.30-5.70) 





 


Hemoglobin


 8.0 g/dL


(13.0-17.5) 





 


Hematocrit


 24.5 %


(39.0-53.0) 





 


Mean Corpuscular Volume 98 fL ()  


 


Mean Corpuscular Hemoglobin 32 pg (25-35)  


 


Mean Corpuscular Hemoglobin


Concent 33 g/dL


(31-37) 





 


Red Cell Distribution Width


 15.7 %


(11.5-14.5) 





 


Platelet Count


 333 x10^3/uL


(140-400) 





 


Neutrophils (%) (Auto) 60 % (31-73)  


 


Lymphocytes (%) (Auto) 23 % (24-48)  


 


Monocytes (%) (Auto) 13 % (0-9)  


 


Eosinophils (%) (Auto) 2 % (0-3)  


 


Basophils (%) (Auto) 1 % (0-3)  


 


Neutrophils # (Auto)


 3.2 x10^3/uL


(1.8-7.7) 





 


Lymphocytes # (Auto)


 1.2 x10^3/uL


(1.0-4.8) 





 


Monocytes # (Auto)


 0.7 x10^3/uL


(0.0-1.1) 





 


Eosinophils # (Auto)


 0.1 x10^3/uL


(0.0-0.7) 





 


Basophils # (Auto)


 0.1 x10^3/uL


(0.0-0.2) 





 


Sodium Level


 143 mmol/L


(136-145) 





 


Potassium Level


 3.9 mmol/L


(3.5-5.1) 





 


Chloride Level


 108 mmol/L


() 





 


Carbon Dioxide Level


 30 mmol/L


(21-32) 





 


Anion Gap 5 (6-14)  


 


Blood Urea Nitrogen


 21 mg/dL


(8-26) 





 


Creatinine


 0.7 mg/dL


(0.7-1.3) 





 


Estimated GFR


(Cockcroft-Gault) 112.8 


 





 


Glucose Level


 92 mg/dL


(70-99) 





 


Calcium Level


 7.8 mg/dL


(8.5-10.1) 





 


O2 Saturation  86 % (92-99) 


 


Arterial Blood pH


 


 7.36


(7.35-7.45)


 


Arterial Blood pCO2 at


Patient Temp 


 54 mmHg


(35-46)


 


Arterial Blood pO2 at Patient


Temp 


 55 mmHg


()


 


Arterial Blood HCO3


 


 29 mmol/L


(21-28)


 


Arterial Blood Base Excess


 


 3 mmol/L


(-3-3)


 


FiO2  55/vent 











Comment


Review of Relevant


I have reviewed the following items jabier (where applicable) has been applied.





Justifications for Admission


Other Justification














WALESKA BOB MD            Pantera 10, 2021 09:58

## 2021-01-10 NOTE — PDOC
PULMONARY PROGRESS NOTES


DATE: 1/10/21 


TIME: 09:56


Subjective


Patient remains on ventilatory support, FiO2 65% and a PEEP of 9


ongoing hypoxia, and mild hypotension 


afebrile 


Other concerns from nursing


Vitals





Vital Signs








  Date Time  Temp Pulse Resp B/P (MAP) Pulse Ox O2 Delivery O2 Flow Rate FiO2


 


1/10/21 09:00  58 28 89/49 (62) 97 Ventilator  


 


1/10/21 08:00 99.0       





 99.0       


 


1/9/21 10:45       40.0 








Comments


Pt. seen during COVID-19 pandemic, visual exam preformed 


RRR


intubated 


no distress


no accessory muscle use 


No edema or rash


Labs





Laboratory Tests








Test


 1/9/21


06:15 1/9/21


09:00 1/10/21


05:45 1/10/21


07:45


 


White Blood Count


 5.5 x10^3/uL


(4.0-11.0) 


 5.2 x10^3/uL


(4.0-11.0) 





 


Red Blood Count


 2.48 x10^6/uL


(4.30-5.70) 


 2.50 x10^6/uL


(4.30-5.70) 





 


Hemoglobin


 8.0 g/dL


(13.0-17.5) 


 8.0 g/dL


(13.0-17.5) 





 


Hematocrit


 24.2 %


(39.0-53.0) 


 24.5 %


(39.0-53.0) 





 


Mean Corpuscular Volume 98 fL ()   98 fL ()  


 


Mean Corpuscular Hemoglobin 32 pg (25-35)   32 pg (25-35)  


 


Mean Corpuscular Hemoglobin


Concent 33 g/dL


(31-37) 


 33 g/dL


(31-37) 





 


Red Cell Distribution Width


 15.8 %


(11.5-14.5) 


 15.7 %


(11.5-14.5) 





 


Platelet Count


 325 x10^3/uL


(140-400) 


 333 x10^3/uL


(140-400) 





 


Neutrophils (%) (Auto) 63 % (31-73)   60 % (31-73)  


 


Lymphocytes (%) (Auto) 21 % (24-48)   23 % (24-48)  


 


Monocytes (%) (Auto) 12 % (0-9)   13 % (0-9)  


 


Eosinophils (%) (Auto) 3 % (0-3)   2 % (0-3)  


 


Basophils (%) (Auto) 1 % (0-3)   1 % (0-3)  


 


Neutrophils # (Auto)


 3.5 x10^3/uL


(1.8-7.7) 


 3.2 x10^3/uL


(1.8-7.7) 





 


Lymphocytes # (Auto)


 1.2 x10^3/uL


(1.0-4.8) 


 1.2 x10^3/uL


(1.0-4.8) 





 


Monocytes # (Auto)


 0.7 x10^3/uL


(0.0-1.1) 


 0.7 x10^3/uL


(0.0-1.1) 





 


Eosinophils # (Auto)


 0.1 x10^3/uL


(0.0-0.7) 


 0.1 x10^3/uL


(0.0-0.7) 





 


Basophils # (Auto)


 0.1 x10^3/uL


(0.0-0.2) 


 0.1 x10^3/uL


(0.0-0.2) 





 


Sodium Level


 141 mmol/L


(136-145) 


 143 mmol/L


(136-145) 





 


Potassium Level


 3.5 mmol/L


(3.5-5.1) 


 3.9 mmol/L


(3.5-5.1) 





 


Chloride Level


 109 mmol/L


() 


 108 mmol/L


() 





 


Carbon Dioxide Level


 28 mmol/L


(21-32) 


 30 mmol/L


(21-32) 





 


Anion Gap 4 (6-14)   5 (6-14)  


 


Blood Urea Nitrogen


 20 mg/dL


(8-26) 


 21 mg/dL


(8-26) 





 


Creatinine


 0.6 mg/dL


(0.7-1.3) 


 0.7 mg/dL


(0.7-1.3) 





 


Estimated GFR


(Cockcroft-Gault) 134.8 


 


 112.8 


 





 


Glucose Level


 100 mg/dL


(70-99) 


 92 mg/dL


(70-99) 





 


Calcium Level


 8.3 mg/dL


(8.5-10.1) 


 7.8 mg/dL


(8.5-10.1) 





 


Triglycerides Level


 133 mg/dL


(0-150) 


 


 





 


O2 Saturation  92 % (92-99)   86 % (92-99) 


 


Arterial Blood pH


 


 7.36


(7.35-7.45) 


 7.36


(7.35-7.45)


 


Arterial Blood pCO2 at


Patient Temp 


 49 mmHg


(35-46) 


 54 mmHg


(35-46)


 


Arterial Blood pO2 at Patient


Temp 


 68 mmHg


() 


 55 mmHg


()


 


Arterial Blood HCO3


 


 27 mmol/L


(21-28) 


 29 mmol/L


(21-28)


 


Arterial Blood Base Excess


 


 2 mmol/L


(-3-3) 


 3 mmol/L


(-3-3)


 


FiO2  65/vent   55/vent 








Laboratory Tests








Test


 1/10/21


05:45 1/10/21


07:45


 


White Blood Count


 5.2 x10^3/uL


(4.0-11.0) 





 


Red Blood Count


 2.50 x10^6/uL


(4.30-5.70) 





 


Hemoglobin


 8.0 g/dL


(13.0-17.5) 





 


Hematocrit


 24.5 %


(39.0-53.0) 





 


Mean Corpuscular Volume 98 fL ()  


 


Mean Corpuscular Hemoglobin 32 pg (25-35)  


 


Mean Corpuscular Hemoglobin


Concent 33 g/dL


(31-37) 





 


Red Cell Distribution Width


 15.7 %


(11.5-14.5) 





 


Platelet Count


 333 x10^3/uL


(140-400) 





 


Neutrophils (%) (Auto) 60 % (31-73)  


 


Lymphocytes (%) (Auto) 23 % (24-48)  


 


Monocytes (%) (Auto) 13 % (0-9)  


 


Eosinophils (%) (Auto) 2 % (0-3)  


 


Basophils (%) (Auto) 1 % (0-3)  


 


Neutrophils # (Auto)


 3.2 x10^3/uL


(1.8-7.7) 





 


Lymphocytes # (Auto)


 1.2 x10^3/uL


(1.0-4.8) 





 


Monocytes # (Auto)


 0.7 x10^3/uL


(0.0-1.1) 





 


Eosinophils # (Auto)


 0.1 x10^3/uL


(0.0-0.7) 





 


Basophils # (Auto)


 0.1 x10^3/uL


(0.0-0.2) 





 


Sodium Level


 143 mmol/L


(136-145) 





 


Potassium Level


 3.9 mmol/L


(3.5-5.1) 





 


Chloride Level


 108 mmol/L


() 





 


Carbon Dioxide Level


 30 mmol/L


(21-32) 





 


Anion Gap 5 (6-14)  


 


Blood Urea Nitrogen


 21 mg/dL


(8-26) 





 


Creatinine


 0.7 mg/dL


(0.7-1.3) 





 


Estimated GFR


(Cockcroft-Gault) 112.8 


 





 


Glucose Level


 92 mg/dL


(70-99) 





 


Calcium Level


 7.8 mg/dL


(8.5-10.1) 





 


O2 Saturation  86 % (92-99) 


 


Arterial Blood pH


 


 7.36


(7.35-7.45)


 


Arterial Blood pCO2 at


Patient Temp 


 54 mmHg


(35-46)


 


Arterial Blood pO2 at Patient


Temp 


 55 mmHg


()


 


Arterial Blood HCO3


 


 29 mmol/L


(21-28)


 


Arterial Blood Base Excess


 


 3 mmol/L


(-3-3)


 


FiO2  55/vent 








Medications





Active Scripts








 Medications  Dose


 Route/Sig


 Max Daily Dose Days Date Category


 


 Morphine Sulfate


 Er (Morphine


 Sulfate) 30 Mg


 Tablet.er  1 Tab


 PO BID


   12/23/20 Reported


 


 Tramadol Hcl 100


 Mg Tbmp.24hr  100 Mg


 PO PRN Q8HRS PRN


   12/23/20 Reported


 


 Ambien (Zolpidem


 Tartrate) 10 Mg


 Tablet  10 Mg


 PO PRN QHS PRN


   12/23/20 Reported


 


 Methotrexate


  (Methotrexate


 Sodium) 2.5 Mg


 Tablet  10 Tab


 PO WEEKLY


   12/23/20 Reported


 


 Lisinopril 10 Mg


 Tablet  1 Tab


 PO DAILY


   12/23/20 Reported


 


 Crestor


  (Rosuvastatin


 Calcium) 5 Mg


 Tablet  2 Tab


 PO DAILY


   12/23/20 Reported


 


 Hydrochlorothiazide


 Tablet


  (Hydrochlorothiazide)


 50 Mg Tablet  25 Mg


 PO DAILY


   12/23/20 Reported


 


 Escitalopram


 Oxalate 10 Mg


 Tablet  1 Tab


 PO DAILY


   12/23/20 Reported


 


 Sulfasalazine Dr


  (Sulfasalazine)


 500 Mg Tablet.dr  2 Tab


 PO TID


  30 12/23/20 Reported


 


 Colace (Docusate


 Sodium) 100 Mg


 Capsule  1 Cap


 PO DA


  30 12/23/20 Reported


 


 Acetaminophen


 Ext.release


  (Acetaminophen)


 650 Mg Tablet.er  650 Mg


 PO PRN Q8HRS PRN


   12/23/20 Reported


 


 Melatonin 5 Mg


 Capsule  1 Cap


 PO QHS


  30 12/23/20 Reported


 


 B-12


  (Cyanocobalamin


  (Vitamin B-12))


 500 Mcg Tablet  2 Tab


 PO DAILY


  30 12/23/20 Reported


 


 Vitamin D3


  (Cholecalciferol


  (Vitamin D3)) 50


 Mcg Capsule  50 Mcg


 PO DAILY


   12/23/20 Reported


 


 Aller-Sung


  (Cetirizine Hcl)


 10 Mg Tablet  1 Tab


 PO DAILY


  30 12/23/20 Reported








Comments


CXR 1/4/21


IMPRESSION: No significant change in multifocal consolidation superimposed on 

diffuse interstitial infiltrate.





Impression


.


IMPRESSION:


1.  Acute hypoxic respiratory failure secondary to highly suspected COVID-19


pneumonia and acute respiratory distress syndrome/acute lung injury.-- COVID-19 

positive 


2.  Abnormal CT chest with extensive widespread ground glass and alveolar and


interstitial infiltrates with reactive mild mediastinal/hilar adenopathy.  This


is likely related to COVID-19 pneumonia.


3.  Patient with history of psoriatic arthritis.  He is likely immunocompromise


as he has been on methotrexate.  covering for opportunistic infection with Abx


4.  History of tongue cancer with resection, details unknown.


5.  History of prostate cancer.


6.  History of Crohn's disease.





Plan


.


RECOMMENDATIONS:


Continue current vent support. wean  Fi02 65% and PEEP of 9, pt. was weaned down

to 55 % overnight


Follow chest x-ray/ABG-- increase FI02 to 65 %


Vasopressors ass needed to Keep MAP greater than 65 --off pressors 


COVID-19 positive


Continue ABX per ID, cefepime


Continue TF for nutritional support 


DVT/GI PPX 





D/W RN and RT 











PT. is FULL CODE





Critical Care time 1000-1030AM











ABI MENDEZ MD                 [place]tera 10, 2021 09:58

## 2021-01-11 VITALS — SYSTOLIC BLOOD PRESSURE: 107 MMHG | DIASTOLIC BLOOD PRESSURE: 64 MMHG

## 2021-01-11 VITALS — SYSTOLIC BLOOD PRESSURE: 104 MMHG | DIASTOLIC BLOOD PRESSURE: 64 MMHG

## 2021-01-11 VITALS — SYSTOLIC BLOOD PRESSURE: 84 MMHG | DIASTOLIC BLOOD PRESSURE: 54 MMHG

## 2021-01-11 VITALS — SYSTOLIC BLOOD PRESSURE: 118 MMHG | DIASTOLIC BLOOD PRESSURE: 77 MMHG

## 2021-01-11 VITALS — SYSTOLIC BLOOD PRESSURE: 94 MMHG | DIASTOLIC BLOOD PRESSURE: 52 MMHG

## 2021-01-11 VITALS — DIASTOLIC BLOOD PRESSURE: 59 MMHG | SYSTOLIC BLOOD PRESSURE: 94 MMHG

## 2021-01-11 VITALS — SYSTOLIC BLOOD PRESSURE: 97 MMHG | DIASTOLIC BLOOD PRESSURE: 58 MMHG

## 2021-01-11 VITALS — DIASTOLIC BLOOD PRESSURE: 60 MMHG | SYSTOLIC BLOOD PRESSURE: 113 MMHG

## 2021-01-11 VITALS — SYSTOLIC BLOOD PRESSURE: 140 MMHG | DIASTOLIC BLOOD PRESSURE: 78 MMHG

## 2021-01-11 VITALS — SYSTOLIC BLOOD PRESSURE: 128 MMHG | DIASTOLIC BLOOD PRESSURE: 76 MMHG

## 2021-01-11 VITALS — SYSTOLIC BLOOD PRESSURE: 101 MMHG | DIASTOLIC BLOOD PRESSURE: 65 MMHG

## 2021-01-11 VITALS — DIASTOLIC BLOOD PRESSURE: 66 MMHG | SYSTOLIC BLOOD PRESSURE: 124 MMHG

## 2021-01-11 VITALS — DIASTOLIC BLOOD PRESSURE: 71 MMHG | SYSTOLIC BLOOD PRESSURE: 110 MMHG

## 2021-01-11 VITALS — DIASTOLIC BLOOD PRESSURE: 70 MMHG | SYSTOLIC BLOOD PRESSURE: 119 MMHG

## 2021-01-11 VITALS — DIASTOLIC BLOOD PRESSURE: 84 MMHG | SYSTOLIC BLOOD PRESSURE: 140 MMHG

## 2021-01-11 VITALS — SYSTOLIC BLOOD PRESSURE: 159 MMHG | DIASTOLIC BLOOD PRESSURE: 91 MMHG

## 2021-01-11 VITALS — SYSTOLIC BLOOD PRESSURE: 92 MMHG | DIASTOLIC BLOOD PRESSURE: 54 MMHG

## 2021-01-11 VITALS — SYSTOLIC BLOOD PRESSURE: 129 MMHG | DIASTOLIC BLOOD PRESSURE: 79 MMHG

## 2021-01-11 VITALS — SYSTOLIC BLOOD PRESSURE: 100 MMHG | DIASTOLIC BLOOD PRESSURE: 61 MMHG

## 2021-01-11 VITALS — SYSTOLIC BLOOD PRESSURE: 106 MMHG | DIASTOLIC BLOOD PRESSURE: 61 MMHG

## 2021-01-11 VITALS — DIASTOLIC BLOOD PRESSURE: 56 MMHG | SYSTOLIC BLOOD PRESSURE: 87 MMHG

## 2021-01-11 VITALS — DIASTOLIC BLOOD PRESSURE: 76 MMHG | SYSTOLIC BLOOD PRESSURE: 125 MMHG

## 2021-01-11 VITALS — DIASTOLIC BLOOD PRESSURE: 116 MMHG | SYSTOLIC BLOOD PRESSURE: 157 MMHG

## 2021-01-11 VITALS — SYSTOLIC BLOOD PRESSURE: 126 MMHG | DIASTOLIC BLOOD PRESSURE: 70 MMHG

## 2021-01-11 LAB
ANION GAP SERPL CALC-SCNC: 5 MMOL/L (ref 6–14)
BASE EXCESS ABG: 4 MMOL/L (ref -3–3)
BASOPHILS # BLD AUTO: 0.1 X10^3/UL (ref 0–0.2)
BASOPHILS NFR BLD: 1 % (ref 0–3)
BUN SERPL-MCNC: 25 MG/DL (ref 8–26)
CALCIUM SERPL-MCNC: 8.5 MG/DL (ref 8.5–10.1)
CHLORIDE SERPL-SCNC: 109 MMOL/L (ref 98–107)
CO2 SERPL-SCNC: 31 MMOL/L (ref 21–32)
CREAT SERPL-MCNC: 0.5 MG/DL (ref 0.7–1.3)
EOSINOPHIL NFR BLD: 0.2 X10^3/UL (ref 0–0.7)
EOSINOPHIL NFR BLD: 3 % (ref 0–3)
ERYTHROCYTE [DISTWIDTH] IN BLOOD BY AUTOMATED COUNT: 15.4 % (ref 11.5–14.5)
GFR SERPLBLD BASED ON 1.73 SQ M-ARVRAT: 166.4 ML/MIN
GLUCOSE SERPL-MCNC: 101 MG/DL (ref 70–99)
HCO3 BLDA-SCNC: 30 MMOL/L (ref 21–28)
HCT VFR BLD CALC: 24.2 % (ref 39–53)
HGB BLD-MCNC: 8.3 G/DL (ref 13–17.5)
INSPIRATION SETTING TIME VENT: 65
LYMPHOCYTES # BLD: 1.1 X10^3/UL (ref 1–4.8)
LYMPHOCYTES NFR BLD AUTO: 20 % (ref 24–48)
MCH RBC QN AUTO: 33 PG (ref 25–35)
MCHC RBC AUTO-ENTMCNC: 34 G/DL (ref 31–37)
MCV RBC AUTO: 97 FL (ref 79–100)
MONO #: 0.6 X10^3/UL (ref 0–1.1)
MONOCYTES NFR BLD: 11 % (ref 0–9)
NEUT #: 3.6 X10^3/UL (ref 1.8–7.7)
NEUTROPHILS NFR BLD AUTO: 65 % (ref 31–73)
PCO2 BLDA: 51 MMHG (ref 35–46)
PLATELET # BLD AUTO: 309 X10^3/UL (ref 140–400)
PO2 BLDA: 64 MMHG (ref 65–108)
POTASSIUM SERPL-SCNC: 3.6 MMOL/L (ref 3.5–5.1)
RBC # BLD AUTO: 2.49 X10^6/UL (ref 4.3–5.7)
SAO2 % BLDA: 91 % (ref 92–99)
SODIUM SERPL-SCNC: 145 MMOL/L (ref 136–145)
WBC # BLD AUTO: 5.6 X10^3/UL (ref 4–11)

## 2021-01-11 RX ADMIN — CYANOCOBALAMIN TAB 1000 MCG SCH MCG: 1000 TAB at 10:01

## 2021-01-11 RX ADMIN — PROPOFOL PRN MLS/HR: 10 INJECTION, EMULSION INTRAVENOUS at 21:20

## 2021-01-11 RX ADMIN — CITALOPRAM HYDROBROMIDE SCH MG: 20 TABLET ORAL at 09:57

## 2021-01-11 RX ADMIN — ENOXAPARIN SODIUM SCH MG: 40 INJECTION SUBCUTANEOUS at 09:56

## 2021-01-11 RX ADMIN — FAMOTIDINE SCH MG: 10 INJECTION, SOLUTION INTRAVENOUS at 09:56

## 2021-01-11 RX ADMIN — ATORVASTATIN CALCIUM SCH MG: 40 TABLET, FILM COATED ORAL at 21:19

## 2021-01-11 RX ADMIN — MIDAZOLAM PRN MLS/HR: 5 INJECTION, SOLUTION INTRAMUSCULAR; INTRAVENOUS at 09:59

## 2021-01-11 RX ADMIN — PROPOFOL PRN MLS/HR: 10 INJECTION, EMULSION INTRAVENOUS at 17:11

## 2021-01-11 RX ADMIN — NYSTATIN SCH APP: 100000 POWDER TOPICAL at 21:00

## 2021-01-11 RX ADMIN — PROPOFOL PRN MLS/HR: 10 INJECTION, EMULSION INTRAVENOUS at 09:11

## 2021-01-11 RX ADMIN — PROPOFOL PRN MLS/HR: 10 INJECTION, EMULSION INTRAVENOUS at 02:40

## 2021-01-11 RX ADMIN — MIDAZOLAM PRN MLS/HR: 5 INJECTION, SOLUTION INTRAMUSCULAR; INTRAVENOUS at 23:53

## 2021-01-11 RX ADMIN — NYSTATIN SCH APP: 100000 POWDER TOPICAL at 09:00

## 2021-01-11 RX ADMIN — POLYETHYLENE GLYCOL 3350 SCH GM: 17 POWDER, FOR SOLUTION ORAL at 09:57

## 2021-01-11 RX ADMIN — CEFEPIME SCH GM: 2 INJECTION, POWDER, FOR SOLUTION INTRAVENOUS at 21:18

## 2021-01-11 RX ADMIN — Medication PRN MLS/HR: at 02:02

## 2021-01-11 RX ADMIN — CEFEPIME SCH GM: 2 INJECTION, POWDER, FOR SOLUTION INTRAVENOUS at 10:00

## 2021-01-11 RX ADMIN — Medication PRN MLS/HR: at 14:45

## 2021-01-11 RX ADMIN — CETIRIZINE HYDROCHLORIDE SCH MG: 10 TABLET, FILM COATED ORAL at 09:56

## 2021-01-11 RX ADMIN — ZINC SULFATE CAP 220 MG (50 MG ELEMENTAL ZN) SCH MG: 220 (50 ZN) CAP at 09:56

## 2021-01-11 RX ADMIN — ENOXAPARIN SODIUM SCH MG: 40 INJECTION SUBCUTANEOUS at 21:19

## 2021-01-11 RX ADMIN — FAMOTIDINE SCH MG: 10 INJECTION, SOLUTION INTRAVENOUS at 21:19

## 2021-01-11 NOTE — RAD
XR CHEST 1V 



INDICATION: Reason: Vent    ICU#113 / Spl. Instructions:  / History: . 



COMPARISON STUDY: 1/4/2021.



FINDINGS:



Life Support Devices: Stable endotracheal tube, enteric tube, right PICC.



Lungs: Normal lung volume. Stable patchy bilateral heterogeneous opacities.



Pleura: Stable pleural spaces.



Heart and Mediastinum: Stable cardiomediastinal silhouette and great vessels. 



Bones and Soft Tissues: Stable regional skeleton and soft tissues.



IMPRESSION:  

1. Stable life support devices.

2. Stable patchy bilateral opacities.



Electronically signed by: Barney Fish MD (1/11/2021 8:07 AM) [id number]

## 2021-01-11 NOTE — PDOC
Infectious Disease Note


Subjective:


Subjective


intubated on vent





Vital Signs:


Vital Signs





Vital Signs








  Date Time  Temp Pulse Resp B/P (MAP) Pulse Ox O2 Delivery O2 Flow Rate FiO2


 


1/11/21 06:00  61 28 94/59 (71) 100 Ventilator  


 


1/11/21 04:00 98.6       





 98.6       


 


1/10/21 12:35       40.0 











Physical Exam:


PHYSICAL EXAM


GENERAL:  Intubated, sedated.


HEENT:  Normocephalic, atraumatic.  Anicteric.  ETT and OGT tube in place.


NECK:  Supple.


LUNGS:  Decreased breath sounds at the bases.  No wheezing.


HEART:  S1, S2.


ABDOMEN:  Soft, nontender, nondistended.


EXTREMITIES:  No edema, no cyanosis.


GENITOURINARY:  Hart in place.scrotal swelling +


CENTRAL NERVOUS SYSTEM:  Intubated, sedated.


PSYCHIATRIC:  Unable to assess.


LINES:  Right upper extremity PICC line clean.





Medications:


Inpatient Meds:





Current Medications








 Medications


  (Trade)  Dose


 Ordered  Sig/Kayli  Start Time


 Stop Time Status Last Admin


Dose Admin


 


 Acetaminophen


  (Tylenol Supp)  650 mg  PRN Q6HRS  PRN  12/23/20 08:45


     





 


 Acetaminophen


  (Tylenol)  650 mg  PRN Q4HRS  PRN  12/23/20 08:15


    12/31/20 01:02


650 MG


 


 Alteplase,


 Recombinant


  (Cathflo For


 Central Catheter


 Clearance)  1 mg  1X  ONCE  1/1/21 16:15


 1/1/21 16:16 DC 1/2/21 02:10


1 MG


 


 Amino Acids/


 Glycerin/


 Electrolytes  1,000 ml @ 


 80 mls/hr  [id number]  12/23/20 08:45


 1/5/21 05:03 DC 1/4/21 14:53


80 MLS/HR


 


 Atorvastatin


 Calcium


  (Lipitor)  40 mg  QHS  12/23/20 21:00


    1/10/21 20:38


40 MG


 


 Azithromycin 250


 mg/Sodium Chloride  250 ml @ 


 250 mls/hr  Q24H  12/24/20 09:00


 12/27/20 09:59 DC 12/27/20 08:55


250 MLS/HR


 


 Azithromycin 500


 mg/Sodium Chloride  250 ml @ 


 250 mls/hr  1X  ONCE  12/23/20 10:00


 12/23/20 10:59 DC 12/23/20 09:59


250 MLS/HR


 


 Benzonatate


  (Tessalon Perle)  100 mg  [id number]  12/26/20 14:00


 1/2/21 10:35 DC 1/1/21 20:51


100 MG


 


 Cefepime HCl


  (Maxipime)  2 gm  Q12HR  1/3/21 14:00


    1/10/21 20:43


2 GM


 


 Ceftriaxone Sodium


  (Rocephin)  1 gm  Q24H  12/24/20 09:00


 1/3/21 13:15 DC 1/3/21 08:14


1 GM


 


 Cetirizine HCl


  (ZyrTEC)  10 mg  DAILY  12/24/20 09:00


    1/10/21 07:56


10 MG


 


 Chlorhexidine


 Gluconate


  (Peridex)  15 ml  BID  1/2/21 09:00


 1/9/21 19:26 DC 1/9/21 09:21


15 ML


 


 Citalopram


 Hydrobromide


  (CeleXA)  20 mg  DAILY  12/24/20 09:00


    1/10/21 07:56


20 MG


 


 Cyanocobalamin


  (Vitamin B-12)  1,000 mcg  DAILY  12/24/20 09:00


    1/10/21 07:56


1,000 MCG


 


 Dexamethasone


 Sodium Phosphate


  (Decadron)  6 mg  DAILY  1/4/21 09:00


 1/5/21 08:55 DC 1/5/21 08:36


6 MG


 


 Docusate Sodium


  (Colace)  100 mg  PRN BID  PRN  12/23/20 08:15


    12/25/20 17:03


100 MG


 


 Enalaprilat


  (Vasotec Inj)  2.5 mg  PRN Q6HRS  PRN  12/27/20 10:00


    12/28/20 12:29


2.5 MG


 


 Enoxaparin Sodium


  (Lovenox 40mg


 Syringe)  40 mg  Q12HR  12/23/20 09:00


    1/10/21 20:38


40 MG


 


 Famotidine


  (Pepcid Vial)  20 mg  BID  1/8/21 21:00


    1/10/21 20:38


20 MG


 


 Fentanyl Citrate  55 ml @ 0


 mls/hr  CONT PRN  PRN  1/2/21 02:45


    1/11/21 02:02


4 MLS/HR


 


 Furosemide


  (Lasix)  20 mg  1X  ONCE  1/1/21 12:30


 1/1/21 12:31 DC 1/1/21 13:34


20 MG


 


 Guaifenesin


  (Robitussin Dm)  10 ml  PRN Q6HRS  PRN  12/23/20 08:45


    1/1/21 15:37


10 ML


 


 Guaifenesin


  (Robitussin)  400 mg  PRN Q4HRS  PRN  12/27/20 22:30


    1/1/21 23:00


400 MG


 


 Info


  (Icu Electrolyte


 Protocol)  1 ea  CONT PRN  PRN  12/24/20 14:15


     





 


 Labetalol HCl


  (Normodyne Iv


 Push)  10 mg  PRN Q2HR  PRN  12/26/20 18:30


    1/6/21 17:55


10 MG


 


 Linezolid


  (Zyvox)  600 mg  BID  1/3/21 21:00


 1/7/21 07:58 DC 1/6/21 20:38


600 MG


 


 Midazolam HCl  100 ml @ 0


 mls/hr  CONT  PRN  1/2/21 01:15


    1/10/21 06:58


5 MLS/HR


 


 Morphine Sulfate


  (Morphine


 Sulfate)  2 mg  PRN Q4HRS  PRN  12/23/20 08:45


 1/2/21 01:41 DC 1/1/21 16:12


2 MG


 


 Morphine Sulfate


  (Ms Contin)  15 mg  BID  12/23/20 21:00


 1/6/21 14:43 DC 1/5/21 21:11


15 MG


 


 Norepinephrine


 Bitartrate 8 mg/


 Dextrose  258 ml @ 


 19.737 mls/


 hr  CONT  PRN  1/2/21 07:30


 1/5/21 08:55 DC 1/2/21 22:27


19.737 MLS/HR


 


 Nystatin


  (Nystop)  1 lindsay  BID  1/6/21 16:00


    1/10/21 20:38


1 LINDSAY


 


 Olanzapine


  (ZyPREXA ZYDIS)  5 mg  PRN BID  PRN  12/27/20 12:30


    1/1/21 08:20


5 MG


 


 Ondansetron HCl


  (Zofran)  4 mg  PRN Q4HRS  PRN  12/23/20 08:15


     





 


 Polyethylene


 Glycol


  (miraLAX PACKET)  17 gm  DAILY  1/6/21 14:30


    1/10/21 07:55


17 GM


 


 Potassium Chloride


  (Klor-Con)  40 meq  1X  ONCE  12/24/20 14:15


 12/24/20 14:16 DC 12/24/20 14:24


40 MEQ


 


 Propofol  100 ml @ 0


 mls/hr  CONT  PRN  1/2/21 01:15


    1/11/21 02:40


15.15 MLS/HR


 


 Quetiapine


 Fumarate


  (SEROquel)  50 mg  PRN QHS  PRN  12/27/20 21:00


    1/1/21 00:24


50 MG


 


 Sodium Chloride  1,000 ml @ 


 0 mls/hr  Q0M  1/7/21 13:15


     





 


 Sodium Chloride


  (Normal Saline


 Flush)  3 ml  QSHIFT  PRN  12/23/20 07:45


     





 


 Sterile Water


  (WATER for RESP)  1,000 ml  CONT  PRN  12/26/20 09:30


    1/1/21 15:49


1,000 ML


 


 Succinylcholine


 Chloride


  (Anectine)  200 mg  STK-MED ONCE  1/2/21 01:21


 1/2/21 01:21 DC  





 


 Sulfasalazine


  (Azulfidine)  1,000 mg  BID  12/23/20 21:00


    1/10/21 20:38


1,000 MG


 


 Tramadol HCl


  (Ultram)  100 mg  PRN Q8HRS  PRN  12/23/20 15:00


    1/1/21 13:35


100 MG


 


 Vancomycin HCl


  (Vanco Per


 Pharmacy)  1 each  PRN DAILY  PRN  1/2/21 16:00


 1/3/21 13:19 DC 1/2/21 20:47


1 EACH


 


 Vancomycin HCl


  (Vancomycin


 Trough Level)  1 each  1X  ONCE  1/4/21 04:30


 1/3/21 13:20 DC  





 


 Vancomycin HCl


 1.5 gm/Sodium


 Chloride  500 ml @ 


 250 mls/hr  Q12H  1/3/21 05:00


 1/3/21 13:15 DC 1/3/21 05:53


250 MLS/HR


 


 Vancomycin HCl 2


 gm/Sodium Chloride  500 ml @ 


 250 mls/hr  1X  ONCE  1/2/21 17:00


 1/2/21 18:59 DC 1/2/21 17:43


250 MLS/HR


 


 Vecuronium Bromide


  (Norcuron Bolus)  6 mg  PRN Q6HRS  PRN  1/2/21 09:15


    1/9/21 17:07


6 MG


 


 Zinc Sulfate


  (Orazinc)  220 mg  DAILY  12/23/20 09:00


    1/10/21 07:55


220 MG


 


 Zolpidem Tartrate


  (Ambien)  5 mg  PRN QHS  PRN  12/23/20 15:00


    1/1/21 22:05


5 MG











Labs:


Lab





Laboratory Tests








Test


 1/10/21


07:45


 


O2 Saturation 86 % (92-99) 


 


Arterial Blood pH


 7.36


(7.35-7.45)


 


Arterial Blood pCO2 at


Patient Temp 54 mmHg


(35-46)


 


Arterial Blood pO2 at Patient


Temp 55 mmHg


()


 


Arterial Blood HCO3


 29 mmol/L


(21-28)


 


Arterial Blood Base Excess


 3 mmol/L


(-3-3)


 


FiO2 55/vent 











Objective:


Assessment:


 


1.  Fever, improved


2.  Leukocytosis, on steroids.


3.  Sepsis.


4.  Acute hypoxic respiratory failure.


5.  COVID-19 infection.


6.  History of psoriasis.


7.  History of Crohn's.


8.  Immunosuppression.


9.  Anemia.


10.  History of tongue and prostate cancer.





Plan:


Plan of Care


1. Continue Cefepime.


2.   Continue supportive care.


3.  Follow up blood culture results.


4.On steroids.


5.  Critically ill.


6.  Prognosis is guarded.


   Discussed with JOSE.











NISA KNAPP MD           Jan 11, 2021 07:16

## 2021-01-11 NOTE — PDOC
[employer] PROGRESS NOTE


Date of Service


DOS:


DATE: 1/11/21 


TIME: 11:29





Chief Complaint


Chief Complaint


Acute hypoxic respiratory failure - no pulmonary history. With recent travel to 

and from [place] likely COVID 19 vs other atypical pneumonia. Cont empiric 

antibiotics, steroids. Consult Pulm. 


Improved aeration of the lungs with persistent moderate mixed interstitial and 

alveolar airspace disease.  1/02 


Pt. experienced hypoxia and respiratory decompensation overnight requiring 

intubation 


now on vent 100% PEEP of 10 


Hypotension as well now on pressors


acute respiratory distress syndrome. Consideration may be given for multifocal 

pneumonia versus pulmonary edema.


COVID 19 positive - with pneumonia - given transaminitis and late presentation 

with high O2 requirements no indication for remdesivir


RYDER - likely vasomotor nephropathy - no known renal history, will hydrate


Pneumonia - likely atypical, gram negative vs viral. will cover 


Prostate Ca - s/p resection at [facility] in [place]


Hypokalemia - likely nutritional or loss from diarrhea, will replace


Elevated troponin - likely from type II demand ischemia, will trend troponins, 

maintain telemetry


Crohn's - sees rheumatology regularly, Dr Fan in [place], on sulfasalazine


Psoriatic arthritis - on pain medications 30mg MS Contin BID, tramadol and 25mg 

Methotrexate weekly as DMARD per rheumatology, Dr. Fan. Hold MTX while 

inpatient


GERD - PPI


HLD - statin


HTN - Lisinopril and HCTZ on hold for RYDER


Anemia - likely of chronic disease, will monitor


Cardiomegaly - notable on CT chest - no known cardiac history, though his mother

did have CHF and CAD


Right renal mass - will need US for further assessment


on 100% FIO2 via Vapotherm , tolerating 


HYPOTENSION 


history of psoriatic arthritis.  He is likely immuno-compromised


as he has been on methotrexate


Severe malnutrition





History of Present Illness


History of Present Illness


1/11/2021


Patient seen and examined in the COVID-19 ICU


He remains intubated


Assist-control/28/450/60 5% FiO2 with 9 of PEEP


He has a Hart to bedside drainage


He is sedated with propofol and Versed and fentanyl


Discussed with RN and RT


Chart reviewed


He remains critically ill











Mr Nance is 64 yo male w/ past medical history of Crohn's, psoriatic arthritis,

GERD, HLD, HTN, prostate ca, tongue cancer who presents to the emergency 

department at [facility] concerned about shortness of breath that had been 

progressive. This started 12/18/2020 and has been getting worse since that time.

Of note patient also has loss of taste, loss of smell, cough, shortness of 

breath, fever. He recently went to [place] on a business trip and just 

returned 5 days prior to presentation.


Upon arrival to the emergency room patient had significant hypoxia with a pulse 

ox in the 40s.  He was placed initially on a nonrebreather and then placed on 

BiPAP.


He was also somewhat concerned about a Crohn's flareup.  Patient states he has 

been having abdominal pain with nausea and diarrhea.  These are not typical 

symptoms of his Crohn's.  He feels very tired and has body aches as well.


He was given steroids and Rocephin in ED. 


Chest radiograph concerning for bilateral interstitial infiltrates.


Labs significant for WBC 4.5, Hb 11.8, platelets 229, , K2.8, BUN 34, CR 

1.5, glucose 119, .9, albumin 3, , , lactic acid 1.6, D-

dimer 3.15, troponin 0.068.


ABG on 80% FiO2 7.53/42/64


CT negative for pulmonary embolism. Widespread airspace disease throughout both 

lungs, consistent with pneumonia. Mild mediastinal and bilateral hilar 

lymphadenopathy, likely reactive. Cardiomegaly with mild aneurysmal dilation of 

the ascending thoracic aorta. Indeterminate hyperdense nodule at the midpole 

right kidney. This could represent a solid mass or complex cyst.


Patient transferred to [facility] for pulmonary consultation and 

ICU admission.





12/24: Overnight BP improved with fluids. O2 saturations slightly increased. Did

not tolerate more than 1 hour off BIPAP even with non-rebreather O2 saturations 

were 92% at best. No pain currently, very slightly appetite.


12/25: COVID-19 returned positive.  Down to 65% on his BiPAP 18/8.  Was able to 

take it off for medications meal yesterday, but desaturates to 79% and recovers 

quickly.  Still very drowsy with little appetite.  Afebrile.


12/26: Afebrile with 100% O2 on BiPAP today.  Transition to Vapotherm with more 

ease of breathing.  He has almost no appetite.  Less drowsy today.  He is 

depressed mood but now has a cell phone  and is able to talk to his 

family.  No complaints of chest pain some abdominal pain no bowel movement as of

yet.  ABG 7.49/37/59


12-29 abg pending 





1/4: Patient seen in [place] [address] ICU.  Breathing on vent, FiO2 90%, PEEP 10.  He is

afebrile.  Continue treatment with cefepime, Zyvox, and steroids.  Present labs 

reviewed, discussed with RN.


1/5: Seen in [place] [address] ICU.  Still on vent, FiO2 90%, PEEP 10.  Afebrile.  

Continue supportive care and steroids.  Continue antibiotics cefepime and 

linezolid.  Discussed with RN.


1/6: Afebrile.  Intubated, FiO2 85%, PEEP 10.  Hemoglobin is dropping.  Continue

to monitor hemoglobin, transfuse as needed.  Continue supportive care, steroids.

 Antibiotics, per ID.  Discussed with RN.


1/7: Patient seen in [place] [address] ICU.  Remains on ventilator, FiO2 35%, PEEP 10.  

Hemoglobin appears to have stabilized.  Continue steroids, antibiotics, 

supportive care.  Discussed with RN


1/8: Seen in [place] [address] ICU.  Afebrile.  Intubated, FiO2 65%, PEEP 9.  Discussed 

with RN, no acute events overnight.  Continue steroids, antibiotics, supportive 

care.


1/9: Afebrile.  On vent with FiO2 65%, PEEP 9.  No acute events overnight.  

Continue cefepime and supportive care.  Discussed with RN.


1/10: Patient seen in ICU.  On vent, FiO2 55%, PEEP 9.  Discussed with RN, no 

acute vents overnight.  No significant improvement.  Charts and labs reviewed.  

Continue cefepime and supportive care.





Vitals/I&O


Vitals/I&O:





                                   Vital Signs








  Date Time  Temp Pulse Resp B/P (MAP) Pulse Ox O2 Delivery O2 Flow Rate FiO2


 


1/11/21 11:00  66 28 97/58 (71) 97 Ventilator  


 


1/11/21 08:00 98.9       





 98.9       


 


1/11/21 06:45       40.0 














                                    I & O   


 


 1/10/21 1/10/21 1/11/21





 15:00 23:00 07:00


 


Intake Total 200 ml 1689 ml 501 ml


 


Output Total 330 ml 200 ml 260 ml


 


Balance -130 ml 1489 ml 241 ml











Physical Exam


Physical Exam:


GENERAL:  Intubated, sedated.


HEENT:  Normocephalic, atraumatic.  Anicteric.  ETT and OGT tube in place.


NECK:  Supple.


LUNGS:  Decreased breath sounds at the bases.  No wheezing.


HEART:  S1, S2.


ABDOMEN:  Soft, nontender, nondistended.


EXTREMITIES:  No edema, no cyanosis.


GENITOURINARY:  Hart in place.scrotal swelling +


CENTRAL NERVOUS SYSTEM:  Intubated, sedated.


PSYCHIATRIC:  Unable to assess.


LINES:  Right upper extremity PICC line clean.


General:  No acute distress


Heart:  Regular rate


Abdomen:  Soft, No masses


Extremities:  No clubbing, No cyanosis, No edema, Normal pulses


Skin:  No rashes, No breakdown, No significant lesion





Labs


Labs:





Laboratory Tests








Test


 1/11/21


08:30 1/11/21


08:50


 


White Blood Count


 5.6 x10^3/uL


(4.0-11.0) 





 


Red Blood Count


 2.49 x10^6/uL


(4.30-5.70) 





 


Hemoglobin


 8.3 g/dL


(13.0-17.5) 





 


Hematocrit


 24.2 %


(39.0-53.0) 





 


Mean Corpuscular Volume 97 fL ()  


 


Mean Corpuscular Hemoglobin 33 pg (25-35)  


 


Mean Corpuscular Hemoglobin


Concent 34 g/dL


(31-37) 





 


Red Cell Distribution Width


 15.4 %


(11.5-14.5) 





 


Platelet Count


 309 x10^3/uL


(140-400) 





 


Neutrophils (%) (Auto) 65 % (31-73)  


 


Lymphocytes (%) (Auto) 20 % (24-48)  


 


Monocytes (%) (Auto) 11 % (0-9)  


 


Eosinophils (%) (Auto) 3 % (0-3)  


 


Basophils (%) (Auto) 1 % (0-3)  


 


Neutrophils # (Auto)


 3.6 x10^3/uL


(1.8-7.7) 





 


Lymphocytes # (Auto)


 1.1 x10^3/uL


(1.0-4.8) 





 


Monocytes # (Auto)


 0.6 x10^3/uL


(0.0-1.1) 





 


Eosinophils # (Auto)


 0.2 x10^3/uL


(0.0-0.7) 





 


Basophils # (Auto)


 0.1 x10^3/uL


(0.0-0.2) 





 


Sodium Level


 145 mmol/L


(136-145) 





 


Potassium Level


 3.6 mmol/L


(3.5-5.1) 





 


Chloride Level


 109 mmol/L


() 





 


Carbon Dioxide Level


 31 mmol/L


(21-32) 





 


Anion Gap 5 (6-14)  


 


Blood Urea Nitrogen


 25 mg/dL


(8-26) 





 


Creatinine


 0.5 mg/dL


(0.7-1.3) 





 


Estimated GFR


(Cockcroft-Gault) 166.4 


 





 


Glucose Level


 101 mg/dL


(70-99) 





 


Calcium Level


 8.5 mg/dL


(8.5-10.1) 





 


O2 Saturation  91 % (92-99) 


 


Arterial Blood pH


 


 7.39


(7.35-7.45)


 


Arterial Blood pCO2 at


Patient Temp 


 51 mmHg


(35-46)


 


Arterial Blood pO2 at Patient


Temp 


 64 mmHg


()


 


Arterial Blood HCO3


 


 30 mmol/L


(21-28)


 


Arterial Blood Base Excess


 


 4 mmol/L


(-3-3)


 


FiO2  65 











Assessment and Plan


Assessmemt and Plan


Acute hypoxic respiratory failure - no pulmonary history. With recent travel to 

and from [place] likely COVID 19 vs other atypical pneumonia. Cont empiric 

antibiotics, steroids. Consult Pulm. 


Improved aeration of the lungs with persistent moderate mixed interstitial and 

alveolar airspace disease.  1/02 


Pt. experienced hypoxia and respiratory decompensation overnight requiring 

intubation 


now on vent 100% PEEP of 10 


Hypotension as well now on pressors


acute respiratory distress syndrome. Consideration may be given for multifocal 

pneumonia versus pulmonary edema.


COVID 19 positive - with pneumonia - given transaminitis and late presentation 

with high O2 requirements no indication for remdesivir


RYDER - likely vasomotor nephropathy - no known renal history, will hydrate


Pneumonia - likely atypical, gram negative vs viral. will cover 


Prostate Ca - s/p resection at [facility] in [place]


Hypokalemia - likely nutritional or loss from diarrhea, will replace


Elevated troponin - likely from type II demand ischemia, will trend troponins, 

maintain telemetry


Crohn's - sees rheumatology regularly, Dr Fan in [place], on sulfasalazine


Psoriatic arthritis - on pain medications 30mg MS Contin BID, tramadol and 25mg 

Methotrexate weekly as DMARD per rheumatology, Dr. Fan. Hold MTX while 

inpatient


GERD - PPI


HLD - statin


HTN - Lisinopril and HCTZ on hold for RYDER


Anemia - likely of chronic disease, will monitor


Cardiomegaly - notable on CT chest - no known cardiac history, though his mother

did have CHF and CAD


Right renal mass - will need US for further assessment


on 100% FIO2 via Vapotherm , tolerating 


HYPOTENSION 


history of psoriatic arthritis.  He is likely immuno-compromised


as he has been on methotrexate


Severe malnutrition





Plan


Continue ICU monitoring


Vent weaning


COVID-19 protocol


IV fluids 


Continue propofol and Versed and fentanyl for sedation


Monitor for respiratory distress requiring intubation 


Continue with empiric antibiotic


Continue with IV steroids with slow taper


Continue PPN for nutritional support  


DVT/GI PPX 


blood cultures


ID CONSULT


PROCALCITONIN


Appreciate subspecialist input


Prognosis very guarded


CC time 32





Comment


Review of Relevant


I have reviewed the following items jabier (where applicable) has been applied.





Justifications for Admission


Other Justification














NELY ANDERSON III DO           Jan 11, 2021 11:32

## 2021-01-11 NOTE — PDOC
PULMONARY PROGRESS NOTES


DATE: 1/11/21 


TIME: 10:04


Subjective


Patient remains on ventilatory support, FiO2 65% and a PEEP of 9


ongoing hypoxia, and mild hypotension 


afebrile 


Other concerns from nursing


Vitals





Vital Signs








  Date Time  Temp Pulse Resp B/P (MAP) Pulse Ox O2 Delivery O2 Flow Rate FiO2


 


1/11/21 08:45     96 Ventilator  


 


1/11/21 06:45       40.0 


 


1/11/21 06:00  61 28 94/59 (71)    


 


1/11/21 04:00 98.6       





 98.6       








Comments


Pt. seen during COVID-19 pandemic, visual exam preformed 


RRR


intubated 


no distress


no accessory muscle use 


No edema or rash


Labs





Laboratory Tests








Test


 1/10/21


05:45 1/10/21


07:45 1/11/21


08:30 1/11/21


08:50


 


White Blood Count


 5.2 x10^3/uL


(4.0-11.0) 


 5.6 x10^3/uL


(4.0-11.0) 





 


Red Blood Count


 2.50 x10^6/uL


(4.30-5.70) 


 2.49 x10^6/uL


(4.30-5.70) 





 


Hemoglobin


 8.0 g/dL


(13.0-17.5) 


 8.3 g/dL


(13.0-17.5) 





 


Hematocrit


 24.5 %


(39.0-53.0) 


 24.2 %


(39.0-53.0) 





 


Mean Corpuscular Volume 98 fL ()   97 fL ()  


 


Mean Corpuscular Hemoglobin 32 pg (25-35)   33 pg (25-35)  


 


Mean Corpuscular Hemoglobin


Concent 33 g/dL


(31-37) 


 34 g/dL


(31-37) 





 


Red Cell Distribution Width


 15.7 %


(11.5-14.5) 


 15.4 %


(11.5-14.5) 





 


Platelet Count


 333 x10^3/uL


(140-400) 


 309 x10^3/uL


(140-400) 





 


Neutrophils (%) (Auto) 60 % (31-73)   65 % (31-73)  


 


Lymphocytes (%) (Auto) 23 % (24-48)   20 % (24-48)  


 


Monocytes (%) (Auto) 13 % (0-9)   11 % (0-9)  


 


Eosinophils (%) (Auto) 2 % (0-3)   3 % (0-3)  


 


Basophils (%) (Auto) 1 % (0-3)   1 % (0-3)  


 


Neutrophils # (Auto)


 3.2 x10^3/uL


(1.8-7.7) 


 3.6 x10^3/uL


(1.8-7.7) 





 


Lymphocytes # (Auto)


 1.2 x10^3/uL


(1.0-4.8) 


 1.1 x10^3/uL


(1.0-4.8) 





 


Monocytes # (Auto)


 0.7 x10^3/uL


(0.0-1.1) 


 0.6 x10^3/uL


(0.0-1.1) 





 


Eosinophils # (Auto)


 0.1 x10^3/uL


(0.0-0.7) 


 0.2 x10^3/uL


(0.0-0.7) 





 


Basophils # (Auto)


 0.1 x10^3/uL


(0.0-0.2) 


 0.1 x10^3/uL


(0.0-0.2) 





 


Sodium Level


 143 mmol/L


(136-145) 


 145 mmol/L


(136-145) 





 


Potassium Level


 3.9 mmol/L


(3.5-5.1) 


 3.6 mmol/L


(3.5-5.1) 





 


Chloride Level


 108 mmol/L


() 


 109 mmol/L


() 





 


Carbon Dioxide Level


 30 mmol/L


(21-32) 


 31 mmol/L


(21-32) 





 


Anion Gap 5 (6-14)   5 (6-14)  


 


Blood Urea Nitrogen


 21 mg/dL


(8-26) 


 25 mg/dL


(8-26) 





 


Creatinine


 0.7 mg/dL


(0.7-1.3) 


 0.5 mg/dL


(0.7-1.3) 





 


Estimated GFR


(Cockcroft-Gault) 112.8 


 


 166.4 


 





 


Glucose Level


 92 mg/dL


(70-99) 


 101 mg/dL


(70-99) 





 


Calcium Level


 7.8 mg/dL


(8.5-10.1) 


 8.5 mg/dL


(8.5-10.1) 





 


O2 Saturation  86 % (92-99)   91 % (92-99) 


 


Arterial Blood pH


 


 7.36


(7.35-7.45) 


 7.39


(7.35-7.45)


 


Arterial Blood pCO2 at


Patient Temp 


 54 mmHg


(35-46) 


 51 mmHg


(35-46)


 


Arterial Blood pO2 at Patient


Temp 


 55 mmHg


() 


 64 mmHg


()


 


Arterial Blood HCO3


 


 29 mmol/L


(21-28) 


 30 mmol/L


(21-28)


 


Arterial Blood Base Excess


 


 3 mmol/L


(-3-3) 


 4 mmol/L


(-3-3)


 


FiO2  55/vent   65 








Laboratory Tests








Test


 1/11/21


08:30 1/11/21


08:50


 


White Blood Count


 5.6 x10^3/uL


(4.0-11.0) 





 


Red Blood Count


 2.49 x10^6/uL


(4.30-5.70) 





 


Hemoglobin


 8.3 g/dL


(13.0-17.5) 





 


Hematocrit


 24.2 %


(39.0-53.0) 





 


Mean Corpuscular Volume 97 fL ()  


 


Mean Corpuscular Hemoglobin 33 pg (25-35)  


 


Mean Corpuscular Hemoglobin


Concent 34 g/dL


(31-37) 





 


Red Cell Distribution Width


 15.4 %


(11.5-14.5) 





 


Platelet Count


 309 x10^3/uL


(140-400) 





 


Neutrophils (%) (Auto) 65 % (31-73)  


 


Lymphocytes (%) (Auto) 20 % (24-48)  


 


Monocytes (%) (Auto) 11 % (0-9)  


 


Eosinophils (%) (Auto) 3 % (0-3)  


 


Basophils (%) (Auto) 1 % (0-3)  


 


Neutrophils # (Auto)


 3.6 x10^3/uL


(1.8-7.7) 





 


Lymphocytes # (Auto)


 1.1 x10^3/uL


(1.0-4.8) 





 


Monocytes # (Auto)


 0.6 x10^3/uL


(0.0-1.1) 





 


Eosinophils # (Auto)


 0.2 x10^3/uL


(0.0-0.7) 





 


Basophils # (Auto)


 0.1 x10^3/uL


(0.0-0.2) 





 


Sodium Level


 145 mmol/L


(136-145) 





 


Potassium Level


 3.6 mmol/L


(3.5-5.1) 





 


Chloride Level


 109 mmol/L


() 





 


Carbon Dioxide Level


 31 mmol/L


(21-32) 





 


Anion Gap 5 (6-14)  


 


Blood Urea Nitrogen


 25 mg/dL


(8-26) 





 


Creatinine


 0.5 mg/dL


(0.7-1.3) 





 


Estimated GFR


(Cockcroft-Gault) 166.4 


 





 


Glucose Level


 101 mg/dL


(70-99) 





 


Calcium Level


 8.5 mg/dL


(8.5-10.1) 





 


O2 Saturation  91 % (92-99) 


 


Arterial Blood pH


 


 7.39


(7.35-7.45)


 


Arterial Blood pCO2 at


Patient Temp 


 51 mmHg


(35-46)


 


Arterial Blood pO2 at Patient


Temp 


 64 mmHg


()


 


Arterial Blood HCO3


 


 30 mmol/L


(21-28)


 


Arterial Blood Base Excess


 


 4 mmol/L


(-3-3)


 


FiO2  65 








Medications





Active Scripts








 Medications  Dose


 Route/Sig


 Max Daily Dose Days Date Category


 


 Morphine Sulfate


 Er (Morphine


 Sulfate) 30 Mg


 Tablet.er  1 Tab


 PO BID


   12/23/20 Reported


 


 Tramadol Hcl 100


 Mg Tbmp.24hr  100 Mg


 PO PRN Q8HRS PRN


   12/23/20 Reported


 


 Ambien (Zolpidem


 Tartrate) 10 Mg


 Tablet  10 Mg


 PO PRN QHS PRN


   12/23/20 Reported


 


 Methotrexate


  (Methotrexate


 Sodium) 2.5 Mg


 Tablet  10 Tab


 PO WEEKLY


   12/23/20 Reported


 


 Lisinopril 10 Mg


 Tablet  1 Tab


 PO DAILY


   12/23/20 Reported


 


 Crestor


  (Rosuvastatin


 Calcium) 5 Mg


 Tablet  2 Tab


 PO DAILY


   12/23/20 Reported


 


 Hydrochlorothiazide


 Tablet


  (Hydrochlorothiazide)


 50 Mg Tablet  25 Mg


 PO DAILY


   12/23/20 Reported


 


 Escitalopram


 Oxalate 10 Mg


 Tablet  1 Tab


 PO DAILY


   12/23/20 Reported


 


 Sulfasalazine Dr


  (Sulfasalazine)


 500 Mg Tablet.dr  2 Tab


 PO TID


  30 12/23/20 Reported


 


 Colace (Docusate


 Sodium) 100 Mg


 Capsule  1 Cap


 PO DA


  30 12/23/20 Reported


 


 Acetaminophen


 Ext.release


  (Acetaminophen)


 650 Mg Tablet.er  650 Mg


 PO PRN Q8HRS PRN


   12/23/20 Reported


 


 Melatonin 5 Mg


 Capsule  1 Cap


 PO QHS


  30 12/23/20 Reported


 


 B-12


  (Cyanocobalamin


  (Vitamin B-12))


 500 Mcg Tablet  2 Tab


 PO DAILY


  30 12/23/20 Reported


 


 Vitamin D3


  (Cholecalciferol


  (Vitamin D3)) 50


 Mcg Capsule  50 Mcg


 PO DAILY


   12/23/20 Reported


 


 Aller-Sung


  (Cetirizine Hcl)


 10 Mg Tablet  1 Tab


 PO DAILY


  30 12/23/20 Reported








Comments


CXR 1/11/21


IMPRESSION: No significant change in multifocal consolidation superimposed on 

diffuse interstitial infiltrate.





Impression


.


IMPRESSION:


1.  Acute hypoxic respiratory failure secondary to highly suspected COVID-19


pneumonia and acute respiratory distress syndrome/acute lung injury.-- COVID-19 

positive 


2.  Abnormal CT chest with extensive widespread ground glass and alveolar and


interstitial infiltrates with reactive mild mediastinal/hilar adenopathy.  This


is likely related to COVID-19 pneumonia.


3.  Patient with history of psoriatic arthritis.  He is likely immunocompromise


as he has been on methotrexate.  covering for opportunistic infection with Abx


4.  History of tongue cancer with resection, details unknown.


5.  History of prostate cancer.


6.  History of Crohn's disease.





Plan


.


RECOMMENDATIONS:


Continue current vent support. wean  Fi02 65% and PEEP of 9, 


Follow chest x-ray/ABG-- 


Vasopressors ass needed to Keep MAP greater than 65 --off pressors 


COVID-19 positive


Continue ABX per ID, cefepime


Continue TF for nutritional support 


DVT/GI PPX 





D/W RN and RT 


prognosis guarded








PT. is FULL CODE





Critical Care time 30 min











ABI MENDEZ MD                 Jan 11, 2021 10:06

## 2021-01-12 VITALS — DIASTOLIC BLOOD PRESSURE: 63 MMHG | SYSTOLIC BLOOD PRESSURE: 119 MMHG

## 2021-01-12 VITALS — SYSTOLIC BLOOD PRESSURE: 126 MMHG | DIASTOLIC BLOOD PRESSURE: 78 MMHG

## 2021-01-12 VITALS — SYSTOLIC BLOOD PRESSURE: 125 MMHG | DIASTOLIC BLOOD PRESSURE: 69 MMHG

## 2021-01-12 VITALS — DIASTOLIC BLOOD PRESSURE: 59 MMHG | SYSTOLIC BLOOD PRESSURE: 104 MMHG

## 2021-01-12 VITALS — SYSTOLIC BLOOD PRESSURE: 138 MMHG | DIASTOLIC BLOOD PRESSURE: 80 MMHG

## 2021-01-12 VITALS — SYSTOLIC BLOOD PRESSURE: 82 MMHG | DIASTOLIC BLOOD PRESSURE: 49 MMHG

## 2021-01-12 VITALS — DIASTOLIC BLOOD PRESSURE: 49 MMHG | SYSTOLIC BLOOD PRESSURE: 85 MMHG

## 2021-01-12 VITALS — SYSTOLIC BLOOD PRESSURE: 91 MMHG | DIASTOLIC BLOOD PRESSURE: 54 MMHG

## 2021-01-12 VITALS — SYSTOLIC BLOOD PRESSURE: 103 MMHG | DIASTOLIC BLOOD PRESSURE: 59 MMHG

## 2021-01-12 VITALS — DIASTOLIC BLOOD PRESSURE: 71 MMHG | SYSTOLIC BLOOD PRESSURE: 128 MMHG

## 2021-01-12 VITALS — DIASTOLIC BLOOD PRESSURE: 83 MMHG | SYSTOLIC BLOOD PRESSURE: 156 MMHG

## 2021-01-12 VITALS — DIASTOLIC BLOOD PRESSURE: 91 MMHG | SYSTOLIC BLOOD PRESSURE: 169 MMHG

## 2021-01-12 VITALS — DIASTOLIC BLOOD PRESSURE: 54 MMHG | SYSTOLIC BLOOD PRESSURE: 97 MMHG

## 2021-01-12 VITALS — DIASTOLIC BLOOD PRESSURE: 52 MMHG | SYSTOLIC BLOOD PRESSURE: 86 MMHG

## 2021-01-12 VITALS — SYSTOLIC BLOOD PRESSURE: 95 MMHG | DIASTOLIC BLOOD PRESSURE: 57 MMHG

## 2021-01-12 VITALS — DIASTOLIC BLOOD PRESSURE: 53 MMHG | SYSTOLIC BLOOD PRESSURE: 99 MMHG

## 2021-01-12 VITALS — SYSTOLIC BLOOD PRESSURE: 113 MMHG | DIASTOLIC BLOOD PRESSURE: 60 MMHG

## 2021-01-12 VITALS — DIASTOLIC BLOOD PRESSURE: 76 MMHG | SYSTOLIC BLOOD PRESSURE: 120 MMHG

## 2021-01-12 VITALS — SYSTOLIC BLOOD PRESSURE: 100 MMHG | DIASTOLIC BLOOD PRESSURE: 55 MMHG

## 2021-01-12 VITALS — SYSTOLIC BLOOD PRESSURE: 109 MMHG | DIASTOLIC BLOOD PRESSURE: 55 MMHG

## 2021-01-12 VITALS — DIASTOLIC BLOOD PRESSURE: 73 MMHG | SYSTOLIC BLOOD PRESSURE: 130 MMHG

## 2021-01-12 VITALS — DIASTOLIC BLOOD PRESSURE: 65 MMHG | SYSTOLIC BLOOD PRESSURE: 123 MMHG

## 2021-01-12 VITALS — DIASTOLIC BLOOD PRESSURE: 59 MMHG | SYSTOLIC BLOOD PRESSURE: 91 MMHG

## 2021-01-12 VITALS — DIASTOLIC BLOOD PRESSURE: 81 MMHG | SYSTOLIC BLOOD PRESSURE: 144 MMHG

## 2021-01-12 VITALS — DIASTOLIC BLOOD PRESSURE: 72 MMHG | SYSTOLIC BLOOD PRESSURE: 119 MMHG

## 2021-01-12 LAB
BASE EXCESS ABG: 6 MMOL/L (ref -3–3)
HCO3 BLDA-SCNC: 32 MMOL/L (ref 21–28)
INSPIRATION SETTING TIME VENT: 65
PCO2 BLDA: 55 MMHG (ref 35–46)
PO2 BLDA: 64 MMHG (ref 65–108)
SAO2 % BLDA: 91 % (ref 92–99)

## 2021-01-12 RX ADMIN — ENOXAPARIN SODIUM SCH MG: 40 INJECTION SUBCUTANEOUS at 07:50

## 2021-01-12 RX ADMIN — ZINC SULFATE CAP 220 MG (50 MG ELEMENTAL ZN) SCH MG: 220 (50 ZN) CAP at 07:50

## 2021-01-12 RX ADMIN — CEFEPIME SCH GM: 2 INJECTION, POWDER, FOR SOLUTION INTRAVENOUS at 20:52

## 2021-01-12 RX ADMIN — CETIRIZINE HYDROCHLORIDE SCH MG: 10 TABLET, FILM COATED ORAL at 07:50

## 2021-01-12 RX ADMIN — Medication PRN MLS/HR: at 04:01

## 2021-01-12 RX ADMIN — POLYETHYLENE GLYCOL 3350 SCH GM: 17 POWDER, FOR SOLUTION ORAL at 07:50

## 2021-01-12 RX ADMIN — FAMOTIDINE SCH MG: 10 INJECTION, SOLUTION INTRAVENOUS at 20:52

## 2021-01-12 RX ADMIN — ATORVASTATIN CALCIUM SCH MG: 40 TABLET, FILM COATED ORAL at 20:51

## 2021-01-12 RX ADMIN — CEFEPIME SCH GM: 2 INJECTION, POWDER, FOR SOLUTION INTRAVENOUS at 07:49

## 2021-01-12 RX ADMIN — CYANOCOBALAMIN TAB 1000 MCG SCH MCG: 1000 TAB at 07:50

## 2021-01-12 RX ADMIN — NYSTATIN SCH APP: 100000 POWDER TOPICAL at 20:52

## 2021-01-12 RX ADMIN — Medication PRN MLS/HR: at 17:22

## 2021-01-12 RX ADMIN — ENOXAPARIN SODIUM SCH MG: 40 INJECTION SUBCUTANEOUS at 20:52

## 2021-01-12 RX ADMIN — NYSTATIN SCH APP: 100000 POWDER TOPICAL at 07:51

## 2021-01-12 RX ADMIN — MIDAZOLAM PRN MLS/HR: 5 INJECTION, SOLUTION INTRAMUSCULAR; INTRAVENOUS at 20:27

## 2021-01-12 RX ADMIN — PROPOFOL PRN MLS/HR: 10 INJECTION, EMULSION INTRAVENOUS at 04:01

## 2021-01-12 RX ADMIN — CITALOPRAM HYDROBROMIDE SCH MG: 20 TABLET ORAL at 07:50

## 2021-01-12 RX ADMIN — FAMOTIDINE SCH MG: 10 INJECTION, SOLUTION INTRAVENOUS at 07:50

## 2021-01-12 RX ADMIN — PROPOFOL PRN MLS/HR: 10 INJECTION, EMULSION INTRAVENOUS at 07:49

## 2021-01-12 NOTE — PDOC
[employer] PROGRESS NOTE


Date of Service


DOS:


DATE: 1/12/21 


TIME: 10:59





Chief Complaint


Chief Complaint


1/12/2021


Patient seen and examined in the ICU


He remains intubated


AC/28/450/60 5% with 9 of PEEP


He is sedated with propofol Versed and fentanyl


His Hart to bedside drainage


Chart reviewed


Discussed with RN


Remains critically ill








Acute hypoxic respiratory failure - no pulmonary history. With recent travel to 

and from [place] likely COVID 19 vs other atypical pneumonia. Cont empiric 

antibiotics, steroids. Consult Pulm. 


Improved aeration of the lungs with persistent moderate mixed interstitial and 

alveolar airspace disease.  1/02 


Pt. experienced hypoxia and respiratory decompensation overnight requiring 

intubation 


now on vent 100% PEEP of 10 


Hypotension as well now on pressors


acute respiratory distress syndrome. Consideration may be given for multifocal 

pneumonia versus pulmonary edema.


COVID 19 positive - with pneumonia - given transaminitis and late presentation 

with high O2 requirements no indication for remdesivir


RYDER - likely vasomotor nephropathy - no known renal history, will hydrate


Pneumonia - likely atypical, gram negative vs viral. will cover 


Prostate Ca - s/p resection at [facility] in [place]


Hypokalemia - likely nutritional or loss from diarrhea, will replace


Elevated troponin - likely from type II demand ischemia, will trend troponins, 

maintain telemetry


Crohn's - sees rheumatology regularly, Dr Fan in [place], on sulfasalazine


Psoriatic arthritis - on pain medications 30mg MS Contin BID, tramadol and 25mg 

Methotrexate weekly as DMARD per rheumatology, Dr. Fan. Hold MTX while 

inpatient


GERD - PPI


HLD - statin


HTN - Lisinopril and HCTZ on hold for RYDER


Anemia - likely of chronic disease, will monitor


Cardiomegaly - notable on CT chest - no known cardiac history, though his mother

did have CHF and CAD


Right renal mass - will need US for further assessment


on 100% FIO2 via Vapotherm , tolerating 


HYPOTENSION 


history of psoriatic arthritis.  He is likely immuno-compromised


as he has been on methotrexate


Severe malnutrition





History of Present Illness


History of Present Illness


1/11/2021


Patient seen and examined in the COVID-19 ICU


He remains intubated


Assist-control/28/450/60 5% FiO2 with 9 of PEEP


He has a Hart to bedside drainage


He is sedated with propofol and Versed and fentanyl


Discussed with RN and RT


Chart reviewed


He remains critically ill











Mr Nance is 66 yo male w/ past medical history of Crohn's, psoriatic arthritis,

GERD, HLD, HTN, prostate ca, tongue cancer who presents to the emergency 

department at [facility] concerned about shortness of breath that had been 

progressive. This started 12/18/2020 and has been getting worse since that time.

Of note patient also has loss of taste, loss of smell, cough, shortness of 

breath, fever. He recently went to [place] on a business trip and just 

returned 5 days prior to presentation.


Upon arrival to the emergency room patient had significant hypoxia with a pulse 

ox in the 40s.  He was placed initially on a nonrebreather and then placed on 

BiPAP.


He was also somewhat concerned about a Crohn's flareup.  Patient states he has 

been having abdominal pain with nausea and diarrhea.  These are not typical 

symptoms of his Crohn's.  He feels very tired and has body aches as well.


He was given steroids and Rocephin in ED. 


Chest radiograph concerning for bilateral interstitial infiltrates.


Labs significant for WBC 4.5, Hb 11.8, platelets 229, , K2.8, BUN 34, CR 

1.5, glucose 119, .9, albumin 3, , , lactic acid 1.6, D-

dimer 3.15, troponin 0.068.


ABG on 80% FiO2 7.53/42/64


CT negative for pulmonary embolism. Widespread airspace disease throughout both 

lungs, consistent with pneumonia. Mild mediastinal and bilateral hilar 

lymphadenopathy, likely reactive. Cardiomegaly with mild aneurysmal dilation of 

the ascending thoracic aorta. Indeterminate hyperdense nodule at the midpole 

right kidney. This could represent a solid mass or complex cyst.


Patient transferred to [facility] for pulmonary consultation and 

ICU admission.





12/24: Overnight BP improved with fluids. O2 saturations slightly increased. Did

not tolerate more than 1 hour off BIPAP even with non-rebreather O2 saturations 

were 92% at best. No pain currently, very slightly appetite.


12/25: COVID-19 returned positive.  Down to 65% on his BiPAP 18/8.  Was able to 

take it off for medications meal yesterday, but desaturates to 79% and recovers 

quickly.  Still very drowsy with little appetite.  Afebrile.


12/26: Afebrile with 100% O2 on BiPAP today.  Transition to Vapotherm with more 

ease of breathing.  He has almost no appetite.  Less drowsy today.  He is 

depressed mood but now has a cell phone  and is able to talk to his 

family.  No complaints of chest pain some abdominal pain no bowel movement as of

yet.  ABG 7.49/37/59


12-29 abg pending 





1/4: Patient seen in [place] [address] ICU.  Breathing on vent, FiO2 90%, PEEP 10.  He is

afebrile.  Continue treatment with cefepime, Zyvox, and steroids.  Present labs 

reviewed, discussed with RN.


1/5: Seen in [place] [address] ICU.  Still on vent, FiO2 90%, PEEP 10.  Afebrile.  

Continue supportive care and steroids.  Continue antibiotics cefepime and 

linezolid.  Discussed with RN.


1/6: Afebrile.  Intubated, FiO2 85%, PEEP 10.  Hemoglobin is dropping.  Continue

to monitor hemoglobin, transfuse as needed.  Continue supportive care, steroids.

 Antibiotics, per ID.  Discussed with RN.


1/7: Patient seen in [place] [address] ICU.  Remains on ventilator, FiO2 35%, PEEP 10.  

Hemoglobin appears to have stabilized.  Continue steroids, antibiotics, 

supportive care.  Discussed with RN


1/8: Seen in [place] [address] ICU.  Afebrile.  Intubated, FiO2 65%, PEEP 9.  Discussed 

with RN, no acute events overnight.  Continue steroids, antibiotics, supportive 

care.


1/9: Afebrile.  On vent with FiO2 65%, PEEP 9.  No acute events overnight.  

Continue cefepime and supportive care.  Discussed with RN.


1/10: Patient seen in ICU.  On vent, FiO2 55%, PEEP 9.  Discussed with RN, no 

acute vents overnight.  No significant improvement.  Charts and labs reviewed.  

Continue cefepime and supportive care.





Vitals/I&O


Vitals/I&O:





                                   Vital Signs








  Date Time  Temp Pulse Resp B/P (MAP) Pulse Ox O2 Delivery O2 Flow Rate FiO2


 


1/12/21 09:00  87 28 169/91 (117) 97 Ventilator  


 


1/12/21 08:00 98.4       





 98.4       


 


1/12/21 04:31       40.0 














                                    I & O   


 


 1/11/21 1/11/21 1/12/21





 15:00 23:00 07:00


 


Intake Total 200 ml 1414 ml 816 ml


 


Output Total 675 ml 475 ml 160 ml


 


Balance -475 ml 939 ml 656 ml











Physical Exam


Physical Exam:


GENERAL:  Intubated, sedated.


HEENT:  Normocephalic, atraumatic.  Anicteric.  ETT and OGT tube in place.


NECK:  Supple.


LUNGS:  Decreased breath sounds at the bases.  No wheezing.


HEART:  S1, S2.


ABDOMEN:  Soft, nontender, nondistended.


EXTREMITIES: Generalized anasarca


GENITOURINARY:  Hart in place.scrotal swelling +


CENTRAL NERVOUS SYSTEM:  Intubated, sedated.


PSYCHIATRIC:  Unable to assess.


LINES:  Right upper extremity PICC line clean.


General:  No acute distress


Heart:  Regular rate


Abdomen:  Soft, No masses


Extremities:  No clubbing, No cyanosis, No edema, Normal pulses


Skin:  No rashes, No breakdown, No significant lesion





Labs


Labs:





Laboratory Tests








Test


 1/12/21


08:25


 


O2 Saturation 91 % (92-99) 


 


Arterial Blood pH


 7.38


(7.35-7.45)


 


Arterial Blood pCO2 at


Patient Temp 55 mmHg


(35-46)


 


Arterial Blood pO2 at Patient


Temp 64 mmHg


()


 


Arterial Blood HCO3


 32 mmol/L


(21-28)


 


Arterial Blood Base Excess


 6 mmol/L


(-3-3)


 


FiO2 65 











Assessment and Plan


Assessmemt and Plan


Assessmemt and Plan


Acute hypoxic respiratory failure - no pulmonary history. With recent travel to 

and from [place] likely COVID 19 vs other atypical pneumonia. Cont empiric 

antibiotics, steroids. Consult Pulm. 


Improved aeration of the lungs with persistent moderate mixed interstitial and 

alveolar airspace disease.  1/02 


Pt. experienced hypoxia and respiratory decompensation overnight requiring 

intubation 


now on vent 100% PEEP of 10 


Hypotension as well now on pressors


acute respiratory distress syndrome. Consideration may be given for multifocal 

pneumonia versus pulmonary edema.


COVID 19 positive - with pneumonia - given transaminitis and late presentation 

with high O2 requirements no indication for remdesivir


RYDER - likely vasomotor nephropathy - no known renal history, will hydrate


Pneumonia - likely atypical, gram negative vs viral. will cover 


Prostate Ca - s/p resection at [facility] in [place]


Hypokalemia - likely nutritional or loss from diarrhea, will replace


Elevated troponin - likely from type II demand ischemia, will trend troponins, 

maintain telemetry


Crohn's - sees rheumatology regularly, Dr Fan in [place], on sulfasalazine


Psoriatic arthritis - on pain medications 30mg MS Contin BID, tramadol and 25mg 

Methotrexate weekly as DMARD per rheumatology, Dr. Fan. Hold MTX while 

inpatient


GERD - PPI


HLD - statin


HTN - Lisinopril and HCTZ on hold for RYDER


Anemia - likely of chronic disease, will monitor


Cardiomegaly - notable on CT chest - no known cardiac history, though his mother

did have CHF and CAD


Right renal mass - will need US for further assessment


on 100% FIO2 via Vapotherm , tolerating 


HYPOTENSION 


history of psoriatic arthritis.  He is likely immuno-compromised


as he has been on methotrexate


Severe malnutrition





Plan


Continue ICU monitoring


Vent weaning


COVID-19 protocol


IV fluids 


Continue propofol and Versed and fentanyl for sedation


Monitor for respiratory distress requiring intubation 


Continue with empiric antibiotic


Continue with IV steroids with slow taper


Continue PPN for nutritional support  


Mitts for patient safety


Hart to BSD


DVT/GI PPX 


blood cultures


ID CONSULT


PROCALCITONIN


Appreciate subspecialist input


Prognosis very guarded


CC time 31





Comment


Review of Relevant


I have reviewed the following items jabier (where applicable) has been applied.





Justifications for Admission


Other Justification














NELY ANDERSON III DO           Jan 12, 2021 11:00

## 2021-01-12 NOTE — PDOC
Infectious Disease Note


Subjective:


Subjective


intubated on vent





Vital Signs:


Vital Signs





Vital Signs








  Date Time  Temp Pulse Resp B/P (MAP) Pulse Ox O2 Delivery O2 Flow Rate FiO2


 


1/12/21 07:00  75 28 119/72 (88) 97 Ventilator  


 


1/12/21 04:31       40.0 


 


1/12/21 04:00 98.8       





 98.8       











Physical Exam:


PHYSICAL EXAM


GENERAL:  Intubated, sedated.


HEENT:  Normocephalic, atraumatic.  Anicteric.  ETT and OGT tube in place.


NECK:  Supple.


LUNGS:  Decreased breath sounds at the bases.  No wheezing.


HEART:  S1, S2.


ABDOMEN:  Soft, nontender, nondistended.


EXTREMITIES: Generalized anasarca


GENITOURINARY:  Hart in place.scrotal swelling +


CENTRAL NERVOUS SYSTEM:  Intubated, sedated.


PSYCHIATRIC:  Unable to assess.


LINES:  Right upper extremity PICC line clean.





Medications:


Inpatient Meds:





Current Medications








 Medications


  (Trade)  Dose


 Ordered  Sig/Kayli  Start Time


 Stop Time Status Last Admin


Dose Admin


 


 Acetaminophen


  (Tylenol Supp)  650 mg  PRN Q6HRS  PRN  12/23/20 08:45


     





 


 Acetaminophen


  (Tylenol)  650 mg  PRN Q4HRS  PRN  12/23/20 08:15


    12/31/20 01:02


650 MG


 


 Alteplase,


 Recombinant


  (Cathflo For


 Central Catheter


 Clearance)  1 mg  1X  ONCE  1/1/21 16:15


 1/1/21 16:16 DC 1/2/21 02:10


1 MG


 


 Amino Acids/


 Glycerin/


 Electrolytes  1,000 ml @ 


 80 mls/hr  [id number]  12/23/20 08:45


 1/5/21 05:03 DC 1/4/21 14:53


80 MLS/HR


 


 Atorvastatin


 Calcium


  (Lipitor)  40 mg  QHS  12/23/20 21:00


    1/11/21 21:19


40 MG


 


 Azithromycin 250


 mg/Sodium Chloride  250 ml @ 


 250 mls/hr  Q24H  12/24/20 09:00


 12/27/20 09:59 DC 12/27/20 08:55


250 MLS/HR


 


 Azithromycin 500


 mg/Sodium Chloride  250 ml @ 


 250 mls/hr  1X  ONCE  12/23/20 10:00


 12/23/20 10:59 DC 12/23/20 09:59


250 MLS/HR


 


 Benzonatate


  (Tessalon Perle)  100 mg  [id number]  12/26/20 14:00


 1/2/21 10:35 DC 1/1/21 20:51


100 MG


 


 Cefepime HCl


  (Maxipime)  2 gm  Q12HR  1/3/21 14:00


    1/12/21 07:49


2 GM


 


 Ceftriaxone Sodium


  (Rocephin)  1 gm  Q24H  12/24/20 09:00


 1/3/21 13:15 DC 1/3/21 08:14


1 GM


 


 Cetirizine HCl


  (ZyrTEC)  10 mg  DAILY  12/24/20 09:00


    1/12/21 07:50


10 MG


 


 Chlorhexidine


 Gluconate


  (Peridex)  15 ml  BID  1/2/21 09:00


 1/9/21 19:26 DC 1/9/21 09:21


15 ML


 


 Citalopram


 Hydrobromide


  (CeleXA)  20 mg  DAILY  12/24/20 09:00


    1/12/21 07:50


20 MG


 


 Cyanocobalamin


  (Vitamin B-12)  1,000 mcg  DAILY  12/24/20 09:00


    1/12/21 07:50


1,000 MCG


 


 Dexamethasone


 Sodium Phosphate


  (Decadron)  6 mg  DAILY  1/4/21 09:00


 1/5/21 08:55 DC 1/5/21 08:36


6 MG


 


 Docusate Sodium


  (Colace)  100 mg  PRN BID  PRN  12/23/20 08:15


    12/25/20 17:03


100 MG


 


 Enalaprilat


  (Vasotec Inj)  2.5 mg  PRN Q6HRS  PRN  12/27/20 10:00


    12/28/20 12:29


2.5 MG


 


 Enoxaparin Sodium


  (Lovenox 40mg


 Syringe)  40 mg  Q12HR  12/23/20 09:00


    1/12/21 07:50


40 MG


 


 Famotidine


  (Pepcid Vial)  20 mg  BID  1/8/21 21:00


    1/12/21 07:50


20 MG


 


 Fentanyl Citrate  55 ml @ 0


 mls/hr  CONT PRN  PRN  1/2/21 02:45


    1/12/21 04:01


4 MLS/HR


 


 Furosemide


  (Lasix)  20 mg  1X  ONCE  1/1/21 12:30


 1/1/21 12:31 DC 1/1/21 13:34


20 MG


 


 Guaifenesin


  (Robitussin Dm)  10 ml  PRN Q6HRS  PRN  12/23/20 08:45


    1/1/21 15:37


10 ML


 


 Guaifenesin


  (Robitussin)  400 mg  PRN Q4HRS  PRN  12/27/20 22:30


    1/1/21 23:00


400 MG


 


 Info


  (Icu Electrolyte


 Protocol)  1 ea  CONT PRN  PRN  12/24/20 14:15


     





 


 Labetalol HCl


  (Normodyne Iv


 Push)  10 mg  PRN Q2HR  PRN  12/26/20 18:30


    1/6/21 17:55


10 MG


 


 Linezolid


  (Zyvox)  600 mg  BID  1/3/21 21:00


 1/7/21 07:58 DC 1/6/21 20:38


600 MG


 


 Midazolam HCl  100 ml @ 0


 mls/hr  CONT  PRN  1/2/21 01:15


    1/11/21 23:53


5 MLS/HR


 


 Morphine Sulfate


  (Morphine


 Sulfate)  2 mg  PRN Q4HRS  PRN  12/23/20 08:45


 1/2/21 01:41 DC 1/1/21 16:12


2 MG


 


 Morphine Sulfate


  (Ms Contin)  15 mg  BID  12/23/20 21:00


 1/6/21 14:43 DC 1/5/21 21:11


15 MG


 


 Norepinephrine


 Bitartrate 8 mg/


 Dextrose  258 ml @ 


 19.737 mls/


 hr  CONT  PRN  1/2/21 07:30


 1/5/21 08:55 DC 1/2/21 22:27


19.737 MLS/HR


 


 Nystatin


  (Nystop)  1 lindsay  BID  1/6/21 16:00


    1/12/21 07:51


1 LINDSAY


 


 Olanzapine


  (ZyPREXA ZYDIS)  5 mg  PRN BID  PRN  12/27/20 12:30


    1/11/21 09:56


5 MG


 


 Ondansetron HCl


  (Zofran)  4 mg  PRN Q4HRS  PRN  12/23/20 08:15


     





 


 Polyethylene


 Glycol


  (miraLAX PACKET)  17 gm  DAILY  1/6/21 14:30


    1/12/21 07:50


17 GM


 


 Potassium Chloride


  (Klor-Con)  40 meq  1X  ONCE  12/24/20 14:15


 12/24/20 14:16 DC 12/24/20 14:24


40 MEQ


 


 Propofol  100 ml @ 0


 mls/hr  CONT  PRN  1/2/21 01:15


    1/12/21 07:49


15.3 MLS/HR


 


 Quetiapine


 Fumarate


  (SEROquel)  50 mg  PRN QHS  PRN  12/27/20 21:00


    1/1/21 00:24


50 MG


 


 Sodium Chloride  1,000 ml @ 


 0 mls/hr  Q0M  1/7/21 13:15


     





 


 Sodium Chloride


  (Normal Saline


 Flush)  3 ml  QSHIFT  PRN  12/23/20 07:45


     





 


 Sterile Water


  (WATER for RESP)  1,000 ml  CONT  PRN  12/26/20 09:30


    1/1/21 15:49


1,000 ML


 


 Succinylcholine


 Chloride


  (Anectine)  200 mg  STK-MED ONCE  1/2/21 01:21


 1/2/21 01:21 DC  





 


 Sulfasalazine


  (Azulfidine)  1,000 mg  BID  12/23/20 21:00


    1/12/21 07:50


1,000 MG


 


 Tramadol HCl


  (Ultram)  100 mg  PRN Q8HRS  PRN  12/23/20 15:00


    1/1/21 13:35


100 MG


 


 Vancomycin HCl


  (Vanco Per


 Pharmacy)  1 each  PRN DAILY  PRN  1/2/21 16:00


 1/3/21 13:19 DC 1/2/21 20:47


1 EACH


 


 Vancomycin HCl


  (Vancomycin


 Trough Level)  1 each  1X  ONCE  1/4/21 04:30


 1/3/21 13:20 DC  





 


 Vancomycin HCl


 1.5 gm/Sodium


 Chloride  500 ml @ 


 250 mls/hr  Q12H  1/3/21 05:00


 1/3/21 13:15 DC 1/3/21 05:53


250 MLS/HR


 


 Vancomycin HCl 2


 gm/Sodium Chloride  500 ml @ 


 250 mls/hr  1X  ONCE  1/2/21 17:00


 1/2/21 18:59 DC 1/2/21 17:43


250 MLS/HR


 


 Vecuronium Bromide


  (Norcuron Bolus)  6 mg  PRN Q6HRS  PRN  1/2/21 09:15


    1/9/21 17:07


6 MG


 


 Zinc Sulfate


  (Orazinc)  220 mg  DAILY  12/23/20 09:00


    1/12/21 07:50


220 MG


 


 Zolpidem Tartrate


  (Ambien)  5 mg  PRN QHS  PRN  12/23/20 15:00


    1/1/21 22:05


5 MG











Labs:


Lab





Laboratory Tests








Test


 1/11/21


08:30 1/11/21


08:50


 


White Blood Count


 5.6 x10^3/uL


(4.0-11.0) 





 


Red Blood Count


 2.49 x10^6/uL


(4.30-5.70) 





 


Hemoglobin


 8.3 g/dL


(13.0-17.5) 





 


Hematocrit


 24.2 %


(39.0-53.0) 





 


Mean Corpuscular Volume 97 fL ()  


 


Mean Corpuscular Hemoglobin 33 pg (25-35)  


 


Mean Corpuscular Hemoglobin


Concent 34 g/dL


(31-37) 





 


Red Cell Distribution Width


 15.4 %


(11.5-14.5) 





 


Platelet Count


 309 x10^3/uL


(140-400) 





 


Neutrophils (%) (Auto) 65 % (31-73)  


 


Lymphocytes (%) (Auto) 20 % (24-48)  


 


Monocytes (%) (Auto) 11 % (0-9)  


 


Eosinophils (%) (Auto) 3 % (0-3)  


 


Basophils (%) (Auto) 1 % (0-3)  


 


Neutrophils # (Auto)


 3.6 x10^3/uL


(1.8-7.7) 





 


Lymphocytes # (Auto)


 1.1 x10^3/uL


(1.0-4.8) 





 


Monocytes # (Auto)


 0.6 x10^3/uL


(0.0-1.1) 





 


Eosinophils # (Auto)


 0.2 x10^3/uL


(0.0-0.7) 





 


Basophils # (Auto)


 0.1 x10^3/uL


(0.0-0.2) 





 


Sodium Level


 145 mmol/L


(136-145) 





 


Potassium Level


 3.6 mmol/L


(3.5-5.1) 





 


Chloride Level


 109 mmol/L


() 





 


Carbon Dioxide Level


 31 mmol/L


(21-32) 





 


Anion Gap 5 (6-14)  


 


Blood Urea Nitrogen


 25 mg/dL


(8-26) 





 


Creatinine


 0.5 mg/dL


(0.7-1.3) 





 


Estimated GFR


(Cockcroft-Gault) 166.4 


 





 


Glucose Level


 101 mg/dL


(70-99) 





 


Calcium Level


 8.5 mg/dL


(8.5-10.1) 





 


O2 Saturation  91 % (92-99) 


 


Arterial Blood pH


 


 7.39


(7.35-7.45)


 


Arterial Blood pCO2 at


Patient Temp 


 51 mmHg


(35-46)


 


Arterial Blood pO2 at Patient


Temp 


 64 mmHg


()


 


Arterial Blood HCO3


 


 30 mmol/L


(21-28)


 


Arterial Blood Base Excess


 


 4 mmol/L


(-3-3)


 


FiO2  65 











Objective:


Assessment:


 


1.  Fever, improved


2.  Leukocytosis, on steroids.


3.  Sepsis.


4.  Acute hypoxic respiratory failure.


5.  COVID-19 infection.


6.  History of psoriasis.


7.  History of Crohn's.


8.  Immunosuppression.


9.  Anemia.


10. History of tongue and prostate cancer.





Plan:


Plan of Care


1. Continue Cefepime.Pantera 3


2.   Continue supportive care.


3.  F/U Cult and lab results


4. completed  steroids.





   Discussed with JOSE.











NISA KNAPP MD           Jan 12, 2021 08:15

## 2021-01-12 NOTE — PDOC
PULMONARY PROGRESS NOTES


DATE: 1/12/21 


TIME: 12:26


Subjective


Patient remains on ventilatory support, FiO2 65% and a PEEP of 9


afebrile 


Other concerns from nursing


Vitals





Vital Signs








  Date Time  Temp Pulse Resp B/P (MAP) Pulse Ox O2 Delivery O2 Flow Rate FiO2


 


1/12/21 11:00  77 28 138/80 (99) 99 Ventilator  


 


1/12/21 08:00 98.4       





 98.4       


 


1/12/21 04:31       40.0 








Comments


Pt. seen during COVID-19 pandemic, visual exam preformed 


RRR


intubated 


no distress


no accessory muscle use 


No edema or rash


Labs





Laboratory Tests








Test


 1/11/21


08:30 1/11/21


08:50 1/12/21


08:25


 


White Blood Count


 5.6 x10^3/uL


(4.0-11.0) 


 





 


Red Blood Count


 2.49 x10^6/uL


(4.30-5.70) 


 





 


Hemoglobin


 8.3 g/dL


(13.0-17.5) 


 





 


Hematocrit


 24.2 %


(39.0-53.0) 


 





 


Mean Corpuscular Volume 97 fL ()   


 


Mean Corpuscular Hemoglobin 33 pg (25-35)   


 


Mean Corpuscular Hemoglobin


Concent 34 g/dL


(31-37) 


 





 


Red Cell Distribution Width


 15.4 %


(11.5-14.5) 


 





 


Platelet Count


 309 x10^3/uL


(140-400) 


 





 


Neutrophils (%) (Auto) 65 % (31-73)   


 


Lymphocytes (%) (Auto) 20 % (24-48)   


 


Monocytes (%) (Auto) 11 % (0-9)   


 


Eosinophils (%) (Auto) 3 % (0-3)   


 


Basophils (%) (Auto) 1 % (0-3)   


 


Neutrophils # (Auto)


 3.6 x10^3/uL


(1.8-7.7) 


 





 


Lymphocytes # (Auto)


 1.1 x10^3/uL


(1.0-4.8) 


 





 


Monocytes # (Auto)


 0.6 x10^3/uL


(0.0-1.1) 


 





 


Eosinophils # (Auto)


 0.2 x10^3/uL


(0.0-0.7) 


 





 


Basophils # (Auto)


 0.1 x10^3/uL


(0.0-0.2) 


 





 


Sodium Level


 145 mmol/L


(136-145) 


 





 


Potassium Level


 3.6 mmol/L


(3.5-5.1) 


 





 


Chloride Level


 109 mmol/L


() 


 





 


Carbon Dioxide Level


 31 mmol/L


(21-32) 


 





 


Anion Gap 5 (6-14)   


 


Blood Urea Nitrogen


 25 mg/dL


(8-26) 


 





 


Creatinine


 0.5 mg/dL


(0.7-1.3) 


 





 


Estimated GFR


(Cockcroft-Gault) 166.4 


 


 





 


Glucose Level


 101 mg/dL


(70-99) 


 





 


Calcium Level


 8.5 mg/dL


(8.5-10.1) 


 





 


O2 Saturation  91 % (92-99)  91 % (92-99) 


 


Arterial Blood pH


 


 7.39


(7.35-7.45) 7.38


(7.35-7.45)


 


Arterial Blood pCO2 at


Patient Temp 


 51 mmHg


(35-46) 55 mmHg


(35-46)


 


Arterial Blood pO2 at Patient


Temp 


 64 mmHg


() 64 mmHg


()


 


Arterial Blood HCO3


 


 30 mmol/L


(21-28) 32 mmol/L


(21-28)


 


Arterial Blood Base Excess


 


 4 mmol/L


(-3-3) 6 mmol/L


(-3-3)


 


FiO2  65  65 








Laboratory Tests








Test


 1/12/21


08:25


 


O2 Saturation 91 % (92-99) 


 


Arterial Blood pH


 7.38


(7.35-7.45)


 


Arterial Blood pCO2 at


Patient Temp 55 mmHg


(35-46)


 


Arterial Blood pO2 at Patient


Temp 64 mmHg


()


 


Arterial Blood HCO3


 32 mmol/L


(21-28)


 


Arterial Blood Base Excess


 6 mmol/L


(-3-3)


 


FiO2 65 








Medications





Active Scripts








 Medications  Dose


 Route/Sig


 Max Daily Dose Days Date Category


 


 Morphine Sulfate


 Er (Morphine


 Sulfate) 30 Mg


 Tablet.er  1 Tab


 PO BID


   12/23/20 Reported


 


 Tramadol Hcl 100


 Mg Tbmp.24hr  100 Mg


 PO PRN Q8HRS PRN


   12/23/20 Reported


 


 Ambien (Zolpidem


 Tartrate) 10 Mg


 Tablet  10 Mg


 PO PRN QHS PRN


   12/23/20 Reported


 


 Methotrexate


  (Methotrexate


 Sodium) 2.5 Mg


 Tablet  10 Tab


 PO WEEKLY


   12/23/20 Reported


 


 Lisinopril 10 Mg


 Tablet  1 Tab


 PO DAILY


   12/23/20 Reported


 


 Crestor


  (Rosuvastatin


 Calcium) 5 Mg


 Tablet  2 Tab


 PO DAILY


   12/23/20 Reported


 


 Hydrochlorothiazide


 Tablet


  (Hydrochlorothiazide)


 50 Mg Tablet  25 Mg


 PO DAILY


   12/23/20 Reported


 


 Escitalopram


 Oxalate 10 Mg


 Tablet  1 Tab


 PO DAILY


   12/23/20 Reported


 


 Sulfasalazine Dr


  (Sulfasalazine)


 500 Mg Tablet.dr  2 Tab


 PO TID


  30 12/23/20 Reported


 


 Colace (Docusate


 Sodium) 100 Mg


 Capsule  1 Cap


 PO DA


  30 12/23/20 Reported


 


 Acetaminophen


 Ext.release


  (Acetaminophen)


 650 Mg Tablet.er  650 Mg


 PO PRN Q8HRS PRN


   12/23/20 Reported


 


 Melatonin 5 Mg


 Capsule  1 Cap


 PO QHS


  30 12/23/20 Reported


 


 B-12


  (Cyanocobalamin


  (Vitamin B-12))


 500 Mcg Tablet  2 Tab


 PO DAILY


  30 12/23/20 Reported


 


 Vitamin D3


  (Cholecalciferol


  (Vitamin D3)) 50


 Mcg Capsule  50 Mcg


 PO DAILY


   12/23/20 Reported


 


 Aller-Sung


  (Cetirizine Hcl)


 10 Mg Tablet  1 Tab


 PO DAILY


  30 12/23/20 Reported








Comments


CXR 1/11/21


IMPRESSION: No significant change in multifocal consolidation superimposed on 

diffuse interstitial infiltrate.





Impression


.


IMPRESSION:


1.  Acute hypoxic respiratory failure secondary to highly suspected COVID-19


pneumonia and acute respiratory distress syndrome/acute lung injury.-- COVID-19 

positive 


2.  Abnormal CT chest with extensive widespread ground glass and alveolar and


interstitial infiltrates with reactive mild mediastinal/hilar adenopathy.  This


is likely related to COVID-19 pneumonia.


3.  Patient with history of psoriatic arthritis.  He is likely immunocompromise


as he has been on methotrexate.  covering for opportunistic infection with Abx


4.  History of tongue cancer with resection, details unknown.


5.  History of prostate cancer.


6.  History of Crohn's disease.





Plan


.


RECOMMENDATIONS:


Continue current vent support. wean  Fi02 65% and PEEP of 9, 


Follow chest x-ray/ABG-- no changes 


COVID-19 positive


Continue ABX per ID, cefepime


Continue TF for nutritional support 


DVT/GI PPX 





D/W RN and RT 


prognosis guarded








PT. is FULL CODE





Critical Care time 30 min











ABI MENDEZ MD                 Jan 12, 2021 12:39

## 2021-01-13 VITALS — DIASTOLIC BLOOD PRESSURE: 58 MMHG | SYSTOLIC BLOOD PRESSURE: 99 MMHG

## 2021-01-13 VITALS — DIASTOLIC BLOOD PRESSURE: 64 MMHG | SYSTOLIC BLOOD PRESSURE: 104 MMHG

## 2021-01-13 VITALS — DIASTOLIC BLOOD PRESSURE: 53 MMHG | SYSTOLIC BLOOD PRESSURE: 97 MMHG

## 2021-01-13 VITALS — DIASTOLIC BLOOD PRESSURE: 52 MMHG | SYSTOLIC BLOOD PRESSURE: 95 MMHG

## 2021-01-13 VITALS — SYSTOLIC BLOOD PRESSURE: 187 MMHG | DIASTOLIC BLOOD PRESSURE: 91 MMHG

## 2021-01-13 VITALS — DIASTOLIC BLOOD PRESSURE: 69 MMHG | SYSTOLIC BLOOD PRESSURE: 112 MMHG

## 2021-01-13 VITALS — DIASTOLIC BLOOD PRESSURE: 67 MMHG | SYSTOLIC BLOOD PRESSURE: 103 MMHG

## 2021-01-13 VITALS — SYSTOLIC BLOOD PRESSURE: 98 MMHG | DIASTOLIC BLOOD PRESSURE: 76 MMHG

## 2021-01-13 VITALS — SYSTOLIC BLOOD PRESSURE: 96 MMHG | DIASTOLIC BLOOD PRESSURE: 56 MMHG

## 2021-01-13 VITALS — DIASTOLIC BLOOD PRESSURE: 58 MMHG | SYSTOLIC BLOOD PRESSURE: 86 MMHG

## 2021-01-13 VITALS — DIASTOLIC BLOOD PRESSURE: 53 MMHG | SYSTOLIC BLOOD PRESSURE: 85 MMHG

## 2021-01-13 VITALS — DIASTOLIC BLOOD PRESSURE: 72 MMHG | SYSTOLIC BLOOD PRESSURE: 107 MMHG

## 2021-01-13 VITALS — DIASTOLIC BLOOD PRESSURE: 59 MMHG | SYSTOLIC BLOOD PRESSURE: 88 MMHG

## 2021-01-13 VITALS — SYSTOLIC BLOOD PRESSURE: 123 MMHG | DIASTOLIC BLOOD PRESSURE: 73 MMHG

## 2021-01-13 VITALS — DIASTOLIC BLOOD PRESSURE: 52 MMHG | SYSTOLIC BLOOD PRESSURE: 85 MMHG

## 2021-01-13 VITALS — DIASTOLIC BLOOD PRESSURE: 62 MMHG | SYSTOLIC BLOOD PRESSURE: 97 MMHG

## 2021-01-13 VITALS — DIASTOLIC BLOOD PRESSURE: 54 MMHG | SYSTOLIC BLOOD PRESSURE: 97 MMHG

## 2021-01-13 VITALS — DIASTOLIC BLOOD PRESSURE: 56 MMHG | SYSTOLIC BLOOD PRESSURE: 108 MMHG

## 2021-01-13 VITALS — SYSTOLIC BLOOD PRESSURE: 89 MMHG | DIASTOLIC BLOOD PRESSURE: 56 MMHG

## 2021-01-13 VITALS — SYSTOLIC BLOOD PRESSURE: 121 MMHG | DIASTOLIC BLOOD PRESSURE: 61 MMHG

## 2021-01-13 VITALS — SYSTOLIC BLOOD PRESSURE: 116 MMHG | DIASTOLIC BLOOD PRESSURE: 70 MMHG

## 2021-01-13 VITALS — DIASTOLIC BLOOD PRESSURE: 61 MMHG | SYSTOLIC BLOOD PRESSURE: 95 MMHG

## 2021-01-13 LAB
ANION GAP SERPL CALC-SCNC: 6 MMOL/L (ref 6–14)
BASE EXCESS ABG: 9 MMOL/L (ref -3–3)
BASOPHILS # BLD AUTO: 0.1 X10^3/UL (ref 0–0.2)
BASOPHILS NFR BLD: 1 % (ref 0–3)
BUN SERPL-MCNC: 29 MG/DL (ref 8–26)
CALCIUM SERPL-MCNC: 8.3 MG/DL (ref 8.5–10.1)
CHLORIDE SERPL-SCNC: 108 MMOL/L (ref 98–107)
CO2 SERPL-SCNC: 33 MMOL/L (ref 21–32)
CREAT SERPL-MCNC: 0.7 MG/DL (ref 0.7–1.3)
EOSINOPHIL NFR BLD: 0.3 X10^3/UL (ref 0–0.7)
EOSINOPHIL NFR BLD: 4 % (ref 0–3)
ERYTHROCYTE [DISTWIDTH] IN BLOOD BY AUTOMATED COUNT: 15.7 % (ref 11.5–14.5)
GFR SERPLBLD BASED ON 1.73 SQ M-ARVRAT: 112.8 ML/MIN
GLUCOSE SERPL-MCNC: 106 MG/DL (ref 70–99)
HCO3 BLDA-SCNC: 34 MMOL/L (ref 21–28)
HCT VFR BLD CALC: 24.1 % (ref 39–53)
HGB BLD-MCNC: 8 G/DL (ref 13–17.5)
INSPIRATION SETTING TIME VENT: 60
LYMPHOCYTES # BLD: 1.2 X10^3/UL (ref 1–4.8)
LYMPHOCYTES NFR BLD AUTO: 20 % (ref 24–48)
MCH RBC QN AUTO: 32 PG (ref 25–35)
MCHC RBC AUTO-ENTMCNC: 33 G/DL (ref 31–37)
MCV RBC AUTO: 96 FL (ref 79–100)
MONO #: 0.8 X10^3/UL (ref 0–1.1)
MONOCYTES NFR BLD: 13 % (ref 0–9)
NEUT #: 3.8 X10^3/UL (ref 1.8–7.7)
NEUTROPHILS NFR BLD AUTO: 61 % (ref 31–73)
PCO2 BLDA: 52 MMHG (ref 35–46)
PLATELET # BLD AUTO: 289 X10^3/UL (ref 140–400)
PO2 BLDA: 66 MMHG (ref 65–108)
POTASSIUM SERPL-SCNC: 3.5 MMOL/L (ref 3.5–5.1)
RBC # BLD AUTO: 2.51 X10^6/UL (ref 4.3–5.7)
SAO2 % BLDA: 92 % (ref 92–99)
SODIUM SERPL-SCNC: 147 MMOL/L (ref 136–145)
WBC # BLD AUTO: 6.2 X10^3/UL (ref 4–11)

## 2021-01-13 RX ADMIN — FAMOTIDINE SCH MG: 10 INJECTION, SOLUTION INTRAVENOUS at 20:37

## 2021-01-13 RX ADMIN — LABETALOL HYDROCHLORIDE PRN MG: 5 INJECTION, SOLUTION INTRAVENOUS at 10:23

## 2021-01-13 RX ADMIN — PROPOFOL PRN MLS/HR: 10 INJECTION, EMULSION INTRAVENOUS at 10:39

## 2021-01-13 RX ADMIN — PROPOFOL PRN MLS/HR: 10 INJECTION, EMULSION INTRAVENOUS at 03:52

## 2021-01-13 RX ADMIN — CITALOPRAM HYDROBROMIDE SCH MG: 20 TABLET ORAL at 09:40

## 2021-01-13 RX ADMIN — FAMOTIDINE SCH MG: 10 INJECTION, SOLUTION INTRAVENOUS at 09:40

## 2021-01-13 RX ADMIN — CYANOCOBALAMIN TAB 1000 MCG SCH MCG: 1000 TAB at 09:40

## 2021-01-13 RX ADMIN — MIDAZOLAM PRN MLS/HR: 5 INJECTION, SOLUTION INTRAMUSCULAR; INTRAVENOUS at 12:29

## 2021-01-13 RX ADMIN — Medication PRN MLS/HR: at 06:48

## 2021-01-13 RX ADMIN — POLYETHYLENE GLYCOL 3350 SCH GM: 17 POWDER, FOR SOLUTION ORAL at 09:40

## 2021-01-13 RX ADMIN — ENOXAPARIN SODIUM SCH MG: 40 INJECTION SUBCUTANEOUS at 20:37

## 2021-01-13 RX ADMIN — MIDAZOLAM PRN MLS/HR: 5 INJECTION, SOLUTION INTRAMUSCULAR; INTRAVENOUS at 21:36

## 2021-01-13 RX ADMIN — CETIRIZINE HYDROCHLORIDE SCH MG: 10 TABLET, FILM COATED ORAL at 09:40

## 2021-01-13 RX ADMIN — FAMOTIDINE SCH MG: 10 INJECTION, SOLUTION INTRAVENOUS at 20:44

## 2021-01-13 RX ADMIN — ENOXAPARIN SODIUM SCH MG: 40 INJECTION SUBCUTANEOUS at 09:00

## 2021-01-13 RX ADMIN — ATORVASTATIN CALCIUM SCH MG: 40 TABLET, FILM COATED ORAL at 20:44

## 2021-01-13 RX ADMIN — ZINC SULFATE CAP 220 MG (50 MG ELEMENTAL ZN) SCH MG: 220 (50 ZN) CAP at 09:40

## 2021-01-13 RX ADMIN — NYSTATIN SCH APP: 100000 POWDER TOPICAL at 20:44

## 2021-01-13 RX ADMIN — NYSTATIN SCH APP: 100000 POWDER TOPICAL at 10:02

## 2021-01-13 RX ADMIN — PROPOFOL PRN MLS/HR: 10 INJECTION, EMULSION INTRAVENOUS at 20:38

## 2021-01-13 RX ADMIN — Medication PRN MLS/HR: at 21:57

## 2021-01-13 NOTE — PDOC
PULMONARY PROGRESS NOTES


DATE: 1/13/21 


TIME: 11:08


Subjective


Patient remains on ventilatory support, FiO2 60% and a PEEP of 9


low grade fever 


Other concerns from nursing


Vitals





Vital Signs








  Date Time  Temp Pulse Resp B/P (MAP) Pulse Ox O2 Delivery O2 Flow Rate FiO2


 


1/13/21 10:23  94  201/87    


 


1/13/21 10:00   28  96 Ventilator  


 


1/13/21 08:00 99.5       





 99.5       








Comments


Pt. seen during COVID-19 pandemic, visual exam preformed 


RRR


intubated 


no distress


no accessory muscle use 


No edema or rash


Labs





Laboratory Tests








Test


 1/12/21


08:25 1/13/21


08:00


 


O2 Saturation 91 % (92-99)  92 % (92-99) 


 


Arterial Blood pH


 7.38


(7.35-7.45) 7.44


(7.35-7.45)


 


Arterial Blood pCO2 at


Patient Temp 55 mmHg


(35-46) 52 mmHg


(35-46)


 


Arterial Blood pO2 at Patient


Temp 64 mmHg


() 66 mmHg


()


 


Arterial Blood HCO3


 32 mmol/L


(21-28) 34 mmol/L


(21-28)


 


Arterial Blood Base Excess


 6 mmol/L


(-3-3) 9 mmol/L


(-3-3)


 


FiO2 65  60 


 


White Blood Count


 


 6.2 x10^3/uL


(4.0-11.0)


 


Red Blood Count


 


 2.51 x10^6/uL


(4.30-5.70)


 


Hemoglobin


 


 8.0 g/dL


(13.0-17.5)


 


Hematocrit


 


 24.1 %


(39.0-53.0)


 


Mean Corpuscular Volume  96 fL () 


 


Mean Corpuscular Hemoglobin  32 pg (25-35) 


 


Mean Corpuscular Hemoglobin


Concent 


 33 g/dL


(31-37)


 


Red Cell Distribution Width


 


 15.7 %


(11.5-14.5)


 


Platelet Count


 


 289 x10^3/uL


(140-400)


 


Neutrophils (%) (Auto)  61 % (31-73) 


 


Lymphocytes (%) (Auto)  20 % (24-48) 


 


Monocytes (%) (Auto)  13 % (0-9) 


 


Eosinophils (%) (Auto)  4 % (0-3) 


 


Basophils (%) (Auto)  1 % (0-3) 


 


Neutrophils # (Auto)


 


 3.8 x10^3/uL


(1.8-7.7)


 


Lymphocytes # (Auto)


 


 1.2 x10^3/uL


(1.0-4.8)


 


Monocytes # (Auto)


 


 0.8 x10^3/uL


(0.0-1.1)


 


Eosinophils # (Auto)


 


 0.3 x10^3/uL


(0.0-0.7)


 


Basophils # (Auto)


 


 0.1 x10^3/uL


(0.0-0.2)


 


Sodium Level


 


 147 mmol/L


(136-145)


 


Potassium Level


 


 3.5 mmol/L


(3.5-5.1)


 


Chloride Level


 


 108 mmol/L


()


 


Carbon Dioxide Level


 


 33 mmol/L


(21-32)


 


Anion Gap  6 (6-14) 


 


Blood Urea Nitrogen


 


 29 mg/dL


(8-26)


 


Creatinine


 


 0.7 mg/dL


(0.7-1.3)


 


Estimated GFR


(Cockcroft-Gault) 


 112.8 





 


Glucose Level


 


 106 mg/dL


(70-99)


 


Calcium Level


 


 8.3 mg/dL


(8.5-10.1)








Laboratory Tests








Test


 1/13/21


08:00


 


White Blood Count


 6.2 x10^3/uL


(4.0-11.0)


 


Red Blood Count


 2.51 x10^6/uL


(4.30-5.70)


 


Hemoglobin


 8.0 g/dL


(13.0-17.5)


 


Hematocrit


 24.1 %


(39.0-53.0)


 


Mean Corpuscular Volume 96 fL () 


 


Mean Corpuscular Hemoglobin 32 pg (25-35) 


 


Mean Corpuscular Hemoglobin


Concent 33 g/dL


(31-37)


 


Red Cell Distribution Width


 15.7 %


(11.5-14.5)


 


Platelet Count


 289 x10^3/uL


(140-400)


 


Neutrophils (%) (Auto) 61 % (31-73) 


 


Lymphocytes (%) (Auto) 20 % (24-48) 


 


Monocytes (%) (Auto) 13 % (0-9) 


 


Eosinophils (%) (Auto) 4 % (0-3) 


 


Basophils (%) (Auto) 1 % (0-3) 


 


Neutrophils # (Auto)


 3.8 x10^3/uL


(1.8-7.7)


 


Lymphocytes # (Auto)


 1.2 x10^3/uL


(1.0-4.8)


 


Monocytes # (Auto)


 0.8 x10^3/uL


(0.0-1.1)


 


Eosinophils # (Auto)


 0.3 x10^3/uL


(0.0-0.7)


 


Basophils # (Auto)


 0.1 x10^3/uL


(0.0-0.2)


 


O2 Saturation 92 % (92-99) 


 


Arterial Blood pH


 7.44


(7.35-7.45)


 


Arterial Blood pCO2 at


Patient Temp 52 mmHg


(35-46)


 


Arterial Blood pO2 at Patient


Temp 66 mmHg


()


 


Arterial Blood HCO3


 34 mmol/L


(21-28)


 


Arterial Blood Base Excess


 9 mmol/L


(-3-3)


 


FiO2 60 


 


Sodium Level


 147 mmol/L


(136-145)


 


Potassium Level


 3.5 mmol/L


(3.5-5.1)


 


Chloride Level


 108 mmol/L


()


 


Carbon Dioxide Level


 33 mmol/L


(21-32)


 


Anion Gap 6 (6-14) 


 


Blood Urea Nitrogen


 29 mg/dL


(8-26)


 


Creatinine


 0.7 mg/dL


(0.7-1.3)


 


Estimated GFR


(Cockcroft-Gault) 112.8 





 


Glucose Level


 106 mg/dL


(70-99)


 


Calcium Level


 8.3 mg/dL


(8.5-10.1)








Medications





Active Scripts








 Medications  Dose


 Route/Sig


 Max Daily Dose Days Date Category


 


 Morphine Sulfate


 Er (Morphine


 Sulfate) 30 Mg


 Tablet.er  1 Tab


 PO BID


   12/23/20 Reported


 


 Tramadol Hcl 100


 Mg Tbmp.24hr  100 Mg


 PO PRN Q8HRS PRN


   12/23/20 Reported


 


 Ambien (Zolpidem


 Tartrate) 10 Mg


 Tablet  10 Mg


 PO PRN QHS PRN


   12/23/20 Reported


 


 Methotrexate


  (Methotrexate


 Sodium) 2.5 Mg


 Tablet  10 Tab


 PO WEEKLY


   12/23/20 Reported


 


 Lisinopril 10 Mg


 Tablet  1 Tab


 PO DAILY


   12/23/20 Reported


 


 Crestor


  (Rosuvastatin


 Calcium) 5 Mg


 Tablet  2 Tab


 PO DAILY


   12/23/20 Reported


 


 Hydrochlorothiazide


 Tablet


  (Hydrochlorothiazide)


 50 Mg Tablet  25 Mg


 PO DAILY


   12/23/20 Reported


 


 Escitalopram


 Oxalate 10 Mg


 Tablet  1 Tab


 PO DAILY


   12/23/20 Reported


 


 Sulfasalazine Dr


  (Sulfasalazine)


 500 Mg Tablet.dr  2 Tab


 PO TID


  30 12/23/20 Reported


 


 Colace (Docusate


 Sodium) 100 Mg


 Capsule  1 Cap


 PO DA


  30 12/23/20 Reported


 


 Acetaminophen


 Ext.release


  (Acetaminophen)


 650 Mg Tablet.er  650 Mg


 PO PRN Q8HRS PRN


   12/23/20 Reported


 


 Melatonin 5 Mg


 Capsule  1 Cap


 PO QHS


  30 12/23/20 Reported


 


 B-12


  (Cyanocobalamin


  (Vitamin B-12))


 500 Mcg Tablet  2 Tab


 PO DAILY


  30 12/23/20 Reported


 


 Vitamin D3


  (Cholecalciferol


  (Vitamin D3)) 50


 Mcg Capsule  50 Mcg


 PO DAILY


   12/23/20 Reported


 


 Aller-Sung


  (Cetirizine Hcl)


 10 Mg Tablet  1 Tab


 PO DAILY


  30 12/23/20 Reported








Comments


CXR 1/11/21


IMPRESSION: No significant change in multifocal consolidation superimposed on 

diffuse interstitial infiltrate.





Impression


.


IMPRESSION:


1.  Acute hypoxic respiratory failure secondary to highly suspected COVID-19


pneumonia and acute respiratory distress syndrome/acute lung injury.-- COVID-19 

positive 


2.  Abnormal CT chest with extensive widespread ground glass and alveolar and


interstitial infiltrates with reactive mild mediastinal/hilar adenopathy.  This 

related to COVID-19 pneumonia.


3.  Patient with history of psoriatic arthritis.  He is likely immunocompromise


as he has been on methotrexate.  


4.  History of tongue cancer with resection, details unknown.


5.  History of prostate cancer.


6.  History of Crohn's disease.





Plan


.


RECOMMENDATIONS:


Continue current vent support. wean  Fi02 60% and PEEP of 9, 


Follow chest x-ray/ABG-- no changes 


COVID-19 positive


Continue ABX per ID, off abx at this time 


Continue TF for nutritional support 


HTN per PCP


DVT/GI PPX 





D/W RN and RT 


prognosis guarded








PT. is FULL CODE





Critical Care time [id number] ABI GARCIA MD                 Jan 13, 2021 11:10

## 2021-01-13 NOTE — RAD
AP abdomen radiograph 1/13/2021



CLINICAL HISTORY: NG tube placement.



An AP portable semierect digital radiograph of the lower chest/upper abdomen was obtained. An NG tube
 is been placed. The tip of this tube overlies the location of the gastric cardia. The side-port of t
he NG tube overlies the expected location of the distal thoracic esophagus. The visualized lung bases
 are clear. The visualized abdominal bowel gas pattern is nonobstructive. Degenerative changes are se
en involving lower thoracic and visualized lumbar spine.



IMPRESSION: The tip of the OG tube overlies the expected location of the gastric cardia.



Electronically signed by: Zachary Al MD (1/13/2021 3:54 PM) [id number]

## 2021-01-13 NOTE — PDOC
Infectious Disease Note


Subjective:


Subjective


intubated on vent





Vital Signs:


Vital Signs





Vital Signs








  Date Time  Temp Pulse Resp B/P (MAP) Pulse Ox O2 Delivery O2 Flow Rate FiO2


 


1/13/21 06:06  62 28 85/53 (64) 98 Ventilator  


 


1/13/21 04:00 98.9       





 98.9       











Physical Exam:


PHYSICAL EXAM


GENERAL:  Intubated, sedated.


HEENT:  Normocephalic, atraumatic.  Anicteric.  ETT and OGT tube in place.


NECK:  Supple.


LUNGS:  Decreased breath sounds at the bases.  No wheezing.


HEART:  S1, S2.


ABDOMEN:  Soft, nontender, nondistended.


EXTREMITIES: Generalized anasarca


GENITOURINARY:  Hart in place.scrotal swelling +


CENTRAL NERVOUS SYSTEM:  Intubated, sedated.


PSYCHIATRIC:  Unable to assess.


LINES:  Right upper extremity PICC line clean.





Medications:


Inpatient Meds:





Current Medications








 Medications


  (Trade)  Dose


 Ordered  Sig/Kayli  Start Time


 Stop Time Status Last Admin


Dose Admin


 


 Acetaminophen


  (Tylenol Supp)  650 mg  PRN Q6HRS  PRN  12/23/20 08:45


     





 


 Acetaminophen


  (Tylenol)  650 mg  PRN Q4HRS  PRN  12/23/20 08:15


    12/31/20 01:02


650 MG


 


 Alteplase,


 Recombinant


  (Cathflo For


 Central Catheter


 Clearance)  1 mg  1X  ONCE  1/1/21 16:15


 1/1/21 16:16 DC 1/2/21 02:10


1 MG


 


 Amino Acids/


 Glycerin/


 Electrolytes  1,000 ml @ 


 80 mls/hr  [id number]  12/23/20 08:45


 1/5/21 05:03 DC 1/4/21 14:53


80 MLS/HR


 


 Atorvastatin


 Calcium


  (Lipitor)  40 mg  QHS  12/23/20 21:00


    1/12/21 20:51


40 MG


 


 Azithromycin 250


 mg/Sodium Chloride  250 ml @ 


 250 mls/hr  Q24H  12/24/20 09:00


 12/27/20 09:59 DC 12/27/20 08:55


250 MLS/HR


 


 Azithromycin 500


 mg/Sodium Chloride  250 ml @ 


 250 mls/hr  1X  ONCE  12/23/20 10:00


 12/23/20 10:59 DC 12/23/20 09:59


250 MLS/HR


 


 Benzonatate


  (Tessalon Perle)  100 mg  TIM129  12/26/20 14:00


 1/2/21 10:35 DC 1/1/21 20:51


100 MG


 


 Cefepime HCl


  (Maxipime)  2 gm  Q12HR  1/3/21 14:00


    1/12/21 20:52


2 GM


 


 Ceftriaxone Sodium


  (Rocephin)  1 gm  Q24H  12/24/20 09:00


 1/3/21 13:15 DC 1/3/21 08:14


1 GM


 


 Cetirizine HCl


  (ZyrTEC)  10 mg  DAILY  12/24/20 09:00


    1/12/21 07:50


10 MG


 


 Chlorhexidine


 Gluconate


  (Peridex)  15 ml  BID  1/2/21 09:00


 1/9/21 19:26 DC 1/9/21 09:21


15 ML


 


 Citalopram


 Hydrobromide


  (CeleXA)  20 mg  DAILY  12/24/20 09:00


    1/12/21 07:50


20 MG


 


 Cyanocobalamin


  (Vitamin B-12)  1,000 mcg  DAILY  12/24/20 09:00


    1/12/21 07:50


1,000 MCG


 


 Dexamethasone


 Sodium Phosphate


  (Decadron)  6 mg  DAILY  1/4/21 09:00


 1/5/21 08:55 DC 1/5/21 08:36


6 MG


 


 Docusate Sodium


  (Colace)  100 mg  PRN BID  PRN  12/23/20 08:15


    12/25/20 17:03


100 MG


 


 Enalaprilat


  (Vasotec Inj)  2.5 mg  PRN Q6HRS  PRN  12/27/20 10:00


    12/28/20 12:29


2.5 MG


 


 Enoxaparin Sodium


  (Lovenox 40mg


 Syringe)  40 mg  Q12HR  12/23/20 09:00


    1/12/21 20:52


40 MG


 


 Famotidine


  (Pepcid Vial)  20 mg  BID  1/8/21 21:00


    1/12/21 20:52


20 MG


 


 Fentanyl Citrate  55 ml @ 0


 mls/hr  CONT PRN  PRN  1/2/21 02:45


    1/13/21 06:48


4 MLS/HR


 


 Furosemide


  (Lasix)  40 mg  1X  ONCE  1/12/21 11:15


 1/12/21 11:16 DC 1/12/21 11:18


40 MG


 


 Guaifenesin


  (Robitussin Dm)  10 ml  PRN Q6HRS  PRN  12/23/20 08:45


    1/1/21 15:37


10 ML


 


 Guaifenesin


  (Robitussin)  400 mg  PRN Q4HRS  PRN  12/27/20 22:30


    1/1/21 23:00


400 MG


 


 Info


  (Icu Electrolyte


 Protocol)  1 ea  CONT PRN  PRN  12/24/20 14:15


     





 


 Labetalol HCl


  (Normodyne Iv


 Push)  10 mg  PRN Q2HR  PRN  12/26/20 18:30


    1/6/21 17:55


10 MG


 


 Linezolid


  (Zyvox)  600 mg  BID  1/3/21 21:00


 1/7/21 07:58 DC 1/6/21 20:38


600 MG


 


 Midazolam HCl  100 ml @ 0


 mls/hr  CONT  PRN  1/2/21 01:15


    1/12/21 20:27


5 MLS/HR


 


 Morphine Sulfate


  (Morphine


 Sulfate)  2 mg  PRN Q4HRS  PRN  12/23/20 08:45


 1/2/21 01:41 DC 1/1/21 16:12


2 MG


 


 Morphine Sulfate


  (Ms Contin)  15 mg  BID  12/23/20 21:00


 1/6/21 14:43 DC 1/5/21 21:11


15 MG


 


 Norepinephrine


 Bitartrate 8 mg/


 Dextrose  258 ml @ 


 19.737 mls/


 hr  CONT  PRN  1/2/21 07:30


 1/5/21 08:55 DC 1/2/21 22:27


19.737 MLS/HR


 


 Nystatin


  (Nystop)  1 lindsay  BID  1/6/21 16:00


    1/12/21 20:52


1 LINDSAY


 


 Olanzapine


  (ZyPREXA ZYDIS)  5 mg  PRN BID  PRN  12/27/20 12:30


    1/11/21 09:56


5 MG


 


 Ondansetron HCl


  (Zofran)  4 mg  PRN Q4HRS  PRN  12/23/20 08:15


     





 


 Polyethylene


 Glycol


  (miraLAX PACKET)  17 gm  DAILY  1/6/21 14:30


    1/12/21 07:50


17 GM


 


 Potassium Chloride


  (Klor-Con)  40 meq  1X  ONCE  12/24/20 14:15


 12/24/20 14:16 DC 12/24/20 14:24


40 MEQ


 


 Propofol  100 ml @ 0


 mls/hr  CONT  PRN  1/2/21 01:15


    1/13/21 03:52


9.272 MLS/HR


 


 Quetiapine


 Fumarate


  (SEROquel)  50 mg  PRN QHS  PRN  12/27/20 21:00


    1/1/21 00:24


50 MG


 


 Sodium Chloride  1,000 ml @ 


 0 mls/hr  Q0M  1/7/21 13:15


     





 


 Sodium Chloride


  (Normal Saline


 Flush)  3 ml  QSHIFT  PRN  12/23/20 07:45


     





 


 Sterile Water


  (WATER for RESP)  1,000 ml  CONT  PRN  12/26/20 09:30


    1/1/21 15:49


1,000 ML


 


 Succinylcholine


 Chloride


  (Anectine)  200 mg  STK-MED ONCE  1/2/21 01:21


 1/2/21 01:21 DC  





 


 Sulfasalazine


  (Azulfidine)  1,000 mg  BID  12/23/20 21:00


    1/12/21 20:51


1,000 MG


 


 Tramadol HCl


  (Ultram)  100 mg  PRN Q8HRS  PRN  12/23/20 15:00


    1/1/21 13:35


100 MG


 


 Vancomycin HCl


  (Vanco Per


 Pharmacy)  1 each  PRN DAILY  PRN  1/2/21 16:00


 1/3/21 13:19 DC 1/2/21 20:47


1 EACH


 


 Vancomycin HCl


  (Vancomycin


 Trough Level)  1 each  1X  ONCE  1/4/21 04:30


 1/3/21 13:20 DC  





 


 Vancomycin HCl


 1.5 gm/Sodium


 Chloride  500 ml @ 


 250 mls/hr  Q12H  1/3/21 05:00


 1/3/21 13:15 DC 1/3/21 05:53


250 MLS/HR


 


 Vancomycin HCl 2


 gm/Sodium Chloride  500 ml @ 


 250 mls/hr  1X  ONCE  1/2/21 17:00


 1/2/21 18:59 DC 1/2/21 17:43


250 MLS/HR


 


 Vecuronium Bromide


  (Norcuron Bolus)  6 mg  PRN Q6HRS  PRN  1/2/21 09:15


    1/9/21 17:07


6 MG


 


 Zinc Sulfate


  (Orazinc)  220 mg  DAILY  12/23/20 09:00


    1/12/21 07:50


220 MG


 


 Zolpidem Tartrate


  (Ambien)  5 mg  PRN QHS  PRN  12/23/20 15:00


    1/1/21 22:05


5 MG











Labs:


Lab





Laboratory Tests








Test


 1/12/21


08:25


 


O2 Saturation 91 % (92-99) 


 


Arterial Blood pH


 7.38


(7.35-7.45)


 


Arterial Blood pCO2 at


Patient Temp 55 mmHg


(35-46)


 


Arterial Blood pO2 at Patient


Temp 64 mmHg


()


 


Arterial Blood HCO3


 32 mmol/L


(21-28)


 


Arterial Blood Base Excess


 6 mmol/L


(-3-3)


 


FiO2 65 











Objective:


Assessment:


 


1.  Fever, improved


2.  Leukocytosis, on steroids.


3.  Sepsis.


4.  Acute hypoxic respiratory failure.


5.  COVID-19 infection.


6.  History of psoriasis.


7.  History of Crohn's.


8.  Immunosuppression.


9.  Anemia.


10. History of tongue and prostate cancer.





Plan:


Plan of Care


1.  Discontinue cefepime.


2.   Continue supportive care.


3. completed  steroids.





   Discussed with RN.











NISA KNAPP MD           Jan 13, 2021 07:41

## 2021-01-13 NOTE — PDOC
[facility] [facility] PROGRESS NOTE


Date of Service


DOS:


DATE: 1/13/21 


TIME: 09:03





Chief Complaint


Chief Complaint


1/12/2021


Patient seen and examined in the ICU


He remains intubated


AC/28/450/60 5% with 9 of PEEP


He is sedated with propofol Versed and fentanyl


His Hart to bedside drainage


Chart reviewed


Discussed with RN


Remains critically ill








Acute hypoxic respiratory failure - no pulmonary history. With recent travel to 

and from [place] likely COVID 19 vs other atypical pneumonia. Cont empiric 

antibiotics, steroids. Consult Pulm. 


Improved aeration of the lungs with persistent moderate mixed interstitial and 

alveolar airspace disease.  1/02 


Pt. experienced hypoxia and respiratory decompensation overnight requiring 

intubation 


now on vent 100% PEEP of 10 


Hypotension as well now on pressors


acute respiratory distress syndrome. Consideration may be given for multifocal 

pneumonia versus pulmonary edema.


COVID 19 positive - with pneumonia - given transaminitis and late presentation 

with high O2 requirements no indication for remdesivir


RYDER - likely vasomotor nephropathy - no known renal history, will hydrate


Pneumonia - likely atypical, gram negative vs viral. will cover 


Prostate Ca - s/p resection at [facility] in [place]


Hypokalemia - likely nutritional or loss from diarrhea, will replace


Elevated troponin - likely from type II demand ischemia, will trend troponins, 

maintain telemetry


Crohn's - sees rheumatology regularly, Dr Fan in [place], on sulfasalazine


Psoriatic arthritis - on pain medications 30mg MS Contin BID, tramadol and 25mg 

Methotrexate weekly as DMARD per rheumatology, Dr. Fan. Hold MTX while 

inpatient


GERD - PPI


HLD - statin


HTN - Lisinopril and HCTZ on hold for RYDER


Anemia - likely of chronic disease, will monitor


Cardiomegaly - notable on CT chest - no known cardiac history, though his mother

did have CHF and CAD


Right renal mass - will need US for further assessment


on 100% FIO2 via Vapotherm , tolerating 


HYPOTENSION 


history of psoriatic arthritis.  He is likely immuno-compromised


as he has been on methotrexate


Severe malnutrition





History of Present Illness


History of Present Illness


1/13/2021


Patient seen and examined in the ICU


He remains intubated


AC/28/450 with 60% FiO2 and 9 of PEEP


Has NG running at 50 cc an hour


Has Hart to bedside drainage


Has SCDs on


Sedated with propofol fentanyl and Versed


Chart reviewed


Discussed with RN


He remains critically











1/11/2021


Patient seen and examined in the COVID-19 ICU


He remains intubated


Assist-control/28/450/60 5% FiO2 with 9 of PEEP


He has a Hart to bedside drainage


He is sedated with propofol and Versed and fentanyl


Discussed with RN and RT


Chart reviewed


He remains critically ill











Mr Nance is 64 yo male w/ past medical history of Crohn's, psoriatic arthritis,

GERD, HLD, HTN, prostate ca, tongue cancer who presents to the emergency 

department at [facility] concerned about shortness of breath that had been 

progressive. This started 12/18/2020 and has been getting worse since that time.

Of note patient also has loss of taste, loss of smell, cough, shortness of 

breath, fever. He recently went to [place] on a business trip and just 

returned 5 days prior to presentation.


Upon arrival to the emergency room patient had significant hypoxia with a pulse 

ox in the 40s.  He was placed initially on a nonrebreather and then placed on 

BiPAP.


He was also somewhat concerned about a Crohn's flareup.  Patient states he has 

been having abdominal pain with nausea and diarrhea.  These are not typical 

symptoms of his Crohn's.  He feels very tired and has body aches as well.


He was given steroids and Rocephin in ED. 


Chest radiograph concerning for bilateral interstitial infiltrates.


Labs significant for WBC 4.5, Hb 11.8, platelets 229, , K2.8, BUN 34, CR 

1.5, glucose 119, .9, albumin 3, , , lactic acid 1.6, D-

dimer 3.15, troponin 0.068.


ABG on 80% FiO2 7.53/42/64


CT negative for pulmonary embolism. Widespread airspace disease throughout both 

lungs, consistent with pneumonia. Mild mediastinal and bilateral hilar 

lymphadenopathy, likely reactive. Cardiomegaly with mild aneurysmal dilation of 

the ascending thoracic aorta. Indeterminate hyperdense nodule at the midpole 

right kidney. This could represent a solid mass or complex cyst.


Patient transferred to [facility] for pulmonary consultation and 

ICU admission.





12/24: Overnight BP improved with fluids. O2 saturations slightly increased. Did

not tolerate more than 1 hour off BIPAP even with non-rebreather O2 saturations 

were 92% at best. No pain currently, very slightly appetite.


12/25: COVID-19 returned positive.  Down to 65% on his BiPAP 18/8.  Was able to 

take it off for medications meal yesterday, but desaturates to 79% and recovers 

quickly.  Still very drowsy with little appetite.  Afebrile.


12/26: Afebrile with 100% O2 on BiPAP today.  Transition to Vapotherm with more 

ease of breathing.  He has almost no appetite.  Less drowsy today.  He is 

depressed mood but now has a cell phone  and is able to talk to his 

family.  No complaints of chest pain some abdominal pain no bowel movement as of

yet.  ABG 7.49/37/59


12-29 abg pending 





1/4: Patient seen in [place] [address] ICU.  Breathing on vent, FiO2 90%, PEEP 10.  He is

afebrile.  Continue treatment with cefepime, Zyvox, and steroids.  Present labs 

reviewed, discussed with RN.


1/5: Seen in [place] [address] ICU.  Still on vent, FiO2 90%, PEEP 10.  Afebrile.  

Continue supportive care and steroids.  Continue antibiotics cefepime and 

linezolid.  Discussed with RN.


1/6: Afebrile.  Intubated, FiO2 85%, PEEP 10.  Hemoglobin is dropping.  Continue

to monitor hemoglobin, transfuse as needed.  Continue supportive care, steroids.

 Antibiotics, per ID.  Discussed with RN.


1/7: Patient seen in [place] [address] ICU.  Remains on ventilator, FiO2 35%, PEEP 10.  

Hemoglobin appears to have stabilized.  Continue steroids, antibiotics, 

supportive care.  Discussed with RN


1/8: Seen in [place] [address] ICU.  Afebrile.  Intubated, FiO2 65%, PEEP 9.  Discussed 

with RN, no acute events overnight.  Continue steroids, antibiotics, supportive 

care.


1/9: Afebrile.  On vent with FiO2 65%, PEEP 9.  No acute events overnight.  

Continue cefepime and supportive care.  Discussed with RN.


1/10: Patient seen in ICU.  On vent, FiO2 55%, PEEP 9.  Discussed with RN, no 

acute vents overnight.  No significant improvement.  Charts and labs reviewed.  

Continue cefepime and supportive care.





Vitals/I&O


Vitals/I&O:





                                   Vital Signs








  Date Time  Temp Pulse Resp B/P (MAP) Pulse Ox O2 Delivery O2 Flow Rate FiO2


 


1/13/21 06:06  62 28 85/53 (64) 98 Ventilator  


 


1/13/21 04:00 98.9       





 98.9       














                                    I & O   


 


 1/12/21 1/12/21 1/13/21





 14:59 22:59 06:59


 


Intake Total 200 ml 1447 ml 1349 ml


 


Output Total 2000 ml 1050 ml 375 ml


 


Balance -1800 ml 397 ml 974 ml











Physical Exam


Physical Exam:


GENERAL:  Intubated, sedated.


HEENT:  Normocephalic, atraumatic.  Anicteric.  ETT and OGT tube in place.


NECK:  Supple.


LUNGS:  Decreased breath sounds at the bases.  No wheezing.


HEART:  S1, S2.


ABDOMEN:  Soft, nontender, nondistended.


EXTREMITIES: Generalized anasarca


GENITOURINARY:  Hart in place.scrotal swelling +


CENTRAL NERVOUS SYSTEM:  Intubated, sedated.


PSYCHIATRIC:  Unable to assess.


LINES:  Right upper extremity PICC line clean.


General:  No acute distress


Heart:  Regular rate


Abdomen:  Soft, No masses


Extremities:  No clubbing, No cyanosis, No edema, Normal pulses


Skin:  No rashes, No breakdown, No significant lesion





Labs


Labs:





Laboratory Tests








Test


 1/13/21


08:00


 


White Blood Count


 6.2 x10^3/uL


(4.0-11.0)


 


Red Blood Count


 2.51 x10^6/uL


(4.30-5.70)


 


Hemoglobin


 8.0 g/dL


(13.0-17.5)


 


Hematocrit


 24.1 %


(39.0-53.0)


 


Mean Corpuscular Volume 96 fL () 


 


Mean Corpuscular Hemoglobin 32 pg (25-35) 


 


Mean Corpuscular Hemoglobin


Concent 33 g/dL


(31-37)


 


Red Cell Distribution Width


 15.7 %


(11.5-14.5)


 


Platelet Count


 289 x10^3/uL


(140-400)


 


Neutrophils (%) (Auto) 61 % (31-73) 


 


Lymphocytes (%) (Auto) 20 % (24-48) 


 


Monocytes (%) (Auto) 13 % (0-9) 


 


Eosinophils (%) (Auto) 4 % (0-3) 


 


Basophils (%) (Auto) 1 % (0-3) 


 


Neutrophils # (Auto)


 3.8 x10^3/uL


(1.8-7.7)


 


Lymphocytes # (Auto)


 1.2 x10^3/uL


(1.0-4.8)


 


Monocytes # (Auto)


 0.8 x10^3/uL


(0.0-1.1)


 


Eosinophils # (Auto)


 0.3 x10^3/uL


(0.0-0.7)


 


Basophils # (Auto)


 0.1 x10^3/uL


(0.0-0.2)


 


O2 Saturation 92 % (92-99) 


 


Arterial Blood pH


 7.44


(7.35-7.45)


 


Arterial Blood pCO2 at


Patient Temp 52 mmHg


(35-46)


 


Arterial Blood pO2 at Patient


Temp 66 mmHg


()


 


Arterial Blood HCO3


 34 mmol/L


(21-28)


 


Arterial Blood Base Excess


 9 mmol/L


(-3-3)


 


FiO2 60 


 


Sodium Level


 147 mmol/L


(136-145)


 


Potassium Level


 3.5 mmol/L


(3.5-5.1)


 


Chloride Level


 108 mmol/L


()


 


Carbon Dioxide Level


 33 mmol/L


(21-32)


 


Anion Gap 6 (6-14) 


 


Blood Urea Nitrogen


 29 mg/dL


(8-26)


 


Creatinine


 0.7 mg/dL


(0.7-1.3)


 


Estimated GFR


(Cockcroft-Gault) 112.8 





 


Glucose Level


 106 mg/dL


(70-99)


 


Calcium Level


 8.3 mg/dL


(8.5-10.1)











Assessment and Plan


Assessmemt and Plan


Assessmemt and Plan


Acute hypoxic respiratory failure - no pulmonary history. With recent travel to 

and from [place] likely COVID 19 vs other atypical pneumonia. Cont empiric 

antibiotics, steroids. Consult Pulm. 


Improved aeration of the lungs with persistent moderate mixed interstitial and 

alveolar airspace disease.  1/02 


Pt. experienced hypoxia and respiratory decompensation overnight requiring 

intubation 


now on vent 100% PEEP of 10 


Hypotension as well now on pressors


acute respiratory distress syndrome. Consideration may be given for multifocal 

pneumonia versus pulmonary edema.


COVID 19 positive - with pneumonia - given transaminitis and late presentation 

with high O2 requirements no indication for remdesivir


RYDER - likely vasomotor nephropathy - no known renal history, will hydrate


Pneumonia - likely atypical, gram negative vs viral. will cover 


Prostate Ca - s/p resection at [facility] in [place]


Hypokalemia - likely nutritional or loss from diarrhea, will replace


Elevated troponin - likely from type II demand ischemia, will trend troponins, 

maintain telemetry


Crohn's - sees rheumatology regularly, Dr Fan in [place], on sulfasalazine


Psoriatic arthritis - on pain medications 30mg MS Contin BID, tramadol and 25mg 

Methotrexate weekly as DMARD per rheumatology, Dr. Fan. Hold MTX while 

inpatient


GERD - PPI


HLD - statin


HTN - Lisinopril and HCTZ on hold for RYDER


Anemia - likely of chronic disease, will monitor


Cardiomegaly - notable on CT chest - no known cardiac history, though his mother

did have CHF and CAD


Right renal mass - will need US for further assessment


on 100% FIO2 via Vapotherm , tolerating 


HYPOTENSION 


history of psoriatic arthritis.  He is likely immuno-compromised


as he has been on methotrexate


Severe malnutrition





Plan


Continue ICU monitoring


Vent weaning


COVID-19 protocol


IV fluids 


Continue propofol and Versed and fentanyl for sedation


Monitor for respiratory distress requiring intubation 


Continue with empiric antibiotic


Continue with IV steroids with slow taper


Continue PPN for nutritional support  


Mitts for patient safety


Hart to BSD


DVT/GI PPX 


blood cultures


ID CONSULT


PROCALCITONIN


Appreciate subspecialist input


Prognosis very guarded


CC time 32





Comment


Review of Relevant


I have reviewed the following items jabier (where applicable) has been applied.


Medications:





Current Medications








 Medications


  (Trade)  Dose


 Ordered  Sig/Kayli


 Route


 PRN Reason  Start Time


 Stop Time Status Last Admin


Dose Admin


 


 Furosemide


  (Lasix)  40 mg  1X  ONCE


 IVP


   1/12/21 11:15


 1/12/21 11:16 DC 1/12/21 11:18














Justifications for Admission


Other Justification














NELY ANDERSON III DO           Jan 13, 2021 09:04

## 2021-01-14 VITALS — SYSTOLIC BLOOD PRESSURE: 92 MMHG | DIASTOLIC BLOOD PRESSURE: 60 MMHG

## 2021-01-14 VITALS — SYSTOLIC BLOOD PRESSURE: 93 MMHG | DIASTOLIC BLOOD PRESSURE: 56 MMHG

## 2021-01-14 VITALS — DIASTOLIC BLOOD PRESSURE: 51 MMHG | SYSTOLIC BLOOD PRESSURE: 83 MMHG

## 2021-01-14 VITALS — SYSTOLIC BLOOD PRESSURE: 174 MMHG | DIASTOLIC BLOOD PRESSURE: 97 MMHG

## 2021-01-14 VITALS — DIASTOLIC BLOOD PRESSURE: 68 MMHG | SYSTOLIC BLOOD PRESSURE: 96 MMHG

## 2021-01-14 VITALS — DIASTOLIC BLOOD PRESSURE: 50 MMHG | SYSTOLIC BLOOD PRESSURE: 85 MMHG

## 2021-01-14 VITALS — SYSTOLIC BLOOD PRESSURE: 79 MMHG | DIASTOLIC BLOOD PRESSURE: 47 MMHG

## 2021-01-14 VITALS — DIASTOLIC BLOOD PRESSURE: 55 MMHG | SYSTOLIC BLOOD PRESSURE: 90 MMHG

## 2021-01-14 VITALS — DIASTOLIC BLOOD PRESSURE: 57 MMHG | SYSTOLIC BLOOD PRESSURE: 85 MMHG

## 2021-01-14 VITALS — DIASTOLIC BLOOD PRESSURE: 48 MMHG | SYSTOLIC BLOOD PRESSURE: 87 MMHG

## 2021-01-14 VITALS — DIASTOLIC BLOOD PRESSURE: 61 MMHG | SYSTOLIC BLOOD PRESSURE: 95 MMHG

## 2021-01-14 VITALS — DIASTOLIC BLOOD PRESSURE: 57 MMHG | SYSTOLIC BLOOD PRESSURE: 101 MMHG

## 2021-01-14 VITALS — SYSTOLIC BLOOD PRESSURE: 100 MMHG | DIASTOLIC BLOOD PRESSURE: 65 MMHG

## 2021-01-14 VITALS — SYSTOLIC BLOOD PRESSURE: 92 MMHG | DIASTOLIC BLOOD PRESSURE: 59 MMHG

## 2021-01-14 VITALS — DIASTOLIC BLOOD PRESSURE: 57 MMHG | SYSTOLIC BLOOD PRESSURE: 92 MMHG

## 2021-01-14 VITALS — SYSTOLIC BLOOD PRESSURE: 79 MMHG | DIASTOLIC BLOOD PRESSURE: 52 MMHG

## 2021-01-14 VITALS — SYSTOLIC BLOOD PRESSURE: 85 MMHG | DIASTOLIC BLOOD PRESSURE: 53 MMHG

## 2021-01-14 VITALS — DIASTOLIC BLOOD PRESSURE: 85 MMHG | SYSTOLIC BLOOD PRESSURE: 165 MMHG

## 2021-01-14 VITALS — DIASTOLIC BLOOD PRESSURE: 67 MMHG | SYSTOLIC BLOOD PRESSURE: 102 MMHG

## 2021-01-14 VITALS — DIASTOLIC BLOOD PRESSURE: 58 MMHG | SYSTOLIC BLOOD PRESSURE: 90 MMHG

## 2021-01-14 VITALS — SYSTOLIC BLOOD PRESSURE: 93 MMHG | DIASTOLIC BLOOD PRESSURE: 53 MMHG

## 2021-01-14 VITALS — SYSTOLIC BLOOD PRESSURE: 86 MMHG | DIASTOLIC BLOOD PRESSURE: 54 MMHG

## 2021-01-14 VITALS — DIASTOLIC BLOOD PRESSURE: 63 MMHG | SYSTOLIC BLOOD PRESSURE: 123 MMHG

## 2021-01-14 LAB
BASE EXCESS ABG: 8 MMOL/L (ref -3–3)
HCO3 BLDA-SCNC: 32 MMOL/L (ref 21–28)
INSPIRATION SETTING TIME VENT: (no result)
PCO2 BLDA: 46 MMHG (ref 35–46)
PO2 BLDA: 75 MMHG (ref 65–108)
SAO2 % BLDA: 94 % (ref 92–99)

## 2021-01-14 RX ADMIN — NYSTATIN SCH APP: 100000 POWDER TOPICAL at 20:45

## 2021-01-14 RX ADMIN — Medication PRN MLS/HR: at 16:59

## 2021-01-14 RX ADMIN — MIDAZOLAM PRN MLS/HR: 5 INJECTION, SOLUTION INTRAMUSCULAR; INTRAVENOUS at 19:27

## 2021-01-14 RX ADMIN — FAMOTIDINE SCH MG: 10 INJECTION, SOLUTION INTRAVENOUS at 08:54

## 2021-01-14 RX ADMIN — PROPOFOL PRN MLS/HR: 10 INJECTION, EMULSION INTRAVENOUS at 10:36

## 2021-01-14 RX ADMIN — CETIRIZINE HYDROCHLORIDE SCH MG: 10 TABLET, FILM COATED ORAL at 11:30

## 2021-01-14 RX ADMIN — POLYETHYLENE GLYCOL 3350 SCH GM: 17 POWDER, FOR SOLUTION ORAL at 14:26

## 2021-01-14 RX ADMIN — PROPOFOL PRN MLS/HR: 10 INJECTION, EMULSION INTRAVENOUS at 16:59

## 2021-01-14 RX ADMIN — PROPOFOL PRN MLS/HR: 10 INJECTION, EMULSION INTRAVENOUS at 22:39

## 2021-01-14 RX ADMIN — ENOXAPARIN SODIUM SCH MG: 40 INJECTION SUBCUTANEOUS at 20:45

## 2021-01-14 RX ADMIN — ATORVASTATIN CALCIUM SCH MG: 40 TABLET, FILM COATED ORAL at 20:44

## 2021-01-14 RX ADMIN — ZINC SULFATE CAP 220 MG (50 MG ELEMENTAL ZN) SCH MG: 220 (50 ZN) CAP at 11:30

## 2021-01-14 RX ADMIN — MIDAZOLAM PRN MLS/HR: 5 INJECTION, SOLUTION INTRAMUSCULAR; INTRAVENOUS at 09:19

## 2021-01-14 RX ADMIN — PROPOFOL PRN MLS/HR: 10 INJECTION, EMULSION INTRAVENOUS at 00:59

## 2021-01-14 RX ADMIN — PROPOFOL PRN MLS/HR: 10 INJECTION, EMULSION INTRAVENOUS at 04:18

## 2021-01-14 RX ADMIN — ENOXAPARIN SODIUM SCH MG: 40 INJECTION SUBCUTANEOUS at 08:54

## 2021-01-14 RX ADMIN — NYSTATIN SCH APP: 100000 POWDER TOPICAL at 08:54

## 2021-01-14 RX ADMIN — CYANOCOBALAMIN TAB 1000 MCG SCH MCG: 1000 TAB at 11:31

## 2021-01-14 RX ADMIN — CITALOPRAM HYDROBROMIDE SCH MG: 20 TABLET ORAL at 11:31

## 2021-01-14 NOTE — PDOC
PULMONARY PROGRESS NOTES


DATE: 1/14/21 


TIME: 08:24


Subjective


Patient remains on ventilatory support, FiO2 60% and a PEEP of 9


Remains sedated on Versed, fentanyl and propofol


Vitals





Vital Signs








  Date Time  Temp Pulse Resp B/P (MAP) Pulse Ox O2 Delivery O2 Flow Rate FiO2


 


1/14/21 06:00  52 28 92/60 (71) 99 Ventilator  


 


1/14/21 04:00 98.7       





 98.7       


 


1/13/21 23:26       40.0 








Comments


Pt. seen during COVID-19 pandemic, visual exam preformed 


RRR


intubated 


no distress


no accessory muscle use 


No edema or rash


Labs





Laboratory Tests








Test


 1/12/21


08:25 1/13/21


08:00


 


O2 Saturation 91 % (92-99)  92 % (92-99) 


 


Arterial Blood pH


 7.38


(7.35-7.45) 7.44


(7.35-7.45)


 


Arterial Blood pCO2 at


Patient Temp 55 mmHg


(35-46) 52 mmHg


(35-46)


 


Arterial Blood pO2 at Patient


Temp 64 mmHg


() 66 mmHg


()


 


Arterial Blood HCO3


 32 mmol/L


(21-28) 34 mmol/L


(21-28)


 


Arterial Blood Base Excess


 6 mmol/L


(-3-3) 9 mmol/L


(-3-3)


 


FiO2 65  60 


 


White Blood Count


 


 6.2 x10^3/uL


(4.0-11.0)


 


Red Blood Count


 


 2.51 x10^6/uL


(4.30-5.70)


 


Hemoglobin


 


 8.0 g/dL


(13.0-17.5)


 


Hematocrit


 


 24.1 %


(39.0-53.0)


 


Mean Corpuscular Volume  96 fL () 


 


Mean Corpuscular Hemoglobin  32 pg (25-35) 


 


Mean Corpuscular Hemoglobin


Concent 


 33 g/dL


(31-37)


 


Red Cell Distribution Width


 


 15.7 %


(11.5-14.5)


 


Platelet Count


 


 289 x10^3/uL


(140-400)


 


Neutrophils (%) (Auto)  61 % (31-73) 


 


Lymphocytes (%) (Auto)  20 % (24-48) 


 


Monocytes (%) (Auto)  13 % (0-9) 


 


Eosinophils (%) (Auto)  4 % (0-3) 


 


Basophils (%) (Auto)  1 % (0-3) 


 


Neutrophils # (Auto)


 


 3.8 x10^3/uL


(1.8-7.7)


 


Lymphocytes # (Auto)


 


 1.2 x10^3/uL


(1.0-4.8)


 


Monocytes # (Auto)


 


 0.8 x10^3/uL


(0.0-1.1)


 


Eosinophils # (Auto)


 


 0.3 x10^3/uL


(0.0-0.7)


 


Basophils # (Auto)


 


 0.1 x10^3/uL


(0.0-0.2)


 


Sodium Level


 


 147 mmol/L


(136-145)


 


Potassium Level


 


 3.5 mmol/L


(3.5-5.1)


 


Chloride Level


 


 108 mmol/L


()


 


Carbon Dioxide Level


 


 33 mmol/L


(21-32)


 


Anion Gap  6 (6-14) 


 


Blood Urea Nitrogen


 


 29 mg/dL


(8-26)


 


Creatinine


 


 0.7 mg/dL


(0.7-1.3)


 


Estimated GFR


(Cockcroft-Gault) 


 112.8 





 


Glucose Level


 


 106 mg/dL


(70-99)


 


Calcium Level


 


 8.3 mg/dL


(8.5-10.1)








Medications





Active Scripts








 Medications  Dose


 Route/Sig


 Max Daily Dose Days Date Category


 


 Morphine Sulfate


 Er (Morphine


 Sulfate) 30 Mg


 Tablet.er  1 Tab


 PO BID


   12/23/20 Reported


 


 Tramadol Hcl 100


 Mg Tbmp.24hr  100 Mg


 PO PRN Q8HRS PRN


   12/23/20 Reported


 


 Ambien (Zolpidem


 Tartrate) 10 Mg


 Tablet  10 Mg


 PO PRN QHS PRN


   12/23/20 Reported


 


 Methotrexate


  (Methotrexate


 Sodium) 2.5 Mg


 Tablet  10 Tab


 PO WEEKLY


   12/23/20 Reported


 


 Lisinopril 10 Mg


 Tablet  1 Tab


 PO DAILY


   12/23/20 Reported


 


 Crestor


  (Rosuvastatin


 Calcium) 5 Mg


 Tablet  2 Tab


 PO DAILY


   12/23/20 Reported


 


 Hydrochlorothiazide


 Tablet


  (Hydrochlorothiazide)


 50 Mg Tablet  25 Mg


 PO DAILY


   12/23/20 Reported


 


 Escitalopram


 Oxalate 10 Mg


 Tablet  1 Tab


 PO DAILY


   12/23/20 Reported


 


 Sulfasalazine Dr


  (Sulfasalazine)


 500 Mg Tablet.dr  2 Tab


 PO TID


  30 12/23/20 Reported


 


 Colace (Docusate


 Sodium) 100 Mg


 Capsule  1 Cap


 PO DA


  30 12/23/20 Reported


 


 Acetaminophen


 Ext.release


  (Acetaminophen)


 650 Mg Tablet.er  650 Mg


 PO PRN Q8HRS PRN


   12/23/20 Reported


 


 Melatonin 5 Mg


 Capsule  1 Cap


 PO QHS


  30 12/23/20 Reported


 


 B-12


  (Cyanocobalamin


  (Vitamin B-12))


 500 Mcg Tablet  2 Tab


 PO DAILY


  30 12/23/20 Reported


 


 Vitamin D3


  (Cholecalciferol


  (Vitamin D3)) 50


 Mcg Capsule  50 Mcg


 PO DAILY


   12/23/20 Reported


 


 Aller-Sung


  (Cetirizine Hcl)


 10 Mg Tablet  1 Tab


 PO DAILY


  30 12/23/20 Reported








Comments


CXR 1/11/21


IMPRESSION: No significant change in multifocal consolidation superimposed on 

diffuse interstitial infiltrate.





Impression


.


IMPRESSION:


1.  Acute hypoxic respiratory failure secondary to highly suspected COVID-19


pneumonia and acute respiratory distress syndrome/acute lung injury.-- COVID-19 

positive 


2.  Abnormal CT chest with extensive widespread ground glass and alveolar and


interstitial infiltrates with reactive mild mediastinal/hilar adenopathy.  This 

related to COVID-19 pneumonia.


3.  Patient with history of psoriatic arthritis.  He is likely immunocompromise


as he has been on methotrexate.  


4.  History of tongue cancer with resection, details unknown.


5.  History of prostate cancer.


6.  History of Crohn's disease.





Plan


.


RECOMMENDATIONS:


Continue current vent support. wean  Fi02 60% and PEEP of 9, 


Follow chest x-ray/ABG--will reduce PEEP to 7 today


COVID-19 positive


Continue ABX per ID, off abx at this time 


Continue TF for nutritional support 


HTN per PCP


DVT/GI PPX 





D/W RN and RT 


prognosis guarded








PT. is FULL CODE





Critical Care time [id number] AM











JUAN RHOADES MD              Jan 14, 2021 08:24

## 2021-01-14 NOTE — PDOC
[facility] PROGRESS NOTE


Date of Service


DOS:


DATE: 1/14/21 


TIME: 13:00





Chief Complaint


Chief Complaint


1/12/2021


Patient seen and examined in the ICU


He remains intubated


AC/28/450/60 5% with 9 of PEEP


He is sedated with propofol Versed and fentanyl


His Hart to bedside drainage


Chart reviewed


Discussed with RN


Remains critically ill








Acute hypoxic respiratory failure - no pulmonary history. With recent travel to 

and from [place] likely COVID 19 vs other atypical pneumonia. Cont empiric 

antibiotics, steroids. Consult Pulm. 


Improved aeration of the lungs with persistent moderate mixed interstitial and 

alveolar airspace disease.  1/02 


Pt. experienced hypoxia and respiratory decompensation overnight requiring 

intubation 


now on vent 100% PEEP of 10 


Hypotension as well now on pressors


acute respiratory distress syndrome. Consideration may be given for multifocal 

pneumonia versus pulmonary edema.


COVID 19 positive - with pneumonia - given transaminitis and late presentation 

with high O2 requirements no indication for remdesivir


RYDER - likely vasomotor nephropathy - no known renal history, will hydrate


Pneumonia - likely atypical, gram negative vs viral. will cover 


Prostate Ca - s/p resection at [facility] in [place]


Hypokalemia - likely nutritional or loss from diarrhea, will replace


Elevated troponin - likely from type II demand ischemia, will trend troponins, 

maintain telemetry


Crohn's - sees rheumatology regularly, Dr Fan in [place], on sulfasalazine


Psoriatic arthritis - on pain medications 30mg MS Contin BID, tramadol and 25mg 

Methotrexate weekly as DMARD per rheumatology, Dr. Fan. Hold MTX while 

inpatient


GERD - PPI


HLD - statin


HTN - Lisinopril and HCTZ on hold for RYDER


Anemia - likely of chronic disease, will monitor


Cardiomegaly - notable on CT chest - no known cardiac history, though his mother

did have CHF and CAD


Right renal mass - will need US for further assessment


on 100% FIO2 via Vapotherm , tolerating 


HYPOTENSION 


history of psoriatic arthritis.  He is likely immuno-compromised


as he has been on methotrexate


Severe malnutrition





History of Present Illness


History of Present Illness


1/14/2021


Patient seen and examined in the ICU


He remains mechanically ventilated


Assist-control/28/450 with 60% FiO2 and 7 of PEEP


He is sedated with fentanyl Versed and propofol


Has Hart to bedside drainage


Chart reviewed


Discussed with RN











1/13/2021


Patient seen and examined in the ICU


He remains intubated


AC/28/450 with 60% FiO2 and 9 of PEEP


Has NG running at 50 cc an hour


Has Hart to bedside drainage


Has SCDs on


Sedated with propofol fentanyl and Versed


Chart reviewed


Discussed with RN


He remains critically











1/11/2021


Patient seen and examined in the COVID-19 ICU


He remains intubated


Assist-control/28/450/60 5% FiO2 with 9 of PEEP


He has a Hart to bedside drainage


He is sedated with propofol and Versed and fentanyl


Discussed with RN and RT


Chart reviewed


He remains critically ill











Mr Nance is 66 yo male w/ past medical history of Crohn's, psoriatic arthritis,

GERD, HLD, HTN, prostate ca, tongue cancer who presents to the emergency 

department at [facility] concerned about shortness of breath that had been 

progressive. This started 12/18/2020 and has been getting worse since that time.

Of note patient also has loss of taste, loss of smell, cough, shortness of hilaria

th, fever. He recently went to [place] on a business trip and just returned 5

days prior to presentation.


Upon arrival to the emergency room patient had significant hypoxia with a pulse 

ox in the 40s.  He was placed initially on a nonrebreather and then placed on 

BiPAP.


He was also somewhat concerned about a Crohn's flareup.  Patient states he has 

been having abdominal pain with nausea and diarrhea.  These are not typical 

symptoms of his Crohn's.  He feels very tired and has body aches as well.


He was given steroids and Rocephin in ED. 


Chest radiograph concerning for bilateral interstitial infiltrates.


Labs significant for WBC 4.5, Hb 11.8, platelets 229, , K2.8, BUN 34, CR 

1.5, glucose 119, .9, albumin 3, , , lactic acid 1.6, D-

dimer 3.15, troponin 0.068.


ABG on 80% FiO2 7.53/42/64


CT negative for pulmonary embolism. Widespread airspace disease throughout both 

lungs, consistent with pneumonia. Mild mediastinal and bilateral hilar 

lymphadenopathy, likely reactive. Cardiomegaly with mild aneurysmal dilation of 

the ascending thoracic aorta. Indeterminate hyperdense nodule at the midpole 

right kidney. This could represent a solid mass or complex cyst.


Patient transferred to [facility] for pulmonary consultation and 

ICU admission.





12/24: Overnight BP improved with fluids. O2 saturations slightly increased. Did

not tolerate more than 1 hour off BIPAP even with non-rebreather O2 saturations 

were 92% at best. No pain currently, very slightly appetite.


12/25: COVTRENT returned positive.  Down to 65% on his BiPAP 18/8.  Was able to 

take it off for medications meal yesterday, but desaturates to 79% and recovers 

quickly.  Still very drowsy with little appetite.  Afebrile.


12/26: Afebrile with 100% O2 on BiPAP today.  Transition to Vapotherm with more 

ease of breathing.  He has almost no appetite.  Less drowsy today.  He is 

depressed mood but now has a cell phone  and is able to talk to his 

family.  No complaints of chest pain some abdominal pain no bowel movement as of

yet.  ABG 7.49/37/59


12-29 abg pending 





1/4: Patient seen in [place] [address] ICU.  Breathing on vent, FiO2 90%, PEEP 10.  He is

afebrile.  Continue treatment with cefepime, Zyvox, and steroids.  Present labs 

reviewed, discussed with RN.


1/5: Seen in [place] [address] ICU.  Still on vent, FiO2 90%, PEEP 10.  Afebrile.  

Continue supportive care and steroids.  Continue antibiotics cefepime and 

linezolid.  Discussed with RN.


1/6: Afebrile.  Intubated, FiO2 85%, PEEP 10.  Hemoglobin is dropping.  Continue

to monitor hemoglobin, transfuse as needed.  Continue supportive care, steroids.

 Antibiotics, per ID.  Discussed with RN.


1/7: Patient seen in [place] [address] ICU.  Remains on ventilator, FiO2 35%, PEEP 10.  

Hemoglobin appears to have stabilized.  Continue steroids, antibiotics, suppo

rtive care.  Discussed with RN


1/8: Seen in [place] [address] ICU.  Afebrile.  Intubated, FiO2 65%, PEEP 9.  Discussed 

with RN, no acute events overnight.  Continue steroids, antibiotics, supportive 

care.


1/9: Afebrile.  On vent with FiO2 65%, PEEP 9.  No acute events overnight.  

Continue cefepime and supportive care.  Discussed with RN.


1/10: Patient seen in ICU.  On vent, FiO2 55%, PEEP 9.  Discussed with RN, no 

acute vents overnight.  No significant improvement.  Charts and labs reviewed.  

Continue cefepime and supportive care.





Vitals/I&O


Vitals/I&O:





                                   Vital Signs








  Date Time  Temp Pulse Resp B/P (MAP) Pulse Ox O2 Delivery O2 Flow Rate FiO2


 


1/14/21 12:00 98.4 52 28 79/47 (58) 90 Ventilator  





 98.4       


 


1/13/21 23:26       40.0 














                                    I & O   


 


 1/13/21 1/13/21 1/14/21





 14:59 22:59 06:59


 


Intake Total 200 ml 467 ml 486 ml


 


Output Total 450 ml 525 ml 350 ml


 


Balance -250 ml -58 ml 136 ml











Physical Exam


Physical Exam:


GENERAL:  Intubated, sedated.


HEENT:  Normocephalic, atraumatic.  Anicteric.  ETT and OGT tube in place.


NECK:  Supple.


LUNGS:  Decreased breath sounds at the bases.  No wheezing.


HEART:  S1, S2.


ABDOMEN: Mildly distended


EXTREMITIES: Generalized anasarca


GENITOURINARY:  Hart in place.scrotal swelling +


CENTRAL NERVOUS SYSTEM:  Intubated, sedated.


PSYCHIATRIC:  Unable to assess.


LINES:  Right upper extremity PICC line clean.


General:  No acute distress


Heart:  Regular rate


Abdomen:  Soft, No masses


Extremities:  No clubbing, No cyanosis, No edema, Normal pulses


Skin:  No rashes, No breakdown, No significant lesion





Labs


Labs:





Laboratory Tests








Test


 1/14/21


08:00


 


O2 Saturation 94 % (92-99) 


 


Arterial Blood pH


 7.47


(7.35-7.45)


 


Arterial Blood pCO2 at


Patient Temp 46 mmHg


(35-46)


 


Arterial Blood pO2 at Patient


Temp 75 mmHg


()


 


Arterial Blood HCO3


 32 mmol/L


(21-28)


 


Arterial Blood Base Excess


 8 mmol/L


(-3-3)


 


FiO2 60/vent 











Assessment and Plan


Assessmemt and Plan


Assessmemt and Plan


Acute hypoxic respiratory failure - no pulmonary history. With recent travel to 

and from [place] likely COVID 19 vs other atypical pneumonia. Cont empiric 

antibiotics, steroids. Consult Pulm. 


Improved aeration of the lungs with persistent moderate mixed interstitial and 

alveolar airspace disease.  1/02 


Pt. experienced hypoxia and respiratory decompensation overnight requiring 

intubation 


now on vent 100% PEEP of 10 


Hypotension as well now on pressors


acute respiratory distress syndrome. Consideration may be given for multifocal 

pneumonia versus pulmonary edema.


COVID 19 positive - with pneumonia - given transaminitis and late presentation 

with high O2 requirements no indication for remdesivir


RYDER - likely vasomotor nephropathy - no known renal history, will hydrate


Pneumonia - likely atypical, gram negative vs viral. will cover 


Prostate Ca - s/p resection at [facility] in [place]


Hypokalemia - likely nutritional or loss from diarrhea, will replace


Elevated troponin - likely from type II demand ischemia, will trend troponins, 

maintain telemetry


Crohn's - sees rheumatology regularly, Dr Fan in [place], on sulfasalazine


Psoriatic arthritis - on pain medications 30mg MS Contin BID, tramadol and 25mg 

Methotrexate weekly as DMARD per rheumatology, Dr. Fan. Hold MTX while 

inpatient


GERD - PPI


HLD - statin


HTN - Lisinopril and HCTZ on hold for RYDER


Anemia - likely of chronic disease, will monitor


Cardiomegaly - notable on CT chest - no known cardiac history, though his mother

did have CHF and CAD


Right renal mass - will need US for further assessment


on 100% FIO2 via Vapotherm , tolerating 


HYPOTENSION 


history of psoriatic arthritis.  He is likely immuno-compromised


as he has been on methotrexate


Severe malnutrition





Plan


Continue ICU monitoring


Vent weaning


COVID-19 protocol


IV fluids 


Continue propofol and Versed and fentanyl for sedation


Monitor for respiratory distress requiring intubation 


Continue with empiric antibiotic


Continue with IV steroids with slow taper


Continue PPN for nutritional support  


Mitts for patient safety


Tanner to BSD


DVT/GI PPX 


blood cultures


ID CONSULT


PROCALCITONIN


Appreciate subspecialist input


Prognosis very guarded


CC time 31 min





Comment


Review of Relevant


I have reviewed the following items jabier (where applicable) has been applied.





Justifications for Admission


Other Justification














NELY ANDERSON III DO           Jan 14, 2021 13:01

## 2021-01-14 NOTE — PDOC
Infectious Disease Note


Subjective:


Subjective


intubated /sedated on vent


no fevers


Had nausea and vomiting yesterday per RN





Vital Signs:


Vital Signs





Vital Signs








  Date Time  Temp Pulse Resp B/P (MAP) Pulse Ox O2 Delivery O2 Flow Rate FiO2


 


1/14/21 06:00  52 28 92/60 (71) 99 Ventilator  


 


1/14/21 04:00 98.7       





 98.7       


 


1/13/21 23:26       40.0 











Physical Exam:


PHYSICAL EXAM


GENERAL:  Intubated, sedated.


HEENT:  Normocephalic, atraumatic.  Anicteric.  ETT and OGT tube in place.


NECK:  Supple.


LUNGS:  Decreased breath sounds at the bases.  No wheezing.


HEART:  S1, S2.


ABDOMEN: Mildly distended


EXTREMITIES: Generalized anasarca


GENITOURINARY:  Hart in place.scrotal swelling +


CENTRAL NERVOUS SYSTEM:  Intubated, sedated.


PSYCHIATRIC:  Unable to assess.


LINES:  Right upper extremity PICC line clean.





Medications:


Inpatient Meds:





Current Medications








 Medications


  (Trade)  Dose


 Ordered  Sig/Kayli  Start Time


 Stop Time Status Last Admin


Dose Admin


 


 Acetaminophen


  (Tylenol Supp)  650 mg  PRN Q6HRS  PRN  12/23/20 08:45


     





 


 Acetaminophen


  (Tylenol)  650 mg  PRN Q4HRS  PRN  12/23/20 08:15


    12/31/20 01:02


650 MG


 


 Alteplase,


 Recombinant


  (Cathflo For


 Central Catheter


 Clearance)  1 mg  1X  ONCE  1/1/21 16:15


 1/1/21 16:16 DC 1/2/21 02:10


1 MG


 


 Amino Acids/


 Glycerin/


 Electrolytes  1,000 ml @ 


 80 mls/hr  [id number]  12/23/20 08:45


 1/5/21 05:03 DC 1/4/21 14:53


80 MLS/HR


 


 Atorvastatin


 Calcium


  (Lipitor)  40 mg  QHS  12/23/20 21:00


    1/13/21 20:44


40 MG


 


 Azithromycin 250


 mg/Sodium Chloride  250 ml @ 


 250 mls/hr  Q24H  12/24/20 09:00


 12/27/20 09:59 DC 12/27/20 08:55


250 MLS/HR


 


 Azithromycin 500


 mg/Sodium Chloride  250 ml @ 


 250 mls/hr  1X  ONCE  12/23/20 10:00


 12/23/20 10:59 DC 12/23/20 09:59


250 MLS/HR


 


 Benzonatate


  (Tessalon Perle)  100 mg  [id number]  12/26/20 14:00


 1/2/21 10:35 DC 1/1/21 20:51


100 MG


 


 Cefepime HCl


  (Maxipime)  2 gm  Q12HR  1/3/21 14:00


 1/13/21 07:59 DC 1/12/21 20:52


2 GM


 


 Ceftriaxone Sodium


  (Rocephin)  1 gm  Q24H  12/24/20 09:00


 1/3/21 13:15 DC 1/3/21 08:14


1 GM


 


 Cetirizine HCl


  (ZyrTEC)  10 mg  DAILY  12/24/20 09:00


    1/13/21 09:40


10 MG


 


 Chlorhexidine


 Gluconate


  (Peridex)  15 ml  BID  1/2/21 09:00


 1/9/21 19:26 DC 1/9/21 09:21


15 ML


 


 Citalopram


 Hydrobromide


  (CeleXA)  20 mg  DAILY  12/24/20 09:00


    1/13/21 09:40


20 MG


 


 Cyanocobalamin


  (Vitamin B-12)  1,000 mcg  DAILY  12/24/20 09:00


    1/13/21 09:40


1,000 MCG


 


 Dexamethasone


 Sodium Phosphate


  (Decadron)  6 mg  DAILY  1/4/21 09:00


 1/5/21 08:55 DC 1/5/21 08:36


6 MG


 


 Docusate Sodium


  (Colace)  100 mg  PRN BID  PRN  12/23/20 08:15


    12/25/20 17:03


100 MG


 


 Enalaprilat


  (Vasotec Inj)  2.5 mg  PRN Q6HRS  PRN  12/27/20 10:00


    12/28/20 12:29


2.5 MG


 


 Enoxaparin Sodium


  (Lovenox 40mg


 Syringe)  40 mg  Q12HR  12/23/20 09:00


    1/13/21 20:37


40 MG


 


 Famotidine


  (Pepcid Vial)  20 mg  BID  1/8/21 21:00


    1/13/21 20:44


20 MG


 


 Fentanyl Citrate  55 ml @ 0


 mls/hr  CONT PRN  PRN  1/2/21 02:45


    1/13/21 21:57


3 MLS/HR


 


 Furosemide


  (Lasix)  40 mg  1X  ONCE  1/12/21 11:15


 1/12/21 11:16 DC 1/12/21 11:18


40 MG


 


 Guaifenesin


  (Robitussin Dm)  10 ml  PRN Q6HRS  PRN  12/23/20 08:45


    1/1/21 15:37


10 ML


 


 Guaifenesin


  (Robitussin)  400 mg  PRN Q4HRS  PRN  12/27/20 22:30


    1/1/21 23:00


400 MG


 


 Info


  (Icu Electrolyte


 Protocol)  1 ea  CONT PRN  PRN  12/24/20 14:15


     





 


 Labetalol HCl


  (Normodyne Iv


 Push)  10 mg  PRN Q2HR  PRN  12/26/20 18:30


    1/13/21 10:23


10 MG


 


 Linezolid


  (Zyvox)  600 mg  BID  1/3/21 21:00


 1/7/21 07:58 DC 1/6/21 20:38


600 MG


 


 Midazolam HCl  100 ml @ 0


 mls/hr  CONT  PRN  1/2/21 01:15


    1/13/21 21:36


10 MLS/HR


 


 Morphine Sulfate


  (Morphine


 Sulfate)  2 mg  PRN Q4HRS  PRN  12/23/20 08:45


 1/2/21 01:41 DC 1/1/21 16:12


2 MG


 


 Morphine Sulfate


  (Ms Contin)  15 mg  BID  12/23/20 21:00


 1/6/21 14:43 DC 1/5/21 21:11


15 MG


 


 Norepinephrine


 Bitartrate 8 mg/


 Dextrose  258 ml @ 


 19.737 mls/


 hr  CONT  PRN  1/2/21 07:30


 1/5/21 08:55 DC 1/2/21 22:27


19.737 MLS/HR


 


 Nystatin


  (Nystop)  1 lindsay  BID  1/6/21 16:00


    1/13/21 20:44


1 LINDSAY


 


 Olanzapine


  (ZyPREXA ZYDIS)  5 mg  PRN BID  PRN  12/27/20 12:30


    1/11/21 09:56


5 MG


 


 Ondansetron HCl


  (Zofran)  4 mg  PRN Q4HRS  PRN  12/23/20 08:15


     





 


 Polyethylene


 Glycol


  (miraLAX PACKET)  17 gm  DAILY  1/6/21 14:30


    1/13/21 09:40


17 GM


 


 Potassium Chloride


  (Klor-Con)  40 meq  1X  ONCE  12/24/20 14:15


 12/24/20 14:16 DC 12/24/20 14:24


40 MEQ


 


 Propofol  100 ml @ 0


 mls/hr  CONT  PRN  1/2/21 01:15


    1/14/21 04:18


21.21 MLS/HR


 


 Quetiapine


 Fumarate


  (SEROquel)  50 mg  PRN QHS  PRN  12/27/20 21:00


    1/1/21 00:24


50 MG


 


 Sodium Chloride  1,000 ml @ 


 0 mls/hr  Q0M  1/7/21 13:15


     





 


 Sodium Chloride


  (Normal Saline


 Flush)  3 ml  QSHIFT  PRN  12/23/20 07:45


     





 


 Sterile Water


  (WATER for RESP)  1,000 ml  CONT  PRN  12/26/20 09:30


    1/1/21 15:49


1,000 ML


 


 Succinylcholine


 Chloride


  (Anectine)  200 mg  STK-MED ONCE  1/2/21 01:21


 1/2/21 01:21 DC  





 


 Sulfasalazine


  (Azulfidine)  1,000 mg  BID  12/23/20 21:00


    1/13/21 20:44


1,000 MG


 


 Tramadol HCl


  (Ultram)  100 mg  PRN Q8HRS  PRN  12/23/20 15:00


    1/1/21 13:35


100 MG


 


 Vancomycin HCl


  (Vanco Per


 Pharmacy)  1 each  PRN DAILY  PRN  1/2/21 16:00


 1/3/21 13:19 DC 1/2/21 20:47


1 EACH


 


 Vancomycin HCl


  (Vancomycin


 Trough Level)  1 each  1X  ONCE  1/4/21 04:30


 1/3/21 13:20 DC  





 


 Vancomycin HCl


 1.5 gm/Sodium


 Chloride  500 ml @ 


 250 mls/hr  Q12H  1/3/21 05:00


 1/3/21 13:15 DC 1/3/21 05:53


250 MLS/HR


 


 Vancomycin HCl 2


 gm/Sodium Chloride  500 ml @ 


 250 mls/hr  1X  ONCE  1/2/21 17:00


 1/2/21 18:59 DC 1/2/21 17:43


250 MLS/HR


 


 Vecuronium Bromide


  (Norcuron Bolus)  6 mg  PRN Q6HRS  PRN  1/2/21 09:15


    1/9/21 17:07


6 MG


 


 Zinc Sulfate


  (Orazinc)  220 mg  DAILY  12/23/20 09:00


    1/13/21 09:40


220 MG


 


 Zolpidem Tartrate


  (Ambien)  5 mg  PRN QHS  PRN  12/23/20 15:00


    1/1/21 22:05


5 MG











Labs:


Lab





Laboratory Tests








Test


 1/13/21


08:00


 


White Blood Count


 6.2 x10^3/uL


(4.0-11.0)


 


Red Blood Count


 2.51 x10^6/uL


(4.30-5.70)


 


Hemoglobin


 8.0 g/dL


(13.0-17.5)


 


Hematocrit


 24.1 %


(39.0-53.0)


 


Mean Corpuscular Volume 96 fL () 


 


Mean Corpuscular Hemoglobin 32 pg (25-35) 


 


Mean Corpuscular Hemoglobin


Concent 33 g/dL


(31-37)


 


Red Cell Distribution Width


 15.7 %


(11.5-14.5)


 


Platelet Count


 289 x10^3/uL


(140-400)


 


Neutrophils (%) (Auto) 61 % (31-73) 


 


Lymphocytes (%) (Auto) 20 % (24-48) 


 


Monocytes (%) (Auto) 13 % (0-9) 


 


Eosinophils (%) (Auto) 4 % (0-3) 


 


Basophils (%) (Auto) 1 % (0-3) 


 


Neutrophils # (Auto)


 3.8 x10^3/uL


(1.8-7.7)


 


Lymphocytes # (Auto)


 1.2 x10^3/uL


(1.0-4.8)


 


Monocytes # (Auto)


 0.8 x10^3/uL


(0.0-1.1)


 


Eosinophils # (Auto)


 0.3 x10^3/uL


(0.0-0.7)


 


Basophils # (Auto)


 0.1 x10^3/uL


(0.0-0.2)


 


O2 Saturation 92 % (92-99) 


 


Arterial Blood pH


 7.44


(7.35-7.45)


 


Arterial Blood pCO2 at


Patient Temp 52 mmHg


(35-46)


 


Arterial Blood pO2 at Patient


Temp 66 mmHg


()


 


Arterial Blood HCO3


 34 mmol/L


(21-28)


 


Arterial Blood Base Excess


 9 mmol/L


(-3-3)


 


FiO2 60 


 


Sodium Level


 147 mmol/L


(136-145)


 


Potassium Level


 3.5 mmol/L


(3.5-5.1)


 


Chloride Level


 108 mmol/L


()


 


Carbon Dioxide Level


 33 mmol/L


(21-32)


 


Anion Gap 6 (6-14) 


 


Blood Urea Nitrogen


 29 mg/dL


(8-26)


 


Creatinine


 0.7 mg/dL


(0.7-1.3)


 


Estimated GFR


(Cockcroft-Gault) 112.8 





 


Glucose Level


 106 mg/dL


(70-99)


 


Calcium Level


 8.3 mg/dL


(8.5-10.1)











Objective:


Assessment:


 


1.  Fever resolved


2.  Leukocytosis, on steroids.


3.  Sepsis.


4.  Acute hypoxic respiratory failure.


5.  COVID-19 infection.


6.  History of psoriasis.


7.  History of Crohn's.


8.  Immunosuppression.


9.  Anemia.


10. History of tongue and prostate cancer.





Plan:


Plan of Care


Monitor off antibiotics


Continue supportive care.


Patient completed  steroids.





   Discussed with NISA JANE MD           Jan 14, 2021 08:00

## 2021-01-14 NOTE — RAD
Abdomen single view



INDICATION: OG placement



COMPARISON: 1/13/2020 abdomen x-ray



FINDINGS:

Interval advancement of the orogastric tube with the tip now projecting over the distal gastric body 
in apparent satisfactory position. Bowel gas pattern is nonspecific with relative paucity of bowel ga
s except for the transverse colon. No evidence of free air.



IMPRESSION:



OG tube has been advanced into the mid to distal stomach in apparent satisfactory position.



Electronically signed by: Jesse Patino MD (1/14/2021 11:00 AM) [id number]

## 2021-01-15 VITALS — DIASTOLIC BLOOD PRESSURE: 52 MMHG | SYSTOLIC BLOOD PRESSURE: 94 MMHG

## 2021-01-15 VITALS — DIASTOLIC BLOOD PRESSURE: 64 MMHG | SYSTOLIC BLOOD PRESSURE: 112 MMHG

## 2021-01-15 VITALS — DIASTOLIC BLOOD PRESSURE: 57 MMHG | SYSTOLIC BLOOD PRESSURE: 86 MMHG

## 2021-01-15 VITALS — SYSTOLIC BLOOD PRESSURE: 83 MMHG | DIASTOLIC BLOOD PRESSURE: 50 MMHG

## 2021-01-15 VITALS — DIASTOLIC BLOOD PRESSURE: 49 MMHG | SYSTOLIC BLOOD PRESSURE: 83 MMHG

## 2021-01-15 VITALS — DIASTOLIC BLOOD PRESSURE: 102 MMHG | SYSTOLIC BLOOD PRESSURE: 187 MMHG

## 2021-01-15 VITALS — SYSTOLIC BLOOD PRESSURE: 96 MMHG | DIASTOLIC BLOOD PRESSURE: 57 MMHG

## 2021-01-15 VITALS — SYSTOLIC BLOOD PRESSURE: 101 MMHG | DIASTOLIC BLOOD PRESSURE: 71 MMHG

## 2021-01-15 VITALS — SYSTOLIC BLOOD PRESSURE: 86 MMHG | DIASTOLIC BLOOD PRESSURE: 58 MMHG

## 2021-01-15 VITALS — SYSTOLIC BLOOD PRESSURE: 95 MMHG | DIASTOLIC BLOOD PRESSURE: 53 MMHG

## 2021-01-15 VITALS — SYSTOLIC BLOOD PRESSURE: 87 MMHG | DIASTOLIC BLOOD PRESSURE: 56 MMHG

## 2021-01-15 VITALS — DIASTOLIC BLOOD PRESSURE: 51 MMHG | SYSTOLIC BLOOD PRESSURE: 82 MMHG

## 2021-01-15 VITALS — SYSTOLIC BLOOD PRESSURE: 85 MMHG | DIASTOLIC BLOOD PRESSURE: 55 MMHG

## 2021-01-15 VITALS — SYSTOLIC BLOOD PRESSURE: 92 MMHG | DIASTOLIC BLOOD PRESSURE: 61 MMHG

## 2021-01-15 VITALS — SYSTOLIC BLOOD PRESSURE: 84 MMHG | DIASTOLIC BLOOD PRESSURE: 54 MMHG

## 2021-01-15 VITALS — DIASTOLIC BLOOD PRESSURE: 94 MMHG | SYSTOLIC BLOOD PRESSURE: 166 MMHG

## 2021-01-15 VITALS — SYSTOLIC BLOOD PRESSURE: 97 MMHG | DIASTOLIC BLOOD PRESSURE: 55 MMHG

## 2021-01-15 VITALS — DIASTOLIC BLOOD PRESSURE: 54 MMHG | SYSTOLIC BLOOD PRESSURE: 91 MMHG

## 2021-01-15 VITALS — SYSTOLIC BLOOD PRESSURE: 81 MMHG | DIASTOLIC BLOOD PRESSURE: 46 MMHG

## 2021-01-15 VITALS — DIASTOLIC BLOOD PRESSURE: 60 MMHG | SYSTOLIC BLOOD PRESSURE: 89 MMHG

## 2021-01-15 VITALS — SYSTOLIC BLOOD PRESSURE: 81 MMHG | DIASTOLIC BLOOD PRESSURE: 56 MMHG

## 2021-01-15 VITALS — DIASTOLIC BLOOD PRESSURE: 54 MMHG | SYSTOLIC BLOOD PRESSURE: 84 MMHG

## 2021-01-15 VITALS — DIASTOLIC BLOOD PRESSURE: 48 MMHG | SYSTOLIC BLOOD PRESSURE: 81 MMHG

## 2021-01-15 VITALS — DIASTOLIC BLOOD PRESSURE: 64 MMHG | SYSTOLIC BLOOD PRESSURE: 107 MMHG

## 2021-01-15 LAB
ANION GAP SERPL CALC-SCNC: 1 MMOL/L (ref 6–14)
BASE EXCESS ABG: 7 MMOL/L (ref -3–3)
BUN SERPL-MCNC: 31 MG/DL (ref 8–26)
CALCIUM SERPL-MCNC: 8.7 MG/DL (ref 8.5–10.1)
CHLORIDE SERPL-SCNC: 105 MMOL/L (ref 98–107)
CO2 SERPL-SCNC: 34 MMOL/L (ref 21–32)
CREAT SERPL-MCNC: 0.6 MG/DL (ref 0.7–1.3)
ERYTHROCYTE [DISTWIDTH] IN BLOOD BY AUTOMATED COUNT: 15.7 % (ref 11.5–14.5)
GFR SERPLBLD BASED ON 1.73 SQ M-ARVRAT: 134.8 ML/MIN
GLUCOSE SERPL-MCNC: 85 MG/DL (ref 70–99)
HCO3 BLDA-SCNC: 32 MMOL/L (ref 21–28)
HCT VFR BLD CALC: 22.9 % (ref 39–53)
HGB BLD-MCNC: 7.5 G/DL (ref 13–17.5)
INSPIRATION SETTING TIME VENT: (no result)
MAGNESIUM SERPL-MCNC: 2.4 MG/DL (ref 1.8–2.4)
MCH RBC QN AUTO: 32 PG (ref 25–35)
MCHC RBC AUTO-ENTMCNC: 33 G/DL (ref 31–37)
MCV RBC AUTO: 96 FL (ref 79–100)
PCO2 BLDA: 48 MMHG (ref 35–46)
PLATELET # BLD AUTO: 286 X10^3/UL (ref 140–400)
PO2 BLDA: 74 MMHG (ref 65–108)
POTASSIUM SERPL-SCNC: 3.7 MMOL/L (ref 3.5–5.1)
RBC # BLD AUTO: 2.38 X10^6/UL (ref 4.3–5.7)
SAO2 % BLDA: 94 % (ref 92–99)
SODIUM SERPL-SCNC: 140 MMOL/L (ref 136–145)
WBC # BLD AUTO: 5.5 X10^3/UL (ref 4–11)

## 2021-01-15 RX ADMIN — NYSTATIN SCH APP: 100000 POWDER TOPICAL at 21:12

## 2021-01-15 RX ADMIN — CETIRIZINE HYDROCHLORIDE SCH MG: 10 TABLET, FILM COATED ORAL at 08:41

## 2021-01-15 RX ADMIN — MIDAZOLAM PRN MLS/HR: 5 INJECTION, SOLUTION INTRAMUSCULAR; INTRAVENOUS at 15:41

## 2021-01-15 RX ADMIN — FAMOTIDINE SCH MG: 10 INJECTION, SOLUTION INTRAVENOUS at 08:40

## 2021-01-15 RX ADMIN — ATORVASTATIN CALCIUM SCH MG: 40 TABLET, FILM COATED ORAL at 21:11

## 2021-01-15 RX ADMIN — PROPOFOL PRN MLS/HR: 10 INJECTION, EMULSION INTRAVENOUS at 03:00

## 2021-01-15 RX ADMIN — NYSTATIN SCH APP: 100000 POWDER TOPICAL at 08:42

## 2021-01-15 RX ADMIN — ZINC SULFATE CAP 220 MG (50 MG ELEMENTAL ZN) SCH MG: 220 (50 ZN) CAP at 08:41

## 2021-01-15 RX ADMIN — ENOXAPARIN SODIUM SCH MG: 40 INJECTION SUBCUTANEOUS at 21:12

## 2021-01-15 RX ADMIN — CITALOPRAM HYDROBROMIDE SCH MG: 20 TABLET ORAL at 08:41

## 2021-01-15 RX ADMIN — PROPOFOL PRN MLS/HR: 10 INJECTION, EMULSION INTRAVENOUS at 12:53

## 2021-01-15 RX ADMIN — ENOXAPARIN SODIUM SCH MG: 40 INJECTION SUBCUTANEOUS at 08:41

## 2021-01-15 RX ADMIN — FAMOTIDINE SCH MG: 10 INJECTION, SOLUTION INTRAVENOUS at 21:12

## 2021-01-15 RX ADMIN — MIDAZOLAM PRN MLS/HR: 5 INJECTION, SOLUTION INTRAMUSCULAR; INTRAVENOUS at 06:01

## 2021-01-15 RX ADMIN — MULTIVITAMIN SCH ML: LIQUID ORAL at 08:41

## 2021-01-15 RX ADMIN — POLYETHYLENE GLYCOL 3350 SCH GM: 17 POWDER, FOR SOLUTION ORAL at 08:40

## 2021-01-15 RX ADMIN — PROPOFOL PRN MLS/HR: 10 INJECTION, EMULSION INTRAVENOUS at 17:08

## 2021-01-15 RX ADMIN — CYANOCOBALAMIN TAB 1000 MCG SCH MCG: 1000 TAB at 08:41

## 2021-01-15 RX ADMIN — Medication PRN MLS/HR: at 10:46

## 2021-01-15 NOTE — PDOC
[employer] PROGRESS NOTE


Date of Service


DOS:


DATE: 1/15/21 


TIME: 10:47





Chief Complaint


Chief Complaint


1/12/2021


Patient seen and examined in the ICU


He remains intubated


AC/28/450/60 5% with 9 of PEEP


He is sedated with propofol Versed and fentanyl


His Hart to bedside drainage


Chart reviewed


Discussed with RN


Remains critically ill








Acute hypoxic respiratory failure - no pulmonary history. With recent travel to 

and from [place] likely COVID 19 vs other atypical pneumonia. Cont empiric 

antibiotics, steroids. Consult Pulm. 


Improved aeration of the lungs with persistent moderate mixed interstitial and 

alveolar airspace disease.  1/02 


Pt. experienced hypoxia and respiratory decompensation overnight requiring 

intubation 


now on vent 100% PEEP of 10 


Hypotension as well now on pressors


acute respiratory distress syndrome. Consideration may be given for multifocal 

pneumonia versus pulmonary edema.


COVID 19 positive - with pneumonia - given transaminitis and late presentation 

with high O2 requirements no indication for remdesivir


RYDER - likely vasomotor nephropathy - no known renal history, will hydrate


Pneumonia - likely atypical, gram negative vs viral. will cover 


Prostate Ca - s/p resection at [facility] in [place]


Hypokalemia - likely nutritional or loss from diarrhea, will replace


Elevated troponin - likely from type II demand ischemia, will trend troponins, 

maintain telemetry


Crohn's - sees rheumatology regularly, Dr Fan in [place], on sulfasalazine


Psoriatic arthritis - on pain medications 30mg MS Contin BID, tramadol and 25mg 

Methotrexate weekly as DMARD per rheumatology, Dr. Fan. Hold MTX while 

inpatient


GERD - PPI


HLD - statin


HTN - Lisinopril and HCTZ on hold for RYDER


Anemia - likely of chronic disease, will monitor


Cardiomegaly - notable on CT chest - no known cardiac history, though his mother

did have CHF and CAD


Right renal mass - will need US for further assessment


on 100% FIO2 via Vapotherm , tolerating 


HYPOTENSION 


history of psoriatic arthritis.  He is likely immuno-compromised


as he has been on methotrexate


Severe malnutrition





History of Present Illness


History of Present Illness


1/15/2021


Patient seen and examined in the ICU


He is now out of isolation because he is 21 days past his COVID-19 diagnosis


He is still intubated


Assist-control/28/450/60 percent with 7 of PEEP


Has NG running at 30 cc an hour


Sedated with fentanyl Versed and propofol


Discussed with RN


Chart reviewed


He remains critically ill











1/14/2021


Patient seen and examined in the ICU


He remains mechanically ventilated


Assist-control/28/450 with 60% FiO2 and 7 of PEEP


He is sedated with fentanyl Versed and propofol


Has Hart to bedside drainage


Chart reviewed


Discussed with RN











1/13/2021


Patient seen and examined in the ICU


He remains intubated


AC/28/450 with 60% FiO2 and 9 of PEEP


Has NG running at 50 cc an hour


Has Hart to bedside drainage


Has SCDs on


Sedated with propofol fentanyl and Versed


Chart reviewed


Discussed with RN


He remains critically











1/11/2021


Patient seen and examined in the COVID-19 ICU


He remains intubated


Assist-control/28/450/60 5% FiO2 with 9 of PEEP


He has a Hart to bedside drainage


He is sedated with propofol and Versed and fentanyl


Discussed with RN and RT


Chart reviewed


He remains critically ill











Mr Nance is 66 yo male w/ past medical history of Crohn's, psoriatic arthritis,

GERD, HLD, HTN, prostate ca, tongue cancer who presents to the emergency 

department at [facility] concerned about shortness of breath that had been 

progressive. This started 12/18/2020 and has been getting worse since that time.

Of note patient also has loss of taste, loss of smell, cough, shortness of 

breath, fever. He recently went to [place] on a business trip and just 

returned 5 days prior to presentation.


Upon arrival to the emergency room patient had significant hypoxia with a pulse 

ox in the 40s.  He was placed initially on a nonrebreather and then placed on 

BiPAP.


He was also somewhat concerned about a Crohn's flareup.  Patient states he has 

been having abdominal pain with nausea and diarrhea.  These are not typical 

symptoms of his Crohn's.  He feels very tired and has body aches as well.


He was given steroids and Rocephin in ED. 


Chest radiograph concerning for bilateral interstitial infiltrates.


Labs significant for WBC 4.5, Hb 11.8, platelets 229, , K2.8, BUN 34, CR 1

.5, glucose 119, .9, albumin 3, , , lactic acid 1.6, D-dimer

3.15, troponin 0.068.


ABG on 80% FiO2 7.53/42/64


CT negative for pulmonary embolism. Widespread airspace disease throughout both 

lungs, consistent with pneumonia. Mild mediastinal and bilateral hilar 

lymphadenopathy, likely reactive. Cardiomegaly with mild aneurysmal dilation of 

the ascending thoracic aorta. Indeterminate hyperdense nodule at the midpole 

right kidney. This could represent a solid mass or complex cyst.


Patient transferred to [facility] for pulmonary consultation and 

ICU admission.





12/24: Overnight BP improved with fluids. O2 saturations slightly increased. Did

not tolerate more than 1 hour off BIPAP even with non-rebreather O2 saturations 

were 92% at best. No pain currently, very slightly appetite.


12/25: COVID-19 returned positive.  Down to 65% on his BiPAP 18/8.  Was able to 

take it off for medications meal yesterday, but desaturates to 79% and recovers 

quickly.  Still very drowsy with little appetite.  Afebrile.


12/26: Afebrile with 100% O2 on BiPAP today.  Transition to Vapotherm with more 

ease of breathing.  He has almost no appetite.  Less drowsy today.  He is 

depressed mood but now has a cell phone  and is able to talk to his 

family.  No complaints of chest pain some abdominal pain no bowel movement as of

yet.  ABG 7.49/37/59


12-29 abg pending 





1/4: Patient seen in [place] [address] ICU.  Breathing on vent, FiO2 90%, PEEP 10.  He is

afebrile.  Continue treatment with cefepime, Zyvox, and steroids.  Present labs 

reviewed, discussed with RN.


1/5: Seen in [place] [address] ICU.  Still on vent, FiO2 90%, PEEP 10.  Afebrile.  

Continue supportive care and steroids.  Continue antibiotics cefepime and 

linezolid.  Discussed with RN.


1/6: Afebrile.  Intubated, FiO2 85%, PEEP 10.  Hemoglobin is dropping.  Continue

to monitor hemoglobin, transfuse as needed.  Continue supportive care, steroids.

 Antibiotics, per ID.  Discussed with RN.


1/7: Patient seen in [place] [address] ICU.  Remains on ventilator, FiO2 35%, PEEP 10.  

Hemoglobin appears to have stabilized.  Continue steroids, antibiotics, 

supportive care.  Discussed with RN


1/8: Seen in [place] [address] ICU.  Afebrile.  Intubated, FiO2 65%, PEEP 9.  Discussed 

with RN, no acute events overnight.  Continue steroids, antibiotics, supportive 

care.


1/9: Afebrile.  On vent with FiO2 65%, PEEP 9.  No acute events overnight.  

Continue cefepime and supportive care.  Discussed with RN.


1/10: Patient seen in ICU.  On vent, FiO2 55%, PEEP 9.  Discussed with RN, no 

acute vents overnight.  No significant improvement.  Charts and labs reviewed.  

Continue cefepime and supportive care.





Vitals/I&O


Vitals/I&O:





                                   Vital Signs








  Date Time  Temp Pulse Resp B/P (MAP) Pulse Ox O2 Delivery O2 Flow Rate FiO2


 


1/15/21 10:46   28  93 Ventilator  


 


1/15/21 10:00  80  166/94 (118)    


 


1/15/21 08:00 98.4       





 98.4       














                                    I & O   


 


 1/14/21 1/14/21 1/15/21





 15:00 23:00 07:00


 


Intake Total 100 ml 1052.93 ml 1341 ml


 


Output Total 305 ml 325 ml 255 ml


 


Balance -205 ml 727.93 ml 1086 ml











Physical Exam


Physical Exam:


GENERAL:  Intubated, sedated.


HEENT:  Normocephalic, atraumatic.  Anicteric.  ETT and OGT tube in place.


NECK:  Supple.


LUNGS:  Decreased breath sounds at the bases.  No wheezing.


HEART:  S1, S2.


ABDOMEN: Mildly distended


EXTREMITIES: Generalized anasarca


GENITOURINARY:  Hart in place.scrotal swelling +


CENTRAL NERVOUS SYSTEM:  Intubated, sedated.


PSYCHIATRIC:  Unable to assess.


LINES:  Right upper extremity PICC line clean.


General:  No acute distress


Heart:  Regular rate


Abdomen:  Soft, No masses


Extremities:  No clubbing, No cyanosis, No edema, Normal pulses


Skin:  No rashes, No breakdown, No significant lesion





Labs


Labs:





Laboratory Tests








Test


 1/15/21


05:45 1/15/21


07:25


 


White Blood Count


 5.5 x10^3/uL


(4.0-11.0) 





 


Red Blood Count


 2.38 x10^6/uL


(4.30-5.70) 





 


Hemoglobin


 7.5 g/dL


(13.0-17.5) 





 


Hematocrit


 22.9 %


(39.0-53.0) 





 


Mean Corpuscular Volume 96 fL ()  


 


Mean Corpuscular Hemoglobin 32 pg (25-35)  


 


Mean Corpuscular Hemoglobin


Concent 33 g/dL


(31-37) 





 


Red Cell Distribution Width


 15.7 %


(11.5-14.5) 





 


Platelet Count


 286 x10^3/uL


(140-400) 





 


Sodium Level


 140 mmol/L


(136-145) 





 


Potassium Level


 3.7 mmol/L


(3.5-5.1) 





 


Chloride Level


 105 mmol/L


() 





 


Carbon Dioxide Level


 34 mmol/L


(21-32) 





 


Anion Gap 1 (6-14)  


 


Blood Urea Nitrogen


 31 mg/dL


(8-26) 





 


Creatinine


 0.6 mg/dL


(0.7-1.3) 





 


Estimated GFR


(Cockcroft-Gault) 134.8 


 





 


Glucose Level


 85 mg/dL


(70-99) 





 


Calcium Level


 8.7 mg/dL


(8.5-10.1) 





 


Magnesium Level


 2.4 mg/dL


(1.8-2.4) 





 


O2 Saturation  94 % (92-99) 


 


Arterial Blood pH


 


 7.44


(7.35-7.45)


 


Arterial Blood pCO2 at


Patient Temp 


 48 mmHg


(35-46)


 


Arterial Blood pO2 at Patient


Temp 


 74 mmHg


()


 


Arterial Blood HCO3


 


 32 mmol/L


(21-28)


 


Arterial Blood Base Excess


 


 7 mmol/L


(-3-3)


 


FiO2  60/vent 











Assessment and Plan


Assessmemt and Plan


Acute hypoxic respiratory failure - no pulmonary history. With recent travel to 

and from [place] likely COVID 19 vs other atypical pneumonia. Cont empiric 

antibiotics, steroids.  Pulmonary following


Resolving COVID-19 pneumonia


RYDER - likely vasomotor nephropathy - no known renal history, will hydrate


Pneumonia - likely atypical, gram negative vs viral. will cover 


Prostate Ca - s/p resection at [facility] in [place]


Hypokalemia - likely nutritional or loss from diarrhea, will replace


Elevated troponin - likely from type II demand ischemia, will trend troponins, 

maintain telemetry


Crohn's - sees rheumatology regularly, Dr Fan in [place], on sulfasalazine


Psoriatic arthritis - on pain medications 30mg MS Contin BID, tramadol and 25mg 

Methotrexate weekly as DMARD per rheumatology, Dr. Fan. Hold MTX while 

inpatient


GERD - PPI


HLD - statin


HTN - Lisinopril and HCTZ on hold for RYDER


Anemia - likely of chronic disease, will monitor


Cardiomegaly - notable on CT chest - no known cardiac history, though his mother

did have CHF and CAD


Right renal mass - will need US for further assessment


on 100% FIO2 via Vapotherm , tolerating 


HYPOTENSION 


history of psoriatic arthritis.  He is likely immuno-compromised


as he has been on methotrexate


Severe malnutrition





Plan


Continue ICU monitoring


Vent weaning


IV fluids 


Continue propofol and Versed and fentanyl for sedation


IV antibiotics


Continue with IV steroids with slow taper


Continue PPN for nutritional support  


Mitts for patient safety


Hart to BSD


DVT/GI PPX 


blood cultures


ID CONSULT


PROCALCITONIN


Appreciate subspecialist input


Prognosis very guarded


CC time 32 min





Comment


Review of Relevant


I have reviewed the following items jabier (where applicable) has been applied.


Medications:





Current Medications








 Medications


  (Trade)  Dose


 Ordered  Sig/Kayli


 Route


 PRN Reason  Start Time


 Stop Time Status Last Admin


Dose Admin


 


 Multivitamins/


 Minerals


 Therapeutic


  (Centrum


 Multivit-Mineral


 Liq)  5 ml  DAILY


 PEG


   1/15/21 09:00


    1/15/21 08:41














Justifications for Admission


Other Justification














NELY ANDERSON III DO           Pantera 15, 2021 10:49

## 2021-01-15 NOTE — RAD
EXAM: AP View of the chest



DATE: 1/15/2021 3:26 AM



INDICATION: Respiratory failure, covid pneumonia



COMPARISON: 1/11/2021 1/4/2021



FINDINGS:

ET tube tip terminates approximately 6 cm above the ruchi. Right upper extremity PICC tip projects o
wayne the proximal SVC. Enteric tube extends beyond the diaphragm, tip is not visualized.



Bilateral parenchymal airspace opacities and left pleural effusion are grossly stable accounting for 
differences in projection/technique. No pneumothorax



Cardiomediastinal silhouette is stable.



IMPRESSION:

1.  Support lines and tubes as above.

2.  Bilateral parenchymal opacities and left pleural effusion are grossly stable.



Electronically signed by: Daniel Tripp MD (1/15/2021 4:38 AM) LIZANDRO

## 2021-01-15 NOTE — PDOC
Infectious Disease Note


Subjective:


Subjective


intubated /sedated on vent


no fevers


no more vomiting documented





Vital Signs:


Vital Signs





Vital Signs








  Date Time  Temp Pulse Resp B/P (MAP) Pulse Ox O2 Delivery O2 Flow Rate FiO2


 


1/15/21 08:00 98.4 49 28 85/55 (65) 98 Ventilator  





 98.4       











Physical Exam:


PHYSICAL EXAM


GENERAL:  Intubated, sedated.


HEENT:  Normocephalic, atraumatic.  Anicteric.  ETT and OGT tube in place.


NECK:  Supple.


LUNGS:  Decreased breath sounds at the bases.  No wheezing.


HEART:  S1, S2.


ABDOMEN: Mildly distended


EXTREMITIES: Generalized anasarca


GENITOURINARY:  Hart in place.scrotal swelling +


CENTRAL NERVOUS SYSTEM:  Intubated, sedated.


PSYCHIATRIC:  Unable to assess.


LINES:  Right upper extremity PICC line clean.





Medications:


Inpatient Meds:





Current Medications








 Medications


  (Trade)  Dose


 Ordered  Sig/Kayli  Start Time


 Stop Time Status Last Admin


Dose Admin


 


 Acetaminophen


  (Tylenol Supp)  650 mg  PRN Q6HRS  PRN  12/23/20 08:45


     





 


 Acetaminophen


  (Tylenol)  650 mg  PRN Q4HRS  PRN  12/23/20 08:15


    12/31/20 01:02


650 MG


 


 Alteplase,


 Recombinant


  (Cathflo For


 Central Catheter


 Clearance)  1 mg  1X  ONCE  1/1/21 16:15


 1/1/21 16:16 DC 1/2/21 02:10


1 MG


 


 Amino Acids/


 Glycerin/


 Electrolytes  1,000 ml @ 


 80 mls/hr  [id number]  12/23/20 08:45


 1/5/21 05:03 DC 1/4/21 14:53


80 MLS/HR


 


 Atorvastatin


 Calcium


  (Lipitor)  40 mg  QHS  12/23/20 21:00


    1/14/21 20:44


40 MG


 


 Azithromycin 250


 mg/Sodium Chloride  250 ml @ 


 250 mls/hr  Q24H  12/24/20 09:00


 12/27/20 09:59 DC 12/27/20 08:55


250 MLS/HR


 


 Azithromycin 500


 mg/Sodium Chloride  250 ml @ 


 250 mls/hr  1X  ONCE  12/23/20 10:00


 12/23/20 10:59 DC 12/23/20 09:59


250 MLS/HR


 


 Benzonatate


  (Tessalon Perle)  100 mg  [id number]  12/26/20 14:00


 1/2/21 10:35 DC 1/1/21 20:51


100 MG


 


 Cefepime HCl


  (Maxipime)  2 gm  Q12HR  1/3/21 14:00


 1/13/21 07:59 DC 1/12/21 20:52


2 GM


 


 Ceftriaxone Sodium


  (Rocephin)  1 gm  Q24H  12/24/20 09:00


 1/3/21 13:15 DC 1/3/21 08:14


1 GM


 


 Cetirizine HCl


  (ZyrTEC)  10 mg  DAILY  12/24/20 09:00


    1/14/21 11:30


10 MG


 


 Chlorhexidine


 Gluconate


  (Peridex)  15 ml  BID  1/2/21 09:00


 1/9/21 19:26 DC 1/9/21 09:21


15 ML


 


 Citalopram


 Hydrobromide


  (CeleXA)  20 mg  DAILY  12/24/20 09:00


    1/14/21 11:31


20 MG


 


 Cyanocobalamin


  (Vitamin B-12)  1,000 mcg  DAILY  12/24/20 09:00


    1/14/21 11:31


1,000 MCG


 


 Dexamethasone


 Sodium Phosphate


  (Decadron)  6 mg  DAILY  1/4/21 09:00


 1/5/21 08:55 DC 1/5/21 08:36


6 MG


 


 Docusate Sodium


  (Colace)  100 mg  PRN BID  PRN  12/23/20 08:15


    12/25/20 17:03


100 MG


 


 Enalaprilat


  (Vasotec Inj)  2.5 mg  PRN Q6HRS  PRN  12/27/20 10:00


    12/28/20 12:29


2.5 MG


 


 Enoxaparin Sodium


  (Lovenox 40mg


 Syringe)  40 mg  Q12HR  12/23/20 09:00


    1/14/21 20:45


40 MG


 


 Famotidine


  (Pepcid Vial)  20 mg  BID  1/8/21 21:00


    1/14/21 08:54


20 MG


 


 Fentanyl Citrate  55 ml @ 0


 mls/hr  CONT PRN  PRN  1/2/21 02:45


    1/14/21 16:59


3 MLS/HR


 


 Furosemide


  (Lasix)  40 mg  1X  ONCE  1/12/21 11:15


 1/12/21 11:16 DC 1/12/21 11:18


40 MG


 


 Guaifenesin


  (Robitussin Dm)  10 ml  PRN Q6HRS  PRN  12/23/20 08:45


    1/1/21 15:37


10 ML


 


 Guaifenesin


  (Robitussin)  400 mg  PRN Q4HRS  PRN  12/27/20 22:30


    1/1/21 23:00


400 MG


 


 Info


  (Icu Electrolyte


 Protocol)  1 ea  CONT PRN  PRN  12/24/20 14:15


     





 


 Labetalol HCl


  (Normodyne Iv


 Push)  10 mg  PRN Q2HR  PRN  12/26/20 18:30


    1/13/21 10:23


10 MG


 


 Linezolid


  (Zyvox)  600 mg  BID  1/3/21 21:00


 1/7/21 07:58 DC 1/6/21 20:38


600 MG


 


 Midazolam HCl  100 ml @ 0


 mls/hr  CONT  PRN  1/2/21 01:15


    1/15/21 06:01


10 MLS/HR


 


 Morphine Sulfate


  (Morphine


 Sulfate)  2 mg  PRN Q4HRS  PRN  12/23/20 08:45


 1/2/21 01:41 DC 1/1/21 16:12


2 MG


 


 Morphine Sulfate


  (Ms Contin)  15 mg  BID  12/23/20 21:00


 1/6/21 14:43 DC 1/5/21 21:11


15 MG


 


 Multivitamins/


 Minerals


 Therapeutic


  (Centrum


 Multivit-Mineral


 Liq)  5 ml  DAILY  1/15/21 09:00


     





 


 Norepinephrine


 Bitartrate 8 mg/


 Dextrose  258 ml @ 


 19.737 mls/


 hr  CONT  PRN  1/2/21 07:30


 1/5/21 08:55 DC 1/2/21 22:27


19.737 MLS/HR


 


 Nystatin


  (Nystop)  1 lindsay  BID  1/6/21 16:00


    1/14/21 20:45


1 LINDSAY


 


 Olanzapine


  (ZyPREXA ZYDIS)  5 mg  PRN BID  PRN  12/27/20 12:30


    1/11/21 09:56


5 MG


 


 Ondansetron HCl


  (Zofran)  4 mg  PRN Q4HRS  PRN  12/23/20 08:15


     





 


 Polyethylene


 Glycol


  (miraLAX PACKET)  17 gm  DAILY  1/6/21 14:30


    1/14/21 14:26


17 GM


 


 Potassium Chloride


  (Klor-Con)  40 meq  1X  ONCE  12/24/20 14:15


 12/24/20 14:16 DC 12/24/20 14:24


40 MEQ


 


 Propofol  100 ml @ 0


 mls/hr  CONT  PRN  1/2/21 01:15


    1/15/21 03:00


12.2 MLS/HR


 


 Quetiapine


 Fumarate


  (SEROquel)  50 mg  PRN QHS  PRN  12/27/20 21:00


    1/1/21 00:24


50 MG


 


 Sodium Chloride  1,000 ml @ 


 0 mls/hr  Q0M  1/7/21 13:15


     





 


 Sodium Chloride


  (Normal Saline


 Flush)  3 ml  QSHIFT  PRN  12/23/20 07:45


     





 


 Sterile Water


  (WATER for RESP)  1,000 ml  CONT  PRN  12/26/20 09:30


    1/1/21 15:49


1,000 ML


 


 Succinylcholine


 Chloride


  (Anectine)  200 mg  STK-MED ONCE  1/2/21 01:21


 1/2/21 01:21 DC  





 


 Sulfasalazine


  (Azulfidine)  1,000 mg  BID  12/23/20 21:00


    1/14/21 20:44


1,000 MG


 


 Tramadol HCl


  (Ultram)  100 mg  PRN Q8HRS  PRN  12/23/20 15:00


    1/1/21 13:35


100 MG


 


 Vancomycin HCl


  (Vanco Per


 Pharmacy)  1 each  PRN DAILY  PRN  1/2/21 16:00


 1/3/21 13:19 DC 1/2/21 20:47


1 EACH


 


 Vancomycin HCl


  (Vancomycin


 Trough Level)  1 each  1X  ONCE  1/4/21 04:30


 1/3/21 13:20 DC  





 


 Vancomycin HCl


 1.5 gm/Sodium


 Chloride  500 ml @ 


 250 mls/hr  Q12H  1/3/21 05:00


 1/3/21 13:15 DC 1/3/21 05:53


250 MLS/HR


 


 Vancomycin HCl 2


 gm/Sodium Chloride  500 ml @ 


 250 mls/hr  1X  ONCE  1/2/21 17:00


 1/2/21 18:59 DC 1/2/21 17:43


250 MLS/HR


 


 Vecuronium Bromide


  (Norcuron Bolus)  6 mg  PRN Q6HRS  PRN  1/2/21 09:15


    1/9/21 17:07


6 MG


 


 Zinc Sulfate


  (Orazinc)  220 mg  DAILY  12/23/20 09:00


    1/14/21 11:30


220 MG


 


 Zolpidem Tartrate


  (Ambien)  5 mg  PRN QHS  PRN  12/23/20 15:00


    1/1/21 22:05


5 MG











Labs:


Lab





Laboratory Tests








Test


 1/15/21


05:45 1/15/21


07:25


 


White Blood Count


 5.5 x10^3/uL


(4.0-11.0) 





 


Red Blood Count


 2.38 x10^6/uL


(4.30-5.70) 





 


Hemoglobin


 7.5 g/dL


(13.0-17.5) 





 


Hematocrit


 22.9 %


(39.0-53.0) 





 


Mean Corpuscular Volume 96 fL ()  


 


Mean Corpuscular Hemoglobin 32 pg (25-35)  


 


Mean Corpuscular Hemoglobin


Concent 33 g/dL


(31-37) 





 


Red Cell Distribution Width


 15.7 %


(11.5-14.5) 





 


Platelet Count


 286 x10^3/uL


(140-400) 





 


Sodium Level


 140 mmol/L


(136-145) 





 


Potassium Level


 3.7 mmol/L


(3.5-5.1) 





 


Chloride Level


 105 mmol/L


() 





 


Carbon Dioxide Level


 34 mmol/L


(21-32) 





 


Anion Gap 1 (6-14)  


 


Blood Urea Nitrogen


 31 mg/dL


(8-26) 





 


Creatinine


 0.6 mg/dL


(0.7-1.3) 





 


Estimated GFR


(Cockcroft-Gault) 134.8 


 





 


Glucose Level


 85 mg/dL


(70-99) 





 


Calcium Level


 8.7 mg/dL


(8.5-10.1) 





 


Magnesium Level


 2.4 mg/dL


(1.8-2.4) 





 


O2 Saturation  94 % (92-99) 


 


Arterial Blood pH


 


 7.44


(7.35-7.45)


 


Arterial Blood pCO2 at


Patient Temp 


 48 mmHg


(35-46)


 


Arterial Blood pO2 at Patient


Temp 


 74 mmHg


()


 


Arterial Blood HCO3


 


 32 mmol/L


(21-28)


 


Arterial Blood Base Excess


 


 7 mmol/L


(-3-3)


 


FiO2  60/vent 











Objective:


Assessment:


COVID-19 infection


Acute hypoxic respiratory failure/ARDS


Fever resolved


Sepsis resolved


History of Crohn's


History of psoriasis


Immunosuppression


Anemia


History of tongue and prostate cancer.





Plan:


Plan of Care


Monitor off antibiotics


Continue supportive care.


Patient completed  steroids.





   Discussed with NISA JANE MD           Pantera 15, 2021 08:36

## 2021-01-15 NOTE — PDOC
PULMONARY PROGRESS NOTES


DATE: 1/15/21 


TIME: 08:42


Subjective


Patient remains on ventilatory support, FiO2 60% and a PEEP of 7


Remains sedated on Versed, fentanyl and propofol


Vitals





Vital Signs








  Date Time  Temp Pulse Resp B/P (MAP) Pulse Ox O2 Delivery O2 Flow Rate FiO2


 


1/15/21 08:00 98.4 49 28 85/55 (65) 98 Ventilator  





 98.4       








Comments


Pt. seen during COVID-19 pandemic, visual exam preformed 


RRR


intubated 


no distress


no accessory muscle use 


No edema or rash


Labs





Laboratory Tests








Test


 1/14/21


08:00 1/15/21


05:45 1/15/21


07:25


 


O2 Saturation 94 % (92-99)   94 % (92-99) 


 


Arterial Blood pH


 7.47


(7.35-7.45) 


 7.44


(7.35-7.45)


 


Arterial Blood pCO2 at


Patient Temp 46 mmHg


(35-46) 


 48 mmHg


(35-46)


 


Arterial Blood pO2 at Patient


Temp 75 mmHg


() 


 74 mmHg


()


 


Arterial Blood HCO3


 32 mmol/L


(21-28) 


 32 mmol/L


(21-28)


 


Arterial Blood Base Excess


 8 mmol/L


(-3-3) 


 7 mmol/L


(-3-3)


 


FiO2 60/vent   60/vent 


 


White Blood Count


 


 5.5 x10^3/uL


(4.0-11.0) 





 


Red Blood Count


 


 2.38 x10^6/uL


(4.30-5.70) 





 


Hemoglobin


 


 7.5 g/dL


(13.0-17.5) 





 


Hematocrit


 


 22.9 %


(39.0-53.0) 





 


Mean Corpuscular Volume  96 fL ()  


 


Mean Corpuscular Hemoglobin  32 pg (25-35)  


 


Mean Corpuscular Hemoglobin


Concent 


 33 g/dL


(31-37) 





 


Red Cell Distribution Width


 


 15.7 %


(11.5-14.5) 





 


Platelet Count


 


 286 x10^3/uL


(140-400) 





 


Sodium Level


 


 140 mmol/L


(136-145) 





 


Potassium Level


 


 3.7 mmol/L


(3.5-5.1) 





 


Chloride Level


 


 105 mmol/L


() 





 


Carbon Dioxide Level


 


 34 mmol/L


(21-32) 





 


Anion Gap  1 (6-14)  


 


Blood Urea Nitrogen


 


 31 mg/dL


(8-26) 





 


Creatinine


 


 0.6 mg/dL


(0.7-1.3) 





 


Estimated GFR


(Cockcroft-Gault) 


 134.8 


 





 


Glucose Level


 


 85 mg/dL


(70-99) 





 


Calcium Level


 


 8.7 mg/dL


(8.5-10.1) 





 


Magnesium Level


 


 2.4 mg/dL


(1.8-2.4) 











Laboratory Tests








Test


 1/15/21


05:45 1/15/21


07:25


 


White Blood Count


 5.5 x10^3/uL


(4.0-11.0) 





 


Red Blood Count


 2.38 x10^6/uL


(4.30-5.70) 





 


Hemoglobin


 7.5 g/dL


(13.0-17.5) 





 


Hematocrit


 22.9 %


(39.0-53.0) 





 


Mean Corpuscular Volume 96 fL ()  


 


Mean Corpuscular Hemoglobin 32 pg (25-35)  


 


Mean Corpuscular Hemoglobin


Concent 33 g/dL


(31-37) 





 


Red Cell Distribution Width


 15.7 %


(11.5-14.5) 





 


Platelet Count


 286 x10^3/uL


(140-400) 





 


Sodium Level


 140 mmol/L


(136-145) 





 


Potassium Level


 3.7 mmol/L


(3.5-5.1) 





 


Chloride Level


 105 mmol/L


() 





 


Carbon Dioxide Level


 34 mmol/L


(21-32) 





 


Anion Gap 1 (6-14)  


 


Blood Urea Nitrogen


 31 mg/dL


(8-26) 





 


Creatinine


 0.6 mg/dL


(0.7-1.3) 





 


Estimated GFR


(Cockcroft-Gault) 134.8 


 





 


Glucose Level


 85 mg/dL


(70-99) 





 


Calcium Level


 8.7 mg/dL


(8.5-10.1) 





 


Magnesium Level


 2.4 mg/dL


(1.8-2.4) 





 


O2 Saturation  94 % (92-99) 


 


Arterial Blood pH


 


 7.44


(7.35-7.45)


 


Arterial Blood pCO2 at


Patient Temp 


 48 mmHg


(35-46)


 


Arterial Blood pO2 at Patient


Temp 


 74 mmHg


()


 


Arterial Blood HCO3


 


 32 mmol/L


(21-28)


 


Arterial Blood Base Excess


 


 7 mmol/L


(-3-3)


 


FiO2  60/vent 








Medications





Active Scripts








 Medications  Dose


 Route/Sig


 Max Daily Dose Days Date Category


 


 Morphine Sulfate


 Er (Morphine


 Sulfate) 30 Mg


 Tablet.er  1 Tab


 PO BID


   12/23/20 Reported


 


 Tramadol Hcl 100


 Mg Tbmp.24hr  100 Mg


 PO PRN Q8HRS PRN


   12/23/20 Reported


 


 Ambien (Zolpidem


 Tartrate) 10 Mg


 Tablet  10 Mg


 PO PRN QHS PRN


   12/23/20 Reported


 


 Methotrexate


  (Methotrexate


 Sodium) 2.5 Mg


 Tablet  10 Tab


 PO WEEKLY


   12/23/20 Reported


 


 Lisinopril 10 Mg


 Tablet  1 Tab


 PO DAILY


   12/23/20 Reported


 


 Crestor


  (Rosuvastatin


 Calcium) 5 Mg


 Tablet  2 Tab


 PO DAILY


   12/23/20 Reported


 


 Hydrochlorothiazide


 Tablet


  (Hydrochlorothiazide)


 50 Mg Tablet  25 Mg


 PO DAILY


   12/23/20 Reported


 


 Escitalopram


 Oxalate 10 Mg


 Tablet  1 Tab


 PO DAILY


   12/23/20 Reported


 


 Sulfasalazine Dr


  (Sulfasalazine)


 500 Mg Tablet.dr  2 Tab


 PO TID


  30 12/23/20 Reported


 


 Colace (Docusate


 Sodium) 100 Mg


 Capsule  1 Cap


 PO DA


  30 12/23/20 Reported


 


 Acetaminophen


 Ext.release


  (Acetaminophen)


 650 Mg Tablet.er  650 Mg


 PO PRN Q8HRS PRN


   12/23/20 Reported


 


 Melatonin 5 Mg


 Capsule  1 Cap


 PO QHS


  30 12/23/20 Reported


 


 B-12


  (Cyanocobalamin


  (Vitamin B-12))


 500 Mcg Tablet  2 Tab


 PO DAILY


  30 12/23/20 Reported


 


 Vitamin D3


  (Cholecalciferol


  (Vitamin D3)) 50


 Mcg Capsule  50 Mcg


 PO DAILY


   12/23/20 Reported


 


 Aller-Sung


  (Cetirizine Hcl)


 10 Mg Tablet  1 Tab


 PO DAILY


  30 12/23/20 Reported








Comments


CXR


IMPRESSION:


1.  Support lines and tubes as above.


2.  Bilateral parenchymal opacities and left pleural effusion are grossly 

stable.





Impression


.


IMPRESSION:


1.  Acute hypoxic respiratory failure secondary to highly suspected COVID-19


pneumonia and acute respiratory distress syndrome/acute lung injury.-- COVID-19 

positive 


2.  Abnormal CT chest with extensive widespread ground glass and alveolar and


interstitial infiltrates with reactive mild mediastinal/hilar adenopathy.  This 

related to COVID-19 pneumonia.


3.  Patient with history of psoriatic arthritis.  He is likely immunocompromise


as he has been on methotrexate.  


4.  History of tongue cancer with resection, details unknown.


5.  History of prostate cancer.


6.  History of Crohn's disease.





Plan


.


RECOMMENDATIONS:


Continue current vent support. wean  Fi02 60% and PEEP of 7, 


Follow chest x-ray/ABG--will reduce PEEP to 6 today on FiO2 to 55%


COVID-19 positive


Continue ABX per ID, off abx at this time--- send PCR/MRSA


Patient has completed full course of steroids


Continue TF for nutritional support 


HTN per PCP


DVT/GI PPX 





D/W RN and RT 


prognosis guarded








PT. is FULL CODE





Critical Care time 0800-0830AM











JUAN RHOADES MD              Pantera 15, 2021 08:42

## 2021-01-16 VITALS — DIASTOLIC BLOOD PRESSURE: 46 MMHG | SYSTOLIC BLOOD PRESSURE: 100 MMHG

## 2021-01-16 VITALS — DIASTOLIC BLOOD PRESSURE: 51 MMHG | SYSTOLIC BLOOD PRESSURE: 91 MMHG

## 2021-01-16 VITALS — SYSTOLIC BLOOD PRESSURE: 84 MMHG | DIASTOLIC BLOOD PRESSURE: 63 MMHG

## 2021-01-16 VITALS — DIASTOLIC BLOOD PRESSURE: 53 MMHG | SYSTOLIC BLOOD PRESSURE: 122 MMHG

## 2021-01-16 VITALS — DIASTOLIC BLOOD PRESSURE: 50 MMHG | SYSTOLIC BLOOD PRESSURE: 107 MMHG

## 2021-01-16 VITALS — DIASTOLIC BLOOD PRESSURE: 54 MMHG | SYSTOLIC BLOOD PRESSURE: 97 MMHG

## 2021-01-16 VITALS — SYSTOLIC BLOOD PRESSURE: 201 MMHG | DIASTOLIC BLOOD PRESSURE: 83 MMHG

## 2021-01-16 VITALS — DIASTOLIC BLOOD PRESSURE: 48 MMHG | SYSTOLIC BLOOD PRESSURE: 90 MMHG

## 2021-01-16 VITALS — SYSTOLIC BLOOD PRESSURE: 186 MMHG | DIASTOLIC BLOOD PRESSURE: 94 MMHG

## 2021-01-16 VITALS — DIASTOLIC BLOOD PRESSURE: 87 MMHG | SYSTOLIC BLOOD PRESSURE: 161 MMHG

## 2021-01-16 VITALS — DIASTOLIC BLOOD PRESSURE: 58 MMHG | SYSTOLIC BLOOD PRESSURE: 103 MMHG

## 2021-01-16 VITALS — DIASTOLIC BLOOD PRESSURE: 84 MMHG | SYSTOLIC BLOOD PRESSURE: 126 MMHG

## 2021-01-16 VITALS — DIASTOLIC BLOOD PRESSURE: 94 MMHG | SYSTOLIC BLOOD PRESSURE: 199 MMHG

## 2021-01-16 VITALS — SYSTOLIC BLOOD PRESSURE: 150 MMHG | DIASTOLIC BLOOD PRESSURE: 90 MMHG

## 2021-01-16 VITALS — DIASTOLIC BLOOD PRESSURE: 70 MMHG | SYSTOLIC BLOOD PRESSURE: 135 MMHG

## 2021-01-16 VITALS — SYSTOLIC BLOOD PRESSURE: 106 MMHG | DIASTOLIC BLOOD PRESSURE: 55 MMHG

## 2021-01-16 VITALS — DIASTOLIC BLOOD PRESSURE: 58 MMHG | SYSTOLIC BLOOD PRESSURE: 105 MMHG

## 2021-01-16 VITALS — DIASTOLIC BLOOD PRESSURE: 89 MMHG | SYSTOLIC BLOOD PRESSURE: 168 MMHG

## 2021-01-16 VITALS — DIASTOLIC BLOOD PRESSURE: 100 MMHG | SYSTOLIC BLOOD PRESSURE: 185 MMHG

## 2021-01-16 VITALS — SYSTOLIC BLOOD PRESSURE: 108 MMHG | DIASTOLIC BLOOD PRESSURE: 63 MMHG

## 2021-01-16 VITALS — DIASTOLIC BLOOD PRESSURE: 65 MMHG | SYSTOLIC BLOOD PRESSURE: 115 MMHG

## 2021-01-16 VITALS — SYSTOLIC BLOOD PRESSURE: 176 MMHG | DIASTOLIC BLOOD PRESSURE: 92 MMHG

## 2021-01-16 VITALS — DIASTOLIC BLOOD PRESSURE: 93 MMHG | SYSTOLIC BLOOD PRESSURE: 178 MMHG

## 2021-01-16 VITALS — SYSTOLIC BLOOD PRESSURE: 107 MMHG | DIASTOLIC BLOOD PRESSURE: 50 MMHG

## 2021-01-16 VITALS — SYSTOLIC BLOOD PRESSURE: 90 MMHG | DIASTOLIC BLOOD PRESSURE: 53 MMHG

## 2021-01-16 VITALS — DIASTOLIC BLOOD PRESSURE: 53 MMHG | SYSTOLIC BLOOD PRESSURE: 86 MMHG

## 2021-01-16 VITALS — DIASTOLIC BLOOD PRESSURE: 60 MMHG | SYSTOLIC BLOOD PRESSURE: 109 MMHG

## 2021-01-16 LAB
BASE EXCESS ABG: 7 MMOL/L (ref -3–3)
HCO3 BLDA-SCNC: 32 MMOL/L (ref 21–28)
INSPIRATION SETTING TIME VENT: (no result)
PCO2 BLDA: 47 MMHG (ref 35–46)
PO2 BLDA: 88 MMHG (ref 65–108)
SAO2 % BLDA: 96 % (ref 92–99)

## 2021-01-16 RX ADMIN — ENOXAPARIN SODIUM SCH MG: 40 INJECTION SUBCUTANEOUS at 21:44

## 2021-01-16 RX ADMIN — PROPOFOL PRN MLS/HR: 10 INJECTION, EMULSION INTRAVENOUS at 01:49

## 2021-01-16 RX ADMIN — MULTIVITAMIN SCH ML: LIQUID ORAL at 08:12

## 2021-01-16 RX ADMIN — MIDAZOLAM PRN MLS/HR: 5 INJECTION, SOLUTION INTRAMUSCULAR; INTRAVENOUS at 12:05

## 2021-01-16 RX ADMIN — CITALOPRAM HYDROBROMIDE SCH MG: 20 TABLET ORAL at 08:13

## 2021-01-16 RX ADMIN — Medication PRN MLS/HR: at 23:20

## 2021-01-16 RX ADMIN — PROPOFOL PRN MLS/HR: 10 INJECTION, EMULSION INTRAVENOUS at 23:12

## 2021-01-16 RX ADMIN — CETIRIZINE HYDROCHLORIDE SCH MG: 10 TABLET, FILM COATED ORAL at 08:12

## 2021-01-16 RX ADMIN — NYSTATIN SCH APP: 100000 POWDER TOPICAL at 09:00

## 2021-01-16 RX ADMIN — ZINC SULFATE CAP 220 MG (50 MG ELEMENTAL ZN) SCH MG: 220 (50 ZN) CAP at 08:12

## 2021-01-16 RX ADMIN — LABETALOL HYDROCHLORIDE PRN MG: 5 INJECTION, SOLUTION INTRAVENOUS at 19:27

## 2021-01-16 RX ADMIN — Medication PRN MLS/HR: at 01:46

## 2021-01-16 RX ADMIN — PROPOFOL PRN MLS/HR: 10 INJECTION, EMULSION INTRAVENOUS at 08:52

## 2021-01-16 RX ADMIN — MIDAZOLAM PRN MLS/HR: 5 INJECTION, SOLUTION INTRAMUSCULAR; INTRAVENOUS at 01:46

## 2021-01-16 RX ADMIN — POLYETHYLENE GLYCOL 3350 SCH GM: 17 POWDER, FOR SOLUTION ORAL at 09:00

## 2021-01-16 RX ADMIN — CYANOCOBALAMIN TAB 1000 MCG SCH MCG: 1000 TAB at 08:13

## 2021-01-16 RX ADMIN — PROPOFOL PRN MLS/HR: 10 INJECTION, EMULSION INTRAVENOUS at 15:14

## 2021-01-16 RX ADMIN — FAMOTIDINE SCH MG: 10 INJECTION, SOLUTION INTRAVENOUS at 21:45

## 2021-01-16 RX ADMIN — ENOXAPARIN SODIUM SCH MG: 40 INJECTION SUBCUTANEOUS at 08:13

## 2021-01-16 RX ADMIN — MIDAZOLAM PRN MLS/HR: 5 INJECTION, SOLUTION INTRAMUSCULAR; INTRAVENOUS at 23:10

## 2021-01-16 RX ADMIN — FAMOTIDINE SCH MG: 10 INJECTION, SOLUTION INTRAVENOUS at 08:13

## 2021-01-16 RX ADMIN — NYSTATIN SCH APP: 100000 POWDER TOPICAL at 21:45

## 2021-01-16 RX ADMIN — ATORVASTATIN CALCIUM SCH MG: 40 TABLET, FILM COATED ORAL at 21:44

## 2021-01-16 NOTE — PDOC
Infectious Disease Note


Subjective:


Subjective


intubated /sedated on vent


no fevers


no more vomiting documented





Vital Signs:


Vital Signs





Vital Signs








  Date Time  Temp Pulse Resp B/P (MAP) Pulse Ox O2 Delivery O2 Flow Rate FiO2


 


1/16/21 07:00  52 28 103/58 (73) 96 Ventilator  


 


1/16/21 04:00 98.6       





 98.6       


 


1/16/21 02:16       40.0 











Physical Exam:


PHYSICAL EXAM


GENERAL:  Intubated, sedated.


HEENT:  Normocephalic, atraumatic.  Anicteric.  ETT and OGT tube in place.


NECK:  Supple.


LUNGS:  Decreased breath sounds at the bases.  No wheezing.


HEART:  S1, S2.


ABDOMEN: Mildly distended


EXTREMITIES: Generalized anasarca


GENITOURINARY:  Hart in place.scrotal swelling +


CENTRAL NERVOUS SYSTEM:  Intubated, sedated.


PSYCHIATRIC:  Unable to assess.


LINES:  Right upper extremity PICC line clean.





Medications:


Inpatient Meds:





Current Medications








 Medications


  (Trade)  Dose


 Ordered  Sig/Kayli  Start Time


 Stop Time Status Last Admin


Dose Admin


 


 Acetaminophen


  (Tylenol Supp)  650 mg  PRN Q6HRS  PRN  12/23/20 08:45


     





 


 Acetaminophen


  (Tylenol)  650 mg  PRN Q4HRS  PRN  12/23/20 08:15


    12/31/20 01:02


650 MG


 


 Alteplase,


 Recombinant


  (Cathflo For


 Central Catheter


 Clearance)  1 mg  1X  ONCE  1/1/21 16:15


 1/1/21 16:16 DC 1/2/21 02:10


1 MG


 


 Amino Acids/


 Glycerin/


 Electrolytes  1,000 ml @ 


 80 mls/hr  [id number]  12/23/20 08:45


 1/5/21 05:03 DC 1/4/21 14:53


80 MLS/HR


 


 Atorvastatin


 Calcium


  (Lipitor)  40 mg  QHS  12/23/20 21:00


    1/15/21 21:11


40 MG


 


 Azithromycin 250


 mg/Sodium Chloride  250 ml @ 


 250 mls/hr  Q24H  12/24/20 09:00


 12/27/20 09:59 DC 12/27/20 08:55


250 MLS/HR


 


 Azithromycin 500


 mg/Sodium Chloride  250 ml @ 


 250 mls/hr  1X  ONCE  12/23/20 10:00


 12/23/20 10:59 DC 12/23/20 09:59


250 MLS/HR


 


 Benzonatate


  (Tessalon Perle)  100 mg  [id number]  12/26/20 14:00


 1/2/21 10:35 DC 1/1/21 20:51


100 MG


 


 Cefepime HCl


  (Maxipime)  2 gm  Q12HR  1/3/21 14:00


 1/13/21 07:59 DC 1/12/21 20:52


2 GM


 


 Ceftriaxone Sodium


  (Rocephin)  1 gm  Q24H  12/24/20 09:00


 1/3/21 13:15 DC 1/3/21 08:14


1 GM


 


 Cetirizine HCl


  (ZyrTEC)  10 mg  DAILY  12/24/20 09:00


    1/16/21 08:12


10 MG


 


 Chlorhexidine


 Gluconate


  (Peridex)  15 ml  BID  1/2/21 09:00


 1/9/21 19:26 DC 1/9/21 09:21


15 ML


 


 Citalopram


 Hydrobromide


  (CeleXA)  20 mg  DAILY  12/24/20 09:00


    1/16/21 08:13


20 MG


 


 Cyanocobalamin


  (Vitamin B-12)  1,000 mcg  DAILY  12/24/20 09:00


    1/16/21 08:13


1,000 MCG


 


 Dexamethasone


 Sodium Phosphate


  (Decadron)  6 mg  DAILY  1/4/21 09:00


 1/5/21 08:55 DC 1/5/21 08:36


6 MG


 


 Docusate Sodium


  (Colace)  100 mg  PRN BID  PRN  12/23/20 08:15


    12/25/20 17:03


100 MG


 


 Enalaprilat


  (Vasotec Inj)  2.5 mg  PRN Q6HRS  PRN  12/27/20 10:00


    12/28/20 12:29


2.5 MG


 


 Enoxaparin Sodium


  (Lovenox 40mg


 Syringe)  40 mg  Q12HR  12/23/20 09:00


    1/16/21 08:13


40 MG


 


 Famotidine


  (Pepcid Vial)  20 mg  BID  1/8/21 21:00


    1/16/21 08:13


20 MG


 


 Fentanyl Citrate  55 ml @ 0


 mls/hr  CONT PRN  PRN  1/2/21 02:45


    1/16/21 01:46


2.5 MLS/HR


 


 Furosemide


  (Lasix)  40 mg  1X  ONCE  1/12/21 11:15


 1/12/21 11:16 DC 1/12/21 11:18


40 MG


 


 Guaifenesin


  (Robitussin Dm)  10 ml  PRN Q6HRS  PRN  12/23/20 08:45


    1/1/21 15:37


10 ML


 


 Guaifenesin


  (Robitussin)  400 mg  PRN Q4HRS  PRN  12/27/20 22:30


    1/1/21 23:00


400 MG


 


 Info


  (Icu Electrolyte


 Protocol)  1 ea  CONT PRN  PRN  12/24/20 14:15


     





 


 Labetalol HCl


  (Normodyne Iv


 Push)  10 mg  PRN Q2HR  PRN  12/26/20 18:30


    1/13/21 10:23


10 MG


 


 Linezolid


  (Zyvox)  600 mg  BID  1/3/21 21:00


 1/7/21 07:58 DC 1/6/21 20:38


600 MG


 


 Midazolam HCl  100 ml @ 0


 mls/hr  CONT  PRN  1/2/21 01:15


    1/16/21 01:46


10 MLS/HR


 


 Morphine Sulfate


  (Morphine


 Sulfate)  2 mg  PRN Q4HRS  PRN  12/23/20 08:45


 1/2/21 01:41 DC 1/1/21 16:12


2 MG


 


 Morphine Sulfate


  (Ms Contin)  15 mg  BID  12/23/20 21:00


 1/6/21 14:43 DC 1/5/21 21:11


15 MG


 


 Multivitamins/


 Minerals


 Therapeutic


  (Centrum


 Multivit-Mineral


 Liq)  5 ml  DAILY  1/15/21 09:00


    1/16/21 08:12


5 ML


 


 Norepinephrine


 Bitartrate 8 mg/


 Dextrose  258 ml @ 


 19.737 mls/


 hr  CONT  PRN  1/2/21 07:30


 1/5/21 08:55 DC 1/2/21 22:27


19.737 MLS/HR


 


 Nystatin


  (Nystop)  1 lindsay  BID  1/6/21 16:00


    1/15/21 21:12


1 LINDSAY


 


 Olanzapine


  (ZyPREXA ZYDIS)  5 mg  PRN BID  PRN  12/27/20 12:30


    1/11/21 09:56


5 MG


 


 Ondansetron HCl


  (Zofran)  4 mg  PRN Q4HRS  PRN  12/23/20 08:15


     





 


 Polyethylene


 Glycol


  (miraLAX PACKET)  17 gm  DAILY  1/6/21 14:30


    1/15/21 08:40


17 GM


 


 Potassium Chloride


  (Klor-Con)  40 meq  1X  ONCE  12/24/20 14:15


 12/24/20 14:16 DC 12/24/20 14:24


40 MEQ


 


 Propofol  100 ml @ 0


 mls/hr  CONT  PRN  1/2/21 01:15


    1/16/21 01:49


12.2 MLS/HR


 


 Quetiapine


 Fumarate


  (SEROquel)  50 mg  PRN QHS  PRN  12/27/20 21:00


    1/1/21 00:24


50 MG


 


 Sodium Chloride  1,000 ml @ 


 0 mls/hr  Q0M  1/7/21 13:15


     





 


 Sodium Chloride


  (Normal Saline


 Flush)  3 ml  QSHIFT  PRN  12/23/20 07:45


     





 


 Sterile Water


  (WATER for RESP)  1,000 ml  CONT  PRN  12/26/20 09:30


    1/1/21 15:49


1,000 ML


 


 Succinylcholine


 Chloride


  (Anectine)  200 mg  STK-MED ONCE  1/2/21 01:21


 1/2/21 01:21 DC  





 


 Sulfasalazine


  (Azulfidine)  1,000 mg  BID  12/23/20 21:00


    1/16/21 08:13


1,000 MG


 


 Tramadol HCl


  (Ultram)  100 mg  PRN Q8HRS  PRN  12/23/20 15:00


    1/1/21 13:35


100 MG


 


 Vancomycin HCl


  (Vanco Per


 Pharmacy)  1 each  PRN DAILY  PRN  1/2/21 16:00


 1/3/21 13:19 DC 1/2/21 20:47


1 EACH


 


 Vancomycin HCl


  (Vancomycin


 Trough Level)  1 each  1X  ONCE  1/4/21 04:30


 1/3/21 13:20 DC  





 


 Vancomycin HCl


 1.5 gm/Sodium


 Chloride  500 ml @ 


 250 mls/hr  Q12H  1/3/21 05:00


 1/3/21 13:15 DC 1/3/21 05:53


250 MLS/HR


 


 Vancomycin HCl 2


 gm/Sodium Chloride  500 ml @ 


 250 mls/hr  1X  ONCE  1/2/21 17:00


 1/2/21 18:59 DC 1/2/21 17:43


250 MLS/HR


 


 Vecuronium Bromide


  (Norcuron Bolus)  6 mg  PRN Q6HRS  PRN  1/2/21 09:15


    1/9/21 17:07


6 MG


 


 Zinc Sulfate


  (Orazinc)  220 mg  DAILY  12/23/20 09:00


    1/16/21 08:12


220 MG


 


 Zolpidem Tartrate


  (Ambien)  5 mg  PRN QHS  PRN  12/23/20 15:00


    1/1/21 22:05


5 MG











Labs:


Lab





Laboratory Tests








Test


 1/15/21


09:45


 


Nasal Screen MRSA (PCR)


 Positive


(NEGATIVE)











Objective:


Assessment:


COVID-19 infection


Acute hypoxic respiratory failure/ARDS from COVID-19 pneumonia


Fever resolved


Sepsis resolved


History of Crohn's


History of psoriasis


Immunosuppression


Anemia


History of tongue and prostate cancer.


MRSA nares + January 15, 2021





Plan:


Plan of Care


Patient has completed antibiotic 


monitor off antibiotics


Continue supportive care.


Patient completed  steroids.


Prognosis poor


ID will sign off.  Call with any questions


   Discussed with JOSE.











NISA KNAPP MD           Jan 16, 2021 08:16

## 2021-01-16 NOTE — PDOC
GENERAL


General:


Patient examined chart reviewed today is hospital day 25 for this patient with 

severe sepsis secondary to COVID-19 pneumonia.  He has been intubated for the 

duration of his stay and has finished all available treatments for this.  We 

appreciate subspecialty support.  We will need goals of care discussion with his

family prior to next steps.  Patient is immunocompromised due to medication 

regimen for his psoriatic arthritis.  We will continue current management.


Problems:  


(1) Sepsis due to severe acute respiratory syndrome coronavirus 2 (SARS-CoV-2)


(2) Immunocompromised state due to drug therapy


(3) Psoriatic arthritis





VITAL SIGNS


Vital Signs/I&O:





                                   Vital Signs








  Date Time  Temp Pulse Resp B/P (MAP) Pulse Ox O2 Delivery O2 Flow Rate FiO2


 


1/16/21 15:03  52 28 122/53 (76) 96 Ventilator  


 


1/16/21 12:00 98.9       





 98.9       


 


1/16/21 02:16       40.0 














                                    I & O   


 


 1/15/21 1/15/21 1/16/21





 15:00 23:00 07:00


 


Intake Total 200 ml 524.35 ml 1220 ml


 


Output Total 335 ml 310 ml 325 ml


 


Balance -135 ml 214.35 ml 895 ml





Patient is intubated sedated unresponsive on the ventilator


Chest bilateral equal air entry though diminished throughout no crackles or 

wheezes noted


Heart S1-S2 normal regular rate and rhythm no murmurs or gallops are noted


Extremity exam is unremarkable for acute abnormality





ALLERGIES


Allergies:





Allergies








Coded Allergies Type Severity Reaction Last Updated Verified


 


  I S O L A T I O N *CONTACT* Allergy Unknown  1/16/21 Yes


 


  No Known Medication Allergies Allergy Unknown  1/16/21 Yes











MEDS


Medications:





Current Medications








 Medications


  (Trade)  Dose


 Ordered  Sig/Kayli  Start Time


 Stop Time Status Last Admin


Dose Admin


 


 Acetaminophen


  (Tylenol Supp)  650 mg  PRN Q6HRS  PRN  12/23/20 08:45


     





 


 Acetaminophen


  (Tylenol)  650 mg  PRN Q4HRS  PRN  12/23/20 08:15


    12/31/20 01:02





 


 Alteplase,


 Recombinant


  (Cathflo For


 Central Catheter


 Clearance)  1 mg  1X  ONCE  1/1/21 16:15


 1/1/21 16:16 DC 1/2/21 02:10





 


 Amino Acids/


 Glycerin/


 Electrolytes  1,000 ml @ 


 80 mls/hr  [id number]  12/23/20 08:45


 1/5/21 05:03 DC 1/4/21 14:53





 


 Atorvastatin


 Calcium


  (Lipitor)  40 mg  QHS  12/23/20 21:00


    1/15/21 21:11





 


 Azithromycin 250


 mg/Sodium Chloride  250 ml @ 


 250 mls/hr  Q24H  12/24/20 09:00


 12/27/20 09:59 DC 12/27/20 08:55





 


 Azithromycin 500


 mg/Sodium Chloride  250 ml @ 


 250 mls/hr  1X  ONCE  12/23/20 10:00


 12/23/20 10:59 DC 12/23/20 09:59





 


 Benzonatate


  (Tessalon Perle)  100 mg  [id number]  12/26/20 14:00


 1/2/21 10:35 DC 1/1/21 20:51





 


 Cefepime HCl


  (Maxipime)  2 gm  Q12HR  1/3/21 14:00


 1/13/21 07:59 DC 1/12/21 20:52





 


 Ceftriaxone Sodium


  (Rocephin)  1 gm  Q24H  12/24/20 09:00


 1/3/21 13:15 DC 1/3/21 08:14





 


 Cetirizine HCl


  (ZyrTEC)  10 mg  DAILY  12/24/20 09:00


    1/16/21 08:12





 


 Chlorhexidine


 Gluconate


  (Peridex)  15 ml  BID  1/2/21 09:00


 1/9/21 19:26 DC 1/9/21 09:21





 


 Citalopram


 Hydrobromide


  (CeleXA)  20 mg  DAILY  12/24/20 09:00


    1/16/21 08:13





 


 Cyanocobalamin


  (Vitamin B-12)  1,000 mcg  DAILY  12/24/20 09:00


    1/16/21 08:13





 


 Dexamethasone


 Sodium Phosphate


  (Decadron)  6 mg  DAILY  1/4/21 09:00


 1/5/21 08:55 DC 1/5/21 08:36





 


 Docusate Sodium


  (Colace)  100 mg  PRN BID  PRN  12/23/20 08:15


    12/25/20 17:03





 


 Enalaprilat


  (Vasotec Inj)  2.5 mg  PRN Q6HRS  PRN  12/27/20 10:00


    12/28/20 12:29





 


 Enoxaparin Sodium


  (Lovenox 40mg


 Syringe)  40 mg  Q12HR  12/23/20 09:00


    1/16/21 08:13





 


 Famotidine


  (Pepcid Vial)  20 mg  BID  1/8/21 21:00


    1/16/21 08:13





 


 Fentanyl Citrate  55 ml @ 0


 mls/hr  CONT PRN  PRN  1/2/21 02:45


    1/16/21 01:46





 


 Furosemide


  (Lasix)  40 mg  1X  ONCE  1/12/21 11:15


 1/12/21 11:16 DC 1/12/21 11:18





 


 Guaifenesin


  (Robitussin Dm)  10 ml  PRN Q6HRS  PRN  12/23/20 08:45


    1/1/21 15:37





 


 Guaifenesin


  (Robitussin)  400 mg  PRN Q4HRS  PRN  12/27/20 22:30


    1/1/21 23:00





 


 Info


  (Icu Electrolyte


 Protocol)  1 ea  CONT PRN  PRN  12/24/20 14:15


     





 


 Labetalol HCl


  (Normodyne Iv


 Push)  10 mg  PRN Q2HR  PRN  12/26/20 18:30


    1/13/21 10:23





 


 Linezolid


  (Zyvox)  600 mg  BID  1/3/21 21:00


 1/7/21 07:58 DC 1/6/21 20:38





 


 Midazolam HCl  100 ml @ 0


 mls/hr  CONT  PRN  1/2/21 01:15


    1/16/21 12:05





 


 Morphine Sulfate


  (Morphine


 Sulfate)  2 mg  PRN Q4HRS  PRN  12/23/20 08:45


 1/2/21 01:41 DC 1/1/21 16:12





 


 Morphine Sulfate


  (Ms Contin)  15 mg  BID  12/23/20 21:00


 1/6/21 14:43 DC 1/5/21 21:11





 


 Multivitamins/


 Minerals


 Therapeutic


  (Centrum


 Multivit-Mineral


 Liq)  5 ml  DAILY  1/15/21 09:00


    1/16/21 08:12





 


 Norepinephrine


 Bitartrate 8 mg/


 Dextrose  258 ml @ 


 19.737 mls/


 hr  CONT  PRN  1/2/21 07:30


 1/5/21 08:55 DC 1/2/21 22:27





 


 Nystatin


  (Nystop)  1 lindsay  BID  1/6/21 16:00


    1/16/21 09:00





 


 Olanzapine


  (ZyPREXA ZYDIS)  5 mg  PRN BID  PRN  12/27/20 12:30


    1/11/21 09:56





 


 Ondansetron HCl


  (Zofran)  4 mg  PRN Q4HRS  PRN  12/23/20 08:15


     





 


 Polyethylene


 Glycol


  (miraLAX PACKET)  17 gm  DAILY  1/6/21 14:30


    1/15/21 08:40





 


 Potassium Chloride


  (Klor-Con)  40 meq  1X  ONCE  12/24/20 14:15


 12/24/20 14:16 DC 12/24/20 14:24





 


 Propofol  100 ml @ 0


 mls/hr  CONT  PRN  1/2/21 01:15


    1/16/21 15:14





 


 Quetiapine


 Fumarate


  (SEROquel)  50 mg  PRN QHS  PRN  12/27/20 21:00


    1/1/21 00:24





 


 Sodium Chloride  1,000 ml @ 


 0 mls/hr  Q0M  1/7/21 13:15


     





 


 Sodium Chloride


  (Normal Saline


 Flush)  3 ml  QSHIFT  PRN  12/23/20 07:45


     





 


 Sterile Water


  (WATER for RESP)  1,000 ml  CONT  PRN  12/26/20 09:30


    1/1/21 15:49





 


 Succinylcholine


 Chloride


  (Anectine)  200 mg  STK-MED ONCE  1/2/21 01:21


 1/2/21 01:21 DC  





 


 Sulfasalazine


  (Azulfidine)  1,000 mg  BID  12/23/20 21:00


    1/16/21 08:13





 


 Tramadol HCl


  (Ultram)  100 mg  PRN Q8HRS  PRN  12/23/20 15:00


    1/1/21 13:35





 


 Vancomycin HCl


  (Vanco Per


 Pharmacy)  1 each  PRN DAILY  PRN  1/2/21 16:00


 1/3/21 13:19 DC 1/2/21 20:47





 


 Vancomycin HCl


  (Vancomycin


 Trough Level)  1 each  1X  ONCE  1/4/21 04:30


 1/3/21 13:20 DC  





 


 Vancomycin HCl


 1.5 gm/Sodium


 Chloride  500 ml @ 


 250 mls/hr  Q12H  1/3/21 05:00


 1/3/21 13:15 DC 1/3/21 05:53





 


 Vancomycin HCl 2


 gm/Sodium Chloride  500 ml @ 


 250 mls/hr  1X  ONCE  1/2/21 17:00


 1/2/21 18:59 DC 1/2/21 17:43





 


 Vecuronium Bromide


  (Norcuron Bolus)  6 mg  PRN Q6HRS  PRN  1/2/21 09:15


    1/9/21 17:07





 


 Zinc Sulfate


  (Orazinc)  220 mg  DAILY  12/23/20 09:00


    1/16/21 08:12





 


 Zolpidem Tartrate


  (Ambien)  5 mg  PRN QHS  PRN  12/23/20 15:00


    1/1/21 22:05














LAB


Lab:





                                Laboratory Tests








Test


 1/16/21


08:45 1/16/21


09:20


 


O2 Saturation 96 % (92-99)   


 


Arterial Blood pH


 7.45


(7.35-7.45) 





 


Arterial Blood pCO2 at


Patient Temp 47 mmHg


(35-46)  H 





 


Arterial Blood pO2 at Patient


Temp 88 mmHg


() 





 


Arterial Blood HCO3


 32 mmol/L


(21-28)  H 





 


Arterial Blood Base Excess


 7 mmol/L


(-3-3)  H 





 


FiO2 60% vent   


 


Triglycerides Level


 


 305 mg/dL


(0-150)  H











ASSESSMENT & PLAN


A&P


Plan as noted above








This note was created using [employer] and may have omissions and/or 

errors due to the nature of real-time voice transcription.





Justifications for Admission


Other Justification














TEE ROOT MD                Jan 16, 2021 16:13

## 2021-01-16 NOTE — PDOC
PULMONARY PROGRESS NOTES


DATE: 1/16/21 


TIME: 06:05


Subjective


Patient remains on ventilatory support, FiO2 60% and a PEEP of 7, small ett 

secretion


Remains sedated on Versed, fentanyl and propofol


Vitals





Vital Signs








  Date Time  Temp Pulse Resp B/P (MAP) Pulse Ox O2 Delivery O2 Flow Rate FiO2


 


1/16/21 05:00  56 28 126/84 (98) 95 Ventilator  


 


1/16/21 04:00 98.6       





 98.6       


 


1/16/21 02:16       40.0 








Comments


Pt. seen during COVID-19 pandemic, visual exam preformed 


NC AT 


RRR


intubated 


no distress


no accessory muscle use 


No edema or rash


Labs





Laboratory Tests








Test


 1/14/21


08:00 1/15/21


05:45 1/15/21


07:25 1/15/21


09:45


 


O2 Saturation 94 % (92-99)   94 % (92-99)  


 


Arterial Blood pH


 7.47


(7.35-7.45) 


 7.44


(7.35-7.45) 





 


Arterial Blood pCO2 at


Patient Temp 46 mmHg


(35-46) 


 48 mmHg


(35-46) 





 


Arterial Blood pO2 at Patient


Temp 75 mmHg


() 


 74 mmHg


() 





 


Arterial Blood HCO3


 32 mmol/L


(21-28) 


 32 mmol/L


(21-28) 





 


Arterial Blood Base Excess


 8 mmol/L


(-3-3) 


 7 mmol/L


(-3-3) 





 


FiO2 60/vent   60/vent  


 


White Blood Count


 


 5.5 x10^3/uL


(4.0-11.0) 


 





 


Red Blood Count


 


 2.38 x10^6/uL


(4.30-5.70) 


 





 


Hemoglobin


 


 7.5 g/dL


(13.0-17.5) 


 





 


Hematocrit


 


 22.9 %


(39.0-53.0) 


 





 


Mean Corpuscular Volume  96 fL ()   


 


Mean Corpuscular Hemoglobin  32 pg (25-35)   


 


Mean Corpuscular Hemoglobin


Concent 


 33 g/dL


(31-37) 


 





 


Red Cell Distribution Width


 


 15.7 %


(11.5-14.5) 


 





 


Platelet Count


 


 286 x10^3/uL


(140-400) 


 





 


Sodium Level


 


 140 mmol/L


(136-145) 


 





 


Potassium Level


 


 3.7 mmol/L


(3.5-5.1) 


 





 


Chloride Level


 


 105 mmol/L


() 


 





 


Carbon Dioxide Level


 


 34 mmol/L


(21-32) 


 





 


Anion Gap  1 (6-14)   


 


Blood Urea Nitrogen


 


 31 mg/dL


(8-26) 


 





 


Creatinine


 


 0.6 mg/dL


(0.7-1.3) 


 





 


Estimated GFR


(Cockcroft-Gault) 


 134.8 


 


 





 


Glucose Level


 


 85 mg/dL


(70-99) 


 





 


Calcium Level


 


 8.7 mg/dL


(8.5-10.1) 


 





 


Magnesium Level


 


 2.4 mg/dL


(1.8-2.4) 


 





 


Nasal Screen MRSA (PCR)


 


 


 


 Positive


(NEGATIVE)








Laboratory Tests








Test


 1/15/21


07:25 1/15/21


09:45


 


O2 Saturation 94 % (92-99)  


 


Arterial Blood pH


 7.44


(7.35-7.45) 





 


Arterial Blood pCO2 at


Patient Temp 48 mmHg


(35-46) 





 


Arterial Blood pO2 at Patient


Temp 74 mmHg


() 





 


Arterial Blood HCO3


 32 mmol/L


(21-28) 





 


Arterial Blood Base Excess


 7 mmol/L


(-3-3) 





 


FiO2 60/vent  


 


Nasal Screen MRSA (PCR)


 


 Positive


(NEGATIVE)








Medications





Active Scripts








 Medications  Dose


 Route/Sig


 Max Daily Dose Days Date Category


 


 Morphine Sulfate


 Er (Morphine


 Sulfate) 30 Mg


 Tablet.er  1 Tab


 PO BID


   12/23/20 Reported


 


 Tramadol Hcl 100


 Mg Tbmp.24hr  100 Mg


 PO PRN Q8HRS PRN


   12/23/20 Reported


 


 Ambien (Zolpidem


 Tartrate) 10 Mg


 Tablet  10 Mg


 PO PRN QHS PRN


   12/23/20 Reported


 


 Methotrexate


  (Methotrexate


 Sodium) 2.5 Mg


 Tablet  10 Tab


 PO WEEKLY


   12/23/20 Reported


 


 Lisinopril 10 Mg


 Tablet  1 Tab


 PO DAILY


   12/23/20 Reported


 


 Crestor


  (Rosuvastatin


 Calcium) 5 Mg


 Tablet  2 Tab


 PO DAILY


   12/23/20 Reported


 


 Hydrochlorothiazide


 Tablet


  (Hydrochlorothiazide)


 50 Mg Tablet  25 Mg


 PO DAILY


   12/23/20 Reported


 


 Escitalopram


 Oxalate 10 Mg


 Tablet  1 Tab


 PO DAILY


   12/23/20 Reported


 


 Sulfasalazine Dr


  (Sulfasalazine)


 500 Mg Tablet.dr  2 Tab


 PO TID


  30 12/23/20 Reported


 


 Colace (Docusate


 Sodium) 100 Mg


 Capsule  1 Cap


 PO DA


  30 12/23/20 Reported


 


 Acetaminophen


 Ext.release


  (Acetaminophen)


 650 Mg Tablet.er  650 Mg


 PO PRN Q8HRS PRN


   12/23/20 Reported


 


 Melatonin 5 Mg


 Capsule  1 Cap


 PO QHS


  30 12/23/20 Reported


 


 B-12


  (Cyanocobalamin


  (Vitamin B-12))


 500 Mcg Tablet  2 Tab


 PO DAILY


  30 12/23/20 Reported


 


 Vitamin D3


  (Cholecalciferol


  (Vitamin D3)) 50


 Mcg Capsule  50 Mcg


 PO DAILY


   12/23/20 Reported


 


 Aller-Sung


  (Cetirizine Hcl)


 10 Mg Tablet  1 Tab


 PO DAILY


  30 12/23/20 Reported








Comments


CXR


reviewed 


1.  ett ok 


2.  Bilateral parenchymal opacities and left pleural effusion are grossly 

stable.





Impression


.


IMPRESSION:


1.  Acute hypoxic respiratory failure secondary to highly suspected COVID-19


pneumonia and acute respiratory distress syndrome/acute lung injury.-- COVID-19 

positive 


2.  Abnormal CT chest with extensive widespread ground glass and alveolar and


interstitial infiltrates with reactive mild mediastinal/hilar adenopathy.  This 

related to COVID-19 pneumonia.


3.  Patient with history of psoriatic arthritis.  He is likely immunocompromised


as he has been on methotrexate.  


4.  History of tongue cancer with resection, details unknown.


5.  History of prostate cancer.


6.  History of Crohn's disease.





Plan


.


RECOMMENDATIONS:


Continue current vent support.  titrate fio2 as tolerated


on propofol  check TG


Follow chest x-ray/ABG--


COVID-19 positive


Continue ABX per ID, off abx at this time--- 


Patient has completed full course of steroids


Continue TF for nutritional support 


HTN per PCP


DVT/GI PPX 





D/W RN and RT 


prognosis guarded








PT. is FULL CODE


critically ill 


Critical Care time 30 min no overlap











RIDGE YATES MD               Jan 16, 2021 06:07

## 2021-01-17 VITALS — SYSTOLIC BLOOD PRESSURE: 153 MMHG | DIASTOLIC BLOOD PRESSURE: 84 MMHG

## 2021-01-17 VITALS — DIASTOLIC BLOOD PRESSURE: 51 MMHG | SYSTOLIC BLOOD PRESSURE: 85 MMHG

## 2021-01-17 VITALS — DIASTOLIC BLOOD PRESSURE: 64 MMHG | SYSTOLIC BLOOD PRESSURE: 98 MMHG

## 2021-01-17 VITALS — SYSTOLIC BLOOD PRESSURE: 135 MMHG | DIASTOLIC BLOOD PRESSURE: 71 MMHG

## 2021-01-17 VITALS — DIASTOLIC BLOOD PRESSURE: 56 MMHG | SYSTOLIC BLOOD PRESSURE: 90 MMHG

## 2021-01-17 VITALS — DIASTOLIC BLOOD PRESSURE: 57 MMHG | SYSTOLIC BLOOD PRESSURE: 107 MMHG

## 2021-01-17 VITALS — SYSTOLIC BLOOD PRESSURE: 99 MMHG | DIASTOLIC BLOOD PRESSURE: 57 MMHG

## 2021-01-17 VITALS — SYSTOLIC BLOOD PRESSURE: 91 MMHG | DIASTOLIC BLOOD PRESSURE: 56 MMHG

## 2021-01-17 VITALS — DIASTOLIC BLOOD PRESSURE: 66 MMHG | SYSTOLIC BLOOD PRESSURE: 105 MMHG

## 2021-01-17 VITALS — DIASTOLIC BLOOD PRESSURE: 59 MMHG | SYSTOLIC BLOOD PRESSURE: 96 MMHG

## 2021-01-17 VITALS — SYSTOLIC BLOOD PRESSURE: 104 MMHG | DIASTOLIC BLOOD PRESSURE: 55 MMHG

## 2021-01-17 VITALS — SYSTOLIC BLOOD PRESSURE: 86 MMHG | DIASTOLIC BLOOD PRESSURE: 52 MMHG

## 2021-01-17 VITALS — SYSTOLIC BLOOD PRESSURE: 105 MMHG | DIASTOLIC BLOOD PRESSURE: 69 MMHG

## 2021-01-17 VITALS — SYSTOLIC BLOOD PRESSURE: 103 MMHG | DIASTOLIC BLOOD PRESSURE: 62 MMHG

## 2021-01-17 VITALS — DIASTOLIC BLOOD PRESSURE: 110 MMHG | SYSTOLIC BLOOD PRESSURE: 181 MMHG

## 2021-01-17 VITALS — SYSTOLIC BLOOD PRESSURE: 105 MMHG | DIASTOLIC BLOOD PRESSURE: 63 MMHG

## 2021-01-17 VITALS — SYSTOLIC BLOOD PRESSURE: 85 MMHG | DIASTOLIC BLOOD PRESSURE: 52 MMHG

## 2021-01-17 VITALS — DIASTOLIC BLOOD PRESSURE: 53 MMHG | SYSTOLIC BLOOD PRESSURE: 96 MMHG

## 2021-01-17 VITALS — SYSTOLIC BLOOD PRESSURE: 94 MMHG | DIASTOLIC BLOOD PRESSURE: 53 MMHG

## 2021-01-17 VITALS — DIASTOLIC BLOOD PRESSURE: 68 MMHG | SYSTOLIC BLOOD PRESSURE: 102 MMHG

## 2021-01-17 VITALS — DIASTOLIC BLOOD PRESSURE: 54 MMHG | SYSTOLIC BLOOD PRESSURE: 87 MMHG

## 2021-01-17 VITALS — DIASTOLIC BLOOD PRESSURE: 48 MMHG | SYSTOLIC BLOOD PRESSURE: 78 MMHG

## 2021-01-17 VITALS — DIASTOLIC BLOOD PRESSURE: 48 MMHG | SYSTOLIC BLOOD PRESSURE: 83 MMHG

## 2021-01-17 VITALS — DIASTOLIC BLOOD PRESSURE: 71 MMHG | SYSTOLIC BLOOD PRESSURE: 104 MMHG

## 2021-01-17 VITALS — SYSTOLIC BLOOD PRESSURE: 112 MMHG | DIASTOLIC BLOOD PRESSURE: 73 MMHG

## 2021-01-17 VITALS — DIASTOLIC BLOOD PRESSURE: 55 MMHG | SYSTOLIC BLOOD PRESSURE: 92 MMHG

## 2021-01-17 LAB
ALBUMIN SERPL-MCNC: 1.7 G/DL (ref 3.4–5)
ALBUMIN/GLOB SERPL: 0.4 {RATIO} (ref 1–1.7)
ALP SERPL-CCNC: 67 U/L (ref 46–116)
ALT SERPL-CCNC: 69 U/L (ref 16–63)
ANION GAP SERPL CALC-SCNC: 6 MMOL/L (ref 6–14)
AST SERPL-CCNC: 56 U/L (ref 15–37)
BASE EXCESS ABG: 7 MMOL/L (ref -3–3)
BASOPHILS # BLD AUTO: 0.1 X10^3/UL (ref 0–0.2)
BASOPHILS NFR BLD: 1 % (ref 0–3)
BILIRUB SERPL-MCNC: 0.3 MG/DL (ref 0.2–1)
BUN SERPL-MCNC: 23 MG/DL (ref 8–26)
BUN/CREAT SERPL: 38 (ref 6–20)
CALCIUM SERPL-MCNC: 8 MG/DL (ref 8.5–10.1)
CHLORIDE SERPL-SCNC: 105 MMOL/L (ref 98–107)
CO2 SERPL-SCNC: 32 MMOL/L (ref 21–32)
CREAT SERPL-MCNC: 0.6 MG/DL (ref 0.7–1.3)
EOSINOPHIL NFR BLD: 0.3 X10^3/UL (ref 0–0.7)
EOSINOPHIL NFR BLD: 6 % (ref 0–3)
ERYTHROCYTE [DISTWIDTH] IN BLOOD BY AUTOMATED COUNT: 16 % (ref 11.5–14.5)
GFR SERPLBLD BASED ON 1.73 SQ M-ARVRAT: 134.8 ML/MIN
GLUCOSE SERPL-MCNC: 112 MG/DL (ref 70–99)
HCO3 BLDA-SCNC: 32 MMOL/L (ref 21–28)
HCT VFR BLD CALC: 25 % (ref 39–53)
HGB BLD-MCNC: 8.2 G/DL (ref 13–17.5)
INSPIRATION SETTING TIME VENT: (no result)
LYMPHOCYTES # BLD: 1.1 X10^3/UL (ref 1–4.8)
LYMPHOCYTES NFR BLD AUTO: 18 % (ref 24–48)
MCH RBC QN AUTO: 31 PG (ref 25–35)
MCHC RBC AUTO-ENTMCNC: 33 G/DL (ref 31–37)
MCV RBC AUTO: 96 FL (ref 79–100)
MONO #: 0.6 X10^3/UL (ref 0–1.1)
MONOCYTES NFR BLD: 10 % (ref 0–9)
NEUT #: 4 X10^3/UL (ref 1.8–7.7)
NEUTROPHILS NFR BLD AUTO: 66 % (ref 31–73)
PCO2 BLDA: 48 MMHG (ref 35–46)
PLATELET # BLD AUTO: 287 X10^3/UL (ref 140–400)
PO2 BLDA: 110 MMHG (ref 65–108)
POTASSIUM SERPL-SCNC: 3.5 MMOL/L (ref 3.5–5.1)
PROT SERPL-MCNC: 5.7 G/DL (ref 6.4–8.2)
RBC # BLD AUTO: 2.61 X10^6/UL (ref 4.3–5.7)
SAO2 % BLDA: 97 % (ref 92–99)
SODIUM SERPL-SCNC: 143 MMOL/L (ref 136–145)
WBC # BLD AUTO: 6.1 X10^3/UL (ref 4–11)

## 2021-01-17 RX ADMIN — ENOXAPARIN SODIUM SCH MG: 40 INJECTION SUBCUTANEOUS at 21:00

## 2021-01-17 RX ADMIN — ATORVASTATIN CALCIUM SCH MG: 40 TABLET, FILM COATED ORAL at 21:55

## 2021-01-17 RX ADMIN — DEXMEDETOMIDINE HYDROCHLORIDE PRN MLS/HR: 100 INJECTION, SOLUTION, CONCENTRATE INTRAVENOUS at 19:37

## 2021-01-17 RX ADMIN — DEXMEDETOMIDINE HYDROCHLORIDE PRN MLS/HR: 100 INJECTION, SOLUTION, CONCENTRATE INTRAVENOUS at 11:40

## 2021-01-17 RX ADMIN — MIDAZOLAM PRN MLS/HR: 5 INJECTION, SOLUTION INTRAMUSCULAR; INTRAVENOUS at 17:45

## 2021-01-17 RX ADMIN — ZINC SULFATE CAP 220 MG (50 MG ELEMENTAL ZN) SCH MG: 220 (50 ZN) CAP at 08:30

## 2021-01-17 RX ADMIN — FAMOTIDINE SCH MG: 10 INJECTION, SOLUTION INTRAVENOUS at 08:31

## 2021-01-17 RX ADMIN — CYANOCOBALAMIN TAB 1000 MCG SCH MCG: 1000 TAB at 08:30

## 2021-01-17 RX ADMIN — POLYETHYLENE GLYCOL 3350 SCH GM: 17 POWDER, FOR SOLUTION ORAL at 09:00

## 2021-01-17 RX ADMIN — NYSTATIN SCH APP: 100000 POWDER TOPICAL at 21:00

## 2021-01-17 RX ADMIN — CETIRIZINE HYDROCHLORIDE SCH MG: 10 TABLET, FILM COATED ORAL at 08:30

## 2021-01-17 RX ADMIN — MULTIVITAMIN SCH ML: LIQUID ORAL at 08:31

## 2021-01-17 RX ADMIN — CITALOPRAM HYDROBROMIDE SCH MG: 20 TABLET ORAL at 08:30

## 2021-01-17 RX ADMIN — DEXMEDETOMIDINE HYDROCHLORIDE PRN MLS/HR: 100 INJECTION, SOLUTION, CONCENTRATE INTRAVENOUS at 10:22

## 2021-01-17 RX ADMIN — MIDAZOLAM PRN MLS/HR: 5 INJECTION, SOLUTION INTRAMUSCULAR; INTRAVENOUS at 09:31

## 2021-01-17 RX ADMIN — Medication PRN MLS/HR: at 19:28

## 2021-01-17 RX ADMIN — NYSTATIN SCH APP: 100000 POWDER TOPICAL at 09:00

## 2021-01-17 RX ADMIN — ENOXAPARIN SODIUM SCH MG: 40 INJECTION SUBCUTANEOUS at 08:31

## 2021-01-17 RX ADMIN — DEXMEDETOMIDINE HYDROCHLORIDE PRN MLS/HR: 100 INJECTION, SOLUTION, CONCENTRATE INTRAVENOUS at 08:32

## 2021-01-17 RX ADMIN — FAMOTIDINE SCH MG: 10 INJECTION, SOLUTION INTRAVENOUS at 21:55

## 2021-01-17 NOTE — PDOC
PULMONARY PROGRESS NOTES


DATE: 1/17/21 


TIME: 05:54


Subjective


Patient remains on ventilatory support, FiO2 60% and a PEEP of 7, small ett 

secretion


Remains sedated on Versed, fentanyl and propofol


Vitals





Vital Signs








  Date Time  Temp Pulse Resp B/P (MAP) Pulse Ox O2 Delivery O2 Flow Rate FiO2


 


1/17/21 04:30     97 Ventilator  


 


1/17/21 04:00 98.7 68 28 107/57 (74)    





 98.7       








Comments


Pt. seen during COVID-19 pandemic, visual exam preformed 


NC AT 


RRR


intubated 


no distress


no accessory muscle use 


No edema or rash


Labs





Laboratory Tests








Test


 1/15/21


07:25 1/15/21


09:45 1/16/21


08:45 1/16/21


09:20


 


O2 Saturation 94 % (92-99)   96 % (92-99)  


 


Arterial Blood pH


 7.44


(7.35-7.45) 


 7.45


(7.35-7.45) 





 


Arterial Blood pCO2 at


Patient Temp 48 mmHg


(35-46) 


 47 mmHg


(35-46) 





 


Arterial Blood pO2 at Patient


Temp 74 mmHg


() 


 88 mmHg


() 





 


Arterial Blood HCO3


 32 mmol/L


(21-28) 


 32 mmol/L


(21-28) 





 


Arterial Blood Base Excess


 7 mmol/L


(-3-3) 


 7 mmol/L


(-3-3) 





 


FiO2 60/vent   60% vent  


 


Nasal Screen MRSA (PCR)


 


 Positive


(NEGATIVE) 


 





 


Triglycerides Level


 


 


 


 305 mg/dL


(0-150)


 


Test


 1/17/21


05:00 


 


 





 


White Blood Count


 6.1 x10^3/uL


(4.0-11.0) 


 


 





 


Red Blood Count


 2.61 x10^6/uL


(4.30-5.70) 


 


 





 


Hemoglobin


 8.2 g/dL


(13.0-17.5) 


 


 





 


Hematocrit


 25.0 %


(39.0-53.0) 


 


 





 


Mean Corpuscular Volume 96 fL ()    


 


Mean Corpuscular Hemoglobin 31 pg (25-35)    


 


Mean Corpuscular Hemoglobin


Concent 33 g/dL


(31-37) 


 


 





 


Red Cell Distribution Width


 16.0 %


(11.5-14.5) 


 


 





 


Platelet Count


 287 x10^3/uL


(140-400) 


 


 





 


Neutrophils (%) (Auto) 66 % (31-73)    


 


Lymphocytes (%) (Auto) 18 % (24-48)    


 


Monocytes (%) (Auto) 10 % (0-9)    


 


Eosinophils (%) (Auto) 6 % (0-3)    


 


Basophils (%) (Auto) 1 % (0-3)    


 


Neutrophils # (Auto)


 4.0 x10^3/uL


(1.8-7.7) 


 


 





 


Lymphocytes # (Auto)


 1.1 x10^3/uL


(1.0-4.8) 


 


 





 


Monocytes # (Auto)


 0.6 x10^3/uL


(0.0-1.1) 


 


 





 


Eosinophils # (Auto)


 0.3 x10^3/uL


(0.0-0.7) 


 


 





 


Basophils # (Auto)


 0.1 x10^3/uL


(0.0-0.2) 


 


 





 


Sodium Level


 143 mmol/L


(136-145) 


 


 





 


Potassium Level


 3.5 mmol/L


(3.5-5.1) 


 


 





 


Chloride Level


 105 mmol/L


() 


 


 





 


Carbon Dioxide Level


 32 mmol/L


(21-32) 


 


 





 


Anion Gap 6 (6-14)    


 


Blood Urea Nitrogen


 23 mg/dL


(8-26) 


 


 





 


Creatinine


 0.6 mg/dL


(0.7-1.3) 


 


 





 


Estimated GFR


(Cockcroft-Gault) 134.8 


 


 


 





 


BUN/Creatinine Ratio 38 (6-20)    


 


Glucose Level


 112 mg/dL


(70-99) 


 


 





 


Calcium Level


 8.0 mg/dL


(8.5-10.1) 


 


 





 


Total Bilirubin


 0.3 mg/dL


(0.2-1.0) 


 


 





 


Aspartate Amino Transf


(AST/SGOT) 56 U/L (15-37) 


 


 


 





 


Alanine Aminotransferase


(ALT/SGPT) 69 U/L (16-63) 


 


 


 





 


Alkaline Phosphatase


 67 U/L


() 


 


 





 


Total Protein


 5.7 g/dL


(6.4-8.2) 


 


 





 


Albumin


 1.7 g/dL


(3.4-5.0) 


 


 





 


Albumin/Globulin Ratio 0.4 (1.0-1.7)    








Laboratory Tests








Test


 1/16/21


08:45 1/16/21


09:20 1/17/21


05:00


 


O2 Saturation 96 % (92-99)   


 


Arterial Blood pH


 7.45


(7.35-7.45) 


 





 


Arterial Blood pCO2 at


Patient Temp 47 mmHg


(35-46) 


 





 


Arterial Blood pO2 at Patient


Temp 88 mmHg


() 


 





 


Arterial Blood HCO3


 32 mmol/L


(21-28) 


 





 


Arterial Blood Base Excess


 7 mmol/L


(-3-3) 


 





 


FiO2 60% vent   


 


Triglycerides Level


 


 305 mg/dL


(0-150) 





 


White Blood Count


 


 


 6.1 x10^3/uL


(4.0-11.0)


 


Red Blood Count


 


 


 2.61 x10^6/uL


(4.30-5.70)


 


Hemoglobin


 


 


 8.2 g/dL


(13.0-17.5)


 


Hematocrit


 


 


 25.0 %


(39.0-53.0)


 


Mean Corpuscular Volume   96 fL () 


 


Mean Corpuscular Hemoglobin   31 pg (25-35) 


 


Mean Corpuscular Hemoglobin


Concent 


 


 33 g/dL


(31-37)


 


Red Cell Distribution Width


 


 


 16.0 %


(11.5-14.5)


 


Platelet Count


 


 


 287 x10^3/uL


(140-400)


 


Neutrophils (%) (Auto)   66 % (31-73) 


 


Lymphocytes (%) (Auto)   18 % (24-48) 


 


Monocytes (%) (Auto)   10 % (0-9) 


 


Eosinophils (%) (Auto)   6 % (0-3) 


 


Basophils (%) (Auto)   1 % (0-3) 


 


Neutrophils # (Auto)


 


 


 4.0 x10^3/uL


(1.8-7.7)


 


Lymphocytes # (Auto)


 


 


 1.1 x10^3/uL


(1.0-4.8)


 


Monocytes # (Auto)


 


 


 0.6 x10^3/uL


(0.0-1.1)


 


Eosinophils # (Auto)


 


 


 0.3 x10^3/uL


(0.0-0.7)


 


Basophils # (Auto)


 


 


 0.1 x10^3/uL


(0.0-0.2)


 


Sodium Level


 


 


 143 mmol/L


(136-145)


 


Potassium Level


 


 


 3.5 mmol/L


(3.5-5.1)


 


Chloride Level


 


 


 105 mmol/L


()


 


Carbon Dioxide Level


 


 


 32 mmol/L


(21-32)


 


Anion Gap   6 (6-14) 


 


Blood Urea Nitrogen


 


 


 23 mg/dL


(8-26)


 


Creatinine


 


 


 0.6 mg/dL


(0.7-1.3)


 


Estimated GFR


(Cockcroft-Gault) 


 


 134.8 





 


BUN/Creatinine Ratio   38 (6-20) 


 


Glucose Level


 


 


 112 mg/dL


(70-99)


 


Calcium Level


 


 


 8.0 mg/dL


(8.5-10.1)


 


Total Bilirubin


 


 


 0.3 mg/dL


(0.2-1.0)


 


Aspartate Amino Transf


(AST/SGOT) 


 


 56 U/L (15-37) 





 


Alanine Aminotransferase


(ALT/SGPT) 


 


 69 U/L (16-63) 





 


Alkaline Phosphatase


 


 


 67 U/L


()


 


Total Protein


 


 


 5.7 g/dL


(6.4-8.2)


 


Albumin


 


 


 1.7 g/dL


(3.4-5.0)


 


Albumin/Globulin Ratio   0.4 (1.0-1.7) 








Medications





Active Scripts








 Medications  Dose


 Route/Sig


 Max Daily Dose Days Date Category


 


 Morphine Sulfate


 Er (Morphine


 Sulfate) 30 Mg


 Tablet.er  1 Tab


 PO BID


   12/23/20 Reported


 


 Tramadol Hcl 100


 Mg Tbmp.24hr  100 Mg


 PO PRN Q8HRS PRN


   12/23/20 Reported


 


 Ambien (Zolpidem


 Tartrate) 10 Mg


 Tablet  10 Mg


 PO PRN QHS PRN


   12/23/20 Reported


 


 Methotrexate


  (Methotrexate


 Sodium) 2.5 Mg


 Tablet  10 Tab


 PO WEEKLY


   12/23/20 Reported


 


 Lisinopril 10 Mg


 Tablet  1 Tab


 PO DAILY


   12/23/20 Reported


 


 Crestor


  (Rosuvastatin


 Calcium) 5 Mg


 Tablet  2 Tab


 PO DAILY


   12/23/20 Reported


 


 Hydrochlorothiazide


 Tablet


  (Hydrochlorothiazide)


 50 Mg Tablet  25 Mg


 PO DAILY


   12/23/20 Reported


 


 Escitalopram


 Oxalate 10 Mg


 Tablet  1 Tab


 PO DAILY


   12/23/20 Reported


 


 Sulfasalazine Dr


  (Sulfasalazine)


 500 Mg Tablet.dr  2 Tab


 PO TID


  30 12/23/20 Reported


 


 Colace (Docusate


 Sodium) 100 Mg


 Capsule  1 Cap


 PO DA


  30 12/23/20 Reported


 


 Acetaminophen


 Ext.release


  (Acetaminophen)


 650 Mg Tablet.er  650 Mg


 PO PRN Q8HRS PRN


   12/23/20 Reported


 


 Melatonin 5 Mg


 Capsule  1 Cap


 PO QHS


  30 12/23/20 Reported


 


 B-12


  (Cyanocobalamin


  (Vitamin B-12))


 500 Mcg Tablet  2 Tab


 PO DAILY


  30 12/23/20 Reported


 


 Vitamin D3


  (Cholecalciferol


  (Vitamin D3)) 50


 Mcg Capsule  50 Mcg


 PO DAILY


   12/23/20 Reported


 


 Aller-Sung


  (Cetirizine Hcl)


 10 Mg Tablet  1 Tab


 PO DAILY


  30 12/23/20 Reported








Comments


CXR


reviewed 


1.  ett ok 


2.  Bilateral parenchymal opacities and left pleural effusion are grossly 

stable.





Impression


.


IMPRESSION:


1.  Acute hypoxic respiratory failure secondary to highly suspected COVID-19


pneumonia and acute respiratory distress syndrome/acute lung injury.-- COVID-19 

positive 


2.  Abnormal CT chest with extensive widespread ground glass and alveolar and


interstitial infiltrates with reactive mild mediastinal/hilar adenopathy.  This 

related to COVID-19 pneumonia.


3.  Patient with history of psoriatic arthritis.  He is likely immunocompromised


as he has been on methotrexate.  


4.  History of tongue cancer with resection, details unknown.


5.  History of prostate cancer.


6.  History of Crohn's disease.





Plan


.


RECOMMENDATIONS:


Continue current vent support.  titrate fio2 as tolerated


on propofol    try to decrease dose    TG in am


Follow chest x-ray/ABG--reviewed


COVID-19 positive


Continue ABX per ID, off abx at this time--- 


Patient has completed full course of steroids


Continue TF for nutritional support 


HTN per PCP


DVT/GI PPX 





D/W RN and RT 


prognosis guarded








PT. is FULL CODE


critically ill 


Critical Care time 30 min no overlap











RIDGE YATES MD               Jan 17, 2021 05:54

## 2021-01-17 NOTE — PDOC
GENERAL


General:


Patient examined chart reviewed seen with nursing at bedside.  No change 

overnight.  We will defer to the critical care team regarding goals of care di

scussion with family specifically whether patient would opt for continued life 

support at this juncture he is certainly eligible for tracheotomy if that is 

consistent with his wishes.  Apparently there is a son who is in town currently 

but will be leaving for a long  assignment in the next couple of months.

 We appreciate subspecialty support will continue current management.


Problems:  


(1) Sepsis due to severe acute respiratory syndrome coronavirus 2 (SARS-CoV-2)


(2) Immunocompromised state due to drug therapy


(3) Psoriatic arthritis





VITAL SIGNS


Vital Signs/I&O:





                                   Vital Signs








  Date Time  Temp Pulse Resp B/P (MAP) Pulse Ox O2 Delivery O2 Flow Rate FiO2


 


1/17/21 12:00      Mechanical Ventilator  


 


1/17/21 12:00 98.6 50 28 85/51 (62) 99   





 98.6       














                                    I & O   


 


 1/16/21 1/16/21 1/17/21





 15:00 23:00 07:00


 


Intake Total 200 ml 1030 ml 1229 ml


 


Output Total 340 ml 725 ml 325 ml


 


Balance -140 ml 305 ml 904 ml





Patient is intubated sedated nonresponsive on the ventilator


Chest is clear to auscultation anteriorly


Heart S1-S2 normal regular rate and rhythm no murmurs or gallops are noted


Abdomen soft nontender nondistended no masses organomegaly noted


Extremity exam is unremarkable for acute abnormality





ALLERGIES


Allergies:





Allergies








Coded Allergies Type Severity Reaction Last Updated Verified


 


  I S O L A T I O N *CONTACT* Allergy Unknown  1/16/21 Yes


 


  No Known Medication Allergies Allergy Unknown  1/16/21 Yes











MEDS


Medications:





Current Medications








 Medications


  (Trade)  Dose


 Ordered  Sig/Kayli  Start Time


 Stop Time Status Last Admin


Dose Admin


 


 Acetaminophen


  (Tylenol Supp)  650 mg  PRN Q6HRS  PRN  12/23/20 08:45


     





 


 Acetaminophen


  (Tylenol)  650 mg  PRN Q4HRS  PRN  12/23/20 08:15


    12/31/20 01:02





 


 Alteplase,


 Recombinant


  (Cathflo For


 Central Catheter


 Clearance)  1 mg  1X  ONCE  1/1/21 16:15


 1/1/21 16:16 DC 1/2/21 02:10





 


 Amino Acids/


 Glycerin/


 Electrolytes  1,000 ml @ 


 80 mls/hr  [id number]  12/23/20 08:45


 1/5/21 05:03 DC 1/4/21 14:53





 


 Atorvastatin


 Calcium


  (Lipitor)  40 mg  QHS  12/23/20 21:00


    1/16/21 21:44





 


 Atropine Sulfate


  (ATROPINE 0.5mg


 SYRINGE)  0.5 mg  PRN Q5MIN  PRN  1/17/21 07:15


     





 


 Azithromycin 250


 mg/Sodium Chloride  250 ml @ 


 250 mls/hr  Q24H  12/24/20 09:00


 12/27/20 09:59 DC 12/27/20 08:55





 


 Azithromycin 500


 mg/Sodium Chloride  250 ml @ 


 250 mls/hr  1X  ONCE  12/23/20 10:00


 12/23/20 10:59 DC 12/23/20 09:59





 


 Benzonatate


  (Tessalon Perle)  100 mg  [id number]  12/26/20 14:00


 1/2/21 10:35 DC 1/1/21 20:51





 


 Cefepime HCl


  (Maxipime)  2 gm  Q12HR  1/3/21 14:00


 1/13/21 07:59 DC 1/12/21 20:52





 


 Ceftriaxone Sodium


  (Rocephin)  1 gm  Q24H  12/24/20 09:00


 1/3/21 13:15 DC 1/3/21 08:14





 


 Cetirizine HCl


  (ZyrTEC)  10 mg  DAILY  12/24/20 09:00


    1/17/21 08:30





 


 Chlorhexidine


 Gluconate


  (Peridex)  15 ml  BID  1/2/21 09:00


 1/9/21 19:26 DC 1/9/21 09:21





 


 Citalopram


 Hydrobromide


  (CeleXA)  20 mg  DAILY  12/24/20 09:00


    1/17/21 08:30





 


 Cyanocobalamin


  (Vitamin B-12)  1,000 mcg  DAILY  12/24/20 09:00


    1/17/21 08:30





 


 Dexamethasone


 Sodium Phosphate


  (Decadron)  6 mg  DAILY  1/4/21 09:00


 1/5/21 08:55 DC 1/5/21 08:36





 


 Dexmedetomidine


 HCl 400 mcg/


 Sodium Chloride  100 ml @ 0


 mls/hr  CONT  PRN  1/17/21 07:15


    1/17/21 11:40





 


 Docusate Sodium


  (Colace)  100 mg  PRN BID  PRN  12/23/20 08:15


    12/25/20 17:03





 


 Enalaprilat


  (Vasotec Inj)  2.5 mg  PRN Q6HRS  PRN  12/27/20 10:00


    12/28/20 12:29





 


 Enoxaparin Sodium


  (Lovenox 40mg


 Syringe)  40 mg  Q12HR  12/23/20 09:00


    1/17/21 08:31





 


 Famotidine


  (Pepcid Vial)  20 mg  BID  1/8/21 21:00


    1/17/21 08:31





 


 Fentanyl Citrate  55 ml @ 0


 mls/hr  CONT PRN  PRN  1/2/21 02:45


    1/16/21 23:20





 


 Furosemide


  (Lasix)  40 mg  1X  ONCE  1/12/21 11:15


 1/12/21 11:16 DC 1/12/21 11:18





 


 Guaifenesin


  (Robitussin Dm)  10 ml  PRN Q6HRS  PRN  12/23/20 08:45


    1/1/21 15:37





 


 Guaifenesin


  (Robitussin)  400 mg  PRN Q4HRS  PRN  12/27/20 22:30


    1/1/21 23:00





 


 Info


  (Icu Electrolyte


 Protocol)  1 ea  CONT PRN  PRN  12/24/20 14:15


     





 


 Labetalol HCl


  (Normodyne Iv


 Push)  10 mg  PRN Q2HR  PRN  12/26/20 18:30


    1/16/21 19:27





 


 Linezolid


  (Zyvox)  600 mg  BID  1/3/21 21:00


 1/7/21 07:58 DC 1/6/21 20:38





 


 Midazolam HCl  100 ml @ 0


 mls/hr  CONT  PRN  1/2/21 01:15


    1/17/21 09:31





 


 Morphine Sulfate


  (Morphine


 Sulfate)  2 mg  PRN Q4HRS  PRN  12/23/20 08:45


 1/2/21 01:41 DC 1/1/21 16:12





 


 Morphine Sulfate


  (Ms Contin)  15 mg  BID  12/23/20 21:00


 1/6/21 14:43 DC 1/5/21 21:11





 


 Multivitamins/


 Minerals


 Therapeutic


  (Centrum


 Multivit-Mineral


 Liq)  5 ml  DAILY  1/15/21 09:00


    1/17/21 08:31





 


 Norepinephrine


 Bitartrate 8 mg/


 Dextrose  258 ml @ 


 19.737 mls/


 hr  CONT  PRN  1/2/21 07:30


 1/5/21 08:55 DC 1/2/21 22:27





 


 Nystatin


  (Nystop)  1 lindsay  BID  1/6/21 16:00


    1/17/21 09:00





 


 Olanzapine


  (ZyPREXA ZYDIS)  5 mg  PRN BID  PRN  12/27/20 12:30


    1/11/21 09:56





 


 Ondansetron HCl


  (Zofran)  4 mg  PRN Q4HRS  PRN  12/23/20 08:15


     





 


 Polyethylene


 Glycol


  (miraLAX PACKET)  17 gm  DAILY  1/6/21 14:30


 1/17/21 12:21 DC 1/15/21 08:40





 


 Potassium Chloride


  (Klor-Con)  40 meq  1X  ONCE  12/24/20 14:15


 12/24/20 14:16 DC 12/24/20 14:24





 


 Propofol  100 ml @ 0


 mls/hr  CONT  PRN  1/2/21 01:15


    1/16/21 23:12





 


 Quetiapine


 Fumarate


  (SEROquel)  50 mg  PRN QHS  PRN  12/27/20 21:00


    1/1/21 00:24





 


 Sodium Chloride  1,000 ml @ 


 0 mls/hr  Q0M  1/7/21 13:15


     





 


 Sodium Chloride


  (Normal Saline


 Flush)  3 ml  QSHIFT  PRN  12/23/20 07:45


     





 


 Sterile Water


  (WATER for RESP)  1,000 ml  CONT  PRN  12/26/20 09:30


    1/1/21 15:49





 


 Succinylcholine


 Chloride


  (Anectine)  200 mg  STK-MED ONCE  1/2/21 01:21


 1/2/21 01:21 DC  





 


 Sulfasalazine


  (Azulfidine)  1,000 mg  BID  12/23/20 21:00


    1/17/21 08:30





 


 Tramadol HCl


  (Ultram)  100 mg  PRN Q8HRS  PRN  12/23/20 15:00


    1/1/21 13:35





 


 Vancomycin HCl


  (Vanco Per


 Pharmacy)  1 each  PRN DAILY  PRN  1/2/21 16:00


 1/3/21 13:19 DC 1/2/21 20:47





 


 Vancomycin HCl


  (Vancomycin


 Trough Level)  1 each  1X  ONCE  1/4/21 04:30


 1/3/21 13:20 DC  





 


 Vancomycin HCl


 1.5 gm/Sodium


 Chloride  500 ml @ 


 250 mls/hr  Q12H  1/3/21 05:00


 1/3/21 13:15 DC 1/3/21 05:53





 


 Vancomycin HCl 2


 gm/Sodium Chloride  500 ml @ 


 250 mls/hr  1X  ONCE  1/2/21 17:00


 1/2/21 18:59 DC 1/2/21 17:43





 


 Vecuronium Bromide


  (Norcuron Bolus)  6 mg  PRN Q6HRS  PRN  1/2/21 09:15


    1/9/21 17:07





 


 Zinc Sulfate


  (Orazinc)  220 mg  DAILY  12/23/20 09:00


    1/17/21 08:30





 


 Zolpidem Tartrate


  (Ambien)  5 mg  PRN QHS  PRN  12/23/20 15:00


    1/1/21 22:05











Current Medications








 Medications


  (Trade)  Dose


 Ordered  Sig/Kayli


 Route


 PRN Reason  Start Time


 Stop Time Status Last Admin


Dose Admin


 


 Dexmedetomidine


 HCl 400 mcg/


 Sodium Chloride  100 ml @ 0


 mls/hr  CONT  PRN


 IV


 PER PROTOCOL  1/17/21 07:15


    1/17/21 11:40














LAB


Lab:





                                Laboratory Tests








Test


 1/17/21


05:00 1/17/21


08:00


 


White Blood Count


 6.1 x10^3/uL


(4.0-11.0) 





 


Red Blood Count


 2.61 x10^6/uL


(4.30-5.70)  L 





 


Hemoglobin


 8.2 g/dL


(13.0-17.5)  L 





 


Hematocrit


 25.0 %


(39.0-53.0)  L 





 


Mean Corpuscular Volume


 96 fL ()


 





 


Mean Corpuscular Hemoglobin 31 pg (25-35)   


 


Mean Corpuscular Hemoglobin


Concent 33 g/dL


(31-37) 





 


Red Cell Distribution Width


 16.0 %


(11.5-14.5)  H 





 


Platelet Count


 287 x10^3/uL


(140-400) 





 


Neutrophils (%) (Auto) 66 % (31-73)   


 


Lymphocytes (%) (Auto) 18 % (24-48)  L 


 


Monocytes (%) (Auto) 10 % (0-9)  H 


 


Eosinophils (%) (Auto) 6 % (0-3)  H 


 


Basophils (%) (Auto) 1 % (0-3)   


 


Neutrophils # (Auto)


 4.0 x10^3/uL


(1.8-7.7) 





 


Lymphocytes # (Auto)


 1.1 x10^3/uL


(1.0-4.8) 





 


Monocytes # (Auto)


 0.6 x10^3/uL


(0.0-1.1) 





 


Eosinophils # (Auto)


 0.3 x10^3/uL


(0.0-0.7) 





 


Basophils # (Auto)


 0.1 x10^3/uL


(0.0-0.2) 





 


Sodium Level


 143 mmol/L


(136-145) 





 


Potassium Level


 3.5 mmol/L


(3.5-5.1) 





 


Chloride Level


 105 mmol/L


() 





 


Carbon Dioxide Level


 32 mmol/L


(21-32) 





 


Anion Gap 6 (6-14)   


 


Blood Urea Nitrogen


 23 mg/dL


(8-26) 





 


Creatinine


 0.6 mg/dL


(0.7-1.3)  L 





 


Estimated GFR


(Cockcroft-Gault) 134.8  


 





 


BUN/Creatinine Ratio 38 (6-20)  H 


 


Glucose Level


 112 mg/dL


(70-99)  H 





 


Calcium Level


 8.0 mg/dL


(8.5-10.1)  L 





 


Total Bilirubin


 0.3 mg/dL


(0.2-1.0) 





 


Aspartate Amino Transferase


(AST) 56 U/L (15-37)


H 





 


Alanine Aminotransferase (ALT)


 69 U/L (16-63)


H 





 


Alkaline Phosphatase


 67 U/L


() 





 


Total Protein


 5.7 g/dL


(6.4-8.2)  L 





 


Albumin


 1.7 g/dL


(3.4-5.0)  L 





 


Albumin/Globulin Ratio


 0.4 (1.0-1.7)


L 





 


O2 Saturation  97 % (92-99)  


 


Arterial Blood pH


 


 7.44


(7.35-7.45)


 


Arterial Blood pCO2 at


Patient Temp 


 48 mmHg


(35-46)  H


 


Arterial Blood pO2 at Patient


Temp 


 110 mmHg


()  H


 


Arterial Blood HCO3


 


 32 mmol/L


(21-28)  H


 


Arterial Blood Base Excess


 


 7 mmol/L


(-3-3)  H


 


FiO2  60%+7  





                                Laboratory Tests


1/17/21 05:00








                                Laboratory Tests


1/17/21 05:00











ASSESSMENT & PLAN


A&P


Plan as noted above 








This note was created using [employer] and may have omissions and/or 

errors due to the nature of real-time voice transcription.





Justifications for Admission


Other Justification














TEE ROOT MD                Jan 17, 2021 12:55

## 2021-01-18 VITALS — SYSTOLIC BLOOD PRESSURE: 102 MMHG | DIASTOLIC BLOOD PRESSURE: 72 MMHG

## 2021-01-18 VITALS — DIASTOLIC BLOOD PRESSURE: 57 MMHG | SYSTOLIC BLOOD PRESSURE: 95 MMHG

## 2021-01-18 VITALS — SYSTOLIC BLOOD PRESSURE: 136 MMHG | DIASTOLIC BLOOD PRESSURE: 81 MMHG

## 2021-01-18 VITALS — SYSTOLIC BLOOD PRESSURE: 102 MMHG | DIASTOLIC BLOOD PRESSURE: 62 MMHG

## 2021-01-18 VITALS — SYSTOLIC BLOOD PRESSURE: 101 MMHG | DIASTOLIC BLOOD PRESSURE: 57 MMHG

## 2021-01-18 VITALS — DIASTOLIC BLOOD PRESSURE: 61 MMHG | SYSTOLIC BLOOD PRESSURE: 97 MMHG

## 2021-01-18 VITALS — SYSTOLIC BLOOD PRESSURE: 108 MMHG | DIASTOLIC BLOOD PRESSURE: 74 MMHG

## 2021-01-18 VITALS — SYSTOLIC BLOOD PRESSURE: 110 MMHG | DIASTOLIC BLOOD PRESSURE: 65 MMHG

## 2021-01-18 VITALS — DIASTOLIC BLOOD PRESSURE: 61 MMHG | SYSTOLIC BLOOD PRESSURE: 100 MMHG

## 2021-01-18 VITALS — SYSTOLIC BLOOD PRESSURE: 116 MMHG | DIASTOLIC BLOOD PRESSURE: 78 MMHG

## 2021-01-18 VITALS — DIASTOLIC BLOOD PRESSURE: 65 MMHG | SYSTOLIC BLOOD PRESSURE: 107 MMHG

## 2021-01-18 VITALS — DIASTOLIC BLOOD PRESSURE: 58 MMHG | SYSTOLIC BLOOD PRESSURE: 96 MMHG

## 2021-01-18 VITALS — SYSTOLIC BLOOD PRESSURE: 101 MMHG | DIASTOLIC BLOOD PRESSURE: 60 MMHG

## 2021-01-18 VITALS — SYSTOLIC BLOOD PRESSURE: 114 MMHG | DIASTOLIC BLOOD PRESSURE: 65 MMHG

## 2021-01-18 VITALS — SYSTOLIC BLOOD PRESSURE: 101 MMHG | DIASTOLIC BLOOD PRESSURE: 55 MMHG

## 2021-01-18 VITALS — SYSTOLIC BLOOD PRESSURE: 99 MMHG | DIASTOLIC BLOOD PRESSURE: 63 MMHG

## 2021-01-18 VITALS — DIASTOLIC BLOOD PRESSURE: 62 MMHG | SYSTOLIC BLOOD PRESSURE: 98 MMHG

## 2021-01-18 VITALS — SYSTOLIC BLOOD PRESSURE: 103 MMHG | DIASTOLIC BLOOD PRESSURE: 62 MMHG

## 2021-01-18 VITALS — SYSTOLIC BLOOD PRESSURE: 135 MMHG | DIASTOLIC BLOOD PRESSURE: 74 MMHG

## 2021-01-18 VITALS — SYSTOLIC BLOOD PRESSURE: 96 MMHG | DIASTOLIC BLOOD PRESSURE: 60 MMHG

## 2021-01-18 VITALS — DIASTOLIC BLOOD PRESSURE: 72 MMHG | SYSTOLIC BLOOD PRESSURE: 122 MMHG

## 2021-01-18 VITALS — SYSTOLIC BLOOD PRESSURE: 114 MMHG | DIASTOLIC BLOOD PRESSURE: 63 MMHG

## 2021-01-18 VITALS — DIASTOLIC BLOOD PRESSURE: 68 MMHG | SYSTOLIC BLOOD PRESSURE: 112 MMHG

## 2021-01-18 VITALS — SYSTOLIC BLOOD PRESSURE: 121 MMHG | DIASTOLIC BLOOD PRESSURE: 74 MMHG

## 2021-01-18 LAB
BASE EXCESS ABG: 4 MMOL/L (ref -3–3)
HCO3 BLDA-SCNC: 29 MMOL/L (ref 21–28)
INSPIRATION SETTING TIME VENT: 55
PCO2 BLDA: 47 MMHG (ref 35–46)
PO2 BLDA: 88 MMHG (ref 65–108)
PROTHROMBIN TIME: 14.6 SEC (ref 11.7–14)
SAO2 % BLDA: 96 % (ref 92–99)

## 2021-01-18 RX ADMIN — DEXMEDETOMIDINE HYDROCHLORIDE PRN MLS/HR: 100 INJECTION, SOLUTION, CONCENTRATE INTRAVENOUS at 19:14

## 2021-01-18 RX ADMIN — ATORVASTATIN CALCIUM SCH MG: 40 TABLET, FILM COATED ORAL at 20:27

## 2021-01-18 RX ADMIN — MIDAZOLAM PRN MLS/HR: 5 INJECTION, SOLUTION INTRAMUSCULAR; INTRAVENOUS at 23:17

## 2021-01-18 RX ADMIN — Medication PRN MLS/HR: at 16:19

## 2021-01-18 RX ADMIN — NYSTATIN SCH APP: 100000 POWDER TOPICAL at 20:28

## 2021-01-18 RX ADMIN — MIDAZOLAM PRN MLS/HR: 5 INJECTION, SOLUTION INTRAMUSCULAR; INTRAVENOUS at 16:19

## 2021-01-18 RX ADMIN — ENOXAPARIN SODIUM SCH MG: 40 INJECTION SUBCUTANEOUS at 20:28

## 2021-01-18 RX ADMIN — MULTIVITAMIN SCH ML: LIQUID ORAL at 08:34

## 2021-01-18 RX ADMIN — DEXMEDETOMIDINE HYDROCHLORIDE PRN MLS/HR: 100 INJECTION, SOLUTION, CONCENTRATE INTRAVENOUS at 01:41

## 2021-01-18 RX ADMIN — DEXMEDETOMIDINE HYDROCHLORIDE PRN MLS/HR: 100 INJECTION, SOLUTION, CONCENTRATE INTRAVENOUS at 08:33

## 2021-01-18 RX ADMIN — CETIRIZINE HYDROCHLORIDE SCH MG: 10 TABLET, FILM COATED ORAL at 08:34

## 2021-01-18 RX ADMIN — CYANOCOBALAMIN TAB 1000 MCG SCH MCG: 1000 TAB at 08:34

## 2021-01-18 RX ADMIN — NYSTATIN SCH APP: 100000 POWDER TOPICAL at 08:36

## 2021-01-18 RX ADMIN — MIDAZOLAM PRN MLS/HR: 5 INJECTION, SOLUTION INTRAMUSCULAR; INTRAVENOUS at 07:30

## 2021-01-18 RX ADMIN — DEXMEDETOMIDINE HYDROCHLORIDE PRN MLS/HR: 100 INJECTION, SOLUTION, CONCENTRATE INTRAVENOUS at 13:29

## 2021-01-18 RX ADMIN — FAMOTIDINE SCH MG: 10 INJECTION, SOLUTION INTRAVENOUS at 08:35

## 2021-01-18 RX ADMIN — ZINC SULFATE CAP 220 MG (50 MG ELEMENTAL ZN) SCH MG: 220 (50 ZN) CAP at 08:34

## 2021-01-18 RX ADMIN — CITALOPRAM HYDROBROMIDE SCH MG: 20 TABLET ORAL at 08:35

## 2021-01-18 RX ADMIN — ENOXAPARIN SODIUM SCH MG: 40 INJECTION SUBCUTANEOUS at 08:34

## 2021-01-18 RX ADMIN — DEXMEDETOMIDINE HYDROCHLORIDE PRN MLS/HR: 100 INJECTION, SOLUTION, CONCENTRATE INTRAVENOUS at 23:17

## 2021-01-18 RX ADMIN — FAMOTIDINE SCH MG: 10 INJECTION, SOLUTION INTRAVENOUS at 20:27

## 2021-01-18 NOTE — PDOC2
CONSULT


Date of Consult


Date of Consult


DATE: 1/18/21 


TIME: 10:47





Reason for Consult


Reason for Consult:


trach





Referring Physician


Referring Physician:


Dr Ordaz





Identification/Chief Complaint


Chief Complaint


resp failure





Source


Source:  Caregiver, Chart review





History of Present Illness


Reason for Visit:


Covid +, resp failure, intubated for 16 days





Past Medical History


Cardiovascular:  HTN, Hyperlipidemia


GI:  GERD, Inflam bowel disease (Crohns)


Rheumatologic:  Other (Crohns, Psoriatic arthritis)





Past Surgical History


Past Surgical History:  Other (Tongue resection)





Family History


Family History:  Cancer (Mother - ), Coronary Artery Disease (Mother)





Social History


No


ALCOHOL:  none


Drugs:  None





Current Medications


Current Medications





Current Medications


Sodium Chloride (Normal Saline Flush) 3 ml QSHIFT  PRN IV AFTER MEDS AND BLOOD 

DRAWS;  Start 12/23/20 at 07:45


Sodium Chloride 1,000 ml @  150 mls/hr Q6H40M IV  Last administered on 

12/23/20at 15:10;  Start 12/23/20 at 08:15;  Stop 12/23/20 at 21:34;  Status DC


Ondansetron HCl (Zofran) 4 mg PRN Q4HRS  PRN IV NAUSEA/VOMITING;  Start 12/23/20

at 08:15


Acetaminophen (Tylenol) 650 mg PRN Q4HRS  PRN PO TEMP OVER 100.4F OR MILD PAIN 

Last administered on 12/31/20at 01:02;  Start 12/23/20 at 08:15


Docusate Sodium (Colace) 100 mg PRN BID  PRN PO HARD STOOLS Last administered on

12/25/20at 17:03;  Start 12/23/20 at 08:15


Ceftriaxone Sodium (Rocephin) 1 gm Q24H IVP  Last administered on 1/3/21at 

08:14;  Start 12/24/20 at 09:00;  Stop 1/3/21 at 13:15;  Status DC


Dexamethasone Sodium Phosphate (Decadron) 4 mg DAILY IVP  Last administered on 

1/3/21at 08:14;  Start 12/24/20 at 09:00;  Stop 1/4/21 at 08:39;  Status DC


Amino Acids/ Glycerin/ Electrolytes 1,000 ml @  80 mls/hr [id number] IV  Last 

administered on 1/4/21at 14:53;  Start 12/23/20 at 08:45;  Stop 1/5/21 at 05:03;

 Status DC


Enoxaparin Sodium (Lovenox 40mg Syringe) 40 mg Q12HR SQ  Last administered on 

1/18/21at 08:34;  Start 12/23/20 at 09:00


Zinc Sulfate (Orazinc) 220 mg DAILY PO  Last administered on 1/18/21at 08:34;  

Start 12/23/20 at 09:00


Guaifenesin (Robitussin Dm) 10 ml PRN Q6HRS  PRN PO COUGH-2ND CHOICE Last 

administered on 1/1/21at 15:37;  Start 12/23/20 at 08:45


Acetaminophen (Tylenol Supp) 650 mg PRN Q6HRS  PRN TX MILD PAIN / TEMP > 

100.3'F;  Start 12/23/20 at 08:45


Morphine Sulfate (Morphine Sulfate) 2 mg PRN Q4HRS  PRN IV PAIN Last 

administered on 1/1/21at 16:12;  Start 12/23/20 at 08:45;  Stop 1/2/21 at 01:41;

 Status DC


Azithromycin 500 mg/Sodium Chloride 250 ml @  250 mls/hr 1X  ONCE IV  Last 

administered on 12/23/20at 09:59;  Start 12/23/20 at 10:00;  Stop 12/23/20 at 

10:59;  Status DC


Azithromycin 250 mg/Sodium Chloride 250 ml @  250 mls/hr Q24H IV  Last 

administered on 12/27/20at 08:55;  Start 12/24/20 at 09:00;  Stop 12/27/20 at 

09:59;  Status DC


Cetirizine HCl (ZyrTEC) 10 mg DAILY PO  Last administered on 1/18/21at 08:34;  

Start 12/24/20 at 09:00


Morphine Sulfate (Ms Contin) 15 mg BID PO  Last administered on 1/5/21at 21:11; 

Start 12/23/20 at 21:00;  Stop 1/6/21 at 14:43;  Status DC


Cyanocobalamin (Vitamin B-12) 1,000 mcg DAILY PO  Last administered on 1/18/21at

08:34;  Start 12/24/20 at 09:00


Citalopram Hydrobromide (CeleXA) 20 mg DAILY PO  Last administered on 1/18/21at 

08:35;  Start 12/24/20 at 09:00


Atorvastatin Calcium (Lipitor) 40 mg QHS PO  Last administered on 1/17/21at 

21:55;  Start 12/23/20 at 21:00


Sulfasalazine (Azulfidine) 1,000 mg BID PO  Last administered on 1/18/21at 

08:34;  Start 12/23/20 at 21:00


Tramadol HCl (Ultram) 100 mg PRN Q8HRS  PRN PO MODERATE PAIN, SEVERE PAIN Last 

administered on 1/1/21at 13:35;  Start 12/23/20 at 15:00


Zolpidem Tartrate (Ambien) 5 mg PRN QHS  PRN PO INSOMNIA Last administered on 

1/1/21at 22:05;  Start 12/23/20 at 15:00


Info ([facility] Electrolyte Protocol) 1 ea CONT PRN  PRN MC PER PROTOCOL;  Start 

12/24/20 at 14:15


Potassium Chloride (Klor-Con) 40 meq 1X  ONCE PO  Last administered on 

12/24/20at 14:24;  Start 12/24/20 at 14:15;  Stop 12/24/20 at 14:16;  Status DC


Sterile Water (WATER for RESP) 1,000 ml CONT  PRN INH VIA VAPOTHERM DEVICE Last 

administered on 1/1/21at 15:49;  Start 12/26/20 at 09:30


Benzonatate (Tessalon Perle) 100 mg [id number] PO  Last administered on 1/1/21at 

20:51;  Start 12/26/20 at 14:00;  Stop 1/2/21 at 10:35;  Status DC


Labetalol HCl (Normodyne Iv Push) 10 mg PRN Q2HR  PRN IVP HYPERTENSION Last 

administered on 1/16/21at 19:27;  Start 12/26/20 at 18:30


Quetiapine Fumarate (SEROquel) 50 mg PRN QHS  PRN PO sleep 2ND CHOICE Last 

administered on 1/1/21at 00:24;  Start 12/27/20 at 21:00


Enalaprilat (Vasotec Inj) 2.5 mg PRN Q6HRS  PRN IVP HYPERTENSION-2ND CHOICE Last

administered on 12/28/20at 12:29;  Start 12/27/20 at 10:00


Olanzapine (ZyPREXA ZYDIS) 5 mg PRN BID  PRN PO ANXIETY / AGITATION Last 

administered on 1/11/21at 09:56;  Start 12/27/20 at 12:30


Guaifenesin (Robitussin) 400 mg PRN Q4HRS  PRN PO COUGH;  Start 12/27/20 at 

22:15;  Stop 12/27/20 at 22:27;  Status DC


Guaifenesin (Robitussin) 400 mg PRN Q4HRS  PRN PO COUGH Last administered on 

1/1/21at 23:00;  Start 12/27/20 at 22:30


Furosemide (Lasix) 40 mg 1X  ONCE IVP  Last administered on 12/30/20at 10:15;  

Start 12/30/20 at 10:00;  Stop 12/30/20 at 10:01;  Status DC


Sodium Chloride 1,000 ml @  100 mls/hr Q10H IV  Last administered on 1/7/21at 

00:03;  Start 12/31/20 at 10:15;  Stop 1/7/21 at 13:14;  Status DC


Furosemide (Lasix) 20 mg 1X  ONCE IVP  Last administered on 1/1/21at 13:34;  

Start 1/1/21 at 12:30;  Stop 1/1/21 at 12:31;  Status DC


Alteplase, Recombinant (Cathflo For Central Catheter Clearance) 1 mg 1X  ONCE I

NT CAT  Last administered on 1/2/21at 02:10;  Start 1/1/21 at 16:15;  Stop 

1/1/21 at 16:16;  Status DC


Fentanyl Citrate 30 ml @ 0 mls/hr CONT  PRN IV SEE PROTOCOL Last administered on

1/2/21at 01:58;  Start 1/2/21 at 01:15;  Stop 1/2/21 at 02:36;  Status DC


Propofol 100 ml @ 0 mls/hr CONT  PRN IV PER PROTOCOL Last administered on 

1/16/21at 23:12;  Start 1/2/21 at 01:15


Chlorhexidine Gluconate (Peridex) 15 ml BID MM  Last administered on 1/9/21at 

09:21;  Start 1/2/21 at 09:00;  Stop 1/9/21 at 19:26;  Status DC


Midazolam HCl 100 ml @ 0 mls/hr CONT  PRN IV SEE PROTOCOL Last administered on 

1/18/21at 07:30;  Start 1/2/21 at 01:15


Succinylcholine Chloride (Anectine) 200 mg STK-MED ONCE .ROUTE ;  Start 1/2/21 

at 01:21;  Stop 1/2/21 at 01:21;  Status DC


Fentanyl Citrate 55 ml @ 0 mls/hr CONT PRN  PRN IV PAIN CONTROL Last 

administered on 1/17/21at 19:28;  Start 1/2/21 at 02:45


Norepinephrine Bitartrate 8 mg/ Dextrose 258 ml @  19.737 mls/ hr CONT  PRN IV 

PER PROTOCOL Last administered on 1/2/21at 22:27;  Start 1/2/21 at 07:30;  Stop 

1/5/21 at 08:55;  Status DC


Vecuronium Bromide (Norcuron Bolus) 6 mg PRN Q6HRS  PRN IV SEDATION Last 

administered on 1/9/21at 17:07;  Start 1/2/21 at 09:15


Vancomycin HCl (Vanco Per Pharmacy) 1 each PRN DAILY  PRN MC SEE COMMENTS Last 

administered on 1/2/21at 20:47;  Start 1/2/21 at 16:00;  Stop 1/3/21 at 13:19;  

Status DC


Vancomycin HCl 2 gm/Sodium Chloride 500 ml @  250 mls/hr 1X  ONCE IV  Last 

administered on 1/2/21at 17:43;  Start 1/2/21 at 17:00;  Stop 1/2/21 at 18:59;  

Status DC


Vancomycin HCl 1.5 gm/Sodium Chloride 500 ml @  250 mls/hr Q12H IV  Last 

administered on 1/3/21at 05:53;  Start 1/3/21 at 05:00;  Stop 1/3/21 at 13:15;  

Status DC


Vancomycin HCl (Vancomycin Trough Level) 1 each 1X  ONCE MC ;  Start 1/4/21 at 

04:30;  Stop 1/3/21 at 13:20;  Status DC


Cefepime HCl (Maxipime) 2 gm Q12HR IVP  Last administered on 1/12/21at 20:52;  

Start 1/3/21 at 14:00;  Stop 1/13/21 at 07:59;  Status DC


Linezolid (Zyvox) 600 mg BID PO  Last administered on 1/6/21at 20:38;  Start 

1/3/21 at 21:00;  Stop 1/7/21 at 07:58;  Status DC


Dexamethasone Sodium Phosphate (Decadron) 6 mg DAILY IVP  Last administered on 

1/5/21at 08:36;  Start 1/4/21 at 09:00;  Stop 1/5/21 at 08:55;  Status DC


Polyethylene Glycol (miraLAX PACKET) 17 gm DAILY PO  Last administered on 

1/15/21at 08:40;  Start 1/6/21 at 14:30;  Stop 1/17/21 at 12:21;  Status DC


Nystatin (Nystop) 1 lindsay BID TP  Last administered on 1/18/21at 08:36;  Start 

1/6/21 at 16:00


Sodium Chloride 1,000 ml @  0 mls/hr Q0M IV ;  Start 1/7/21 at 13:15


Famotidine (Pepcid Vial) 20 mg BID IVP  Last administered on 1/18/21at 08:35;  

Start 1/8/21 at 21:00


Furosemide (Lasix) 40 mg 1X  ONCE IVP  Last administered on 1/12/21at 11:18;  

Start 1/12/21 at 11:15;  Stop 1/12/21 at 11:16;  Status DC


Multivitamins/ Minerals Therapeutic (Centrum Multivit-Mineral Liq) 5 ml DAILY 

PEG  Last administered on 1/18/21at 08:34;  Start 1/15/21 at 09:00


Dexmedetomidine HCl 400 mcg/ Sodium Chloride 100 ml @ 0 mls/hr CONT  PRN IV PER 

PROTOCOL Last administered on 1/18/21at 08:33;  Start 1/17/21 at 07:15


Atropine Sulfate (ATROPINE 0.5mg SYRINGE) 0.5 mg PRN Q5MIN  PRN IV SEE COMMENTS;

 Start 1/17/21 at 07:15





Active Scripts


Active


Reported


Morphine Sulfate Er (Morphine Sulfate) 30 Mg Tablet.er 1 Tab PO BID


Tramadol Hcl 100 Mg Tbmp.24hr 100 Mg PO PRN Q8HRS PRN


Ambien (Zolpidem Tartrate) 10 Mg Tablet 10 Mg PO PRN QHS PRN


Methotrexate (Methotrexate Sodium) 2.5 Mg Tablet 10 Tab PO WEEKLY


Lisinopril 10 Mg Tablet 1 Tab PO DAILY


Crestor (Rosuvastatin Calcium) 5 Mg Tablet 2 Tab PO DAILY


Hydrochlorothiazide Tablet (Hydrochlorothiazide) 50 Mg Tablet 25 Mg PO DAILY


Escitalopram Oxalate 10 Mg Tablet 1 Tab PO DAILY


Sulfasalazine Dr (Sulfasalazine) 500 Mg Tablet.dr 2 Tab PO TID 30 Days


Colace (Docusate Sodium) 100 Mg Capsule 1 Cap PO DA 30 Days


Acetaminophen Ext.release (Acetaminophen) 650 Mg Tablet.er 650 Mg PO PRN Q8HRS 

PRN


Melatonin 5 Mg Capsule 1 Cap PO QHS 30 Days


B-12 (Cyanocobalamin (Vitamin B-12)) 500 Mcg Tablet 2 Tab PO DAILY 30 Days


Vitamin D3 (Cholecalciferol (Vitamin D3)) 50 Mcg Capsule 50 Mcg PO DAILY


Aller-Sung (Cetirizine Hcl) 10 Mg Tablet 1 Tab PO DAILY 30 Days





Allergies


Allergies:  


Coded Allergies:  


     I S O L A T I O N *CONTACT* (Verified  Allergy, Unknown, 1/16/21)


   mrsa


     No Known Medication Allergies (Verified  Allergy, Unknown, 1/16/21)





ROS


Review of System


unable to obtain





Physical Exam


General:  Other (sedated )


HEENT:  Other (mech vent)


Heart:  Regular rate, Normal S1, Normal S2


Abdomen:  Soft, No tenderness


Extremities:  No clubbing, No cyanosis


Skin:  No rashes, No breakdown





Vitals


VITALS





Vital Signs








  Date Time  Temp Pulse Resp B/P (MAP) Pulse Ox O2 Delivery O2 Flow Rate FiO2


 


1/18/21 10:00  50 28 99/63 (75) 98 Ventilator  


 


1/18/21 08:00 98.8       





 98.8       


 


1/17/21 21:56       40.0 











Labs


Labs





Laboratory Tests








Test


 1/17/21


05:00 1/17/21


08:00 1/18/21


05:40 1/18/21


07:50


 


White Blood Count


 6.1 x10^3/uL


(4.0-11.0) 


 


 





 


Red Blood Count


 2.61 x10^6/uL


(4.30-5.70) 


 


 





 


Hemoglobin


 8.2 g/dL


(13.0-17.5) 


 


 





 


Hematocrit


 25.0 %


(39.0-53.0) 


 


 





 


Mean Corpuscular Volume 96 fL ()    


 


Mean Corpuscular Hemoglobin 31 pg (25-35)    


 


Mean Corpuscular Hemoglobin


Concent 33 g/dL


(31-37) 


 


 





 


Red Cell Distribution Width


 16.0 %


(11.5-14.5) 


 


 





 


Platelet Count


 287 x10^3/uL


(140-400) 


 


 





 


Neutrophils (%) (Auto) 66 % (31-73)    


 


Lymphocytes (%) (Auto) 18 % (24-48)    


 


Monocytes (%) (Auto) 10 % (0-9)    


 


Eosinophils (%) (Auto) 6 % (0-3)    


 


Basophils (%) (Auto) 1 % (0-3)    


 


Neutrophils # (Auto)


 4.0 x10^3/uL


(1.8-7.7) 


 


 





 


Lymphocytes # (Auto)


 1.1 x10^3/uL


(1.0-4.8) 


 


 





 


Monocytes # (Auto)


 0.6 x10^3/uL


(0.0-1.1) 


 


 





 


Eosinophils # (Auto)


 0.3 x10^3/uL


(0.0-0.7) 


 


 





 


Basophils # (Auto)


 0.1 x10^3/uL


(0.0-0.2) 


 


 





 


Sodium Level


 143 mmol/L


(136-145) 


 


 





 


Potassium Level


 3.5 mmol/L


(3.5-5.1) 


 


 





 


Chloride Level


 105 mmol/L


() 


 


 





 


Carbon Dioxide Level


 32 mmol/L


(21-32) 


 


 





 


Anion Gap 6 (6-14)    


 


Blood Urea Nitrogen


 23 mg/dL


(8-26) 


 


 





 


Creatinine


 0.6 mg/dL


(0.7-1.3) 


 


 





 


Estimated GFR


(Cockcroft-Gault) 134.8 


 


 


 





 


BUN/Creatinine Ratio 38 (6-20)    


 


Glucose Level


 112 mg/dL


(70-99) 


 


 





 


Calcium Level


 8.0 mg/dL


(8.5-10.1) 


 


 





 


Total Bilirubin


 0.3 mg/dL


(0.2-1.0) 


 


 





 


Aspartate Amino Transf


(AST/SGOT) 56 U/L (15-37) 


 


 


 





 


Alanine Aminotransferase


(ALT/SGPT) 69 U/L (16-63) 


 


 


 





 


Alkaline Phosphatase


 67 U/L


() 


 


 





 


Total Protein


 5.7 g/dL


(6.4-8.2) 


 


 





 


Albumin


 1.7 g/dL


(3.4-5.0) 


 


 





 


Albumin/Globulin Ratio 0.4 (1.0-1.7)    


 


O2 Saturation  97 % (92-99)   96 % (92-99) 


 


Arterial Blood pH


 


 7.44


(7.35-7.45) 


 7.41


(7.35-7.45)


 


Arterial Blood pCO2 at


Patient Temp 


 48 mmHg


(35-46) 


 47 mmHg


(35-46)


 


Arterial Blood pO2 at Patient


Temp 


 110 mmHg


() 


 88 mmHg


()


 


Arterial Blood HCO3


 


 32 mmol/L


(21-28) 


 29 mmol/L


(21-28)


 


Arterial Blood Base Excess


 


 7 mmol/L


(-3-3) 


 4 mmol/L


(-3-3)


 


FiO2  60%+7   55 


 


Triglycerides Level


 


 


 119 mg/dL


(0-150) 











Laboratory Tests








Test


 1/18/21


05:40 1/18/21


07:50


 


Triglycerides Level


 119 mg/dL


(0-150) 





 


O2 Saturation  96 % (92-99) 


 


Arterial Blood pH


 


 7.41


(7.35-7.45)


 


Arterial Blood pCO2 at


Patient Temp 


 47 mmHg


(35-46)


 


Arterial Blood pO2 at Patient


Temp 


 88 mmHg


()


 


Arterial Blood HCO3


 


 29 mmol/L


(21-28)


 


Arterial Blood Base Excess


 


 4 mmol/L


(-3-3)


 


FiO2  55 











Assessment/Plan


Assessment/Plan


resp failure


peep down to 5


would like trach placed





will review with Dr Mae for timing











PETRA LEIGH            Jan 18, 2021 10:48

## 2021-01-18 NOTE — PDOC2
GI CONSULT


Date of Service:


DATE: 1/18/21 


TIME: 12:38





Reason For Consult:


PEG





HPI:


HPI:


64 y/o male from [place], here visiting family, transferred to [facility] from [facility] 

w/ resp failure on 12/23/20, +COVID then.


Remains intubated and sedated but out of isolation in ICU.  


History from chart and nurse - wife consented to tracheostomy and PEG placement 

via phone.  Tolerating OG tube feeds, has stooled.  Notes mention n/v on 

1/13/21.


H/o Crohn's and psoriatic arthritis on sulfasalazine, B12, and methotrexate 

(which has been held), GERD (on IV H2 blocker here), also chronic pain on MS 

Contin.  


Labs note normocytic anemia and mildly elevated AST and ALT.





PMH:


PMH:


per chart:


HTN, HLD, Crohn's, GERD, psoriatic arthritis, prostate and tongue cancers, 

depression, right renal mass


prostate surgery, tongue surgery, PEG placement





FH:


Family History:  Other (unable to obtain)





Social History:


Smoke:  No


ALCOHOL:  none


Drugs:  None





ROS:


unable to obtain





Vitals:


Vitals:





                                   Vital Signs








  Date Time  Temp Pulse Resp B/P (MAP) Pulse Ox O2 Delivery O2 Flow Rate FiO2


 


1/18/21 11:54     98 Ventilator  


 


1/18/21 11:00  51 28 102/62 (75)    


 


1/18/21 08:00 98.8       





 98.8       


 


1/17/21 21:56       40.0 











Labs:


Labs:





Laboratory Tests








Test


 1/18/21


05:40 1/18/21


07:50


 


Triglycerides Level


 119 mg/dL


(0-150) 





 


O2 Saturation  96 % (92-99) 


 


Arterial Blood pH


 


 7.41


(7.35-7.45)


 


Arterial Blood pCO2 at


Patient Temp 


 47 mmHg


(35-46)


 


Arterial Blood pO2 at Patient


Temp 


 88 mmHg


()


 


Arterial Blood HCO3


 


 29 mmol/L


(21-28)


 


Arterial Blood Base Excess


 


 4 mmol/L


(-3-3)


 


FiO2  55 











Allergies:


Coded Allergies:  


     I S O L A T I O N *CONTACT* (Verified  Allergy, Unknown, 1/16/21)


   mrsa


     No Known Medication Allergies (Verified  Allergy, Unknown, 1/16/21)





Imaging:


Imaging:


CXR 1/15


IMPRESSION:


1.  Support lines and tubes as above.


2.  Bilateral parenchymal opacities and left pleural effusion are grossly 

stable.





KUB 1/14


IMPRESSION:


OG tube has been advanced into the mid to distal stomach in apparent satisfac

tory position.





PE:





GEN: intubated, OG tube w/ feeds running @50cc/hr


HEENT: Atraumatic


LUNGS: vent/clear


HEART: RRR


ABD: quiet, large, soft, ?previous PEG site LUQ


EXTREMITY/SKIN: SCDs


NEURO/PSYCH: sedated





A/P:


A/P:


COVID-19 infection, resp failure - COVID test + 12/23/20, now out of isolation


H/o Crohn's, psoriatic arthritis, GERD


H/o PEG placement


Normocytic anemia, mildly elevated AST and ALT


H/o prostate and tongue cancers





--


Will review w/ Dr. Goddard.


Surgery following for possible tracheostomy placement - possible PEG to follow.


Check INR.











LORAINE CUADRA         Jan 18, 2021 12:52

## 2021-01-18 NOTE — PDOC
PULMONARY PROGRESS NOTES


DATE: 1/18/21 


TIME: 10:14


Subjective


Patient remains on ventilatory support, FiO2 60% and a PEEP of 7,


Afebrile overnight


Tolerating tube feeding well


No overnight concerns from nursing


Vitals





Vital Signs








  Date Time  Temp Pulse Resp B/P (MAP) Pulse Ox O2 Delivery O2 Flow Rate FiO2


 


1/18/21 10:00  50 28 99/63 (75) 98 Ventilator  


 


1/18/21 08:00 98.8       





 98.8       


 


1/17/21 21:56       40.0 








Comments


Intubated/sedated


Lungs:  Clear


Cardiovascular:  S1, S2


Abdomen:  Soft


Extremities:  No Edema


Skin:  Warm, Dry


Labs





Laboratory Tests








Test


 1/17/21


05:00 1/17/21


08:00 1/18/21


05:40 1/18/21


07:50


 


White Blood Count


 6.1 x10^3/uL


(4.0-11.0) 


 


 





 


Red Blood Count


 2.61 x10^6/uL


(4.30-5.70) 


 


 





 


Hemoglobin


 8.2 g/dL


(13.0-17.5) 


 


 





 


Hematocrit


 25.0 %


(39.0-53.0) 


 


 





 


Mean Corpuscular Volume 96 fL ()    


 


Mean Corpuscular Hemoglobin 31 pg (25-35)    


 


Mean Corpuscular Hemoglobin


Concent 33 g/dL


(31-37) 


 


 





 


Red Cell Distribution Width


 16.0 %


(11.5-14.5) 


 


 





 


Platelet Count


 287 x10^3/uL


(140-400) 


 


 





 


Neutrophils (%) (Auto) 66 % (31-73)    


 


Lymphocytes (%) (Auto) 18 % (24-48)    


 


Monocytes (%) (Auto) 10 % (0-9)    


 


Eosinophils (%) (Auto) 6 % (0-3)    


 


Basophils (%) (Auto) 1 % (0-3)    


 


Neutrophils # (Auto)


 4.0 x10^3/uL


(1.8-7.7) 


 


 





 


Lymphocytes # (Auto)


 1.1 x10^3/uL


(1.0-4.8) 


 


 





 


Monocytes # (Auto)


 0.6 x10^3/uL


(0.0-1.1) 


 


 





 


Eosinophils # (Auto)


 0.3 x10^3/uL


(0.0-0.7) 


 


 





 


Basophils # (Auto)


 0.1 x10^3/uL


(0.0-0.2) 


 


 





 


Sodium Level


 143 mmol/L


(136-145) 


 


 





 


Potassium Level


 3.5 mmol/L


(3.5-5.1) 


 


 





 


Chloride Level


 105 mmol/L


() 


 


 





 


Carbon Dioxide Level


 32 mmol/L


(21-32) 


 


 





 


Anion Gap 6 (6-14)    


 


Blood Urea Nitrogen


 23 mg/dL


(8-26) 


 


 





 


Creatinine


 0.6 mg/dL


(0.7-1.3) 


 


 





 


Estimated GFR


(Cockcroft-Gault) 134.8 


 


 


 





 


BUN/Creatinine Ratio 38 (6-20)    


 


Glucose Level


 112 mg/dL


(70-99) 


 


 





 


Calcium Level


 8.0 mg/dL


(8.5-10.1) 


 


 





 


Total Bilirubin


 0.3 mg/dL


(0.2-1.0) 


 


 





 


Aspartate Amino Transf


(AST/SGOT) 56 U/L (15-37) 


 


 


 





 


Alanine Aminotransferase


(ALT/SGPT) 69 U/L (16-63) 


 


 


 





 


Alkaline Phosphatase


 67 U/L


() 


 


 





 


Total Protein


 5.7 g/dL


(6.4-8.2) 


 


 





 


Albumin


 1.7 g/dL


(3.4-5.0) 


 


 





 


Albumin/Globulin Ratio 0.4 (1.0-1.7)    


 


O2 Saturation  97 % (92-99)   96 % (92-99) 


 


Arterial Blood pH


 


 7.44


(7.35-7.45) 


 7.41


(7.35-7.45)


 


Arterial Blood pCO2 at


Patient Temp 


 48 mmHg


(35-46) 


 47 mmHg


(35-46)


 


Arterial Blood pO2 at Patient


Temp 


 110 mmHg


() 


 88 mmHg


()


 


Arterial Blood HCO3


 


 32 mmol/L


(21-28) 


 29 mmol/L


(21-28)


 


Arterial Blood Base Excess


 


 7 mmol/L


(-3-3) 


 4 mmol/L


(-3-3)


 


FiO2  60%+7   55 


 


Triglycerides Level


 


 


 119 mg/dL


(0-150) 











Laboratory Tests








Test


 1/18/21


05:40 1/18/21


07:50


 


Triglycerides Level


 119 mg/dL


(0-150) 





 


O2 Saturation  96 % (92-99) 


 


Arterial Blood pH


 


 7.41


(7.35-7.45)


 


Arterial Blood pCO2 at


Patient Temp 


 47 mmHg


(35-46)


 


Arterial Blood pO2 at Patient


Temp 


 88 mmHg


()


 


Arterial Blood HCO3


 


 29 mmol/L


(21-28)


 


Arterial Blood Base Excess


 


 4 mmol/L


(-3-3)


 


FiO2  55 








Medications





Active Scripts








 Medications  Dose


 Route/Sig


 Max Daily Dose Days Date Category


 


 Morphine Sulfate


 Er (Morphine


 Sulfate) 30 Mg


 Tablet.er  1 Tab


 PO BID


   12/23/20 Reported


 


 Tramadol Hcl 100


 Mg Tbmp.24hr  100 Mg


 PO PRN Q8HRS PRN


   12/23/20 Reported


 


 Ambien (Zolpidem


 Tartrate) 10 Mg


 Tablet  10 Mg


 PO PRN QHS PRN


   12/23/20 Reported


 


 Methotrexate


  (Methotrexate


 Sodium) 2.5 Mg


 Tablet  10 Tab


 PO WEEKLY


   12/23/20 Reported


 


 Lisinopril 10 Mg


 Tablet  1 Tab


 PO DAILY


   12/23/20 Reported


 


 Crestor


  (Rosuvastatin


 Calcium) 5 Mg


 Tablet  2 Tab


 PO DAILY


   12/23/20 Reported


 


 Hydrochlorothiazide


 Tablet


  (Hydrochlorothiazide)


 50 Mg Tablet  25 Mg


 PO DAILY


   12/23/20 Reported


 


 Escitalopram


 Oxalate 10 Mg


 Tablet  1 Tab


 PO DAILY


   12/23/20 Reported


 


 Sulfasalazine Dr


  (Sulfasalazine)


 500 Mg Tablet.dr  2 Tab


 PO TID


  30 12/23/20 Reported


 


 Colace (Docusate


 Sodium) 100 Mg


 Capsule  1 Cap


 PO DA


  30 12/23/20 Reported


 


 Acetaminophen


 Ext.release


  (Acetaminophen)


 650 Mg Tablet.er  650 Mg


 PO PRN Q8HRS PRN


   12/23/20 Reported


 


 Melatonin 5 Mg


 Capsule  1 Cap


 PO QHS


  30 12/23/20 Reported


 


 B-12


  (Cyanocobalamin


  (Vitamin B-12))


 500 Mcg Tablet  2 Tab


 PO DAILY


  30 12/23/20 Reported


 


 Vitamin D3


  (Cholecalciferol


  (Vitamin D3)) 50


 Mcg Capsule  50 Mcg


 PO DAILY


   12/23/20 Reported


 


 Aller-Sung


  (Cetirizine Hcl)


 10 Mg Tablet  1 Tab


 PO DAILY


  30 12/23/20 Reported








Comments


CXR


reviewed 


1.  ett ok 


2.  Bilateral parenchymal opacities and left pleural effusion are grossly 

stable.





Impression


.


IMPRESSION:


1.  Acute hypoxic respiratory failure secondary to highly suspected COVID-19


pneumonia and acute respiratory distress syndrome/acute lung injury.-- COVID-19 

positive ----improving


2.  Abnormal CT chest with extensive widespread ground glass and alveolar and


interstitial infiltrates with reactive mild mediastinal/hilar adenopathy.  This 

related to COVID-19 pneumonia.


3.  Patient with history of psoriatic arthritis.  He is likely immunocompromised


as he has been on methotrexate.  


4.  History of tongue cancer with resection, details unknown.


5.  History of prostate cancer.


6.  History of Crohn's disease.





Plan


.


RECOMMENDATIONS:


Continue current ventilatory support, FiO2 55% and a PEEP of 7


Follow chest x-ray/ABG, reduce PEEP to 5


COVID-19 positive


Patient remains off antibiotics at this time, infectious disease of signed off 

cultures are negative


Patient is completed a full 10-day course of steroids


Patient has completed full course of steroids


Continue TF for nutritional support 


Consult surgery for tracheostomy and GI for possible PEG tube placement--- 

patient has been intubated on the ventilator for 16 days


DVT/GI PPX 





D/W RN and RT 





PT. is FULL CODE





Critical care time [id number]











JUAN RHOADES MD              Jan 18, 2021 10:16

## 2021-01-18 NOTE — PDOC
PROGRESS NOTES


Date of Service:


DATE: 1/18/21 


TIME: 16:03





Chief Complaint


Chief Complaint


 





Acute hypoxic respiratory failure - no pulmonary history. With recent travel to 

and from [place] likely COVID 19 vs other atypical pneumonia. Cont empiric 

antibiotics, steroids. Consult Pulm. 


Improved aeration of the lungs with persistent moderate mixed interstitial and 

alveolar airspace disease.  1/02 


Pt. experienced hypoxia and respiratory decompensation overnight requiring 

intubation 


now on vent 100% PEEP of 10 


Hypotension as well now on pressors


acute respiratory distress syndrome. Consideration may be given for multifocal 

pneumonia versus pulmonary edema.


COVID 19 positive - with pneumonia - given transaminitis and late presentation 

with high O2 requirements no indication for remdesivir


RYDER - likely vasomotor nephropathy - no known renal history, will hydrate


Pneumonia - likely atypical, gram negative vs viral. will cover 


Prostate Ca - s/p resection at [facility] in [place]


Hypokalemia - likely nutritional or loss from diarrhea, will replace


Elevated troponin - likely from type II demand ischemia, will trend troponins, 

maintain telemetry


Crohn's - sees rheumatology regularly, Dr Fan in [place], on sulfasalazine


 


Anemia - likely of chronic disease, will monitor


Cardiomegaly - notable on CT chest - no known cardiac history, though his mother

did have CHF and CAD


Right renal mass -  


HYPOTENSION 


 


Severe malnutrition





History of Present Illness


History of Present Illness


1/18/2021


Patient seen and examined in the ICU


24 days past his COVID-19 diagnosis


He is still intubated


Assist-control 


Has NG tube feeds


cont supportive care


Sedated with fentanyl Versed and propofol





Vitals


Vitals





Vital Signs








  Date Time  Temp Pulse Resp B/P (MAP) Pulse Ox O2 Delivery O2 Flow Rate FiO2


 


1/18/21 15:43     99 Ventilator  


 


1/18/21 15:00  61 28 110/65 (80)    


 


1/18/21 12:00 99.2       





 99.2       


 


1/17/21 21:56       40.0 











Physical Exam


Physical Exam


GENERAL:  Intubated, sedated.


HEENT:  Normocephalic, atraumatic.  Anicteric.  ETT and OGT tube in place.


NECK:  Supple.


LUNGS:  Decreased breath sounds at the bases.  No wheezing.


HEART:  S1, S2.


ABDOMEN: Mildly distended


EXTREMITIES: Generalized anasarca


GENITOURINARY:  Hart in place.scrotal swelling +


CENTRAL NERVOUS SYSTEM:  Intubated, sedated.


PSYCHIATRIC:  Unable to assess.


LINES:  Right upper extremity PICC line clean.


General:  Other (sedated )


Heart:  Regular rate, Normal S1, Normal S2


Lungs:  Clear


Abdomen:  Soft, No tenderness


Extremities:  No clubbing, No cyanosis


Skin:  No rashes, No breakdown





Labs


LABS





Laboratory Tests








Test


 1/18/21


05:40 1/18/21


07:50 1/18/21


13:45


 


Triglycerides Level


 119 mg/dL


(0-150) 


 





 


O2 Saturation  96 % (92-99)  


 


Arterial Blood pH


 


 7.41


(7.35-7.45) 





 


Arterial Blood pCO2 at


Patient Temp 


 47 mmHg


(35-46) 





 


Arterial Blood pO2 at Patient


Temp 


 88 mmHg


() 





 


Arterial Blood HCO3


 


 29 mmol/L


(21-28) 





 


Arterial Blood Base Excess


 


 4 mmol/L


(-3-3) 





 


FiO2  55  


 


Prothrombin Time


 


 


 14.6 SEC


(11.7-14.0)


 


Prothromb Time International


Ratio 


 


 1.2 (0.8-1.1) 














Comment


Review of Relevant


I have reviewed the following items jabier (where applicable) has been applied.


Labs





Laboratory Tests








Test


 1/17/21


05:00 1/17/21


08:00 1/18/21


05:40 1/18/21


07:50


 


White Blood Count


 6.1 x10^3/uL


(4.0-11.0) 


 


 





 


Red Blood Count


 2.61 x10^6/uL


(4.30-5.70) 


 


 





 


Hemoglobin


 8.2 g/dL


(13.0-17.5) 


 


 





 


Hematocrit


 25.0 %


(39.0-53.0) 


 


 





 


Mean Corpuscular Volume 96 fL ()    


 


Mean Corpuscular Hemoglobin 31 pg (25-35)    


 


Mean Corpuscular Hemoglobin


Concent 33 g/dL


(31-37) 


 


 





 


Red Cell Distribution Width


 16.0 %


(11.5-14.5) 


 


 





 


Platelet Count


 287 x10^3/uL


(140-400) 


 


 





 


Neutrophils (%) (Auto) 66 % (31-73)    


 


Lymphocytes (%) (Auto) 18 % (24-48)    


 


Monocytes (%) (Auto) 10 % (0-9)    


 


Eosinophils (%) (Auto) 6 % (0-3)    


 


Basophils (%) (Auto) 1 % (0-3)    


 


Neutrophils # (Auto)


 4.0 x10^3/uL


(1.8-7.7) 


 


 





 


Lymphocytes # (Auto)


 1.1 x10^3/uL


(1.0-4.8) 


 


 





 


Monocytes # (Auto)


 0.6 x10^3/uL


(0.0-1.1) 


 


 





 


Eosinophils # (Auto)


 0.3 x10^3/uL


(0.0-0.7) 


 


 





 


Basophils # (Auto)


 0.1 x10^3/uL


(0.0-0.2) 


 


 





 


Sodium Level


 143 mmol/L


(136-145) 


 


 





 


Potassium Level


 3.5 mmol/L


(3.5-5.1) 


 


 





 


Chloride Level


 105 mmol/L


() 


 


 





 


Carbon Dioxide Level


 32 mmol/L


(21-32) 


 


 





 


Anion Gap 6 (6-14)    


 


Blood Urea Nitrogen


 23 mg/dL


(8-26) 


 


 





 


Creatinine


 0.6 mg/dL


(0.7-1.3) 


 


 





 


Estimated GFR


(Cockcroft-Gault) 134.8 


 


 


 





 


BUN/Creatinine Ratio 38 (6-20)    


 


Glucose Level


 112 mg/dL


(70-99) 


 


 





 


Calcium Level


 8.0 mg/dL


(8.5-10.1) 


 


 





 


Total Bilirubin


 0.3 mg/dL


(0.2-1.0) 


 


 





 


Aspartate Amino Transf


(AST/SGOT) 56 U/L (15-37) 


 


 


 





 


Alanine Aminotransferase


(ALT/SGPT) 69 U/L (16-63) 


 


 


 





 


Alkaline Phosphatase


 67 U/L


() 


 


 





 


Total Protein


 5.7 g/dL


(6.4-8.2) 


 


 





 


Albumin


 1.7 g/dL


(3.4-5.0) 


 


 





 


Albumin/Globulin Ratio 0.4 (1.0-1.7)    


 


O2 Saturation  97 % (92-99)   96 % (92-99) 


 


Arterial Blood pH


 


 7.44


(7.35-7.45) 


 7.41


(7.35-7.45)


 


Arterial Blood pCO2 at


Patient Temp 


 48 mmHg


(35-46) 


 47 mmHg


(35-46)


 


Arterial Blood pO2 at Patient


Temp 


 110 mmHg


() 


 88 mmHg


()


 


Arterial Blood HCO3


 


 32 mmol/L


(21-28) 


 29 mmol/L


(21-28)


 


Arterial Blood Base Excess


 


 7 mmol/L


(-3-3) 


 4 mmol/L


(-3-3)


 


FiO2  60%+7   55 


 


Triglycerides Level


 


 


 119 mg/dL


(0-150) 





 


Test


 1/18/21


13:45 


 


 





 


Prothrombin Time


 14.6 SEC


(11.7-14.0) 


 


 





 


Prothromb Time International


Ratio 1.2 (0.8-1.1) 


 


 


 











Laboratory Tests








Test


 1/18/21


05:40 1/18/21


07:50 1/18/21


13:45


 


Triglycerides Level


 119 mg/dL


(0-150) 


 





 


O2 Saturation  96 % (92-99)  


 


Arterial Blood pH


 


 7.41


(7.35-7.45) 





 


Arterial Blood pCO2 at


Patient Temp 


 47 mmHg


(35-46) 





 


Arterial Blood pO2 at Patient


Temp 


 88 mmHg


() 





 


Arterial Blood HCO3


 


 29 mmol/L


(21-28) 





 


Arterial Blood Base Excess


 


 4 mmol/L


(-3-3) 





 


FiO2  55  


 


Prothrombin Time


 


 


 14.6 SEC


(11.7-14.0)


 


Prothromb Time International


Ratio 


 


 1.2 (0.8-1.1) 











Microbiology


1/2/21 Blood Culture - Final, Complete


         NO GROWTH AFTER 5 DAYS


Medications





Current Medications


Sodium Chloride (Normal Saline Flush) 3 ml QSHIFT  PRN IV AFTER MEDS AND BLOOD 

DRAWS;  Start 12/23/20 at 07:45


Sodium Chloride 1,000 ml @  150 mls/hr Q6H40M IV  Last administered on 

12/23/20at 15:10;  Start 12/23/20 at 08:15;  Stop 12/23/20 at 21:34;  Status DC


Ondansetron HCl (Zofran) 4 mg PRN Q4HRS  PRN IV NAUSEA/VOMITING;  Start 12/23/20

at 08:15


Acetaminophen (Tylenol) 650 mg PRN Q4HRS  PRN PO TEMP OVER 100.4F OR MILD PAIN 

Last administered on 12/31/20at 01:02;  Start 12/23/20 at 08:15


Docusate Sodium (Colace) 100 mg PRN BID  PRN PO HARD STOOLS Last administered on

12/25/20at 17:03;  Start 12/23/20 at 08:15


Ceftriaxone Sodium (Rocephin) 1 gm Q24H IVP  Last administered on 1/3/21at 

08:14;  Start 12/24/20 at 09:00;  Stop 1/3/21 at 13:15;  Status DC


Dexamethasone Sodium Phosphate (Decadron) 4 mg DAILY IVP  Last administered on 

1/3/21at 08:14;  Start 12/24/20 at 09:00;  Stop 1/4/21 at 08:39;  Status DC


Amino Acids/ Glycerin/ Electrolytes 1,000 ml @  80 mls/hr [id number] IV  Last 

administered on 1/4/21at 14:53;  Start 12/23/20 at 08:45;  Stop 1/5/21 at 05:03;

 Status DC


Enoxaparin Sodium (Lovenox 40mg Syringe) 40 mg Q12HR SQ  Last administered on 

1/18/21at 08:34;  Start 12/23/20 at 09:00


Zinc Sulfate (Orazinc) 220 mg DAILY PO  Last administered on 1/18/21at 08:34;  

Start 12/23/20 at 09:00


Guaifenesin (Robitussin Dm) 10 ml PRN Q6HRS  PRN PO COUGH-2ND CHOICE Last 

administered on 1/1/21at 15:37;  Start 12/23/20 at 08:45


Acetaminophen (Tylenol Supp) 650 mg PRN Q6HRS  PRN [place] MILD PAIN / TEMP > 

100.3'F;  Start 12/23/20 at 08:45


Morphine Sulfate (Morphine Sulfate) 2 mg PRN Q4HRS  PRN IV PAIN Last 

administered on 1/1/21at 16:12;  Start 12/23/20 at 08:45;  Stop 1/2/21 at 01:41;

 Status DC


Azithromycin 500 mg/Sodium Chloride 250 ml @  250 mls/hr 1X  ONCE IV  Last 

administered on 12/23/20at 09:59;  Start 12/23/20 at 10:00;  Stop 12/23/20 at 

10:59;  Status DC


Azithromycin 250 mg/Sodium Chloride 250 ml @  250 mls/hr Q24H IV  Last 

administered on 12/27/20at 08:55;  Start 12/24/20 at 09:00;  Stop 12/27/20 at 

09:59;  Status DC


Cetirizine HCl (ZyrTEC) 10 mg DAILY PO  Last administered on 1/18/21at 08:34;  

Start 12/24/20 at 09:00


Morphine Sulfate (Ms Contin) 15 mg BID PO  Last administered on 1/5/21at 21:11; 

Start 12/23/20 at 21:00;  Stop 1/6/21 at 14:43;  Status DC


Cyanocobalamin (Vitamin B-12) 1,000 mcg DAILY PO  Last administered on 1/18/21at

08:34;  Start 12/24/20 at 09:00


Citalopram Hydrobromide (CeleXA) 20 mg DAILY PO  Last administered on 1/18/21at 

08:35;  Start 12/24/20 at 09:00


Atorvastatin Calcium (Lipitor) 40 mg QHS PO  Last administered on 1/17/21at 

21:55;  Start 12/23/20 at 21:00


Sulfasalazine (Azulfidine) 1,000 mg BID PO  Last administered on 1/18/21at 

08:34;  Start 12/23/20 at 21:00


Tramadol HCl (Ultram) 100 mg PRN Q8HRS  PRN PO MODERATE PAIN, SEVERE PAIN Last 

administered on 1/1/21at 13:35;  Start 12/23/20 at 15:00


Zolpidem Tartrate (Ambien) 5 mg PRN QHS  PRN PO INSOMNIA Last administered on 

1/1/21at 22:05;  Start 12/23/20 at 15:00


Info (Icu Electrolyte Protocol) 1 ea CONT PRN  PRN MC PER PROTOCOL;  Start 12/ 24/20 at 14:15


Potassium Chloride (Klor-Con) 40 meq 1X  ONCE PO  Last administered on 

12/24/20at 14:24;  Start 12/24/20 at 14:15;  Stop 12/24/20 at 14:16;  Status DC


Sterile Water (WATER for RESP) 1,000 ml CONT  PRN INH VIA VAPOTHERM DEVICE Last 

administered on 1/1/21at 15:49;  Start 12/26/20 at 09:30


Benzonatate (Tessalon Perle) 100 mg [id number] PO  Last administered on 1/1/21at 

20:51;  Start 12/26/20 at 14:00;  Stop 1/2/21 at 10:35;  Status DC


Labetalol HCl (Normodyne Iv Push) 10 mg PRN Q2HR  PRN IVP HYPERTENSION Last 

administered on 1/16/21at 19:27;  Start 12/26/20 at 18:30


Quetiapine Fumarate (SEROquel) 50 mg PRN QHS  PRN PO sleep 2ND CHOICE Last 

administered on 1/1/21at 00:24;  Start 12/27/20 at 21:00


Enalaprilat (Vasotec Inj) 2.5 mg PRN Q6HRS  PRN IVP HYPERTENSION-2ND CHOICE Last

administered on 12/28/20at 12:29;  Start 12/27/20 at 10:00


Olanzapine (ZyPREXA ZYDIS) 5 mg PRN BID  PRN PO ANXIETY / AGITATION Last 

administered on 1/11/21at 09:56;  Start 12/27/20 at 12:30


Guaifenesin (Robitussin) 400 mg PRN Q4HRS  PRN PO COUGH;  Start 12/27/20 at 

22:15;  Stop 12/27/20 at 22:27;  Status DC


Guaifenesin (Robitussin) 400 mg PRN Q4HRS  PRN PO COUGH Last administered on 

1/1/21at 23:00;  Start 12/27/20 at 22:30


Furosemide (Lasix) 40 mg 1X  ONCE IVP  Last administered on 12/30/20at 10:15;  

Start 12/30/20 at 10:00;  Stop 12/30/20 at 10:01;  Status DC


Sodium Chloride 1,000 ml @  100 mls/hr Q10H IV  Last administered on 1/7/21at 

00:03;  Start 12/31/20 at 10:15;  Stop 1/7/21 at 13:14;  Status DC


Furosemide (Lasix) 20 mg 1X  ONCE IVP  Last administered on 1/1/21at 13:34;  

Start 1/1/21 at 12:30;  Stop 1/1/21 at 12:31;  Status DC


Alteplase, Recombinant (Cathflo For Central Catheter Clearance) 1 mg 1X  ONCE 

INT CAT  Last administered on 1/2/21at 02:10;  Start 1/1/21 at 16:15;  Stop 

1/1/21 at 16:16;  Status DC


Fentanyl Citrate 30 ml @ 0 mls/hr CONT  PRN IV SEE PROTOCOL Last administered on

1/2/21at 01:58;  Start 1/2/21 at 01:15;  Stop 1/2/21 at 02:36;  Status DC


Propofol 100 ml @ 0 mls/hr CONT  PRN IV PER PROTOCOL Last administered on 

1/16/21at 23:12;  Start 1/2/21 at 01:15


Chlorhexidine Gluconate (Peridex) 15 ml BID MM  Last administered on 1/9/21at 

09:21;  Start 1/2/21 at 09:00;  Stop 1/9/21 at 19:26;  Status DC


Midazolam HCl 100 ml @ 0 mls/hr CONT  PRN IV SEE PROTOCOL Last administered on 

1/18/21at 07:30;  Start 1/2/21 at 01:15


Succinylcholine Chloride (Anectine) 200 mg STK-MED ONCE .ROUTE ;  Start 1/2/21 

at 01:21;  Stop 1/2/21 at 01:21;  Status DC


Fentanyl Citrate 55 ml @ 0 mls/hr CONT PRN  PRN IV PAIN CONTROL Last 

administered on 1/17/21at 19:28;  Start 1/2/21 at 02:45


Norepinephrine Bitartrate 8 mg/ Dextrose 258 ml @  19.737 mls/ hr CONT  PRN IV 

PER PROTOCOL Last administered on 1/2/21at 22:27;  Start 1/2/21 at 07:30;  Stop 

1/5/21 at 08:55;  Status DC


Vecuronium Bromide (Norcuron Bolus) 6 mg PRN Q6HRS  PRN IV SEDATION Last 

administered on 1/9/21at 17:07;  Start 1/2/21 at 09:15


Vancomycin HCl (Vanco Per Pharmacy) 1 each PRN DAILY  PRN MC SEE COMMENTS Last 

administered on 1/2/21at 20:47;  Start 1/2/21 at 16:00;  Stop 1/3/21 at 13:19;  

Status DC


Vancomycin HCl 2 gm/Sodium Chloride 500 ml @  250 mls/hr 1X  ONCE IV  Last 

administered on 1/2/21at 17:43;  Start 1/2/21 at 17:00;  Stop 1/2/21 at 18:59;  

Status DC


Vancomycin HCl 1.5 gm/Sodium Chloride 500 ml @  250 mls/hr Q12H IV  Last admin

istered on 1/3/21at 05:53;  Start 1/3/21 at 05:00;  Stop 1/3/21 at 13:15;  

Status DC


Vancomycin HCl (Vancomycin Trough Level) 1 each 1X  ONCE MC ;  Start 1/4/21 at 

04:30;  Stop 1/3/21 at 13:20;  Status DC


Cefepime HCl (Maxipime) 2 gm Q12HR IVP  Last administered on 1/12/21at 20:52;  

Start 1/3/21 at 14:00;  Stop 1/13/21 at 07:59;  Status DC


Linezolid (Zyvox) 600 mg BID PO  Last administered on 1/6/21at 20:38;  Start 

1/3/21 at 21:00;  Stop 1/7/21 at 07:58;  Status DC


Dexamethasone Sodium Phosphate (Decadron) 6 mg DAILY IVP  Last administered on 

1/5/21at 08:36;  Start 1/4/21 at 09:00;  Stop 1/5/21 at 08:55;  Status DC


Polyethylene Glycol (miraLAX PACKET) 17 gm DAILY PO  Last administered on 

1/15/21at 08:40;  Start 1/6/21 at 14:30;  Stop 1/17/21 at 12:21;  Status DC


Nystatin (Nystop) 1 lindsay BID TP  Last administered on 1/18/21at 08:36;  Start 

1/6/21 at 16:00


Sodium Chloride 1,000 ml @  0 mls/hr Q0M IV ;  Start 1/7/21 at 13:15


Famotidine (Pepcid Vial) 20 mg BID IVP  Last administered on 1/18/21at 08:35;  

Start 1/8/21 at 21:00


Furosemide (Lasix) 40 mg 1X  ONCE IVP  Last administered on 1/12/21at 11:18;  

Start 1/12/21 at 11:15;  Stop 1/12/21 at 11:16;  Status DC


Multivitamins/ Minerals Therapeutic (Centrum Multivit-Mineral Liq) 5 ml DAILY 

PEG  Last administered on 1/18/21at 08:34;  Start 1/15/21 at 09:00


Dexmedetomidine HCl 400 mcg/ Sodium Chloride 100 ml @ 0 mls/hr CONT  PRN IV PER 

PROTOCOL Last administered on 1/18/21at 13:29;  Start 1/17/21 at 07:15


Atropine Sulfate (ATROPINE 0.5mg SYRINGE) 0.5 mg PRN Q5MIN  PRN IV SEE COMMENTS;

 Start 1/17/21 at 07:15





Active Scripts


Active


Reported


Morphine Sulfate Er (Morphine Sulfate) 30 Mg Tablet.er 1 Tab PO BID


Tramadol Hcl 100 Mg Tbmp.24hr 100 Mg PO PRN Q8HRS PRN


Ambien (Zolpidem Tartrate) 10 Mg Tablet 10 Mg PO PRN QHS PRN


Methotrexate (Methotrexate Sodium) 2.5 Mg Tablet 10 Tab PO WEEKLY


Lisinopril 10 Mg Tablet 1 Tab PO DAILY


Crestor (Rosuvastatin Calcium) 5 Mg Tablet 2 Tab PO DAILY


Hydrochlorothiazide Tablet (Hydrochlorothiazide) 50 Mg Tablet 25 Mg PO DAILY


Escitalopram Oxalate 10 Mg Tablet 1 Tab PO DAILY


Sulfasalazine Dr (Sulfasalazine) 500 Mg Tablet.dr 2 Tab PO TID 30 Days


Colace (Docusate Sodium) 100 Mg Capsule 1 Cap PO DA 30 Days


Acetaminophen Ext.release (Acetaminophen) 650 Mg Tablet.er 650 Mg PO PRN Q8HRS 

PRN


Melatonin 5 Mg Capsule 1 Cap PO QHS 30 Days


B-12 (Cyanocobalamin (Vitamin B-12)) 500 Mcg Tablet 2 Tab PO DAILY 30 Days


Vitamin D3 (Cholecalciferol (Vitamin D3)) 50 Mcg Capsule 50 Mcg PO DAILY


Aller-Sung (Cetirizine Hcl) 10 Mg Tablet 1 Tab PO DAILY 30 Days


Vitals/I & O





Vital Sign - Last 24 Hours








 1/17/21 1/17/21 1/17/21 1/17/21





 17:00 18:00 19:00 19:28


 


Temp   98.3 





   98.3 


 


Pulse 48 50 48 


 


Resp 28 28 27 


 


B/P (MAP) 98/64 (75) 105/66 (79) 105/63 (77) 


 


Pulse Ox 98 99 99 99


 


O2 Delivery Ventilator Ventilator Ventilator 


 


O2 Flow Rate    40.0


 


    





    





 1/17/21 1/17/21 1/17/21 1/17/21





 20:00 20:00 21:00 21:00


 


Pulse  48 48 


 


Resp  27 27 


 


B/P (MAP)  104/71 (82) 105/69 (81) 


 


Pulse Ox  99 98 98


 


O2 Delivery Mechanical Ventilator Ventilator Ventilator Ventilator





 1/17/21 1/17/21 1/17/21 1/17/21





 21:56 22:00 23:00 23:59


 


Pulse  48 52 


 


Resp  27 26 


 


B/P (MAP)  98/64 (75) 102/68 (79) 


 


Pulse Ox 99 98 99 


 


O2 Delivery  Ventilator Ventilator Mechanical Ventilator


 


O2 Flow Rate 40.0   





 1/18/21 1/18/21 1/18/21 1/18/21





 00:00 00:05 01:00 02:00


 


Temp 98.3   





 98.3   


 


Pulse 55  48 48


 


Resp 26 26 28


 


B/P (MAP) 102/72 (82)  100/61 (74) 97/61 (73)


 


Pulse Ox 98 98 99 99


 


O2 Delivery Ventilator Ventilator Ventilator Ventilator


 


    





    





 1/18/21 1/18/21 1/18/21 1/18/21





 03:00 03:49 04:00 04:00


 


Temp    98.4





    98.4


 


Pulse 55   49


 


Resp 28   28


 


B/P (MAP) 98/62 (74)   101/55 (70)


 


Pulse Ox 99 99  99


 


O2 Delivery Ventilator Ventilator Mechanical Ventilator Ventilator


 


    





    





 1/18/21 1/18/21 1/18/21 1/18/21





 05:00 06:05 07:00 07:50


 


Pulse 49 56 49 


 


Resp 28 28 28 


 


B/P (MAP) 101/60 (74) 96/58 (71) 96/60 (72) 


 


Pulse Ox 99 99 100 99


 


O2 Delivery Ventilator Ventilator Ventilator Ventilator





 1/18/21 1/18/21 1/18/21 1/18/21





 08:00 08:00 09:00 10:00


 


Temp 98.8   





 98.8   


 


Pulse 50  50 50


 


Resp 28  28 28


 


B/P (MAP) 95/57 (70)  100/61 (74) 99/63 (75)


 


Pulse Ox 100  99 98


 


O2 Delivery Ventilator Mechanical Ventilator Ventilator Ventilator


 


    





    





 1/18/21 1/18/21 1/18/21 1/18/21





 11:00 11:54 12:00 12:00


 


Temp    99.2





    99.2


 


Pulse 51   51


 


Resp 28   28


 


B/P (MAP) 102/62 (75)   103/62 (76)


 


Pulse Ox 100 98  99


 


O2 Delivery Ventilator Ventilator Mechanical Ventilator Ventilator


 


    





    





 1/18/21 1/18/21 1/18/21 1/18/21





 13:00 14:00 15:00 15:43


 


Pulse 59 64 61 


 


Resp 28 28 28 


 


B/P (MAP) 107/65 (79) 112/68 (83) 110/65 (80) 


 


Pulse Ox 100 97 97 99


 


O2 Delivery Ventilator Ventilator Ventilator Ventilator














Intake and Output   


 


 1/17/21 1/17/21 1/18/21





 15:00 23:00 07:00


 


Intake Total 300 ml 300 ml 1371 ml


 


Output Total 270 ml 325 ml 110 ml


 


Balance 30 ml -25 ml 1261 ml











Justicifation of Admission Dx:


Justifications for Admission:


Justification of Admission Dx:  Yes


Comminuty Aquired Pneumonia:  Med-High Risk Pt











ANNE SHEPARD MD                 Jan 18, 2021 16:05

## 2021-01-19 VITALS — SYSTOLIC BLOOD PRESSURE: 135 MMHG | DIASTOLIC BLOOD PRESSURE: 85 MMHG

## 2021-01-19 VITALS — SYSTOLIC BLOOD PRESSURE: 121 MMHG | DIASTOLIC BLOOD PRESSURE: 71 MMHG

## 2021-01-19 VITALS — DIASTOLIC BLOOD PRESSURE: 50 MMHG | SYSTOLIC BLOOD PRESSURE: 91 MMHG

## 2021-01-19 VITALS — SYSTOLIC BLOOD PRESSURE: 130 MMHG | DIASTOLIC BLOOD PRESSURE: 74 MMHG

## 2021-01-19 VITALS — DIASTOLIC BLOOD PRESSURE: 59 MMHG | SYSTOLIC BLOOD PRESSURE: 96 MMHG

## 2021-01-19 VITALS — SYSTOLIC BLOOD PRESSURE: 109 MMHG | DIASTOLIC BLOOD PRESSURE: 62 MMHG

## 2021-01-19 VITALS — DIASTOLIC BLOOD PRESSURE: 86 MMHG | SYSTOLIC BLOOD PRESSURE: 135 MMHG

## 2021-01-19 VITALS — SYSTOLIC BLOOD PRESSURE: 91 MMHG | DIASTOLIC BLOOD PRESSURE: 55 MMHG

## 2021-01-19 VITALS — DIASTOLIC BLOOD PRESSURE: 76 MMHG | SYSTOLIC BLOOD PRESSURE: 131 MMHG

## 2021-01-19 VITALS — DIASTOLIC BLOOD PRESSURE: 64 MMHG | SYSTOLIC BLOOD PRESSURE: 95 MMHG

## 2021-01-19 VITALS — DIASTOLIC BLOOD PRESSURE: 61 MMHG | SYSTOLIC BLOOD PRESSURE: 99 MMHG

## 2021-01-19 VITALS — DIASTOLIC BLOOD PRESSURE: 48 MMHG | SYSTOLIC BLOOD PRESSURE: 88 MMHG

## 2021-01-19 VITALS — SYSTOLIC BLOOD PRESSURE: 97 MMHG | DIASTOLIC BLOOD PRESSURE: 63 MMHG

## 2021-01-19 VITALS — SYSTOLIC BLOOD PRESSURE: 108 MMHG | DIASTOLIC BLOOD PRESSURE: 64 MMHG

## 2021-01-19 VITALS — DIASTOLIC BLOOD PRESSURE: 54 MMHG | SYSTOLIC BLOOD PRESSURE: 98 MMHG

## 2021-01-19 VITALS — DIASTOLIC BLOOD PRESSURE: 60 MMHG | SYSTOLIC BLOOD PRESSURE: 116 MMHG

## 2021-01-19 VITALS — SYSTOLIC BLOOD PRESSURE: 125 MMHG | DIASTOLIC BLOOD PRESSURE: 77 MMHG

## 2021-01-19 VITALS — DIASTOLIC BLOOD PRESSURE: 64 MMHG | SYSTOLIC BLOOD PRESSURE: 103 MMHG

## 2021-01-19 VITALS — DIASTOLIC BLOOD PRESSURE: 66 MMHG | SYSTOLIC BLOOD PRESSURE: 102 MMHG

## 2021-01-19 VITALS — DIASTOLIC BLOOD PRESSURE: 66 MMHG | SYSTOLIC BLOOD PRESSURE: 106 MMHG

## 2021-01-19 VITALS — SYSTOLIC BLOOD PRESSURE: 100 MMHG | DIASTOLIC BLOOD PRESSURE: 56 MMHG

## 2021-01-19 VITALS — DIASTOLIC BLOOD PRESSURE: 75 MMHG | SYSTOLIC BLOOD PRESSURE: 131 MMHG

## 2021-01-19 VITALS — SYSTOLIC BLOOD PRESSURE: 96 MMHG | DIASTOLIC BLOOD PRESSURE: 67 MMHG

## 2021-01-19 VITALS — DIASTOLIC BLOOD PRESSURE: 60 MMHG | SYSTOLIC BLOOD PRESSURE: 98 MMHG

## 2021-01-19 LAB
ALBUMIN SERPL-MCNC: 1.7 G/DL (ref 3.4–5)
ALBUMIN/GLOB SERPL: 0.4 {RATIO} (ref 1–1.7)
ALP SERPL-CCNC: 70 U/L (ref 46–116)
ALT SERPL-CCNC: 63 U/L (ref 16–63)
ANION GAP SERPL CALC-SCNC: 5 MMOL/L (ref 6–14)
AST SERPL-CCNC: 40 U/L (ref 15–37)
BASE EXCESS ABG: 8 MMOL/L (ref -3–3)
BASOPHILS # BLD AUTO: 0.1 X10^3/UL (ref 0–0.2)
BASOPHILS NFR BLD: 1 % (ref 0–3)
BILIRUB SERPL-MCNC: 0.3 MG/DL (ref 0.2–1)
BUN SERPL-MCNC: 23 MG/DL (ref 8–26)
BUN/CREAT SERPL: 38 (ref 6–20)
CALCIUM SERPL-MCNC: 8.6 MG/DL (ref 8.5–10.1)
CHLORIDE SERPL-SCNC: 106 MMOL/L (ref 98–107)
CO2 SERPL-SCNC: 32 MMOL/L (ref 21–32)
CREAT SERPL-MCNC: 0.6 MG/DL (ref 0.7–1.3)
EOSINOPHIL NFR BLD: 0.2 X10^3/UL (ref 0–0.7)
EOSINOPHIL NFR BLD: 3 % (ref 0–3)
ERYTHROCYTE [DISTWIDTH] IN BLOOD BY AUTOMATED COUNT: 16.1 % (ref 11.5–14.5)
GFR SERPLBLD BASED ON 1.73 SQ M-ARVRAT: 134.8 ML/MIN
GLUCOSE SERPL-MCNC: 93 MG/DL (ref 70–99)
HCO3 BLDA-SCNC: 33 MMOL/L (ref 21–28)
HCT VFR BLD CALC: 25 % (ref 39–53)
HGB BLD-MCNC: 8.1 G/DL (ref 13–17.5)
INSPIRATION SETTING TIME VENT: (no result)
LYMPHOCYTES # BLD: 1.3 X10^3/UL (ref 1–4.8)
LYMPHOCYTES NFR BLD AUTO: 18 % (ref 24–48)
MCH RBC QN AUTO: 31 PG (ref 25–35)
MCHC RBC AUTO-ENTMCNC: 32 G/DL (ref 31–37)
MCV RBC AUTO: 96 FL (ref 79–100)
MONO #: 0.7 X10^3/UL (ref 0–1.1)
MONOCYTES NFR BLD: 10 % (ref 0–9)
NEUT #: 4.9 X10^3/UL (ref 1.8–7.7)
NEUTROPHILS NFR BLD AUTO: 69 % (ref 31–73)
PCO2 BLDA: 49 MMHG (ref 35–46)
PLATELET # BLD AUTO: 282 X10^3/UL (ref 140–400)
PO2 BLDA: 139 MMHG (ref 65–108)
POTASSIUM SERPL-SCNC: 3.9 MMOL/L (ref 3.5–5.1)
PROT SERPL-MCNC: 5.9 G/DL (ref 6.4–8.2)
RBC # BLD AUTO: 2.6 X10^6/UL (ref 4.3–5.7)
SAO2 % BLDA: 99 % (ref 92–99)
SODIUM SERPL-SCNC: 143 MMOL/L (ref 136–145)
WBC # BLD AUTO: 7.2 X10^3/UL (ref 4–11)

## 2021-01-19 PROCEDURE — 0B110F4 BYPASS TRACHEA TO CUTANEOUS WITH TRACHEOSTOMY DEVICE, OPEN APPROACH: ICD-10-PCS | Performed by: SURGERY

## 2021-01-19 RX ADMIN — ENOXAPARIN SODIUM SCH MG: 40 INJECTION SUBCUTANEOUS at 21:00

## 2021-01-19 RX ADMIN — FAMOTIDINE SCH MG: 10 INJECTION, SOLUTION INTRAVENOUS at 09:24

## 2021-01-19 RX ADMIN — DEXMEDETOMIDINE HYDROCHLORIDE PRN MLS/HR: 100 INJECTION, SOLUTION, CONCENTRATE INTRAVENOUS at 20:27

## 2021-01-19 RX ADMIN — Medication PRN MLS/HR: at 12:19

## 2021-01-19 RX ADMIN — ENOXAPARIN SODIUM SCH MG: 40 INJECTION SUBCUTANEOUS at 09:23

## 2021-01-19 RX ADMIN — NYSTATIN SCH APP: 100000 POWDER TOPICAL at 10:42

## 2021-01-19 RX ADMIN — DEXMEDETOMIDINE HYDROCHLORIDE PRN MLS/HR: 100 INJECTION, SOLUTION, CONCENTRATE INTRAVENOUS at 16:10

## 2021-01-19 RX ADMIN — ENOXAPARIN SODIUM SCH MG: 40 INJECTION SUBCUTANEOUS at 09:00

## 2021-01-19 RX ADMIN — NYSTATIN SCH APP: 100000 POWDER TOPICAL at 21:00

## 2021-01-19 RX ADMIN — DEXMEDETOMIDINE HYDROCHLORIDE PRN MLS/HR: 100 INJECTION, SOLUTION, CONCENTRATE INTRAVENOUS at 09:24

## 2021-01-19 RX ADMIN — DEXMEDETOMIDINE HYDROCHLORIDE PRN MLS/HR: 100 INJECTION, SOLUTION, CONCENTRATE INTRAVENOUS at 05:20

## 2021-01-19 RX ADMIN — FAMOTIDINE SCH MG: 10 INJECTION, SOLUTION INTRAVENOUS at 21:27

## 2021-01-19 RX ADMIN — MIDAZOLAM PRN MLS/HR: 5 INJECTION, SOLUTION INTRAMUSCULAR; INTRAVENOUS at 10:43

## 2021-01-19 RX ADMIN — MULTIVITAMIN SCH ML: LIQUID ORAL at 09:23

## 2021-01-19 RX ADMIN — ENOXAPARIN SODIUM SCH MG: 40 INJECTION SUBCUTANEOUS at 21:27

## 2021-01-19 RX ADMIN — CITALOPRAM HYDROBROMIDE SCH MG: 20 TABLET ORAL at 09:24

## 2021-01-19 RX ADMIN — ZINC SULFATE CAP 220 MG (50 MG ELEMENTAL ZN) SCH MG: 220 (50 ZN) CAP at 09:23

## 2021-01-19 RX ADMIN — CETIRIZINE HYDROCHLORIDE SCH MG: 10 TABLET, FILM COATED ORAL at 09:24

## 2021-01-19 RX ADMIN — MIDAZOLAM PRN MLS/HR: 5 INJECTION, SOLUTION INTRAMUSCULAR; INTRAVENOUS at 19:04

## 2021-01-19 RX ADMIN — ATORVASTATIN CALCIUM SCH MG: 40 TABLET, FILM COATED ORAL at 21:27

## 2021-01-19 RX ADMIN — CYANOCOBALAMIN TAB 1000 MCG SCH MCG: 1000 TAB at 09:24

## 2021-01-19 NOTE — PDOC
PULMONARY PROGRESS NOTES


DATE: 1/19/21 


TIME: 09:37


Subjective


Patient remains on ventilatory support, FiO2 50% and a PEEP of 5,


Afebrile overnight


Tolerating tube feeding well, planned for trach today 


No overnight concerns from nursing


Vitals





Vital Signs








  Date Time  Temp Pulse Resp B/P (MAP) Pulse Ox O2 Delivery O2 Flow Rate FiO2


 


1/19/21 07:00  52 28 96/59 (71) 100 Ventilator  


 


1/19/21 04:00 98.4       





 98.4       








Comments


Intubated/sedated


Lungs:  Clear


Cardiovascular:  S1, S2


Abdomen:  Soft


Extremities:  No Edema


Skin:  Warm, Dry


Labs





Laboratory Tests








Test


 1/18/21


05:40 1/18/21


07:50 1/18/21


13:45 1/19/21


06:00


 


Triglycerides Level


 119 mg/dL


(0-150) 


 


 





 


O2 Saturation  96 % (92-99)   


 


Arterial Blood pH


 


 7.41


(7.35-7.45) 


 





 


Arterial Blood pCO2 at


Patient Temp 


 47 mmHg


(35-46) 


 





 


Arterial Blood pO2 at Patient


Temp 


 88 mmHg


() 


 





 


Arterial Blood HCO3


 


 29 mmol/L


(21-28) 


 





 


Arterial Blood Base Excess


 


 4 mmol/L


(-3-3) 


 





 


FiO2  55   


 


Prothrombin Time


 


 


 14.6 SEC


(11.7-14.0) 





 


Prothromb Time International


Ratio 


 


 1.2 (0.8-1.1) 


 





 


White Blood Count


 


 


 


 7.2 x10^3/uL


(4.0-11.0)


 


Red Blood Count


 


 


 


 2.60 x10^6/uL


(4.30-5.70)


 


Hemoglobin


 


 


 


 8.1 g/dL


(13.0-17.5)


 


Hematocrit


 


 


 


 25.0 %


(39.0-53.0)


 


Mean Corpuscular Volume    96 fL () 


 


Mean Corpuscular Hemoglobin    31 pg (25-35) 


 


Mean Corpuscular Hemoglobin


Concent 


 


 


 32 g/dL


(31-37)


 


Red Cell Distribution Width


 


 


 


 16.1 %


(11.5-14.5)


 


Platelet Count


 


 


 


 282 x10^3/uL


(140-400)


 


Neutrophils (%) (Auto)    69 % (31-73) 


 


Lymphocytes (%) (Auto)    18 % (24-48) 


 


Monocytes (%) (Auto)    10 % (0-9) 


 


Eosinophils (%) (Auto)    3 % (0-3) 


 


Basophils (%) (Auto)    1 % (0-3) 


 


Neutrophils # (Auto)


 


 


 


 4.9 x10^3/uL


(1.8-7.7)


 


Lymphocytes # (Auto)


 


 


 


 1.3 x10^3/uL


(1.0-4.8)


 


Monocytes # (Auto)


 


 


 


 0.7 x10^3/uL


(0.0-1.1)


 


Eosinophils # (Auto)


 


 


 


 0.2 x10^3/uL


(0.0-0.7)


 


Basophils # (Auto)


 


 


 


 0.1 x10^3/uL


(0.0-0.2)


 


Sodium Level


 


 


 


 143 mmol/L


(136-145)


 


Potassium Level


 


 


 


 3.9 mmol/L


(3.5-5.1)


 


Chloride Level


 


 


 


 106 mmol/L


()


 


Carbon Dioxide Level


 


 


 


 32 mmol/L


(21-32)


 


Anion Gap    5 (6-14) 


 


Blood Urea Nitrogen


 


 


 


 23 mg/dL


(8-26)


 


Creatinine


 


 


 


 0.6 mg/dL


(0.7-1.3)


 


Estimated GFR


(Cockcroft-Gault) 


 


 


 134.8 





 


BUN/Creatinine Ratio    38 (6-20) 


 


Glucose Level


 


 


 


 93 mg/dL


(70-99)


 


Calcium Level


 


 


 


 8.6 mg/dL


(8.5-10.1)


 


Total Bilirubin


 


 


 


 0.3 mg/dL


(0.2-1.0)


 


Aspartate Amino Transf


(AST/SGOT) 


 


 


 40 U/L (15-37) 





 


Alanine Aminotransferase


(ALT/SGPT) 


 


 


 63 U/L (16-63) 





 


Alkaline Phosphatase


 


 


 


 70 U/L


()


 


Total Protein


 


 


 


 5.9 g/dL


(6.4-8.2)


 


Albumin


 


 


 


 1.7 g/dL


(3.4-5.0)


 


Albumin/Globulin Ratio    0.4 (1.0-1.7) 


 


Test


 1/19/21


08:00 


 


 





 


O2 Saturation 99 % (92-99)    


 


Arterial Blood pH


 7.45


(7.35-7.45) 


 


 





 


Arterial Blood pCO2 at


Patient Temp 49 mmHg


(35-46) 


 


 





 


Arterial Blood pO2 at Patient


Temp 139 mmHg


() 


 


 





 


Arterial Blood HCO3


 33 mmol/L


(21-28) 


 


 





 


Arterial Blood Base Excess


 8 mmol/L


(-3-3) 


 


 





 


FiO2 55/vent    








Laboratory Tests








Test


 1/18/21


13:45 1/19/21


06:00 1/19/21


08:00


 


Prothrombin Time


 14.6 SEC


(11.7-14.0) 


 





 


Prothromb Time International


Ratio 1.2 (0.8-1.1) 


 


 





 


White Blood Count


 


 7.2 x10^3/uL


(4.0-11.0) 





 


Red Blood Count


 


 2.60 x10^6/uL


(4.30-5.70) 





 


Hemoglobin


 


 8.1 g/dL


(13.0-17.5) 





 


Hematocrit


 


 25.0 %


(39.0-53.0) 





 


Mean Corpuscular Volume  96 fL ()  


 


Mean Corpuscular Hemoglobin  31 pg (25-35)  


 


Mean Corpuscular Hemoglobin


Concent 


 32 g/dL


(31-37) 





 


Red Cell Distribution Width


 


 16.1 %


(11.5-14.5) 





 


Platelet Count


 


 282 x10^3/uL


(140-400) 





 


Neutrophils (%) (Auto)  69 % (31-73)  


 


Lymphocytes (%) (Auto)  18 % (24-48)  


 


Monocytes (%) (Auto)  10 % (0-9)  


 


Eosinophils (%) (Auto)  3 % (0-3)  


 


Basophils (%) (Auto)  1 % (0-3)  


 


Neutrophils # (Auto)


 


 4.9 x10^3/uL


(1.8-7.7) 





 


Lymphocytes # (Auto)


 


 1.3 x10^3/uL


(1.0-4.8) 





 


Monocytes # (Auto)


 


 0.7 x10^3/uL


(0.0-1.1) 





 


Eosinophils # (Auto)


 


 0.2 x10^3/uL


(0.0-0.7) 





 


Basophils # (Auto)


 


 0.1 x10^3/uL


(0.0-0.2) 





 


Sodium Level


 


 143 mmol/L


(136-145) 





 


Potassium Level


 


 3.9 mmol/L


(3.5-5.1) 





 


Chloride Level


 


 106 mmol/L


() 





 


Carbon Dioxide Level


 


 32 mmol/L


(21-32) 





 


Anion Gap  5 (6-14)  


 


Blood Urea Nitrogen


 


 23 mg/dL


(8-26) 





 


Creatinine


 


 0.6 mg/dL


(0.7-1.3) 





 


Estimated GFR


(Cockcroft-Gault) 


 134.8 


 





 


BUN/Creatinine Ratio  38 (6-20)  


 


Glucose Level


 


 93 mg/dL


(70-99) 





 


Calcium Level


 


 8.6 mg/dL


(8.5-10.1) 





 


Total Bilirubin


 


 0.3 mg/dL


(0.2-1.0) 





 


Aspartate Amino Transf


(AST/SGOT) 


 40 U/L (15-37) 


 





 


Alanine Aminotransferase


(ALT/SGPT) 


 63 U/L (16-63) 


 





 


Alkaline Phosphatase


 


 70 U/L


() 





 


Total Protein


 


 5.9 g/dL


(6.4-8.2) 





 


Albumin


 


 1.7 g/dL


(3.4-5.0) 





 


Albumin/Globulin Ratio  0.4 (1.0-1.7)  


 


O2 Saturation   99 % (92-99) 


 


Arterial Blood pH


 


 


 7.45


(7.35-7.45)


 


Arterial Blood pCO2 at


Patient Temp 


 


 49 mmHg


(35-46)


 


Arterial Blood pO2 at Patient


Temp 


 


 139 mmHg


()


 


Arterial Blood HCO3


 


 


 33 mmol/L


(21-28)


 


Arterial Blood Base Excess


 


 


 8 mmol/L


(-3-3)


 


FiO2   55/vent 








Medications





Active Scripts








 Medications  Dose


 Route/Sig


 Max Daily Dose Days Date Category


 


 Morphine Sulfate


 Er (Morphine


 Sulfate) 30 Mg


 Tablet.er  1 Tab


 PO BID


   12/23/20 Reported


 


 Tramadol Hcl 100


 Mg Tbmp.24hr  100 Mg


 PO PRN Q8HRS PRN


   12/23/20 Reported


 


 Ambien (Zolpidem


 Tartrate) 10 Mg


 Tablet  10 Mg


 PO PRN QHS PRN


   12/23/20 Reported


 


 Methotrexate


  (Methotrexate


 Sodium) 2.5 Mg


 Tablet  10 Tab


 PO WEEKLY


   12/23/20 Reported


 


 Lisinopril 10 Mg


 Tablet  1 Tab


 PO DAILY


   12/23/20 Reported


 


 Crestor


  (Rosuvastatin


 Calcium) 5 Mg


 Tablet  2 Tab


 PO DAILY


   12/23/20 Reported


 


 Hydrochlorothiazide


 Tablet


  (Hydrochlorothiazide)


 50 Mg Tablet  25 Mg


 PO DAILY


   12/23/20 Reported


 


 Escitalopram


 Oxalate 10 Mg


 Tablet  1 Tab


 PO DAILY


   12/23/20 Reported


 


 Sulfasalazine Dr


  (Sulfasalazine)


 500 Mg Tablet.dr  2 Tab


 PO TID


  30 12/23/20 Reported


 


 Colace (Docusate


 Sodium) 100 Mg


 Capsule  1 Cap


 PO DA


  30 12/23/20 Reported


 


 Acetaminophen


 Ext.release


  (Acetaminophen)


 650 Mg Tablet.er  650 Mg


 PO PRN Q8HRS PRN


   12/23/20 Reported


 


 Melatonin 5 Mg


 Capsule  1 Cap


 PO QHS


  30 12/23/20 Reported


 


 B-12


  (Cyanocobalamin


  (Vitamin B-12))


 500 Mcg Tablet  2 Tab


 PO DAILY


  30 12/23/20 Reported


 


 Vitamin D3


  (Cholecalciferol


  (Vitamin D3)) 50


 Mcg Capsule  50 Mcg


 PO DAILY


   12/23/20 Reported


 


 Aller-Sung


  (Cetirizine Hcl)


 10 Mg Tablet  1 Tab


 PO DAILY


  30 12/23/20 Reported








Comments


CXR


reviewed 


1.  ett ok 


2.  Bilateral parenchymal opacities and left pleural effusion are grossly 

stable.





Impression


.


IMPRESSION:


1.  Acute hypoxic respiratory failure secondary to highly suspected COVID-19


pneumonia and acute respiratory distress syndrome/acute lung injury.-- COVID-19 

positive ----improving


2.  Abnormal CT chest with extensive widespread ground glass and alveolar and


interstitial infiltrates with reactive mild mediastinal/hilar adenopathy.  This 

related to COVID-19 pneumonia.


3.  Patient with history of psoriatic arthritis.  He is likely immunocompromised


as he has been on methotrexate.  


4.  History of tongue cancer with resection, details unknown.


5.  History of prostate cancer.


6.  History of Crohn's disease.





Plan


.


RECOMMENDATIONS:


Continue current ventilatory support, FiO2 50% and a PEEP of 5


Follow chest x-ray/ABG---- reduce Fi02 to 45% and reduce rate to 24 


COVID-19 positive-- now recovered 


Patient remains off antibiotics at this time, infectious disease of signed off 

cultures are negative


Patient is completed a full 10-day course of steroids


Patient has completed full course of steroids


Continue TF for nutritional support 


Patient is planned for trach today-- follow surgery recs 


Follow GI recs 


DVT/GI PPX 





D/W RN and RT 





PT. is FULL CODE





Critical care time [id number]











ABI MENDEZ MD                 Jan 19, 2021 09:40

## 2021-01-19 NOTE — PDOC4
OPERATIVE NOTE


Date:


Date:  Jan 19, 2021





Pre-Op Diagnosis:


Respiratory failure





Post-Op Diagnosis:


same





Procedure Performed:


combined open/percutaneous tracheostomy placement (specifically XLT 7 cuffed 

tracheostomy)





Surgeon:


Yogi Odom





Anesthesia Type:


GETA plus local





Blood Loss:


minimal





Specimans Obtained:


none





Findings:


obesity, making procedure difficult





Complications:


none





Operative Note:


After obtaining informed consent, patient was taken to OR, induced under GETA 

and prepped in the usual fashion.  Vertical incision made with cautery.  

Subcutaneous tissue divided with cautery.  Prominent thyroid moved inferiorly.  

3 0 prolene placed in 1st tracheal ring and secured anterior left neck with 

steristrips.  Using blue rhino system used to access tracheostomy and air 

aspirated at 2nd tracheal ring.  Tracheostomy placed under direct vision.  End 

tidal CO2 detected.  Patient successfully oxygenated and ventilated via 

tracheostomy.  Surgicel placed around tracheostomy.  Tracheostomy secured with 3

0 nylon and trach collar.





Patient tolerated procedure well and sent to ICU in stable condition.  All 

counts correct.











ROGELIO ODOM MD             Jan 19, 2021 13:24

## 2021-01-19 NOTE — PDOC
Date of Service:


DATE: 1/19/21 


TIME: 10:05





Objective:


Objective:


D/w nurse - tube feeds held for possible trach today.


Vital Signs:





                                   Vital Signs








  Date Time  Temp Pulse Resp B/P (MAP) Pulse Ox O2 Delivery O2 Flow Rate FiO2


 


1/19/21 09:38      Ventilator  


 


1/19/21 09:29     94   


 


1/19/21 07:00  52 28 96/59 (71)    


 


1/19/21 04:00 98.4       





 98.4       








Labs:





Laboratory Tests








Test


 1/18/21


13:45 1/19/21


06:00 1/19/21


08:00


 


Prothrombin Time 14.6 SEC   


 


Prothromb Time International


Ratio 1.2 


 


 





 


White Blood Count  7.2 x10^3/uL  


 


Red Blood Count  2.60 x10^6/uL  


 


Hemoglobin  8.1 g/dL  


 


Hematocrit  25.0 %  


 


Mean Corpuscular Volume  96 fL  


 


Mean Corpuscular Hemoglobin  31 pg  


 


Mean Corpuscular Hemoglobin


Concent 


 32 g/dL 


 





 


Red Cell Distribution Width  16.1 %  


 


Platelet Count  282 x10^3/uL  


 


Neutrophils (%) (Auto)  69 %  


 


Lymphocytes (%) (Auto)  18 %  


 


Monocytes (%) (Auto)  10 %  


 


Eosinophils (%) (Auto)  3 %  


 


Basophils (%) (Auto)  1 %  


 


Neutrophils # (Auto)  4.9 x10^3/uL  


 


Lymphocytes # (Auto)  1.3 x10^3/uL  


 


Monocytes # (Auto)  0.7 x10^3/uL  


 


Eosinophils # (Auto)  0.2 x10^3/uL  


 


Basophils # (Auto)  0.1 x10^3/uL  


 


Sodium Level  143 mmol/L  


 


Potassium Level  3.9 mmol/L  


 


Chloride Level  106 mmol/L  


 


Carbon Dioxide Level  32 mmol/L  


 


Anion Gap  5  


 


Blood Urea Nitrogen  23 mg/dL  


 


Creatinine  0.6 mg/dL  


 


Estimated GFR


(Cockcroft-Gault) 


 134.8 


 





 


BUN/Creatinine Ratio  38  


 


Glucose Level  93 mg/dL  


 


Calcium Level  8.6 mg/dL  


 


Total Bilirubin  0.3 mg/dL  


 


Aspartate Amino Transf


(AST/SGOT) 


 40 U/L 


 





 


Alanine Aminotransferase


(ALT/SGPT) 


 63 U/L 


 





 


Alkaline Phosphatase  70 U/L  


 


Total Protein  5.9 g/dL  


 


Albumin  1.7 g/dL  


 


Albumin/Globulin Ratio  0.4  


 


O2 Saturation   99 % 


 


Arterial Blood pH   7.45 


 


Arterial Blood pCO2 at


Patient Temp 


 


 49 mmHg 





 


Arterial Blood pO2 at Patient


Temp 


 


 139 mmHg 





 


Arterial Blood HCO3   33 mmol/L 


 


Arterial Blood Base Excess   8 mmol/L 


 


FiO2   55/vent 











PE:





GEN: intubated


LUNGS: vent


HEART: mildly bradycardic


ABD: large, soft


NEURO/PSYCH: sedated





A/P:


COVID-19 infection, resp failure - family has consented for tracheostomy and PEG


H/o Crohn's, GERD - on sulfasalazine and IV H2 blocker


H/o PEG placement


Normocytic anemia (stable), mildly elevated AST and ALT (improved)





--


?PEG tomorrow if trach placed today?


COVID + 12/23, interval test pending.





Justicifation of Admission Dx:


Justifications for Admission:


Justification of Admission Dx:  Yes


Comminuty Aquired Pneumonia:  Med-High Risk Pt











LORAINE CUADRA         Jan 19, 2021 10:10

## 2021-01-19 NOTE — PDOC
PROGRESS NOTES


Date of Service:


DATE: 1/19/21 


TIME: 15:05





Chief Complaint


Chief Complaint


 





Acute hypoxic respiratory failure - no pulmonary history. With recent travel to 

and from [place] likely COVID 19 vs other atypical pneumonia. Cont empiric 

antibiotics, steroids. Consult Pulm. 


Improved aeration of the lungs with persistent moderate mixed interstitial and 

alveolar airspace disease.  1/02 


Pt. experienced hypoxia and respiratory decompensation overnight requiring 

intubation 


now on vent 100% PEEP of 10 


Hypotension as well now on pressors


acute respiratory distress syndrome. Consideration may be given for multifocal 

pneumonia versus pulmonary edema.


COVID 19 positive - with pneumonia - given transaminitis and late presentation 

with high O2 requirements no indication for remdesivir


RYDER - likely vasomotor nephropathy - no known renal history, will hydrate


Pneumonia - likely atypical, gram negative vs viral. will cover 


Prostate Ca - s/p resection at [facility] [place] in [place]


Hypokalemia - likely nutritional or loss from diarrhea, will replace


Elevated troponin - likely from type II demand ischemia, will trend troponins, 

maintain telemetry


Crohn's - sees rheumatology regularly, Dr Fan in [place], on sulfasalazine


 


Anemia - likely of chronic disease, will monitor


Cardiomegaly - notable on CT chest - no known cardiac history, though his mother

did have CHF and CAD


Right renal mass -  


HYPOTENSION 


 


Severe malnutrition





History of Present Illness


History of Present Illness


1/19/2021


Patient seen and examined in the ICU


25 days past his COVID-19 diagnosis


He is still intubated


Assist-control 


Has NG tube feeds


cont supportive care


Sedated with fentanyl Versed and propofol





Vitals


Vitals





Vital Signs








  Date Time  Temp Pulse Resp B/P (MAP) Pulse Ox O2 Delivery O2 Flow Rate FiO2


 


1/19/21 14:00  55 24 99/61 (74) 95 Ventilator  


 


1/19/21 12:00 99.1       





 99.1       











Physical Exam


Physical Exam


GENERAL:  Intubated, sedated.


HEENT:  Normocephalic, atraumatic.  Anicteric.  ETT and OGT tube in place.


NECK:  Supple.


LUNGS:  Decreased breath sounds at the bases.  No wheezing.


HEART:  S1, S2.


ABDOMEN: Mildly distended


EXTREMITIES: Generalized anasarca


GENITOURINARY:  Hart in place.scrotal swelling +


CENTRAL NERVOUS SYSTEM:  Intubated, sedated.


PSYCHIATRIC:  Unable to assess.


LINES:  Right upper extremity PICC line clean.


General:  Other (sedated )


Heart:  Regular rate, Normal S1, Normal S2


Lungs:  Clear


Abdomen:  Soft, No tenderness


Extremities:  No clubbing, No cyanosis


Skin:  No rashes, No breakdown





Labs


LABS





Laboratory Tests








Test


 1/19/21


06:00 1/19/21


08:00 1/19/21


09:45


 


White Blood Count


 7.2 x10^3/uL


(4.0-11.0) 


 





 


Red Blood Count


 2.60 x10^6/uL


(4.30-5.70) 


 





 


Hemoglobin


 8.1 g/dL


(13.0-17.5) 


 





 


Hematocrit


 25.0 %


(39.0-53.0) 


 





 


Mean Corpuscular Volume 96 fL ()   


 


Mean Corpuscular Hemoglobin 31 pg (25-35)   


 


Mean Corpuscular Hemoglobin


Concent 32 g/dL


(31-37) 


 





 


Red Cell Distribution Width


 16.1 %


(11.5-14.5) 


 





 


Platelet Count


 282 x10^3/uL


(140-400) 


 





 


Neutrophils (%) (Auto) 69 % (31-73)   


 


Lymphocytes (%) (Auto) 18 % (24-48)   


 


Monocytes (%) (Auto) 10 % (0-9)   


 


Eosinophils (%) (Auto) 3 % (0-3)   


 


Basophils (%) (Auto) 1 % (0-3)   


 


Neutrophils # (Auto)


 4.9 x10^3/uL


(1.8-7.7) 


 





 


Lymphocytes # (Auto)


 1.3 x10^3/uL


(1.0-4.8) 


 





 


Monocytes # (Auto)


 0.7 x10^3/uL


(0.0-1.1) 


 





 


Eosinophils # (Auto)


 0.2 x10^3/uL


(0.0-0.7) 


 





 


Basophils # (Auto)


 0.1 x10^3/uL


(0.0-0.2) 


 





 


Sodium Level


 143 mmol/L


(136-145) 


 





 


Potassium Level


 3.9 mmol/L


(3.5-5.1) 


 





 


Chloride Level


 106 mmol/L


() 


 





 


Carbon Dioxide Level


 32 mmol/L


(21-32) 


 





 


Anion Gap 5 (6-14)   


 


Blood Urea Nitrogen


 23 mg/dL


(8-26) 


 





 


Creatinine


 0.6 mg/dL


(0.7-1.3) 


 





 


Estimated GFR


(Cockcroft-Gault) 134.8 


 


 





 


BUN/Creatinine Ratio 38 (6-20)   


 


Glucose Level


 93 mg/dL


(70-99) 


 





 


Calcium Level


 8.6 mg/dL


(8.5-10.1) 


 





 


Total Bilirubin


 0.3 mg/dL


(0.2-1.0) 


 





 


Aspartate Amino Transf


(AST/SGOT) 40 U/L (15-37) 


 


 





 


Alanine Aminotransferase


(ALT/SGPT) 63 U/L (16-63) 


 


 





 


Alkaline Phosphatase


 70 U/L


() 


 





 


Total Protein


 5.9 g/dL


(6.4-8.2) 


 





 


Albumin


 1.7 g/dL


(3.4-5.0) 


 





 


Albumin/Globulin Ratio 0.4 (1.0-1.7)   


 


O2 Saturation  99 % (92-99)  


 


Arterial Blood pH


 


 7.45


(7.35-7.45) 





 


Arterial Blood pCO2 at


Patient Temp 


 49 mmHg


(35-46) 





 


Arterial Blood pO2 at Patient


Temp 


 139 mmHg


() 





 


Arterial Blood HCO3


 


 33 mmol/L


(21-28) 





 


Arterial Blood Base Excess


 


 8 mmol/L


(-3-3) 





 


FiO2  55/vent  


 


SARS-CoV-2 Antigen (Rapid)


 


 


 Negative


(NEGATIVE)











Comment


Review of Relevant


I have reviewed the following items jabier (where applicable) has been applied.


Labs





Laboratory Tests








Test


 1/18/21


05:40 1/18/21


07:50 1/18/21


13:45 1/19/21


06:00


 


Triglycerides Level


 119 mg/dL


(0-150) 


 


 





 


O2 Saturation  96 % (92-99)   


 


Arterial Blood pH


 


 7.41


(7.35-7.45) 


 





 


Arterial Blood pCO2 at


Patient Temp 


 47 mmHg


(35-46) 


 





 


Arterial Blood pO2 at Patient


Temp 


 88 mmHg


() 


 





 


Arterial Blood HCO3


 


 29 mmol/L


(21-28) 


 





 


Arterial Blood Base Excess


 


 4 mmol/L


(-3-3) 


 





 


FiO2  55   


 


Prothrombin Time


 


 


 14.6 SEC


(11.7-14.0) 





 


Prothromb Time International


Ratio 


 


 1.2 (0.8-1.1) 


 





 


White Blood Count


 


 


 


 7.2 x10^3/uL


(4.0-11.0)


 


Red Blood Count


 


 


 


 2.60 x10^6/uL


(4.30-5.70)


 


Hemoglobin


 


 


 


 8.1 g/dL


(13.0-17.5)


 


Hematocrit


 


 


 


 25.0 %


(39.0-53.0)


 


Mean Corpuscular Volume    96 fL () 


 


Mean Corpuscular Hemoglobin    31 pg (25-35) 


 


Mean Corpuscular Hemoglobin


Concent 


 


 


 32 g/dL


(31-37)


 


Red Cell Distribution Width


 


 


 


 16.1 %


(11.5-14.5)


 


Platelet Count


 


 


 


 282 x10^3/uL


(140-400)


 


Neutrophils (%) (Auto)    69 % (31-73) 


 


Lymphocytes (%) (Auto)    18 % (24-48) 


 


Monocytes (%) (Auto)    10 % (0-9) 


 


Eosinophils (%) (Auto)    3 % (0-3) 


 


Basophils (%) (Auto)    1 % (0-3) 


 


Neutrophils # (Auto)


 


 


 


 4.9 x10^3/uL


(1.8-7.7)


 


Lymphocytes # (Auto)


 


 


 


 1.3 x10^3/uL


(1.0-4.8)


 


Monocytes # (Auto)


 


 


 


 0.7 x10^3/uL


(0.0-1.1)


 


Eosinophils # (Auto)


 


 


 


 0.2 x10^3/uL


(0.0-0.7)


 


Basophils # (Auto)


 


 


 


 0.1 x10^3/uL


(0.0-0.2)


 


Sodium Level


 


 


 


 143 mmol/L


(136-145)


 


Potassium Level


 


 


 


 3.9 mmol/L


(3.5-5.1)


 


Chloride Level


 


 


 


 106 mmol/L


()


 


Carbon Dioxide Level


 


 


 


 32 mmol/L


(21-32)


 


Anion Gap    5 (6-14) 


 


Blood Urea Nitrogen


 


 


 


 23 mg/dL


(8-26)


 


Creatinine


 


 


 


 0.6 mg/dL


(0.7-1.3)


 


Estimated GFR


(Cockcroft-Gault) 


 


 


 134.8 





 


BUN/Creatinine Ratio    38 (6-20) 


 


Glucose Level


 


 


 


 93 mg/dL


(70-99)


 


Calcium Level


 


 


 


 8.6 mg/dL


(8.5-10.1)


 


Total Bilirubin


 


 


 


 0.3 mg/dL


(0.2-1.0)


 


Aspartate Amino Transf


(AST/SGOT) 


 


 


 40 U/L (15-37) 





 


Alanine Aminotransferase


(ALT/SGPT) 


 


 


 63 U/L (16-63) 





 


Alkaline Phosphatase


 


 


 


 70 U/L


()


 


Total Protein


 


 


 


 5.9 g/dL


(6.4-8.2)


 


Albumin


 


 


 


 1.7 g/dL


(3.4-5.0)


 


Albumin/Globulin Ratio    0.4 (1.0-1.7) 


 


Test


 1/19/21


08:00 1/19/21


09:45 


 





 


O2 Saturation 99 % (92-99)    


 


Arterial Blood pH


 7.45


(7.35-7.45) 


 


 





 


Arterial Blood pCO2 at


Patient Temp 49 mmHg


(35-46) 


 


 





 


Arterial Blood pO2 at Patient


Temp 139 mmHg


() 


 


 





 


Arterial Blood HCO3


 33 mmol/L


(21-28) 


 


 





 


Arterial Blood Base Excess


 8 mmol/L


(-3-3) 


 


 





 


FiO2 55/vent    


 


SARS-CoV-2 Antigen (Rapid)


 


 Negative


(NEGATIVE) 


 











Laboratory Tests








Test


 1/19/21


06:00 1/19/21


08:00 1/19/21


09:45


 


White Blood Count


 7.2 x10^3/uL


(4.0-11.0) 


 





 


Red Blood Count


 2.60 x10^6/uL


(4.30-5.70) 


 





 


Hemoglobin


 8.1 g/dL


(13.0-17.5) 


 





 


Hematocrit


 25.0 %


(39.0-53.0) 


 





 


Mean Corpuscular Volume 96 fL ()   


 


Mean Corpuscular Hemoglobin 31 pg (25-35)   


 


Mean Corpuscular Hemoglobin


Concent 32 g/dL


(31-37) 


 





 


Red Cell Distribution Width


 16.1 %


(11.5-14.5) 


 





 


Platelet Count


 282 x10^3/uL


(140-400) 


 





 


Neutrophils (%) (Auto) 69 % (31-73)   


 


Lymphocytes (%) (Auto) 18 % (24-48)   


 


Monocytes (%) (Auto) 10 % (0-9)   


 


Eosinophils (%) (Auto) 3 % (0-3)   


 


Basophils (%) (Auto) 1 % (0-3)   


 


Neutrophils # (Auto)


 4.9 x10^3/uL


(1.8-7.7) 


 





 


Lymphocytes # (Auto)


 1.3 x10^3/uL


(1.0-4.8) 


 





 


Monocytes # (Auto)


 0.7 x10^3/uL


(0.0-1.1) 


 





 


Eosinophils # (Auto)


 0.2 x10^3/uL


(0.0-0.7) 


 





 


Basophils # (Auto)


 0.1 x10^3/uL


(0.0-0.2) 


 





 


Sodium Level


 143 mmol/L


(136-145) 


 





 


Potassium Level


 3.9 mmol/L


(3.5-5.1) 


 





 


Chloride Level


 106 mmol/L


() 


 





 


Carbon Dioxide Level


 32 mmol/L


(21-32) 


 





 


Anion Gap 5 (6-14)   


 


Blood Urea Nitrogen


 23 mg/dL


(8-26) 


 





 


Creatinine


 0.6 mg/dL


(0.7-1.3) 


 





 


Estimated GFR


(Cockcroft-Gault) 134.8 


 


 





 


BUN/Creatinine Ratio 38 (6-20)   


 


Glucose Level


 93 mg/dL


(70-99) 


 





 


Calcium Level


 8.6 mg/dL


(8.5-10.1) 


 





 


Total Bilirubin


 0.3 mg/dL


(0.2-1.0) 


 





 


Aspartate Amino Transf


(AST/SGOT) 40 U/L (15-37) 


 


 





 


Alanine Aminotransferase


(ALT/SGPT) 63 U/L (16-63) 


 


 





 


Alkaline Phosphatase


 70 U/L


() 


 





 


Total Protein


 5.9 g/dL


(6.4-8.2) 


 





 


Albumin


 1.7 g/dL


(3.4-5.0) 


 





 


Albumin/Globulin Ratio 0.4 (1.0-1.7)   


 


O2 Saturation  99 % (92-99)  


 


Arterial Blood pH


 


 7.45


(7.35-7.45) 





 


Arterial Blood pCO2 at


Patient Temp 


 49 mmHg


(35-46) 





 


Arterial Blood pO2 at Patient


Temp 


 139 mmHg


() 





 


Arterial Blood HCO3


 


 33 mmol/L


(21-28) 





 


Arterial Blood Base Excess


 


 8 mmol/L


(-3-3) 





 


FiO2  55/vent  


 


SARS-CoV-2 Antigen (Rapid)


 


 


 Negative


(NEGATIVE)








Microbiology


1/2/21 Blood Culture - Final, Complete


         NO GROWTH AFTER 5 DAYS


Medications





Current Medications


Sodium Chloride (Normal Saline Flush) 3 ml QSHIFT  PRN IV AFTER MEDS AND BLOOD 

DRAWS;  Start 12/23/20 at 07:45


Sodium Chloride 1,000 ml @  150 mls/hr Q6H40M IV  Last administered on 

12/23/20at 15:10;  Start 12/23/20 at 08:15;  Stop 12/23/20 at 21:34;  Status DC


Ondansetron HCl (Zofran) 4 mg PRN Q4HRS  PRN IV NAUSEA/VOMITING;  Start 12/23/20

at 08:15


Acetaminophen (Tylenol) 650 mg PRN Q4HRS  PRN PO TEMP OVER 100.4F OR MILD PAIN 

Last administered on 12/31/20at 01:02;  Start 12/23/20 at 08:15


Docusate Sodium (Colace) 100 mg PRN BID  PRN PO HARD STOOLS Last administered on

12/25/20at 17:03;  Start 12/23/20 at 08:15


Ceftriaxone Sodium (Rocephin) 1 gm Q24H IVP  Last administered on 1/3/21at 

08:14;  Start 12/24/20 at 09:00;  Stop 1/3/21 at 13:15;  Status DC


Dexamethasone Sodium Phosphate (Decadron) 4 mg DAILY IVP  Last administered on 

1/3/21at 08:14;  Start 12/24/20 at 09:00;  Stop 1/4/21 at 08:39;  Status DC


Amino Acids/ Glycerin/ Electrolytes 1,000 ml @  80 mls/hr [id number] IV  Last 

administered on 1/4/21at 14:53;  Start 12/23/20 at 08:45;  Stop 1/5/21 at 05:03;

 Status DC


Enoxaparin Sodium (Lovenox 40mg Syringe) 40 mg Q12HR SQ  Last administered on 

1/18/21at 20:28;  Start 12/23/20 at 09:00


Zinc Sulfate (Orazinc) 220 mg DAILY PO  Last administered on 1/19/21at 09:23;  S

tart 12/23/20 at 09:00


Guaifenesin (Robitussin Dm) 10 ml PRN Q6HRS  PRN PO COUGH-2ND CHOICE Last 

administered on 1/1/21at 15:37;  Start 12/23/20 at 08:45


Acetaminophen (Tylenol Supp) 650 mg PRN Q6HRS  PRN IL MILD PAIN / TEMP > 

100.3'F;  Start 12/23/20 at 08:45


Morphine Sulfate (Morphine Sulfate) 2 mg PRN Q4HRS  PRN IV PAIN Last 

administered on 1/1/21at 16:12;  Start 12/23/20 at 08:45;  Stop 1/2/21 at 01:41;

 Status DC


Azithromycin 500 mg/Sodium Chloride 250 ml @  250 mls/hr 1X  ONCE IV  Last 

administered on 12/23/20at 09:59;  Start 12/23/20 at 10:00;  Stop 12/23/20 at 

10:59;  Status DC


Azithromycin 250 mg/Sodium Chloride 250 ml @  250 mls/hr Q24H IV  Last 

administered on 12/27/20at 08:55;  Start 12/24/20 at 09:00;  Stop 12/27/20 at 

09:59;  Status DC


Cetirizine HCl (ZyrTEC) 10 mg DAILY PO  Last administered on 1/19/21at 09:24;  

Start 12/24/20 at 09:00


Morphine Sulfate (Ms Contin) 15 mg BID PO  Last administered on 1/5/21at 21:11; 

Start 12/23/20 at 21:00;  Stop 1/6/21 at 14:43;  Status DC


Cyanocobalamin (Vitamin B-12) 1,000 mcg DAILY PO  Last administered on 1/19/21at

09:24;  Start 12/24/20 at 09:00


Citalopram Hydrobromide (CeleXA) 20 mg DAILY PO  Last administered on 1/19/21at 

09:24;  Start 12/24/20 at 09:00


Atorvastatin Calcium (Lipitor) 40 mg QHS PO  Last administered on 1/18/21at 

20:27;  Start 12/23/20 at 21:00


Sulfasalazine (Azulfidine) 1,000 mg BID PO  Last administered on 1/19/21at 

09:23;  Start 12/23/20 at 21:00


Tramadol HCl (Ultram) 100 mg PRN Q8HRS  PRN PO MODERATE PAIN, SEVERE PAIN Last 

administered on 1/1/21at 13:35;  Start 12/23/20 at 15:00


Zolpidem Tartrate (Ambien) 5 mg PRN QHS  PRN PO INSOMNIA Last administered on 

1/1/21at 22:05;  Start 12/23/20 at 15:00


Info ([facility] Electrolyte Protocol) 1 ea CONT PRN  PRN MC PER PROTOCOL;  Start 

12/24/20 at 14:15


Potassium Chloride (Klor-Con) 40 meq 1X  ONCE PO  Last administered on 

12/24/20at 14:24;  Start 12/24/20 at 14:15;  Stop 12/24/20 at 14:16;  Status DC


Sterile Water (WATER for RESP) 1,000 ml CONT  PRN INH VIA VAPOTHERM DEVICE Last 

administered on 1/1/21at 15:49;  Start 12/26/20 at 09:30


Benzonatate (Tessalon Perle) 100 mg [id number] PO  Last administered on 1/1/21at 

20:51;  Start 12/26/20 at 14:00;  Stop 1/2/21 at 10:35;  Status DC


Labetalol HCl (Normodyne Iv Push) 10 mg PRN Q2HR  PRN IVP HYPERTENSION Last 

administered on 1/16/21at 19:27;  Start 12/26/20 at 18:30


Quetiapine Fumarate (SEROquel) 50 mg PRN QHS  PRN PO sleep 2ND CHOICE Last 

administered on 1/1/21at 00:24;  Start 12/27/20 at 21:00


Enalaprilat (Vasotec Inj) 2.5 mg PRN Q6HRS  PRN IVP HYPERTENSION-2ND CHOICE Last

administered on 12/28/20at 12:29;  Start 12/27/20 at 10:00


Olanzapine (ZyPREXA ZYDIS) 5 mg PRN BID  PRN PO ANXIETY / AGITATION Last 

administered on 1/11/21at 09:56;  Start 12/27/20 at 12:30


Guaifenesin (Robitussin) 400 mg PRN Q4HRS  PRN PO COUGH;  Start 12/27/20 at 

22:15;  Stop 12/27/20 at 22:27;  Status DC


Guaifenesin (Robitussin) 400 mg PRN Q4HRS  PRN PO COUGH Last administered on 

1/1/21at 23:00;  Start 12/27/20 at 22:30


Furosemide (Lasix) 40 mg 1X  ONCE IVP  Last administered on 12/30/20at 10:15;  

Start 12/30/20 at 10:00;  Stop 12/30/20 at 10:01;  Status DC


Sodium Chloride 1,000 ml @  100 mls/hr Q10H IV  Last administered on 1/7/21at 

00:03;  Start 12/31/20 at 10:15;  Stop 1/7/21 at 13:14;  Status DC


Furosemide (Lasix) 20 mg 1X  ONCE IVP  Last administered on 1/1/21at 13:34;  

Start 1/1/21 at 12:30;  Stop 1/1/21 at 12:31;  Status DC


Alteplase, Recombinant (Cathflo For Central Catheter Clearance) 1 mg 1X  ONCE 

INT CAT  Last administered on 1/2/21at 02:10;  Start 1/1/21 at 16:15;  Stop 

1/1/21 at 16:16;  Status DC


Fentanyl Citrate 30 ml @ 0 mls/hr CONT  PRN IV SEE PROTOCOL Last administered on

1/2/21at 01:58;  Start 1/2/21 at 01:15;  Stop 1/2/21 at 02:36;  Status DC


Propofol 100 ml @ 0 mls/hr CONT  PRN IV PER PROTOCOL Last administered on 

1/16/21at 23:12;  Start 1/2/21 at 01:15


Chlorhexidine Gluconate (Peridex) 15 ml BID MM  Last administered on 1/9/21at 

09:21;  Start 1/2/21 at 09:00;  Stop 1/9/21 at 19:26;  Status DC


Midazolam HCl 100 ml @ 0 mls/hr CONT  PRN IV SEE PROTOCOL Last administered on 

1/19/21at 10:43;  Start 1/2/21 at 01:15


Succinylcholine Chloride (Anectine) 200 mg STK-MED ONCE .ROUTE ;  Start 1/2/21 

at 01:21;  Stop 1/2/21 at 01:21;  Status DC


Fentanyl Citrate 55 ml @ 0 mls/hr CONT PRN  PRN IV PAIN CONTROL Last 

administered on 1/19/21at 12:19;  Start 1/2/21 at 02:45


Norepinephrine Bitartrate 8 mg/ Dextrose 258 ml @  19.737 mls/ hr CONT  PRN IV 

PER PROTOCOL Last administered on 1/2/21at 22:27;  Start 1/2/21 at 07:30;  Stop 

1/5/21 at 08:55;  Status DC


Vecuronium Bromide (Norcuron Bolus) 6 mg PRN Q6HRS  PRN IV SEDATION Last 

administered on 1/9/21at 17:07;  Start 1/2/21 at 09:15


Vancomycin HCl (Vanco Per Pharmacy) 1 each PRN DAILY  PRN MC SEE COMMENTS Last 

administered on 1/2/21at 20:47;  Start 1/2/21 at 16:00;  Stop 1/3/21 at 13:19;  

Status DC


Vancomycin HCl 2 gm/Sodium Chloride 500 ml @  250 mls/hr 1X  ONCE IV  Last 

administered on 1/2/21at 17:43;  Start 1/2/21 at 17:00;  Stop 1/2/21 at 18:59;  

Status DC


Vancomycin HCl 1.5 gm/Sodium Chloride 500 ml @  250 mls/hr Q12H IV  Last 

administered on 1/3/21at 05:53;  Start 1/3/21 at 05:00;  Stop 1/3/21 at 13:15;  

Status DC


Vancomycin HCl (Vancomycin Trough Level) 1 each 1X  ONCE MC ;  Start 1/4/21 at 

04:30;  Stop 1/3/21 at 13:20;  Status DC


Cefepime HCl (Maxipime) 2 gm Q12HR IVP  Last administered on 1/12/21at 20:52;  

Start 1/3/21 at 14:00;  Stop 1/13/21 at 07:59;  Status DC


Linezolid (Zyvox) 600 mg BID PO  Last administered on 1/6/21at 20:38;  Start 

1/3/21 at 21:00;  Stop 1/7/21 at 07:58;  Status DC


Dexamethasone Sodium Phosphate (Decadron) 6 mg DAILY IVP  Last administered on 

1/5/21at 08:36;  Start 1/4/21 at 09:00;  Stop 1/5/21 at 08:55;  Status DC


Polyethylene Glycol (miraLAX PACKET) 17 gm DAILY PO  Last administered on 

1/15/21at 08:40;  Start 1/6/21 at 14:30;  Stop 1/17/21 at 12:21;  Status DC


Nystatin (Nystop) 1 lindsay BID TP  Last administered on 1/19/21at 10:42;  Start 

1/6/21 at 16:00


Sodium Chloride 1,000 ml @  0 mls/hr Q0M IV ;  Start 1/7/21 at 13:15


Famotidine (Pepcid Vial) 20 mg BID IVP  Last administered on 1/19/21at 09:24;  

Start 1/8/21 at 21:00


Furosemide (Lasix) 40 mg 1X  ONCE IVP  Last administered on 1/12/21at 11:18;  

Start 1/12/21 at 11:15;  Stop 1/12/21 at 11:16;  Status DC


Multivitamins/ Minerals Therapeutic (Centrum Multivit-Mineral Liq) 5 ml DAILY 

PEG  Last administered on 1/19/21at 09:23;  Start 1/15/21 at 09:00


Dexmedetomidine HCl 400 mcg/ Sodium Chloride 100 ml @ 0 mls/hr CONT  PRN IV PER 

PROTOCOL Last administered on 1/19/21at 09:24;  Start 1/17/21 at 07:15


Atropine Sulfate (ATROPINE 0.5mg SYRINGE) 0.5 mg PRN Q5MIN  PRN IV SEE COMMENTS;

 Start 1/17/21 at 07:15


Rocuronium Bromide (Zemuron) 50 mg STK-MED ONCE .ROUTE ;  Start 1/19/21 at 

10:24;  Stop 1/19/21 at 10:25;  Status DC


Bupivacaine HCl/ Epinephrine Bitart (Sensorcain-Epi 0.5% Kit) 30 ml STK-MED ONCE

.ROUTE  Last administered on 1/19/21at 12:47;  Start 1/19/21 at 11:34;  Stop 

1/19/21 at 11:34;  Status DC


Cellulose (Surgicel Fibrillar 1x2) 1 each STK-MED ONCE .ROUTE  Last administered

on 1/19/21at 12:56;  Start 1/19/21 at 11:34;  Stop 1/19/21 at 11:34;  Status DC


Ephedrine Sulfate (Akovaz) 50 mg STK-MED ONCE .ROUTE ;  Start 1/19/21 at 12:56; 

Stop 1/19/21 at 12:57;  Status DC


Desflurane (Suprane) 15 ml STK-MED ONCE IH ;  Start 1/19/21 at 12:56;  Stop 

1/19/21 at 12:57;  Status DC


Sodium Chloride (SODIUM CHLORIDE 20ml) 20 ml STK-MED ONCE IJ ;  Start 1/19/21 at

12:57;  Stop 1/19/21 at 12:57;  Status DC





Active Scripts


Active


Reported


Morphine Sulfate Er (Morphine Sulfate) 30 Mg Tablet.er 1 Tab PO BID


Tramadol Hcl 100 Mg Tbmp.24hr 100 Mg PO PRN Q8HRS PRN


Ambien (Zolpidem Tartrate) 10 Mg Tablet 10 Mg PO PRN QHS PRN


Methotrexate (Methotrexate Sodium) 2.5 Mg Tablet 10 Tab PO WEEKLY


Lisinopril 10 Mg Tablet 1 Tab PO DAILY


Crestor (Rosuvastatin Calcium) 5 Mg Tablet 2 Tab PO DAILY


Hydrochlorothiazide Tablet (Hydrochlorothiazide) 50 Mg Tablet 25 Mg PO DAILY


Escitalopram Oxalate 10 Mg Tablet 1 Tab PO DAILY


Sulfasalazine Dr (Sulfasalazine) 500 Mg Tablet.dr 2 Tab PO TID 30 Days


Colace (Docusate Sodium) 100 Mg Capsule 1 Cap PO DA 30 Days


Acetaminophen Ext.release (Acetaminophen) 650 Mg Tablet.er 650 Mg PO PRN Q8HRS 

PRN


Melatonin 5 Mg Capsule 1 Cap PO QHS 30 Days


B-12 (Cyanocobalamin (Vitamin B-12)) 500 Mcg Tablet 2 Tab PO DAILY 30 Days


Vitamin D3 (Cholecalciferol (Vitamin D3)) 50 Mcg Capsule 50 Mcg PO DAILY


Aller-Sung (Cetirizine Hcl) 10 Mg Tablet 1 Tab PO DAILY 30 Days


Vitals/I & O





Vital Sign - Last 24 Hours








 1/18/21 1/18/21 1/18/21 1/18/21





 15:43 16:00 16:00 17:00


 


Temp  99.4  





  99.4  


 


Pulse  100  53


 


Resp  28  28


 


B/P (MAP)  136/81 (99)  101/57 (72)


 


Pulse Ox 99 99  99


 


O2 Delivery Ventilator Ventilator Mechanical Ventilator Ventilator


 


    





    





 1/18/21 1/18/21 1/18/21 1/18/21





 18:00 19:00 20:00 20:00


 


Temp    98.4





    98.4


 


Pulse 69 60  56


 


Resp 28 28  28


 


B/P (MAP) 121/74 (90) 114/63 (80)  114/65 (81)


 


Pulse Ox 97 97  97


 


O2 Delivery Ventilator Ventilator Mechanical Ventilator Ventilator


 


    





    





 1/18/21 1/18/21 1/18/21 1/18/21





 20:05 21:00 22:00 23:00


 


Pulse  75 68 61


 


Resp  28 28 28


 


B/P (MAP)  122/72 (89) 135/74 (94) 116/78 (91)


 


Pulse Ox 99 99 99 100


 


O2 Delivery Ventilator Ventilator Ventilator Ventilator





 1/18/21 1/18/21 1/19/21 1/19/21





 23:59 23:59 00:30 01:00


 


Temp 98.5   





 98.5   


 


Pulse 75   56


 


Resp 30   28


 


B/P (MAP) 108/74 (85)   135/86 (102)


 


Pulse Ox 100  99 98


 


O2 Delivery Ventilator Mechanical Ventilator Ventilator Ventilator


 


    





    





 1/19/21 1/19/21 1/19/21 1/19/21





 02:00 03:00 04:00 04:00


 


Temp   98.4 





   98.4 


 


Pulse 58 55 53 


 


Resp 28 29 28 


 


B/P (MAP) 98/54 (69) 91/50 (64) 88/48 (61) 


 


Pulse Ox 98 99 99 


 


O2 Delivery Ventilator Ventilator Ventilator Mechanical Ventilator


 


    





    





 1/19/21 1/19/21 1/19/21 1/19/21





 04:41 05:00 06:02 07:00


 


Pulse  54 52 52


 


Resp  28 28 28


 


B/P (MAP)  100/56 (71) 103/64 (77) 96/59 (71)


 


Pulse Ox 97 99 100 100


 


O2 Delivery Ventilator Ventilator Ventilator Ventilator





 1/19/21 1/19/21 1/19/21 1/19/21





 08:00 08:00 09:00 09:29


 


Temp 99.0   





 99.0   


 


Pulse 51  54 


 


Resp 24  24 


 


B/P (MAP) 102/66 (78)  98/60 (73) 


 


Pulse Ox 94  92 94


 


O2 Delivery Ventilator Mechanical Ventilator Ventilator Ventilator


 


    





    





 1/19/21 1/19/21 1/19/21 1/19/21





 09:38 10:00 11:00 12:00


 


Temp    99.1





    99.1


 


Pulse  53 52 52


 


Resp  24 24 19


 


B/P (MAP)  96/67 (77) 97/63 (74) 106/66 (79)


 


Pulse Ox  92 98 94


 


O2 Delivery Ventilator Ventilator Ventilator Ventilator


 


    





    





 1/19/21 1/19/21 1/19/21 1/19/21





 12:00 12:13 13:00 14:00


 


Pulse   66 55


 


Resp   24 24


 


B/P (MAP)   95/64 (74) 99/61 (74)


 


Pulse Ox  96 100 95


 


O2 Delivery Mechanical Ventilator Ventilator Ventilator Ventilator














Intake and Output   


 


 1/18/21 1/18/21 1/19/21





 15:00 23:00 07:00


 


Intake Total 300 ml 1463 ml 1106 ml


 


Output Total 315 ml 220 ml 425 ml


 


Balance -15 ml 1243 ml 681 ml











Justicifation of Admission Dx:


Justifications for Admission:


Justification of Admission Dx:  Yes


Comminuty Aquired Pneumonia:  Med-High Risk Pt











ANNE SHEPARD MD                 Jan 19, 2021 15:06

## 2021-01-20 VITALS — SYSTOLIC BLOOD PRESSURE: 98 MMHG | DIASTOLIC BLOOD PRESSURE: 58 MMHG

## 2021-01-20 VITALS — DIASTOLIC BLOOD PRESSURE: 72 MMHG | SYSTOLIC BLOOD PRESSURE: 135 MMHG

## 2021-01-20 VITALS — DIASTOLIC BLOOD PRESSURE: 68 MMHG | SYSTOLIC BLOOD PRESSURE: 112 MMHG

## 2021-01-20 VITALS — DIASTOLIC BLOOD PRESSURE: 62 MMHG | SYSTOLIC BLOOD PRESSURE: 106 MMHG

## 2021-01-20 VITALS — DIASTOLIC BLOOD PRESSURE: 61 MMHG | SYSTOLIC BLOOD PRESSURE: 109 MMHG

## 2021-01-20 VITALS — SYSTOLIC BLOOD PRESSURE: 100 MMHG | DIASTOLIC BLOOD PRESSURE: 63 MMHG

## 2021-01-20 VITALS — SYSTOLIC BLOOD PRESSURE: 132 MMHG | DIASTOLIC BLOOD PRESSURE: 71 MMHG

## 2021-01-20 VITALS — SYSTOLIC BLOOD PRESSURE: 119 MMHG | DIASTOLIC BLOOD PRESSURE: 72 MMHG

## 2021-01-20 VITALS — SYSTOLIC BLOOD PRESSURE: 114 MMHG | DIASTOLIC BLOOD PRESSURE: 65 MMHG

## 2021-01-20 VITALS — SYSTOLIC BLOOD PRESSURE: 121 MMHG | DIASTOLIC BLOOD PRESSURE: 69 MMHG

## 2021-01-20 VITALS — DIASTOLIC BLOOD PRESSURE: 67 MMHG | SYSTOLIC BLOOD PRESSURE: 117 MMHG

## 2021-01-20 VITALS — DIASTOLIC BLOOD PRESSURE: 67 MMHG | SYSTOLIC BLOOD PRESSURE: 119 MMHG

## 2021-01-20 VITALS — SYSTOLIC BLOOD PRESSURE: 119 MMHG | DIASTOLIC BLOOD PRESSURE: 64 MMHG

## 2021-01-20 VITALS — DIASTOLIC BLOOD PRESSURE: 57 MMHG | SYSTOLIC BLOOD PRESSURE: 99 MMHG

## 2021-01-20 VITALS — SYSTOLIC BLOOD PRESSURE: 108 MMHG | DIASTOLIC BLOOD PRESSURE: 62 MMHG

## 2021-01-20 VITALS — SYSTOLIC BLOOD PRESSURE: 158 MMHG | DIASTOLIC BLOOD PRESSURE: 58 MMHG

## 2021-01-20 VITALS — SYSTOLIC BLOOD PRESSURE: 145 MMHG | DIASTOLIC BLOOD PRESSURE: 89 MMHG

## 2021-01-20 VITALS — DIASTOLIC BLOOD PRESSURE: 61 MMHG | SYSTOLIC BLOOD PRESSURE: 113 MMHG

## 2021-01-20 VITALS — SYSTOLIC BLOOD PRESSURE: 98 MMHG | DIASTOLIC BLOOD PRESSURE: 49 MMHG

## 2021-01-20 VITALS — DIASTOLIC BLOOD PRESSURE: 83 MMHG | SYSTOLIC BLOOD PRESSURE: 153 MMHG

## 2021-01-20 VITALS — DIASTOLIC BLOOD PRESSURE: 88 MMHG | SYSTOLIC BLOOD PRESSURE: 135 MMHG

## 2021-01-20 VITALS — DIASTOLIC BLOOD PRESSURE: 78 MMHG | SYSTOLIC BLOOD PRESSURE: 125 MMHG

## 2021-01-20 VITALS — SYSTOLIC BLOOD PRESSURE: 117 MMHG | DIASTOLIC BLOOD PRESSURE: 69 MMHG

## 2021-01-20 LAB
BASE EXCESS ABG: 3 MMOL/L (ref -3–3)
HCO3 BLDA-SCNC: 28 MMOL/L (ref 21–28)
PCO2 BLDA: 42 MMHG (ref 35–46)
PO2 BLDA: 76 MMHG (ref 65–108)
SAO2 % BLDA: 94 % (ref 92–99)

## 2021-01-20 RX ADMIN — MIDAZOLAM PRN MLS/HR: 5 INJECTION, SOLUTION INTRAMUSCULAR; INTRAVENOUS at 21:10

## 2021-01-20 RX ADMIN — ENOXAPARIN SODIUM SCH MG: 40 INJECTION SUBCUTANEOUS at 19:55

## 2021-01-20 RX ADMIN — DEXMEDETOMIDINE HYDROCHLORIDE PRN MLS/HR: 100 INJECTION, SOLUTION, CONCENTRATE INTRAVENOUS at 14:04

## 2021-01-20 RX ADMIN — DEXMEDETOMIDINE HYDROCHLORIDE PRN MLS/HR: 100 INJECTION, SOLUTION, CONCENTRATE INTRAVENOUS at 21:09

## 2021-01-20 RX ADMIN — GLYCERIN, ISOLEUCINE, LEUCINE, LYSINE, METHIONINE, PHENYLALANINE, THREONINE, TRYPTOPHAN, VALINE, ALANINE, GLYCINE, ARGININE, HISTIDINE, PROLINE, SERINE, CYSTEINE, SODIUM ACETATE, MAGNESIUM ACETATE, CALCIUM ACETATE, SODIUM CHLORIDE, POTASSIUM CHLORIDE, PHOSPHORIC ACID, AND POTASSIUM METABISULFITE SCH MLS/HR
3; .21; .27; .22; .16; .17; .12; .046; .2; .21; .42; .29; .085; .34; .18; .014; .2; .054; .026; .12; .15; .041 INJECTION INTRAVENOUS at 23:30

## 2021-01-20 RX ADMIN — DEXMEDETOMIDINE HYDROCHLORIDE PRN MLS/HR: 100 INJECTION, SOLUTION, CONCENTRATE INTRAVENOUS at 02:40

## 2021-01-20 RX ADMIN — MIDAZOLAM PRN MLS/HR: 5 INJECTION, SOLUTION INTRAMUSCULAR; INTRAVENOUS at 05:42

## 2021-01-20 RX ADMIN — CETIRIZINE HYDROCHLORIDE SCH MG: 10 TABLET, FILM COATED ORAL at 08:05

## 2021-01-20 RX ADMIN — GLYCERIN, ISOLEUCINE, LEUCINE, LYSINE, METHIONINE, PHENYLALANINE, THREONINE, TRYPTOPHAN, VALINE, ALANINE, GLYCINE, ARGININE, HISTIDINE, PROLINE, SERINE, CYSTEINE, SODIUM ACETATE, MAGNESIUM ACETATE, CALCIUM ACETATE, SODIUM CHLORIDE, POTASSIUM CHLORIDE, PHOSPHORIC ACID, AND POTASSIUM METABISULFITE SCH MLS/HR
3; .21; .27; .22; .16; .17; .12; .046; .2; .21; .42; .29; .085; .34; .18; .014; .2; .054; .026; .12; .15; .041 INJECTION INTRAVENOUS at 15:08

## 2021-01-20 RX ADMIN — CITALOPRAM HYDROBROMIDE SCH MG: 20 TABLET ORAL at 08:05

## 2021-01-20 RX ADMIN — MIDAZOLAM PRN MLS/HR: 5 INJECTION, SOLUTION INTRAMUSCULAR; INTRAVENOUS at 14:03

## 2021-01-20 RX ADMIN — ZINC SULFATE CAP 220 MG (50 MG ELEMENTAL ZN) SCH MG: 220 (50 ZN) CAP at 08:04

## 2021-01-20 RX ADMIN — FAMOTIDINE SCH MG: 10 INJECTION, SOLUTION INTRAVENOUS at 21:06

## 2021-01-20 RX ADMIN — NYSTATIN SCH APP: 100000 POWDER TOPICAL at 08:46

## 2021-01-20 RX ADMIN — ATORVASTATIN CALCIUM SCH MG: 40 TABLET, FILM COATED ORAL at 19:05

## 2021-01-20 RX ADMIN — MULTIVITAMIN SCH ML: LIQUID ORAL at 08:17

## 2021-01-20 RX ADMIN — NYSTATIN SCH APP: 100000 POWDER TOPICAL at 21:00

## 2021-01-20 RX ADMIN — CYANOCOBALAMIN TAB 1000 MCG SCH MCG: 1000 TAB at 08:04

## 2021-01-20 RX ADMIN — Medication PRN MLS/HR: at 09:15

## 2021-01-20 RX ADMIN — ENOXAPARIN SODIUM SCH MG: 40 INJECTION SUBCUTANEOUS at 08:05

## 2021-01-20 RX ADMIN — DEXMEDETOMIDINE HYDROCHLORIDE PRN MLS/HR: 100 INJECTION, SOLUTION, CONCENTRATE INTRAVENOUS at 08:06

## 2021-01-20 RX ADMIN — FAMOTIDINE SCH MG: 10 INJECTION, SOLUTION INTRAVENOUS at 08:04

## 2021-01-20 NOTE — PDOC
PROGRESS NOTES


Date of Service:


DATE: 1/20/21 


TIME: 14:00





Chief Complaint


Chief Complaint


 





Acute hypoxic respiratory failure - no pulmonary history. With recent travel to 

and from [place] likely COVID 19 vs other atypical pneumonia. Cont empiric 

antibiotics, steroids. Consult Pulm. 


Improved aeration of the lungs with persistent moderate mixed interstitial and 

alveolar airspace disease.  1/02 


Pt. experienced hypoxia and respiratory decompensation overnight requiring 

intubation 


now on vent 100% PEEP of 10 


Hypotension as well now on pressors


acute respiratory distress syndrome. Consideration may be given for multifocal 

pneumonia versus pulmonary edema.


COVID 19 positive - with pneumonia - given transaminitis and late presentation 

with high O2 requirements no indication for remdesivir


RYDER - likely vasomotor nephropathy - no known renal history, will hydrate


Pneumonia - likely atypical, gram negative vs viral. will cover 


Prostate Ca - s/p resection at [facility] in [place]


Hypokalemia - likely nutritional or loss from diarrhea, will replace


Elevated troponin - likely from type II demand ischemia, will trend troponins, 

maintain telemetry


Crohn's - sees rheumatology regularly, Dr Fan in [place], on sulfasalazine


 


Anemia - likely of chronic disease, will monitor


Cardiomegaly - notable on CT chest - no known cardiac history, though his mother

did have CHF and CAD


Right renal mass -  


HYPOTENSION 


 


Severe malnutrition





History of Present Illness


History of Present Illness


1/20/2021,   add PPN,   fluid stable


cont current,  PEG tube tomorrow 


vent OK








Patient seen and examined in the ICU


26 days past his COVID-19 diagnosis


He is still intubated


Assist-control 


Has NG tube feeds


cont supportive care


Sedated with fentanyl Versed and propofol





Vitals


Vitals





Vital Signs








  Date Time  Temp Pulse Resp B/P (MAP) Pulse Ox O2 Delivery O2 Flow Rate FiO2


 


1/20/21 12:00  54  108/62 (77) 97 Ventilator  


 


1/20/21 09:15       40.0 


 


1/20/21 08:00 98.0       





 98.0       


 


1/20/21 06:00   28     











Physical Exam


Physical Exam


GENERAL:  Intubated, sedated.


HEENT:  Normocephalic, atraumatic.  Anicteric.  ETT and OGT tube in place.


NECK:  Supple.


LUNGS:  Decreased breath sounds at the bases.  No wheezing.


HEART:  S1, S2.


ABDOMEN: Mildly distended


EXTREMITIES: Generalized anasarca


GENITOURINARY:  Hart in place.scrotal swelling +


CENTRAL NERVOUS SYSTEM:  Intubated, sedated.


PSYCHIATRIC:  Unable to assess.


LINES:  Right upper extremity PICC line clean.


General:  Other (sedated )


Heart:  Regular rate, Normal S1, Normal S2


Lungs:  Clear


Abdomen:  Soft, No tenderness


Extremities:  No clubbing, No cyanosis


Skin:  No rashes, No breakdown





Labs


LABS





Laboratory Tests








Test


 1/20/21


08:55


 


O2 Saturation 94 % (92-99) 


 


Arterial Blood pH


 7.44


(7.35-7.45)


 


Arterial Blood pCO2 at


Patient Temp 42 mmHg


(35-46)


 


Arterial Blood pO2 at Patient


Temp 76 mmHg


()


 


Arterial Blood HCO3


 28 mmol/L


(21-28)


 


Arterial Blood Base Excess


 3 mmol/L


(-3-3)


 


FiO2 55% 











Comment


Review of Relevant


I have reviewed the following items jabier (where applicable) has been applied.


Labs





Laboratory Tests








Test


 1/19/21


06:00 1/19/21


08:00 1/19/21


09:45 1/20/21


08:55


 


White Blood Count


 7.2 x10^3/uL


(4.0-11.0) 


 


 





 


Red Blood Count


 2.60 x10^6/uL


(4.30-5.70) 


 


 





 


Hemoglobin


 8.1 g/dL


(13.0-17.5) 


 


 





 


Hematocrit


 25.0 %


(39.0-53.0) 


 


 





 


Mean Corpuscular Volume 96 fL ()    


 


Mean Corpuscular Hemoglobin 31 pg (25-35)    


 


Mean Corpuscular Hemoglobin


Concent 32 g/dL


(31-37) 


 


 





 


Red Cell Distribution Width


 16.1 %


(11.5-14.5) 


 


 





 


Platelet Count


 282 x10^3/uL


(140-400) 


 


 





 


Neutrophils (%) (Auto) 69 % (31-73)    


 


Lymphocytes (%) (Auto) 18 % (24-48)    


 


Monocytes (%) (Auto) 10 % (0-9)    


 


Eosinophils (%) (Auto) 3 % (0-3)    


 


Basophils (%) (Auto) 1 % (0-3)    


 


Neutrophils # (Auto)


 4.9 x10^3/uL


(1.8-7.7) 


 


 





 


Lymphocytes # (Auto)


 1.3 x10^3/uL


(1.0-4.8) 


 


 





 


Monocytes # (Auto)


 0.7 x10^3/uL


(0.0-1.1) 


 


 





 


Eosinophils # (Auto)


 0.2 x10^3/uL


(0.0-0.7) 


 


 





 


Basophils # (Auto)


 0.1 x10^3/uL


(0.0-0.2) 


 


 





 


Sodium Level


 143 mmol/L


(136-145) 


 


 





 


Potassium Level


 3.9 mmol/L


(3.5-5.1) 


 


 





 


Chloride Level


 106 mmol/L


() 


 


 





 


Carbon Dioxide Level


 32 mmol/L


(21-32) 


 


 





 


Anion Gap 5 (6-14)    


 


Blood Urea Nitrogen


 23 mg/dL


(8-26) 


 


 





 


Creatinine


 0.6 mg/dL


(0.7-1.3) 


 


 





 


Estimated GFR


(Cockcroft-Gault) 134.8 


 


 


 





 


BUN/Creatinine Ratio 38 (6-20)    


 


Glucose Level


 93 mg/dL


(70-99) 


 


 





 


Calcium Level


 8.6 mg/dL


(8.5-10.1) 


 


 





 


Total Bilirubin


 0.3 mg/dL


(0.2-1.0) 


 


 





 


Aspartate Amino Transf


(AST/SGOT) 40 U/L (15-37) 


 


 


 





 


Alanine Aminotransferase


(ALT/SGPT) 63 U/L (16-63) 


 


 


 





 


Alkaline Phosphatase


 70 U/L


() 


 


 





 


Total Protein


 5.9 g/dL


(6.4-8.2) 


 


 





 


Albumin


 1.7 g/dL


(3.4-5.0) 


 


 





 


Albumin/Globulin Ratio 0.4 (1.0-1.7)    


 


O2 Saturation  99 % (92-99)   94 % (92-99) 


 


Arterial Blood pH


 


 7.45


(7.35-7.45) 


 7.44


(7.35-7.45)


 


Arterial Blood pCO2 at


Patient Temp 


 49 mmHg


(35-46) 


 42 mmHg


(35-46)


 


Arterial Blood pO2 at Patient


Temp 


 139 mmHg


() 


 76 mmHg


()


 


Arterial Blood HCO3


 


 33 mmol/L


(21-28) 


 28 mmol/L


(21-28)


 


Arterial Blood Base Excess


 


 8 mmol/L


(-3-3) 


 3 mmol/L


(-3-3)


 


FiO2  55/vent   55% 


 


Coronavirus (PCR)


 


 


 Not detected


(Not Detected) 





 


SARS-CoV-2 Antigen (Rapid)


 


 


 Negative


(NEGATIVE) 











Laboratory Tests








Test


 1/20/21


08:55


 


O2 Saturation 94 % (92-99) 


 


Arterial Blood pH


 7.44


(7.35-7.45)


 


Arterial Blood pCO2 at


Patient Temp 42 mmHg


(35-46)


 


Arterial Blood pO2 at Patient


Temp 76 mmHg


()


 


Arterial Blood HCO3


 28 mmol/L


(21-28)


 


Arterial Blood Base Excess


 3 mmol/L


(-3-3)


 


FiO2 55% 








Microbiology


1/2/21 Blood Culture - Final, Complete


         NO GROWTH AFTER 5 DAYS


Medications





Current Medications


Sodium Chloride (Normal Saline Flush) 3 ml QSHIFT  PRN IV AFTER MEDS AND BLOOD 

DRAWS;  Start 12/23/20 at 07:45


Sodium Chloride 1,000 ml @  150 mls/hr Q6H40M IV  Last administered on 

12/23/20at 15:10;  Start 12/23/20 at 08:15;  Stop 12/23/20 at 21:34;  Status DC


Ondansetron HCl (Zofran) 4 mg PRN Q4HRS  PRN IV NAUSEA/VOMITING;  Start 12/23/20

at 08:15


Acetaminophen (Tylenol) 650 mg PRN Q4HRS  PRN PO TEMP OVER 100.4F OR MILD PAIN 

Last administered on 12/31/20at 01:02;  Start 12/23/20 at 08:15


Docusate Sodium (Colace) 100 mg PRN BID  PRN PO HARD STOOLS Last administered on

12/25/20at 17:03;  Start 12/23/20 at 08:15


Ceftriaxone Sodium (Rocephin) 1 gm Q24H IVP  Last administered on 1/3/21at 

08:14;  Start 12/24/20 at 09:00;  Stop 1/3/21 at 13:15;  Status DC


Dexamethasone Sodium Phosphate (Decadron) 4 mg DAILY IVP  Last administered on 

1/3/21at 08:14;  Start 12/24/20 at 09:00;  Stop 1/4/21 at 08:39;  Status DC


Amino Acids/ Glycerin/ Electrolytes 1,000 ml @  80 mls/hr [id number] IV  Last 

administered on 1/4/21at 14:53;  Start 12/23/20 at 08:45;  Stop 1/5/21 at 05:03;

 Status DC


Enoxaparin Sodium (Lovenox 40mg Syringe) 40 mg Q12HR SQ  Last administered on 

1/20/21at 08:05;  Start 12/23/20 at 09:00


Zinc Sulfate (Orazinc) 220 mg DAILY PO  Last administered on 1/20/21at 08:04;  

Start 12/23/20 at 09:00


Guaifenesin (Robitussin Dm) 10 ml PRN Q6HRS  PRN PO COUGH-2ND CHOICE Last 

administered on 1/1/21at 15:37;  Start 12/23/20 at 08:45


Acetaminophen (Tylenol Supp) 650 mg PRN Q6HRS  PRN [place] MILD PAIN / TEMP > 

100.3'F;  Start 12/23/20 at 08:45


Morphine Sulfate (Morphine Sulfate) 2 mg PRN Q4HRS  PRN IV PAIN Last 

administered on 1/1/21at 16:12;  Start 12/23/20 at 08:45;  Stop 1/2/21 at 01:41;

 Status DC


Azithromycin 500 mg/Sodium Chloride 250 ml @  250 mls/hr 1X  ONCE IV  Last 

administered on 12/23/20at 09:59;  Start 12/23/20 at 10:00;  Stop 12/23/20 at 

10:59;  Status DC


Azithromycin 250 mg/Sodium Chloride 250 ml @  250 mls/hr Q24H IV  Last 

administered on 12/27/20at 08:55;  Start 12/24/20 at 09:00;  Stop 12/27/20 at 

09:59;  Status DC


Cetirizine HCl (ZyrTEC) 10 mg DAILY PO  Last administered on 1/20/21at 08:05;  

Start 12/24/20 at 09:00


Morphine Sulfate (Ms Contin) 15 mg BID PO  Last administered on 1/5/21at 21:11; 

Start 12/23/20 at 21:00;  Stop 1/6/21 at 14:43;  Status DC


Cyanocobalamin (Vitamin B-12) 1,000 mcg DAILY PO  Last administered on 1/20/21at

08:04;  Start 12/24/20 at 09:00


Citalopram Hydrobromide (CeleXA) 20 mg DAILY PO  Last administered on 1/20/21at 

08:05;  Start 12/24/20 at 09:00


Atorvastatin Calcium (Lipitor) 40 mg QHS PO  Last administered on 1/19/21at 

21:27;  Start 12/23/20 at 21:00


Sulfasalazine (Azulfidine) 1,000 mg BID PO  Last administered on 1/19/21at 

21:27;  Start 12/23/20 at 21:00


Tramadol HCl (Ultram) 100 mg PRN Q8HRS  PRN PO MODERATE PAIN, SEVERE PAIN Last 

administered on 1/1/21at 13:35;  Start 12/23/20 at 15:00


Zolpidem Tartrate (Ambien) 5 mg PRN QHS  PRN PO INSOMNIA Last administered on 

1/1/21at 22:05;  Start 12/23/20 at 15:00


Info (Icu Electrolyte Protocol) 1 ea CONT PRN  PRN MC PER PROTOCOL;  Start 

12/24/20 at 14:15


Potassium Chloride (Klor-Con) 40 meq 1X  ONCE PO  Last administered on 

12/24/20at 14:24;  Start 12/24/20 at 14:15;  Stop 12/24/20 at 14:16;  Status DC


Sterile Water (WATER for RESP) 1,000 ml CONT  PRN INH VIA VAPOTHERM DEVICE Last 

administered on 1/1/21at 15:49;  Start 12/26/20 at 09:30


Benzonatate (Tessalon Perle) 100 mg [id number] PO  Last administered on 1/1/21at 

20:51;  Start 12/26/20 at 14:00;  Stop 1/2/21 at 10:35;  Status DC


Labetalol HCl (Normodyne Iv Push) 10 mg PRN Q2HR  PRN IVP HYPERTENSION Last 

administered on 1/16/21at 19:27;  Start 12/26/20 at 18:30


Quetiapine Fumarate (SEROquel) 50 mg PRN QHS  PRN PO sleep 2ND CHOICE Last 

administered on 1/1/21at 00:24;  Start 12/27/20 at 21:00


Enalaprilat (Vasotec Inj) 2.5 mg PRN Q6HRS  PRN IVP HYPERTENSION-2ND CHOICE Last

administered on 12/28/20at 12:29;  Start 12/27/20 at 10:00


Olanzapine (ZyPREXA ZYDIS) 5 mg PRN BID  PRN PO ANXIETY / AGITATION Last 

administered on 1/11/21at 09:56;  Start 12/27/20 at 12:30


Guaifenesin (Robitussin) 400 mg PRN Q4HRS  PRN PO COUGH;  Start 12/27/20 at 

22:15;  Stop 12/27/20 at 22:27;  Status DC


Guaifenesin (Robitussin) 400 mg PRN Q4HRS  PRN PO COUGH Last administered on 

1/1/21at 23:00;  Start 12/27/20 at 22:30


Furosemide (Lasix) 40 mg 1X  ONCE IVP  Last administered on 12/30/20at 10:15;  

Start 12/30/20 at 10:00;  Stop 12/30/20 at 10:01;  Status DC


Sodium Chloride 1,000 ml @  100 mls/hr Q10H IV  Last administered on 1/7/21at 

00:03;  Start 12/31/20 at 10:15;  Stop 1/7/21 at 13:14;  Status DC


Furosemide (Lasix) 20 mg 1X  ONCE IVP  Last administered on 1/1/21at 13:34;  

Start 1/1/21 at 12:30;  Stop 1/1/21 at 12:31;  Status DC


Alteplase, Recombinant (Cathflo For Central Catheter Clearance) 1 mg 1X  ONCE 

INT CAT  Last administered on 1/2/21at 02:10;  Start 1/1/21 at 16:15;  Stop 

1/1/21 at 16:16;  Status DC


Fentanyl Citrate 30 ml @ 0 mls/hr CONT  PRN IV SEE PROTOCOL Last administered on

1/2/21at 01:58;  Start 1/2/21 at 01:15;  Stop 1/2/21 at 02:36;  Status DC


Propofol 100 ml @ 0 mls/hr CONT  PRN IV PER PROTOCOL Last administered on 1/16/2

1at 23:12;  Start 1/2/21 at 01:15


Chlorhexidine Gluconate (Peridex) 15 ml BID MM  Last administered on 1/9/21at 

09:21;  Start 1/2/21 at 09:00;  Stop 1/9/21 at 19:26;  Status DC


Midazolam HCl 100 ml @ 0 mls/hr CONT  PRN IV SEE PROTOCOL Last administered on 

1/20/21at 05:42;  Start 1/2/21 at 01:15


Succinylcholine Chloride (Anectine) 200 mg STK-MED ONCE .ROUTE ;  Start 1/2/21 

at 01:21;  Stop 1/2/21 at 01:21;  Status DC


Fentanyl Citrate 55 ml @ 0 mls/hr CONT PRN  PRN IV PAIN CONTROL Last 

administered on 1/20/21at 09:15;  Start 1/2/21 at 02:45


Norepinephrine Bitartrate 8 mg/ Dextrose 258 ml @  19.737 mls/ hr CONT  PRN IV 

PER PROTOCOL Last administered on 1/2/21at 22:27;  Start 1/2/21 at 07:30;  Stop 

1/5/21 at 08:55;  Status DC


Vecuronium Bromide (Norcuron Bolus) 6 mg PRN Q6HRS  PRN IV SEDATION Last 

administered on 1/9/21at 17:07;  Start 1/2/21 at 09:15


Vancomycin HCl (Vanco Per Pharmacy) 1 each PRN DAILY  PRN MC SEE COMMENTS Last 

administered on 1/2/21at 20:47;  Start 1/2/21 at 16:00;  Stop 1/3/21 at 13:19;  

Status DC


Vancomycin HCl 2 gm/Sodium Chloride 500 ml @  250 mls/hr 1X  ONCE IV  Last 

administered on 1/2/21at 17:43;  Start 1/2/21 at 17:00;  Stop 1/2/21 at 18:59;  

Status DC


Vancomycin HCl 1.5 gm/Sodium Chloride 500 ml @  250 mls/hr Q12H IV  Last 

administered on 1/3/21at 05:53;  Start 1/3/21 at 05:00;  Stop 1/3/21 at 13:15;  

Status DC


Vancomycin HCl (Vancomycin Trough Level) 1 each 1X  ONCE MC ;  Start 1/4/21 at 

04:30;  Stop 1/3/21 at 13:20;  Status DC


Cefepime HCl (Maxipime) 2 gm Q12HR IVP  Last administered on 1/12/21at 20:52;  

Start 1/3/21 at 14:00;  Stop 1/13/21 at 07:59;  Status DC


Linezolid (Zyvox) 600 mg BID PO  Last administered on 1/6/21at 20:38;  Start 

1/3/21 at 21:00;  Stop 1/7/21 at 07:58;  Status DC


Dexamethasone Sodium Phosphate (Decadron) 6 mg DAILY IVP  Last administered on 

1/5/21at 08:36;  Start 1/4/21 at 09:00;  Stop 1/5/21 at 08:55;  Status DC


Polyethylene Glycol (miraLAX PACKET) 17 gm DAILY PO  Last administered on 

1/15/21at 08:40;  Start 1/6/21 at 14:30;  Stop 1/17/21 at 12:21;  Status DC


Nystatin (Nystop) 1 lindsay BID TP  Last administered on 1/20/21at 08:46;  Start 

1/6/21 at 16:00


Sodium Chloride 1,000 ml @  0 mls/hr Q0M IV ;  Start 1/7/21 at 13:15


Famotidine (Pepcid Vial) 20 mg BID IVP  Last administered on 1/20/21at 08:04;  

Start 1/8/21 at 21:00


Furosemide (Lasix) 40 mg 1X  ONCE IVP  Last administered on 1/12/21at 11:18;  

Start 1/12/21 at 11:15;  Stop 1/12/21 at 11:16;  Status DC


Multivitamins/ Minerals Therapeutic (Centrum Multivit-Mineral Liq) 5 ml DAILY 

PEG  Last administered on 1/20/21at 08:17;  Start 1/15/21 at 09:00


Dexmedetomidine HCl 400 mcg/ Sodium Chloride 100 ml @ 0 mls/hr CONT  PRN IV PER 

PROTOCOL Last administered on 1/20/21at 08:06;  Start 1/17/21 at 07:15


Atropine Sulfate (ATROPINE 0.5mg SYRINGE) 0.5 mg PRN Q5MIN  PRN IV SEE COMMENTS;

 Start 1/17/21 at 07:15


Rocuronium Bromide (Zemuron) 50 mg STK-MED ONCE .ROUTE ;  Start 1/19/21 at 

10:24;  Stop 1/19/21 at 10:25;  Status DC


Bupivacaine HCl/ Epinephrine Bitart (Sensorcain-Epi 0.5% Kit) 30 ml STK-MED ONCE

.ROUTE  Last administered on 1/19/21at 12:47;  Start 1/19/21 at 11:34;  Stop 

1/19/21 at 11:34;  Status DC


Cellulose (Surgicel Fibrillar 1x2) 1 each STK-MED ONCE .ROUTE  Last administered

on 1/19/21at 12:56;  Start 1/19/21 at 11:34;  Stop 1/19/21 at 11:34;  Status DC


Ephedrine Sulfate (Akovaz) 50 mg STK-MED ONCE .ROUTE ;  Start 1/19/21 at 12:56; 

Stop 1/19/21 at 12:57;  Status DC


Desflurane (Suprane) 15 ml STK-MED ONCE IH ;  Start 1/19/21 at 12:56;  Stop 

1/19/21 at 12:57;  Status DC


Sodium Chloride (SODIUM CHLORIDE 20ml) 20 ml STK-MED ONCE IJ ;  Start 1/19/21 at

12:57;  Stop 1/19/21 at 12:57;  Status DC


Amino Acids/ Glycerin/ Electrolytes 1,000 ml @  80 mls/hr [id number] IV ;  Start 

1/20/21 at 11:00


Cefazolin Sodium/ Dextrose 50 ml @  100 mls/hr 1X  ONCE IV ;  Start 1/21/21 at 

12:00;  Stop 1/21/21 at 12:29





Active Scripts


Active


Reported


Morphine Sulfate Er (Morphine Sulfate) 30 Mg Tablet.er 1 Tab PO BID


Tramadol Hcl 100 Mg Tbmp.24hr 100 Mg PO PRN Q8HRS PRN


Ambien (Zolpidem Tartrate) 10 Mg Tablet 10 Mg PO PRN QHS PRN


Methotrexate (Methotrexate Sodium) 2.5 Mg Tablet 10 Tab PO WEEKLY


Lisinopril 10 Mg Tablet 1 Tab PO DAILY


Crestor (Rosuvastatin Calcium) 5 Mg Tablet 2 Tab PO DAILY


Hydrochlorothiazide Tablet (Hydrochlorothiazide) 50 Mg Tablet 25 Mg PO DAILY


Escitalopram Oxalate 10 Mg Tablet 1 Tab PO DAILY


Sulfasalazine Dr (Sulfasalazine) 500 Mg Tablet.dr 2 Tab PO TID 30 Days


Colace (Docusate Sodium) 100 Mg Capsule 1 Cap PO DA 30 Days


Acetaminophen Ext.release (Acetaminophen) 650 Mg Tablet.er 650 Mg PO PRN Q8HRS 

PRN


Melatonin 5 Mg Capsule 1 Cap PO QHS 30 Days


B-12 (Cyanocobalamin (Vitamin B-12)) 500 Mcg Tablet 2 Tab PO DAILY 30 Days


Vitamin D3 (Cholecalciferol (Vitamin D3)) 50 Mcg Capsule 50 Mcg PO DAILY


Aller-Sung (Cetirizine Hcl) 10 Mg Tablet 1 Tab PO DAILY 30 Days


Vitals/I & O





Vital Sign - Last 24 Hours








 1/19/21 1/19/21 1/19/21 1/19/21





 14:25 15:00 16:00 16:00


 


Temp    98.8





    98.8


 


Pulse  56  54


 


Resp  24  24


 


B/P (MAP)  109/62 (78)  108/64 (79)


 


Pulse Ox  96  98


 


O2 Delivery Ventilator Ventilator Mechanical Ventilator Ventilator


 


    





    





 1/19/21 1/19/21 1/19/21 1/19/21





 16:17 17:00 17:13 18:00


 


Pulse  56  64


 


Resp  24  24


 


B/P (MAP)  91/55 (67)  130/74 (92)


 


Pulse Ox 98 95  98


 


O2 Delivery Ventilator Ventilator Ventilator Ventilator





 1/19/21 1/19/21 1/19/21 1/19/21





 19:00 19:54 20:00 20:00


 


Temp    98.4





    98.4


 


Pulse 56   56


 


Resp 28   28


 


B/P (MAP) 131/76 (94)   135/85 (102)


 


Pulse Ox 98 100  98


 


O2 Delivery Ventilator Ventilator Mechanical Ventilator Ventilator


 


    





    





 1/19/21 1/19/21 1/19/21 1/19/21





 21:00 22:00 23:00 23:39


 


Pulse 54 55 54 


 


Resp 28 28 28 


 


B/P (MAP) 116/60 (78) 121/71 (88) 125/77 (93) 


 


Pulse Ox 99 98 99 100


 


O2 Delivery Ventilator Ventilator Ventilator Ventilator





 1/19/21 1/19/21 1/20/21 1/20/21





 23:59 23:59 01:00 02:00


 


Temp 98.1   





 98.1   


 


Pulse 53  55 53


 


Resp 28 28 28


 


B/P (MAP) 131/75 (93)  119/72 (88) 106/62 (77)


 


Pulse Ox 99  100 100


 


O2 Delivery Ventilator Mechanical Ventilator Ventilator Ventilator


 


    





    





 1/20/21 1/20/21 1/20/21 1/20/21





 03:00 03:46 04:00 04:00


 


Temp    98.2





    98.2


 


Pulse 62   54


 


Resp 28 28


 


B/P (MAP) 98/58 (71)   119/67 (84)


 


Pulse Ox 99 100  100


 


O2 Delivery Ventilator Ventilator Mechanical Ventilator Ventilator


 


    





    





 1/20/21 1/20/21 1/20/21 1/20/21





 05:00 06:00 07:00 08:00


 


Pulse 55 60 54 


 


Resp 28 28  


 


B/P (MAP) 117/69 (85) 114/65 (81) 117/67 (84) 


 


Pulse Ox 100 100 99 


 


O2 Delivery Ventilator Ventilator Ventilator Mechanical Ventilator





 1/20/21 1/20/21 1/20/21 1/20/21





 08:00 08:39 09:00 09:15


 


Temp 98.0   





 98.0   


 


Pulse 56  54 


 


B/P (MAP) 135/72 (93)  98/49 (65) 


 


Pulse Ox 100 98 99 98


 


O2 Delivery Ventilator Ventilator Ventilator 


 


O2 Flow Rate    40.0


 


    





    





 1/20/21 1/20/21 1/20/21 1/20/21





 10:00 11:00 11:00 12:00


 


Pulse 54 54  


 


B/P (MAP) 109/61 (77) 119/64 (82)  


 


Pulse Ox 100 100 100 


 


O2 Delivery Ventilator Ventilator Ventilator Mechanical Ventilator





 1/20/21   





 12:00   


 


Pulse 54   


 


B/P (MAP) 108/62 (77)   


 


Pulse Ox 97   


 


O2 Delivery Ventilator   














Intake and Output   


 


 1/19/21 1/19/21 1/20/21





 15:00 23:00 07:00


 


Intake Total 300 ml 850 ml 485 ml


 


Output Total 275 ml 295 ml 205 ml


 


Balance 25 ml 555 ml 280 ml











Justicifation of Admission Dx:


Justifications for Admission:


Justification of Admission Dx:  Yes


Comminuty Aquired Pneumonia:  Med-High Risk Pt











ANNE SHEPARD MD                 Jan 20, 2021 14:00

## 2021-01-20 NOTE — RAD
XR CHEST 1V 



INDICATION: Reason: resp. failure 113 / Spl. Instructions:  / History: . 



COMPARISON STUDY: 1/15/2021.



FINDINGS:



Life Support Devices: Endotracheal tube terminates 3.4 cm above the ruchi. Stable right PICC, enteri
c tube.



Lungs: Normal lung volume. Stable bilateral perihilar and basilar opacities.



Pleura: Stable pleural spaces.



Heart and Mediastinum: Stable cardiomediastinal silhouette and great vessels. 



Bones and Soft Tissues: Stable regional skeleton and soft tissues.



IMPRESSION:  

1. Life-support devices as above.

2. Stable bilateral perihilar and basilar opacities.



Electronically signed by: Barney Fish MD (1/20/2021 8:27 AM) [id number]

## 2021-01-20 NOTE — PDOC
Date of Service:


DATE: 1/20/21 


TIME: 10:02





Objective:


Objective:


D/w nurse - no GI concerns, tube feeds held.


Vital Signs:





                                   Vital Signs








  Date Time  Temp Pulse Resp B/P (MAP) Pulse Ox O2 Delivery O2 Flow Rate FiO2


 


1/20/21 09:15     98  40.0 


 


1/20/21 08:39      Ventilator  


 


1/20/21 06:00  60 28 114/65 (81)    


 


1/20/21 04:00 98.2       





 98.2       








Labs:





Laboratory Tests








Test


 1/20/21


08:55


 


O2 Saturation 94 % 


 


Arterial Blood pH 7.44 


 


Arterial Blood pCO2 at


Patient Temp 42 mmHg 





 


Arterial Blood pO2 at Patient


Temp 76 mmHg 





 


Arterial Blood HCO3 28 mmol/L 


 


Arterial Blood Base Excess 3 mmol/L 


 


FiO2 55% 








Imaging:


CXR 1/20


IMPRESSION:  


1. Life-support devices as above.


2. Stable bilateral perihilar and basilar opacities.





PE:





GEN: NAD


LUNGS: trach/vent


HEART: RRR


ABD: quiet, soft


NEURO/PSYCH: sedated





A/P:


COVID-19 infection, resp failure s/p tracheostomy 1/19


H/o Crohn's, GERD, h/o PEG


COVID negative 1/19





--


Will review timing of PEG placement w/ Dr. Goddard.





Justicifation of Admission Dx:


Justifications for Admission:


Justification of Admission Dx:  Yes


Comminuty Aquired Pneumonia:  Med-High Risk Pt











LORAINE CUADRA         Jan 20, 2021 10:05

## 2021-01-20 NOTE — PDOC
PULMONARY PROGRESS NOTES


DATE: 1/20/21 


TIME: 10:18


Subjective


Patient remains on ventilatory support, FiO2 50% and a PEEP of 5,


Afebrile overnight


Tolerating tube feeding well, planned for trach today 


No overnight concerns from nursing


Vitals





Vital Signs








  Date Time  Temp Pulse Resp B/P (MAP) Pulse Ox O2 Delivery O2 Flow Rate FiO2


 


1/20/21 09:15     98  40.0 


 


1/20/21 08:39      Ventilator  


 


1/20/21 06:00  60 28 114/65 (81)    


 


1/20/21 04:00 98.2       





 98.2       








Comments


Intubated/sedated


Lungs:  Clear


Cardiovascular:  S1, S2


Abdomen:  Soft


Extremities:  No Edema


Skin:  Warm, Dry


Labs





Laboratory Tests








Test


 1/18/21


13:45 1/19/21


06:00 1/19/21


08:00 1/19/21


09:45


 


Prothrombin Time


 14.6 SEC


(11.7-14.0) 


 


 





 


Prothromb Time International


Ratio 1.2 (0.8-1.1) 


 


 


 





 


White Blood Count


 


 7.2 x10^3/uL


(4.0-11.0) 


 





 


Red Blood Count


 


 2.60 x10^6/uL


(4.30-5.70) 


 





 


Hemoglobin


 


 8.1 g/dL


(13.0-17.5) 


 





 


Hematocrit


 


 25.0 %


(39.0-53.0) 


 





 


Mean Corpuscular Volume  96 fL ()   


 


Mean Corpuscular Hemoglobin  31 pg (25-35)   


 


Mean Corpuscular Hemoglobin


Concent 


 32 g/dL


(31-37) 


 





 


Red Cell Distribution Width


 


 16.1 %


(11.5-14.5) 


 





 


Platelet Count


 


 282 x10^3/uL


(140-400) 


 





 


Neutrophils (%) (Auto)  69 % (31-73)   


 


Lymphocytes (%) (Auto)  18 % (24-48)   


 


Monocytes (%) (Auto)  10 % (0-9)   


 


Eosinophils (%) (Auto)  3 % (0-3)   


 


Basophils (%) (Auto)  1 % (0-3)   


 


Neutrophils # (Auto)


 


 4.9 x10^3/uL


(1.8-7.7) 


 





 


Lymphocytes # (Auto)


 


 1.3 x10^3/uL


(1.0-4.8) 


 





 


Monocytes # (Auto)


 


 0.7 x10^3/uL


(0.0-1.1) 


 





 


Eosinophils # (Auto)


 


 0.2 x10^3/uL


(0.0-0.7) 


 





 


Basophils # (Auto)


 


 0.1 x10^3/uL


(0.0-0.2) 


 





 


Sodium Level


 


 143 mmol/L


(136-145) 


 





 


Potassium Level


 


 3.9 mmol/L


(3.5-5.1) 


 





 


Chloride Level


 


 106 mmol/L


() 


 





 


Carbon Dioxide Level


 


 32 mmol/L


(21-32) 


 





 


Anion Gap  5 (6-14)   


 


Blood Urea Nitrogen


 


 23 mg/dL


(8-26) 


 





 


Creatinine


 


 0.6 mg/dL


(0.7-1.3) 


 





 


Estimated GFR


(Cockcroft-Gault) 


 134.8 


 


 





 


BUN/Creatinine Ratio  38 (6-20)   


 


Glucose Level


 


 93 mg/dL


(70-99) 


 





 


Calcium Level


 


 8.6 mg/dL


(8.5-10.1) 


 





 


Total Bilirubin


 


 0.3 mg/dL


(0.2-1.0) 


 





 


Aspartate Amino Transf


(AST/SGOT) 


 40 U/L (15-37) 


 


 





 


Alanine Aminotransferase


(ALT/SGPT) 


 63 U/L (16-63) 


 


 





 


Alkaline Phosphatase


 


 70 U/L


() 


 





 


Total Protein


 


 5.9 g/dL


(6.4-8.2) 


 





 


Albumin


 


 1.7 g/dL


(3.4-5.0) 


 





 


Albumin/Globulin Ratio  0.4 (1.0-1.7)   


 


O2 Saturation   99 % (92-99)  


 


Arterial Blood pH


 


 


 7.45


(7.35-7.45) 





 


Arterial Blood pCO2 at


Patient Temp 


 


 49 mmHg


(35-46) 





 


Arterial Blood pO2 at Patient


Temp 


 


 139 mmHg


() 





 


Arterial Blood HCO3


 


 


 33 mmol/L


(21-28) 





 


Arterial Blood Base Excess


 


 


 8 mmol/L


(-3-3) 





 


FiO2   55/vent  


 


Coronavirus (PCR)


 


 


 


 Not detected


(Not Detected)


 


SARS-CoV-2 Antigen (Rapid)


 


 


 


 Negative


(NEGATIVE)


 


Test


 1/20/21


08:55 


 


 





 


O2 Saturation 94 % (92-99)    


 


Arterial Blood pH


 7.44


(7.35-7.45) 


 


 





 


Arterial Blood pCO2 at


Patient Temp 42 mmHg


(35-46) 


 


 





 


Arterial Blood pO2 at Patient


Temp 76 mmHg


() 


 


 





 


Arterial Blood HCO3


 28 mmol/L


(21-28) 


 


 





 


Arterial Blood Base Excess


 3 mmol/L


(-3-3) 


 


 





 


FiO2 55%    








Laboratory Tests








Test


 1/20/21


08:55


 


O2 Saturation 94 % (92-99) 


 


Arterial Blood pH


 7.44


(7.35-7.45)


 


Arterial Blood pCO2 at


Patient Temp 42 mmHg


(35-46)


 


Arterial Blood pO2 at Patient


Temp 76 mmHg


()


 


Arterial Blood HCO3


 28 mmol/L


(21-28)


 


Arterial Blood Base Excess


 3 mmol/L


(-3-3)


 


FiO2 55% 








Medications





Active Scripts








 Medications  Dose


 Route/Sig


 Max Daily Dose Days Date Category


 


 Morphine Sulfate


 Er (Morphine


 Sulfate) 30 Mg


 Tablet.er  1 Tab


 PO BID


   12/23/20 Reported


 


 Tramadol Hcl 100


 Mg Tbmp.24hr  100 Mg


 PO PRN Q8HRS PRN


   12/23/20 Reported


 


 Ambien (Zolpidem


 Tartrate) 10 Mg


 Tablet  10 Mg


 PO PRN QHS PRN


   12/23/20 Reported


 


 Methotrexate


  (Methotrexate


 Sodium) 2.5 Mg


 Tablet  10 Tab


 PO WEEKLY


   12/23/20 Reported


 


 Lisinopril 10 Mg


 Tablet  1 Tab


 PO DAILY


   12/23/20 Reported


 


 Crestor


  (Rosuvastatin


 Calcium) 5 Mg


 Tablet  2 Tab


 PO DAILY


   12/23/20 Reported


 


 Hydrochlorothiazide


 Tablet


  (Hydrochlorothiazide)


 50 Mg Tablet  25 Mg


 PO DAILY


   12/23/20 Reported


 


 Escitalopram


 Oxalate 10 Mg


 Tablet  1 Tab


 PO DAILY


   12/23/20 Reported


 


 Sulfasalazine Dr


  (Sulfasalazine)


 500 Mg Tablet.dr  2 Tab


 PO TID


  30 12/23/20 Reported


 


 Colace (Docusate


 Sodium) 100 Mg


 Capsule  1 Cap


 PO DA


  30 12/23/20 Reported


 


 Acetaminophen


 Ext.release


  (Acetaminophen)


 650 Mg Tablet.er  650 Mg


 PO PRN Q8HRS PRN


   12/23/20 Reported


 


 Melatonin 5 Mg


 Capsule  1 Cap


 PO QHS


  30 12/23/20 Reported


 


 B-12


  (Cyanocobalamin


  (Vitamin B-12))


 500 Mcg Tablet  2 Tab


 PO DAILY


  30 12/23/20 Reported


 


 Vitamin D3


  (Cholecalciferol


  (Vitamin D3)) 50


 Mcg Capsule  50 Mcg


 PO DAILY


   12/23/20 Reported


 


 Aller-Sung


  (Cetirizine Hcl)


 10 Mg Tablet  1 Tab


 PO DAILY


  30 12/23/20 Reported








Comments


CXR 1/20


  Bilateral parenchymal opacities and left pleural effusion are grossly stable.





Impression


.


IMPRESSION:


1.  Acute hypoxic respiratory failure secondary to highly suspected COVID-19


pneumonia and acute respiratory distress syndrome/acute lung injury.-- COVID-19 

positive ----improving


2.  Abnormal CT chest with extensive widespread ground glass and alveolar and


interstitial infiltrates with reactive mild mediastinal/hilar adenopathy.  This 

related to COVID-19 pneumonia.


3.  Patient with history of psoriatic arthritis.  He is likely immunocompromised


as he has been on methotrexate.  


4.  History of tongue cancer with resection, details unknown.


5.  History of prostate cancer.


6.  History of Crohn's disease.





Plan


.


RECOMMENDATIONS:


Continue current ventilatory support, FiO2 50% and a PEEP of 5


s/p trach 1/19


awaiting PEG


will wean off sedation post PEG


Follow chest x-ray/ABG---- 


COVID-19 positive-- now recovered 


Patient remains off antibiotics at this time, infectious disease of signed off 

cultures are negative


Patient is completed a full 10-day course of steroids


Patient has completed full course of steroids


Continue TF for nutritional support 


Patient is planned for trach today-- follow surgery recs 


Follow GI recs 


DVT/GI PPX 








D/W RN and RT 





PT. is FULL CODE





Critical care time [id number]











ABI MENDEZ MD                 Jan 20, 2021 10:22

## 2021-01-20 NOTE — PDOC
SURGICAL PROGRESS NOTE


DATE: 1/20/21 


TIME: 13:06


Subjective


Pt appears comfortable


trach intact


will sign off, but please call for questions.


Vital Signs





Vital Signs








  Date Time  Temp Pulse Resp B/P (MAP) Pulse Ox O2 Delivery O2 Flow Rate FiO2


 


1/20/21 12:00  54  108/62 (77) 97 Ventilator  


 


1/20/21 09:15       40.0 


 


1/20/21 08:00 98.0       





 98.0       


 


1/20/21 06:00   28     








I&O








l


Intake and Output 


 


 1/20/21





 07:00


 


Intake Total 1635 ml


 


Output Total 775 ml


 


Balance 860 ml


 


 


 


IV Total 585 ml


 


Tube Feeding 1050 ml


 


Output Urine Total 775 ml


 


# Bowel Movements 1








Labs





Laboratory Tests








Test


 1/18/21


13:45 1/19/21


06:00 1/19/21


08:00 1/19/21


09:45


 


Prothrombin Time


 14.6 SEC


(11.7-14.0) 


 


 





 


Prothromb Time International


Ratio 1.2 (0.8-1.1) 


 


 


 





 


White Blood Count


 


 7.2 x10^3/uL


(4.0-11.0) 


 





 


Red Blood Count


 


 2.60 x10^6/uL


(4.30-5.70) 


 





 


Hemoglobin


 


 8.1 g/dL


(13.0-17.5) 


 





 


Hematocrit


 


 25.0 %


(39.0-53.0) 


 





 


Mean Corpuscular Volume  96 fL ()   


 


Mean Corpuscular Hemoglobin  31 pg (25-35)   


 


Mean Corpuscular Hemoglobin


Concent 


 32 g/dL


(31-37) 


 





 


Red Cell Distribution Width


 


 16.1 %


(11.5-14.5) 


 





 


Platelet Count


 


 282 x10^3/uL


(140-400) 


 





 


Neutrophils (%) (Auto)  69 % (31-73)   


 


Lymphocytes (%) (Auto)  18 % (24-48)   


 


Monocytes (%) (Auto)  10 % (0-9)   


 


Eosinophils (%) (Auto)  3 % (0-3)   


 


Basophils (%) (Auto)  1 % (0-3)   


 


Neutrophils # (Auto)


 


 4.9 x10^3/uL


(1.8-7.7) 


 





 


Lymphocytes # (Auto)


 


 1.3 x10^3/uL


(1.0-4.8) 


 





 


Monocytes # (Auto)


 


 0.7 x10^3/uL


(0.0-1.1) 


 





 


Eosinophils # (Auto)


 


 0.2 x10^3/uL


(0.0-0.7) 


 





 


Basophils # (Auto)


 


 0.1 x10^3/uL


(0.0-0.2) 


 





 


Sodium Level


 


 143 mmol/L


(136-145) 


 





 


Potassium Level


 


 3.9 mmol/L


(3.5-5.1) 


 





 


Chloride Level


 


 106 mmol/L


() 


 





 


Carbon Dioxide Level


 


 32 mmol/L


(21-32) 


 





 


Anion Gap  5 (6-14)   


 


Blood Urea Nitrogen


 


 23 mg/dL


(8-26) 


 





 


Creatinine


 


 0.6 mg/dL


(0.7-1.3) 


 





 


Estimated GFR


(Cockcroft-Gault) 


 134.8 


 


 





 


BUN/Creatinine Ratio  38 (6-20)   


 


Glucose Level


 


 93 mg/dL


(70-99) 


 





 


Calcium Level


 


 8.6 mg/dL


(8.5-10.1) 


 





 


Total Bilirubin


 


 0.3 mg/dL


(0.2-1.0) 


 





 


Aspartate Amino Transf


(AST/SGOT) 


 40 U/L (15-37) 


 


 





 


Alanine Aminotransferase


(ALT/SGPT) 


 63 U/L (16-63) 


 


 





 


Alkaline Phosphatase


 


 70 U/L


() 


 





 


Total Protein


 


 5.9 g/dL


(6.4-8.2) 


 





 


Albumin


 


 1.7 g/dL


(3.4-5.0) 


 





 


Albumin/Globulin Ratio  0.4 (1.0-1.7)   


 


O2 Saturation   99 % (92-99)  


 


Arterial Blood pH


 


 


 7.45


(7.35-7.45) 





 


Arterial Blood pCO2 at


Patient Temp 


 


 49 mmHg


(35-46) 





 


Arterial Blood pO2 at Patient


Temp 


 


 139 mmHg


() 





 


Arterial Blood HCO3


 


 


 33 mmol/L


(21-28) 





 


Arterial Blood Base Excess


 


 


 8 mmol/L


(-3-3) 





 


FiO2   55/vent  


 


Coronavirus (PCR)


 


 


 


 Not detected


(Not Detected)


 


SARS-CoV-2 Antigen (Rapid)


 


 


 


 Negative


(NEGATIVE)


 


Test


 1/20/21


08:55 


 


 





 


O2 Saturation 94 % (92-99)    


 


Arterial Blood pH


 7.44


(7.35-7.45) 


 


 





 


Arterial Blood pCO2 at


Patient Temp 42 mmHg


(35-46) 


 


 





 


Arterial Blood pO2 at Patient


Temp 76 mmHg


() 


 


 





 


Arterial Blood HCO3


 28 mmol/L


(21-28) 


 


 





 


Arterial Blood Base Excess


 3 mmol/L


(-3-3) 


 


 





 


FiO2 55%    








Laboratory Tests








Test


 1/20/21


08:55


 


O2 Saturation 94 % (92-99) 


 


Arterial Blood pH


 7.44


(7.35-7.45)


 


Arterial Blood pCO2 at


Patient Temp 42 mmHg


(35-46)


 


Arterial Blood pO2 at Patient


Temp 76 mmHg


()


 


Arterial Blood HCO3


 28 mmol/L


(21-28)


 


Arterial Blood Base Excess


 3 mmol/L


(-3-3)


 


FiO2 55% 











Justicifation of Admission Dx:


Justifications for Admission:


Justification of Admission Dx:  Yes


Comminuty Aquired Pneumonia:  Med-High Risk Pt











ROGELIO ODOM MD             Jan 20, 2021 13:07

## 2021-01-21 VITALS — SYSTOLIC BLOOD PRESSURE: 132 MMHG | DIASTOLIC BLOOD PRESSURE: 56 MMHG

## 2021-01-21 VITALS — DIASTOLIC BLOOD PRESSURE: 58 MMHG | SYSTOLIC BLOOD PRESSURE: 97 MMHG

## 2021-01-21 VITALS — SYSTOLIC BLOOD PRESSURE: 99 MMHG | DIASTOLIC BLOOD PRESSURE: 56 MMHG

## 2021-01-21 VITALS — SYSTOLIC BLOOD PRESSURE: 137 MMHG | DIASTOLIC BLOOD PRESSURE: 83 MMHG

## 2021-01-21 VITALS — SYSTOLIC BLOOD PRESSURE: 95 MMHG | DIASTOLIC BLOOD PRESSURE: 58 MMHG

## 2021-01-21 VITALS — DIASTOLIC BLOOD PRESSURE: 83 MMHG | SYSTOLIC BLOOD PRESSURE: 163 MMHG

## 2021-01-21 VITALS — SYSTOLIC BLOOD PRESSURE: 111 MMHG | DIASTOLIC BLOOD PRESSURE: 72 MMHG

## 2021-01-21 VITALS — DIASTOLIC BLOOD PRESSURE: 70 MMHG | SYSTOLIC BLOOD PRESSURE: 126 MMHG

## 2021-01-21 VITALS — SYSTOLIC BLOOD PRESSURE: 122 MMHG | DIASTOLIC BLOOD PRESSURE: 72 MMHG

## 2021-01-21 VITALS — DIASTOLIC BLOOD PRESSURE: 76 MMHG | SYSTOLIC BLOOD PRESSURE: 104 MMHG

## 2021-01-21 VITALS — SYSTOLIC BLOOD PRESSURE: 109 MMHG | DIASTOLIC BLOOD PRESSURE: 64 MMHG

## 2021-01-21 VITALS — DIASTOLIC BLOOD PRESSURE: 80 MMHG | SYSTOLIC BLOOD PRESSURE: 170 MMHG

## 2021-01-21 VITALS — SYSTOLIC BLOOD PRESSURE: 111 MMHG | DIASTOLIC BLOOD PRESSURE: 50 MMHG

## 2021-01-21 VITALS — SYSTOLIC BLOOD PRESSURE: 98 MMHG | DIASTOLIC BLOOD PRESSURE: 55 MMHG

## 2021-01-21 VITALS — DIASTOLIC BLOOD PRESSURE: 85 MMHG | SYSTOLIC BLOOD PRESSURE: 158 MMHG

## 2021-01-21 VITALS — SYSTOLIC BLOOD PRESSURE: 103 MMHG | DIASTOLIC BLOOD PRESSURE: 62 MMHG

## 2021-01-21 VITALS — DIASTOLIC BLOOD PRESSURE: 70 MMHG | SYSTOLIC BLOOD PRESSURE: 124 MMHG

## 2021-01-21 VITALS — DIASTOLIC BLOOD PRESSURE: 50 MMHG | SYSTOLIC BLOOD PRESSURE: 99 MMHG

## 2021-01-21 VITALS — DIASTOLIC BLOOD PRESSURE: 50 MMHG | SYSTOLIC BLOOD PRESSURE: 87 MMHG

## 2021-01-21 VITALS — SYSTOLIC BLOOD PRESSURE: 89 MMHG | DIASTOLIC BLOOD PRESSURE: 48 MMHG

## 2021-01-21 VITALS — DIASTOLIC BLOOD PRESSURE: 51 MMHG | SYSTOLIC BLOOD PRESSURE: 100 MMHG

## 2021-01-21 VITALS — SYSTOLIC BLOOD PRESSURE: 190 MMHG | DIASTOLIC BLOOD PRESSURE: 103 MMHG

## 2021-01-21 LAB
ALBUMIN SERPL-MCNC: 1.6 G/DL (ref 3.4–5)
ALBUMIN/GLOB SERPL: 0.4 {RATIO} (ref 1–1.7)
ALP SERPL-CCNC: 60 U/L (ref 46–116)
ALT SERPL-CCNC: 45 U/L (ref 16–63)
ANION GAP SERPL CALC-SCNC: 4 MMOL/L (ref 6–14)
AST SERPL-CCNC: 33 U/L (ref 15–37)
BASE EXCESS ABG: 5 MMOL/L (ref -3–3)
BASOPHILS # BLD AUTO: 0 X10^3/UL (ref 0–0.2)
BASOPHILS NFR BLD: 1 % (ref 0–3)
BILIRUB SERPL-MCNC: 0.5 MG/DL (ref 0.2–1)
BUN SERPL-MCNC: 20 MG/DL (ref 8–26)
BUN/CREAT SERPL: 33 (ref 6–20)
CALCIUM SERPL-MCNC: 8.6 MG/DL (ref 8.5–10.1)
CHLORIDE SERPL-SCNC: 105 MMOL/L (ref 98–107)
CO2 SERPL-SCNC: 30 MMOL/L (ref 21–32)
CREAT SERPL-MCNC: 0.6 MG/DL (ref 0.7–1.3)
EOSINOPHIL NFR BLD: 0.1 X10^3/UL (ref 0–0.7)
EOSINOPHIL NFR BLD: 2 % (ref 0–3)
ERYTHROCYTE [DISTWIDTH] IN BLOOD BY AUTOMATED COUNT: 15.3 % (ref 11.5–14.5)
GFR SERPLBLD BASED ON 1.73 SQ M-ARVRAT: 134.8 ML/MIN
GLUCOSE SERPL-MCNC: 90 MG/DL (ref 70–99)
HCO3 BLDA-SCNC: 29 MMOL/L (ref 21–28)
HCT VFR BLD CALC: 24.4 % (ref 39–53)
HGB BLD-MCNC: 8 G/DL (ref 13–17.5)
INSPIRATION SETTING TIME VENT: 50
LYMPHOCYTES # BLD: 1.1 X10^3/UL (ref 1–4.8)
LYMPHOCYTES NFR BLD AUTO: 13 % (ref 24–48)
MCH RBC QN AUTO: 31 PG (ref 25–35)
MCHC RBC AUTO-ENTMCNC: 33 G/DL (ref 31–37)
MCV RBC AUTO: 96 FL (ref 79–100)
MONO #: 0.8 X10^3/UL (ref 0–1.1)
MONOCYTES NFR BLD: 10 % (ref 0–9)
NEUT #: 6.2 X10^3/UL (ref 1.8–7.7)
NEUTROPHILS NFR BLD AUTO: 75 % (ref 31–73)
PCO2 BLDA: 44 MMHG (ref 35–46)
PLATELET # BLD AUTO: 319 X10^3/UL (ref 140–400)
PO2 BLDA: 74 MMHG (ref 65–108)
POTASSIUM SERPL-SCNC: 3.9 MMOL/L (ref 3.5–5.1)
PROT SERPL-MCNC: 5.6 G/DL (ref 6.4–8.2)
RBC # BLD AUTO: 2.55 X10^6/UL (ref 4.3–5.7)
SAO2 % BLDA: 95 % (ref 92–99)
SODIUM SERPL-SCNC: 139 MMOL/L (ref 136–145)
WBC # BLD AUTO: 8.3 X10^3/UL (ref 4–11)

## 2021-01-21 PROCEDURE — 0DH63UZ INSERTION OF FEEDING DEVICE INTO STOMACH, PERCUTANEOUS APPROACH: ICD-10-PCS

## 2021-01-21 RX ADMIN — MULTIVITAMIN SCH ML: LIQUID ORAL at 09:00

## 2021-01-21 RX ADMIN — DEXMEDETOMIDINE HYDROCHLORIDE PRN MLS/HR: 100 INJECTION, SOLUTION, CONCENTRATE INTRAVENOUS at 23:25

## 2021-01-21 RX ADMIN — DEXMEDETOMIDINE HYDROCHLORIDE PRN MLS/HR: 100 INJECTION, SOLUTION, CONCENTRATE INTRAVENOUS at 03:48

## 2021-01-21 RX ADMIN — DEXMEDETOMIDINE HYDROCHLORIDE PRN MLS/HR: 100 INJECTION, SOLUTION, CONCENTRATE INTRAVENOUS at 10:23

## 2021-01-21 RX ADMIN — CETIRIZINE HYDROCHLORIDE SCH MG: 10 TABLET, FILM COATED ORAL at 09:00

## 2021-01-21 RX ADMIN — MIDAZOLAM PRN MLS/HR: 5 INJECTION, SOLUTION INTRAMUSCULAR; INTRAVENOUS at 07:18

## 2021-01-21 RX ADMIN — ENOXAPARIN SODIUM SCH MG: 40 INJECTION SUBCUTANEOUS at 09:00

## 2021-01-21 RX ADMIN — GLYCERIN, ISOLEUCINE, LEUCINE, LYSINE, METHIONINE, PHENYLALANINE, THREONINE, TRYPTOPHAN, VALINE, ALANINE, GLYCINE, ARGININE, HISTIDINE, PROLINE, SERINE, CYSTEINE, SODIUM ACETATE, MAGNESIUM ACETATE, CALCIUM ACETATE, SODIUM CHLORIDE, POTASSIUM CHLORIDE, PHOSPHORIC ACID, AND POTASSIUM METABISULFITE SCH MLS/HR
3; .21; .27; .22; .16; .17; .12; .046; .2; .21; .42; .29; .085; .34; .18; .014; .2; .054; .026; .12; .15; .041 INJECTION INTRAVENOUS at 03:49

## 2021-01-21 RX ADMIN — ZINC SULFATE CAP 220 MG (50 MG ELEMENTAL ZN) SCH MG: 220 (50 ZN) CAP at 09:00

## 2021-01-21 RX ADMIN — LABETALOL HYDROCHLORIDE PRN MG: 5 INJECTION, SOLUTION INTRAVENOUS at 17:31

## 2021-01-21 RX ADMIN — FAMOTIDINE SCH MG: 10 INJECTION, SOLUTION INTRAVENOUS at 21:45

## 2021-01-21 RX ADMIN — CITALOPRAM HYDROBROMIDE SCH MG: 20 TABLET ORAL at 09:00

## 2021-01-21 RX ADMIN — MIDAZOLAM PRN MLS/HR: 5 INJECTION, SOLUTION INTRAMUSCULAR; INTRAVENOUS at 17:01

## 2021-01-21 RX ADMIN — ATORVASTATIN CALCIUM SCH MG: 40 TABLET, FILM COATED ORAL at 21:45

## 2021-01-21 RX ADMIN — DEXMEDETOMIDINE HYDROCHLORIDE PRN MLS/HR: 100 INJECTION, SOLUTION, CONCENTRATE INTRAVENOUS at 16:59

## 2021-01-21 RX ADMIN — NYSTATIN SCH APP: 100000 POWDER TOPICAL at 21:46

## 2021-01-21 RX ADMIN — NYSTATIN SCH APP: 100000 POWDER TOPICAL at 10:23

## 2021-01-21 RX ADMIN — FAMOTIDINE SCH MG: 10 INJECTION, SOLUTION INTRAVENOUS at 10:23

## 2021-01-21 RX ADMIN — Medication PRN MLS/HR: at 02:00

## 2021-01-21 RX ADMIN — CYANOCOBALAMIN TAB 1000 MCG SCH MCG: 1000 TAB at 09:00

## 2021-01-21 NOTE — PDOC
PROGRESS NOTES


Date of Service:


DATE: 1/21/21 


TIME: 13:33





Chief Complaint


Chief Complaint


 





Acute hypoxic respiratory failure - no pulmonary history. With recent travel to 

and from [place] likely COVID 19 vs other atypical pneumonia. Cont empiric 

antibiotics, steroids. Consult Pulm. 


Improved aeration of the lungs with persistent moderate mixed interstitial and 

alveolar airspace disease.  1/02 


Pt. experienced hypoxia and respiratory decompensation overnight requiring 

intubation 


now on vent 100% PEEP of 10 


Hypotension as well now on pressors


acute respiratory distress syndrome. Consideration may be given for multifocal 

pneumonia versus pulmonary edema.


COVID 19 positive - with pneumonia - given transaminitis and late presentation 

with high O2 requirements no indication for remdesivir


RYDER - likely vasomotor nephropathy - no known renal history, will hydrate


Pneumonia - likely atypical, gram negative vs viral. will cover 


Prostate Ca - s/p resection at [facility] in [place]


Hypokalemia - likely nutritional or loss from diarrhea, will replace


Elevated troponin - likely from type II demand ischemia, will trend troponins, 

maintain telemetry


Crohn's - sees rheumatology regularly, Dr Fan in [place], on sulfasalazine


 


Anemia - likely of chronic disease, will monitor


Cardiomegaly - notable on CT chest - no known cardiac history, though his mother

did have CHF and CAD


Right renal mass -  


HYPOTENSION 


 


Severe malnutrition





History of Present Illness


History of Present Illness


1/21.  PEG today


doing OK


will need LTACH


cont current


discussed wityh RN








1/20/2021,   add PPN,   fluid stable


cont current,  PEG tube tomorrow 


vent OK








Patient seen and examined in the ICU


26 days past his COVID-19 diagnosis


He is still intubated


Assist-control 


Has NG tube feeds


cont supportive care


Sedated with fentanyl Versed and propofol





Vitals


Vitals





Vital Signs








  Date Time  Temp Pulse Resp B/P (MAP) Pulse Ox O2 Delivery O2 Flow Rate FiO2


 


1/21/21 12:00      Mechanical Ventilator  


 


1/21/21 11:06     98   


 


1/21/21 10:00  54 25 100/51 (67)    


 


1/21/21 08:00 99.7       





 99.7       


 


1/21/21 02:00       24.0 











Physical Exam


Physical Exam


GENERAL:  Intubated, sedated.


HEENT:  Normocephalic, atraumatic.  Anicteric.  ETT and OGT tube in place.


NECK:  Supple.


LUNGS:  Decreased breath sounds at the bases.  No wheezing.


HEART:  S1, S2.


ABDOMEN: Mildly distended


EXTREMITIES: Generalized anasarca


GENITOURINARY:  Hart in place.scrotal swelling +


CENTRAL NERVOUS SYSTEM:  Intubated, sedated.


PSYCHIATRIC:  Unable to assess.


LINES:  Right upper extremity PICC line clean.


General:  Other (sedated )


Heart:  Regular rate, Normal S1, Normal S2


Lungs:  Clear


Abdomen:  Soft, No tenderness


Extremities:  No clubbing, No cyanosis


Skin:  No rashes, No breakdown





Labs


LABS





Laboratory Tests








Test


 1/21/21


06:00 1/21/21


08:27


 


White Blood Count


 8.3 x10^3/uL


(4.0-11.0) 





 


Red Blood Count


 2.55 x10^6/uL


(4.30-5.70) 





 


Hemoglobin


 8.0 g/dL


(13.0-17.5) 





 


Hematocrit


 24.4 %


(39.0-53.0) 





 


Mean Corpuscular Volume 96 fL ()  


 


Mean Corpuscular Hemoglobin 31 pg (25-35)  


 


Mean Corpuscular Hemoglobin


Concent 33 g/dL


(31-37) 





 


Red Cell Distribution Width


 15.3 %


(11.5-14.5) 





 


Platelet Count


 319 x10^3/uL


(140-400) 





 


Neutrophils (%) (Auto) 75 % (31-73)  


 


Lymphocytes (%) (Auto) 13 % (24-48)  


 


Monocytes (%) (Auto) 10 % (0-9)  


 


Eosinophils (%) (Auto) 2 % (0-3)  


 


Basophils (%) (Auto) 1 % (0-3)  


 


Neutrophils # (Auto)


 6.2 x10^3/uL


(1.8-7.7) 





 


Lymphocytes # (Auto)


 1.1 x10^3/uL


(1.0-4.8) 





 


Monocytes # (Auto)


 0.8 x10^3/uL


(0.0-1.1) 





 


Eosinophils # (Auto)


 0.1 x10^3/uL


(0.0-0.7) 





 


Basophils # (Auto)


 0.0 x10^3/uL


(0.0-0.2) 





 


Sodium Level


 139 mmol/L


(136-145) 





 


Potassium Level


 3.9 mmol/L


(3.5-5.1) 





 


Chloride Level


 105 mmol/L


() 





 


Carbon Dioxide Level


 30 mmol/L


(21-32) 





 


Anion Gap 4 (6-14)  


 


Blood Urea Nitrogen


 20 mg/dL


(8-26) 





 


Creatinine


 0.6 mg/dL


(0.7-1.3) 





 


Estimated GFR


(Cockcroft-Gault) 134.8 


 





 


BUN/Creatinine Ratio 33 (6-20)  


 


Glucose Level


 90 mg/dL


(70-99) 





 


Calcium Level


 8.6 mg/dL


(8.5-10.1) 





 


Total Bilirubin


 0.5 mg/dL


(0.2-1.0) 





 


Aspartate Amino Transf


(AST/SGOT) 33 U/L (15-37) 


 





 


Alanine Aminotransferase


(ALT/SGPT) 45 U/L (16-63) 


 





 


Alkaline Phosphatase


 60 U/L


() 





 


Total Protein


 5.6 g/dL


(6.4-8.2) 





 


Albumin


 1.6 g/dL


(3.4-5.0) 





 


Albumin/Globulin Ratio 0.4 (1.0-1.7)  


 


O2 Saturation  95 % (92-99) 


 


Arterial Blood pH


 


 7.45


(7.35-7.45)


 


Arterial Blood pCO2 at


Patient Temp 


 44 mmHg


(35-46)


 


Arterial Blood pO2 at Patient


Temp 


 74 mmHg


()


 


Arterial Blood HCO3


 


 29 mmol/L


(21-28)


 


Arterial Blood Base Excess


 


 5 mmol/L


(-3-3)


 


FiO2  50 











Comment


Review of Relevant


I have reviewed the following items jabier (where applicable) has been applied.


Labs





Laboratory Tests








Test


 1/20/21


08:55 1/21/21


06:00 1/21/21


08:27


 


O2 Saturation 94 % (92-99)   95 % (92-99) 


 


Arterial Blood pH


 7.44


(7.35-7.45) 


 7.45


(7.35-7.45)


 


Arterial Blood pCO2 at


Patient Temp 42 mmHg


(35-46) 


 44 mmHg


(35-46)


 


Arterial Blood pO2 at Patient


Temp 76 mmHg


() 


 74 mmHg


()


 


Arterial Blood HCO3


 28 mmol/L


(21-28) 


 29 mmol/L


(21-28)


 


Arterial Blood Base Excess


 3 mmol/L


(-3-3) 


 5 mmol/L


(-3-3)


 


FiO2 55%   50 


 


White Blood Count


 


 8.3 x10^3/uL


(4.0-11.0) 





 


Red Blood Count


 


 2.55 x10^6/uL


(4.30-5.70) 





 


Hemoglobin


 


 8.0 g/dL


(13.0-17.5) 





 


Hematocrit


 


 24.4 %


(39.0-53.0) 





 


Mean Corpuscular Volume  96 fL ()  


 


Mean Corpuscular Hemoglobin  31 pg (25-35)  


 


Mean Corpuscular Hemoglobin


Concent 


 33 g/dL


(31-37) 





 


Red Cell Distribution Width


 


 15.3 %


(11.5-14.5) 





 


Platelet Count


 


 319 x10^3/uL


(140-400) 





 


Neutrophils (%) (Auto)  75 % (31-73)  


 


Lymphocytes (%) (Auto)  13 % (24-48)  


 


Monocytes (%) (Auto)  10 % (0-9)  


 


Eosinophils (%) (Auto)  2 % (0-3)  


 


Basophils (%) (Auto)  1 % (0-3)  


 


Neutrophils # (Auto)


 


 6.2 x10^3/uL


(1.8-7.7) 





 


Lymphocytes # (Auto)


 


 1.1 x10^3/uL


(1.0-4.8) 





 


Monocytes # (Auto)


 


 0.8 x10^3/uL


(0.0-1.1) 





 


Eosinophils # (Auto)


 


 0.1 x10^3/uL


(0.0-0.7) 





 


Basophils # (Auto)


 


 0.0 x10^3/uL


(0.0-0.2) 





 


Sodium Level


 


 139 mmol/L


(136-145) 





 


Potassium Level


 


 3.9 mmol/L


(3.5-5.1) 





 


Chloride Level


 


 105 mmol/L


() 





 


Carbon Dioxide Level


 


 30 mmol/L


(21-32) 





 


Anion Gap  4 (6-14)  


 


Blood Urea Nitrogen


 


 20 mg/dL


(8-26) 





 


Creatinine


 


 0.6 mg/dL


(0.7-1.3) 





 


Estimated GFR


(Cockcroft-Gault) 


 134.8 


 





 


BUN/Creatinine Ratio  33 (6-20)  


 


Glucose Level


 


 90 mg/dL


(70-99) 





 


Calcium Level


 


 8.6 mg/dL


(8.5-10.1) 





 


Total Bilirubin


 


 0.5 mg/dL


(0.2-1.0) 





 


Aspartate Amino Transf


(AST/SGOT) 


 33 U/L (15-37) 


 





 


Alanine Aminotransferase


(ALT/SGPT) 


 45 U/L (16-63) 


 





 


Alkaline Phosphatase


 


 60 U/L


() 





 


Total Protein


 


 5.6 g/dL


(6.4-8.2) 





 


Albumin


 


 1.6 g/dL


(3.4-5.0) 





 


Albumin/Globulin Ratio  0.4 (1.0-1.7)  








Laboratory Tests








Test


 1/21/21


06:00 1/21/21


08:27


 


White Blood Count


 8.3 x10^3/uL


(4.0-11.0) 





 


Red Blood Count


 2.55 x10^6/uL


(4.30-5.70) 





 


Hemoglobin


 8.0 g/dL


(13.0-17.5) 





 


Hematocrit


 24.4 %


(39.0-53.0) 





 


Mean Corpuscular Volume 96 fL ()  


 


Mean Corpuscular Hemoglobin 31 pg (25-35)  


 


Mean Corpuscular Hemoglobin


Concent 33 g/dL


(31-37) 





 


Red Cell Distribution Width


 15.3 %


(11.5-14.5) 





 


Platelet Count


 319 x10^3/uL


(140-400) 





 


Neutrophils (%) (Auto) 75 % (31-73)  


 


Lymphocytes (%) (Auto) 13 % (24-48)  


 


Monocytes (%) (Auto) 10 % (0-9)  


 


Eosinophils (%) (Auto) 2 % (0-3)  


 


Basophils (%) (Auto) 1 % (0-3)  


 


Neutrophils # (Auto)


 6.2 x10^3/uL


(1.8-7.7) 





 


Lymphocytes # (Auto)


 1.1 x10^3/uL


(1.0-4.8) 





 


Monocytes # (Auto)


 0.8 x10^3/uL


(0.0-1.1) 





 


Eosinophils # (Auto)


 0.1 x10^3/uL


(0.0-0.7) 





 


Basophils # (Auto)


 0.0 x10^3/uL


(0.0-0.2) 





 


Sodium Level


 139 mmol/L


(136-145) 





 


Potassium Level


 3.9 mmol/L


(3.5-5.1) 





 


Chloride Level


 105 mmol/L


() 





 


Carbon Dioxide Level


 30 mmol/L


(21-32) 





 


Anion Gap 4 (6-14)  


 


Blood Urea Nitrogen


 20 mg/dL


(8-26) 





 


Creatinine


 0.6 mg/dL


(0.7-1.3) 





 


Estimated GFR


(Cockcroft-Gault) 134.8 


 





 


BUN/Creatinine Ratio 33 (6-20)  


 


Glucose Level


 90 mg/dL


(70-99) 





 


Calcium Level


 8.6 mg/dL


(8.5-10.1) 





 


Total Bilirubin


 0.5 mg/dL


(0.2-1.0) 





 


Aspartate Amino Transf


(AST/SGOT) 33 U/L (15-37) 


 





 


Alanine Aminotransferase


(ALT/SGPT) 45 U/L (16-63) 


 





 


Alkaline Phosphatase


 60 U/L


() 





 


Total Protein


 5.6 g/dL


(6.4-8.2) 





 


Albumin


 1.6 g/dL


(3.4-5.0) 





 


Albumin/Globulin Ratio 0.4 (1.0-1.7)  


 


O2 Saturation  95 % (92-99) 


 


Arterial Blood pH


 


 7.45


(7.35-7.45)


 


Arterial Blood pCO2 at


Patient Temp 


 44 mmHg


(35-46)


 


Arterial Blood pO2 at Patient


Temp 


 74 mmHg


()


 


Arterial Blood HCO3


 


 29 mmol/L


(21-28)


 


Arterial Blood Base Excess


 


 5 mmol/L


(-3-3)


 


FiO2  50 








Microbiology


1/2/21 Blood Culture - Final, Complete


         NO GROWTH AFTER 5 DAYS


Medications





Current Medications


Sodium Chloride (Normal Saline Flush) 3 ml QSHIFT  PRN IV AFTER MEDS AND BLOOD 

DRAWS;  Start 12/23/20 at 07:45


Sodium Chloride 1,000 ml @  150 mls/hr Q6H40M IV  Last administered on 

12/23/20at 15:10;  Start 12/23/20 at 08:15;  Stop 12/23/20 at 21:34;  Status DC


Ondansetron HCl (Zofran) 4 mg PRN Q4HRS  PRN IV NAUSEA/VOMITING;  Start 12/23/20

at 08:15


Acetaminophen (Tylenol) 650 mg PRN Q4HRS  PRN PO TEMP OVER 100.4F OR MILD PAIN 

Last administered on 12/31/20at 01:02;  Start 12/23/20 at 08:15


Docusate Sodium (Colace) 100 mg PRN BID  PRN PO HARD STOOLS Last administered on

12/25/20at 17:03;  Start 12/23/20 at 08:15


Ceftriaxone Sodium (Rocephin) 1 gm Q24H IVP  Last administered on 1/3/21at 

08:14;  Start 12/24/20 at 09:00;  Stop 1/3/21 at 13:15;  Status DC


Dexamethasone Sodium Phosphate (Decadron) 4 mg DAILY IVP  Last administered on 

1/3/21at 08:14;  Start 12/24/20 at 09:00;  Stop 1/4/21 at 08:39;  Status DC


Amino Acids/ Glycerin/ Electrolytes 1,000 ml @  80 mls/hr [id number] IV  Last 

administered on 1/4/21at 14:53;  Start 12/23/20 at 08:45;  Stop 1/5/21 at 05:03;

 Status DC


Enoxaparin Sodium (Lovenox 40mg Syringe) 40 mg Q12HR SQ  Last administered on 

1/20/21at 08:05;  Start 12/23/20 at 09:00


Zinc Sulfate (Orazinc) 220 mg DAILY PO  Last administered on 1/20/21at 08:04;  

Start 12/23/20 at 09:00


Guaifenesin (Robitussin Dm) 10 ml PRN Q6HRS  PRN PO COUGH-2ND CHOICE Last 

administered on 1/1/21at 15:37;  Start 12/23/20 at 08:45


Acetaminophen (Tylenol Supp) 650 mg PRN Q6HRS  PRN NH MILD PAIN / TEMP > 100.3'F

Last administered on 1/21/21at 05:58;  Start 12/23/20 at 08:45


Morphine Sulfate (Morphine Sulfate) 2 mg PRN Q4HRS  PRN IV PAIN Last admini

stered on 1/1/21at 16:12;  Start 12/23/20 at 08:45;  Stop 1/2/21 at 01:41;  

Status DC


Azithromycin 500 mg/Sodium Chloride 250 ml @  250 mls/hr 1X  ONCE IV  Last 

administered on 12/23/20at 09:59;  Start 12/23/20 at 10:00;  Stop 12/23/20 at 

10:59;  Status DC


Azithromycin 250 mg/Sodium Chloride 250 ml @  250 mls/hr Q24H IV  Last 

administered on 12/27/20at 08:55;  Start 12/24/20 at 09:00;  Stop 12/27/20 at 

09:59;  Status DC


Cetirizine HCl (ZyrTEC) 10 mg DAILY PO  Last administered on 1/20/21at 08:05;  

Start 12/24/20 at 09:00


Morphine Sulfate (Ms Contin) 15 mg BID PO  Last administered on 1/5/21at 21:11; 

Start 12/23/20 at 21:00;  Stop 1/6/21 at 14:43;  Status DC


Cyanocobalamin (Vitamin B-12) 1,000 mcg DAILY PO  Last administered on 1/20/21at

08:04;  Start 12/24/20 at 09:00


Citalopram Hydrobromide (CeleXA) 20 mg DAILY PO  Last administered on 1/20/21at 

08:05;  Start 12/24/20 at 09:00


Atorvastatin Calcium (Lipitor) 40 mg QHS PO  Last administered on 1/19/21at 

21:27;  Start 12/23/20 at 21:00


Sulfasalazine (Azulfidine) 1,000 mg BID PO  Last administered on 1/19/21at 

21:27;  Start 12/23/20 at 21:00


Tramadol HCl (Ultram) 100 mg PRN Q8HRS  PRN PO MODERATE PAIN, SEVERE PAIN Last 

administered on 1/1/21at 13:35;  Start 12/23/20 at 15:00


Zolpidem Tartrate (Ambien) 5 mg PRN QHS  PRN PO INSOMNIA Last administered on 

1/1/21at 22:05;  Start 12/23/20 at 15:00


Info ([facility] Electrolyte Protocol) 1 ea CONT PRN  PRN MC PER PROTOCOL;  Start 

12/24/20 at 14:15


Potassium Chloride (Klor-Con) 40 meq 1X  ONCE PO  Last administered on 

12/24/20at 14:24;  Start 12/24/20 at 14:15;  Stop 12/24/20 at 14:16;  Status DC


Sterile Water (WATER for RESP) 1,000 ml CONT  PRN INH VIA VAPOTHERM DEVICE Last 

administered on 1/1/21at 15:49;  Start 12/26/20 at 09:30


Benzonatate (Tessalon Perle) 100 mg [id number] PO  Last administered on 1/1/21at 

20:51;  Start 12/26/20 at 14:00;  Stop 1/2/21 at 10:35;  Status DC


Labetalol HCl (Normodyne Iv Push) 10 mg PRN Q2HR  PRN IVP HYPERTENSION Last 

administered on 1/16/21at 19:27;  Start 12/26/20 at 18:30


Quetiapine Fumarate (SEROquel) 50 mg PRN QHS  PRN PO sleep 2ND CHOICE Last 

administered on 1/1/21at 00:24;  Start 12/27/20 at 21:00


Enalaprilat (Vasotec Inj) 2.5 mg PRN Q6HRS  PRN IVP HYPERTENSION-2ND CHOICE Last

administered on 12/28/20at 12:29;  Start 12/27/20 at 10:00


Olanzapine (ZyPREXA ZYDIS) 5 mg PRN BID  PRN PO ANXIETY / AGITATION Last 

administered on 1/11/21at 09:56;  Start 12/27/20 at 12:30


Guaifenesin (Robitussin) 400 mg PRN Q4HRS  PRN PO COUGH;  Start 12/27/20 at 

22:15;  Stop 12/27/20 at 22:27;  Status DC


Guaifenesin (Robitussin) 400 mg PRN Q4HRS  PRN PO COUGH Last administered on 

1/1/21at 23:00;  Start 12/27/20 at 22:30


Furosemide (Lasix) 40 mg 1X  ONCE IVP  Last administered on 12/30/20at 10:15;  

Start 12/30/20 at 10:00;  Stop 12/30/20 at 10:01;  Status DC


Sodium Chloride 1,000 ml @  100 mls/hr Q10H IV  Last administered on 1/7/21at 

00:03;  Start 12/31/20 at 10:15;  Stop 1/7/21 at 13:14;  Status DC


Furosemide (Lasix) 20 mg 1X  ONCE IVP  Last administered on 1/1/21at 13:34;  

Start 1/1/21 at 12:30;  Stop 1/1/21 at 12:31;  Status DC


Alteplase, Recombinant (Cathflo For Central Catheter Clearance) 1 mg 1X  ONCE 

INT CAT  Last administered on 1/2/21at 02:10;  Start 1/1/21 at 16:15;  Stop 

1/1/21 at 16:16;  Status DC


Fentanyl Citrate 30 ml @ 0 mls/hr CONT  PRN IV SEE PROTOCOL Last administered on

1/2/21at 01:58;  Start 1/2/21 at 01:15;  Stop 1/2/21 at 02:36;  Status DC


Propofol 100 ml @ 0 mls/hr CONT  PRN IV PER PROTOCOL Last administered on 

1/16/21at 23:12;  Start 1/2/21 at 01:15


Chlorhexidine Gluconate (Peridex) 15 ml BID MM  Last administered on 1/9/21at 

09:21;  Start 1/2/21 at 09:00;  Stop 1/9/21 at 19:26;  Status DC


Midazolam HCl 100 ml @ 0 mls/hr CONT  PRN IV SEE PROTOCOL Last administered on 

1/21/21at 07:18;  Start 1/2/21 at 01:15


Succinylcholine Chloride (Anectine) 200 mg STK-MED ONCE .ROUTE ;  Start 1/2/21 

at 01:21;  Stop 1/2/21 at 01:21;  Status DC


Fentanyl Citrate 55 ml @ 0 mls/hr CONT PRN  PRN IV PAIN CONTROL Last 

administered on 1/21/21at 02:00;  Start 1/2/21 at 02:45


Norepinephrine Bitartrate 8 mg/ Dextrose 258 ml @  19.737 mls/ hr CONT  PRN IV 

PER PROTOCOL Last administered on 1/2/21at 22:27;  Start 1/2/21 at 07:30;  Stop 

1/5/21 at 08:55;  Status DC


Vecuronium Bromide (Norcuron Bolus) 6 mg PRN Q6HRS  PRN IV SEDATION Last 

administered on 1/9/21at 17:07;  Start 1/2/21 at 09:15


Vancomycin HCl (Vanco Per Pharmacy) 1 each PRN DAILY  PRN MC SEE COMMENTS Last 

administered on 1/2/21at 20:47;  Start 1/2/21 at 16:00;  Stop 1/3/21 at 13:19;  

Status DC


Vancomycin HCl 2 gm/Sodium Chloride 500 ml @  250 mls/hr 1X  ONCE IV  Last 

administered on 1/2/21at 17:43;  Start 1/2/21 at 17:00;  Stop 1/2/21 at 18:59;  

Status DC


Vancomycin HCl 1.5 gm/Sodium Chloride 500 ml @  250 mls/hr Q12H IV  Last 

administered on 1/3/21at 05:53;  Start 1/3/21 at 05:00;  Stop 1/3/21 at 13:15;  

Status DC


Vancomycin HCl (Vancomycin Trough Level) 1 each 1X  ONCE MC ;  Start 1/4/21 at 

04:30;  Stop 1/3/21 at 13:20;  Status DC


Cefepime HCl (Maxipime) 2 gm Q12HR IVP  Last administered on 1/12/21at 20:52;  

Start 1/3/21 at 14:00;  Stop 1/13/21 at 07:59;  Status DC


Linezolid (Zyvox) 600 mg BID PO  Last administered on 1/6/21at 20:38;  Start 

1/3/21 at 21:00;  Stop 1/7/21 at 07:58;  Status DC


Dexamethasone Sodium Phosphate (Decadron) 6 mg DAILY IVP  Last administered on 

1/5/21at 08:36;  Start 1/4/21 at 09:00;  Stop 1/5/21 at 08:55;  Status DC


Polyethylene Glycol (miraLAX PACKET) 17 gm DAILY PO  Last administered on 

1/15/21at 08:40;  Start 1/6/21 at 14:30;  Stop 1/17/21 at 12:21;  Status DC


Nystatin (Nystop) 1 lindsay BID TP  Last administered on 1/21/21at 10:23;  Start 

1/6/21 at 16:00


Sodium Chloride 1,000 ml @  0 mls/hr Q0M IV ;  Start 1/7/21 at 13:15


Famotidine (Pepcid Vial) 20 mg BID IVP  Last administered on 1/21/21at 10:23;  

Start 1/8/21 at 21:00


Furosemide (Lasix) 40 mg 1X  ONCE IVP  Last administered on 1/12/21at 11:18;  

Start 1/12/21 at 11:15;  Stop 1/12/21 at 11:16;  Status DC


Multivitamins/ Minerals Therapeutic (Centrum Multivit-Mineral Liq) 5 ml DAILY 

PEG  Last administered on 1/20/21at 08:17;  Start 1/15/21 at 09:00


Dexmedetomidine HCl 400 mcg/ Sodium Chloride 100 ml @ 0 mls/hr CONT  PRN IV PER 

PROTOCOL Last administered on 1/21/21at 10:23;  Start 1/17/21 at 07:15


Atropine Sulfate (ATROPINE 0.5mg SYRINGE) 0.5 mg PRN Q5MIN  PRN IV SEE COMMENTS;

 Start 1/17/21 at 07:15


Rocuronium Bromide (Zemuron) 50 mg STK-MED ONCE .ROUTE ;  Start 1/19/21 at 

10:24;  Stop 1/19/21 at 10:25;  Status DC


Bupivacaine HCl/ Epinephrine Bitart (Sensorcain-Epi 0.5% Kit) 30 ml STK-MED ONCE

.ROUTE  Last administered on 1/19/21at 12:47;  Start 1/19/21 at 11:34;  Stop 

1/19/21 at 11:34;  Status DC


Cellulose (Surgicel Fibrillar 1x2) 1 each STK-MED ONCE .ROUTE  Last administered

on 1/19/21at 12:56;  Start 1/19/21 at 11:34;  Stop 1/19/21 at 11:34;  Status DC


Ephedrine Sulfate (Akovaz) 50 mg STK-MED ONCE .ROUTE ;  Start 1/19/21 at 12:56; 

Stop 1/19/21 at 12:57;  Status DC


Desflurane (Suprane) 15 ml STK-MED ONCE IH ;  Start 1/19/21 at 12:56;  Stop 

1/19/21 at 12:57;  Status DC


Sodium Chloride (SODIUM CHLORIDE 20ml) 20 ml STK-MED ONCE IJ ;  Start 1/19/21 at

12:57;  Stop 1/19/21 at 12:57;  Status DC


Amino Acids/ Glycerin/ Electrolytes 1,000 ml @  80 mls/hr [id number] IV  Last 

administered on 1/21/21at 03:49;  Start 1/20/21 at 11:00


Cefazolin Sodium/ Dextrose 50 ml @  100 mls/hr 1X  ONCE IV ;  Start 1/21/21 at 

12:00;  Stop 1/21/21 at 12:29;  Status DC


Amino Acids/ Glycerin/ Electrolytes 1,000 ml @  80 mls/hr [id number] IV ;  Start 

1/20/21 at 14:00;  Stop 1/20/21 at 14:05;  Status DC


Ringer's Solution 1,000 ml @  30 mls/hr Q24H IV ;  Start 1/21/21 at 07:00;  Stop

1/21/21 at 18:59





Active Scripts


Active


Reported


Morphine Sulfate Er (Morphine Sulfate) 30 Mg Tablet.er 1 Tab PO BID


Tramadol Hcl 100 Mg Tbmp.24hr 100 Mg PO PRN Q8HRS PRN


Ambien (Zolpidem Tartrate) 10 Mg Tablet 10 Mg PO PRN QHS PRN


Methotrexate (Methotrexate Sodium) 2.5 Mg Tablet 10 Tab PO WEEKLY


Lisinopril 10 Mg Tablet 1 Tab PO DAILY


Crestor (Rosuvastatin Calcium) 5 Mg Tablet 2 Tab PO DAILY


Hydrochlorothiazide Tablet (Hydrochlorothiazide) 50 Mg Tablet 25 Mg PO DAILY


Escitalopram Oxalate 10 Mg Tablet 1 Tab PO DAILY


Sulfasalazine Dr (Sulfasalazine) 500 Mg Tablet.dr 2 Tab PO TID 30 Days


Colace (Docusate Sodium) 100 Mg Capsule 1 Cap PO DA 30 Days


Acetaminophen Ext.release (Acetaminophen) 650 Mg Tablet.er 650 Mg PO PRN Q8HRS 

PRN


Melatonin 5 Mg Capsule 1 Cap PO QHS 30 Days


B-12 (Cyanocobalamin (Vitamin B-12)) 500 Mcg Tablet 2 Tab PO DAILY 30 Days


Vitamin D3 (Cholecalciferol (Vitamin D3)) 50 Mcg Capsule 50 Mcg PO DAILY


Aller-Sung (Cetirizine Hcl) 10 Mg Tablet 1 Tab PO DAILY 30 Days


Vitals/I & O





Vital Sign - Last 24 Hours








 1/20/21 1/20/21 1/20/21 1/20/21





 14:00 15:00 15:03 16:00


 


Pulse 52 54  


 


Resp 24 24  


 


B/P (MAP) 113/61 (78) 100/63 (75)  


 


Pulse Ox 98 97 96 


 


O2 Delivery Ventilator Ventilator Ventilator Mechanical Ventilator


 


O2 Flow Rate 24.0   





 1/20/21 1/20/21 1/20/21 1/20/21





 16:00 17:00 18:00 19:00


 


Temp 96.0   97.9





 96.0   97.9


 


Pulse 52 62 54 57


 


Resp 24 24 24 24


 


B/P (MAP) 121/69 (86) 158/58 (91) 99/57 (71) 132/71 (91)


 


Pulse Ox 97 93 94 98


 


O2 Delivery Ventilator Ventilator Ventilator Ventilator


 


    





    





 1/20/21 1/20/21 1/20/21 1/20/21





 20:00 20:00 20:42 21:00


 


Pulse 70   100


 


Resp 28   28


 


B/P (MAP) 112/68 (83)   145/89 (107)


 


Pulse Ox 99  94 99


 


O2 Delivery Ventilator Mechanical Ventilator Ventilator Ventilator





 1/20/21 1/20/21 1/20/21 1/20/21





 22:12 23:00 23:59 23:59


 


Temp   98.3 





   98.3 


 


Pulse 97 83 53 


 


Resp 24 26 24 


 


B/P (MAP) 135/88 (104) 153/83 (106) 125/78 (94) 


 


Pulse Ox 99 99 99 


 


O2 Delivery Ventilator Ventilator Ventilator Mechanical Ventilator


 


    





    





 1/21/21 1/21/21 1/21/21 1/21/21





 00:53 01:04 02:00 02:00


 


Pulse  76 57 


 


Resp   26 


 


B/P (MAP)  104/76 (85) 97/58 (71) 


 


Pulse Ox 95 100 99 100


 


O2 Delivery Ventilator Ventilator Ventilator 


 


O2 Flow Rate    24.0





 1/21/21 1/21/21 1/21/21 1/21/21





 03:00 04:00 04:00 04:45


 


Temp   100.9 





   100.9 


 


Pulse 60  76 


 


Resp 24  24 


 


B/P (MAP) 126/70 (88)  137/83 (101) 


 


Pulse Ox 94  98 95


 


O2 Delivery Ventilator Mechanical Ventilator Ventilator Ventilator


 


    





    





 1/21/21 1/21/21 1/21/21 1/21/21





 05:00 06:00 07:00 08:00


 


Temp  101.4  99.7





  101.4  99.7


 


Pulse 64 64 56 56


 


Resp 24 25 25 24


 


B/P (MAP) 99/56 (70) 97/58 (71) 89/48 (62) 98/55 (69)


 


Pulse Ox 97 96 96 97


 


O2 Delivery Ventilator Ventilator Ventilator Ventilator


 


    





    





 1/21/21 1/21/21 1/21/21 1/21/21





 08:00 08:03 09:00 10:00


 


Pulse   56 54


 


Resp   26 25


 


B/P (MAP)   99/50 (66) 100/51 (67)


 


Pulse Ox  98 94 95


 


O2 Delivery Mechanical Ventilator Ventilator Ventilator Ventilator





 1/21/21 1/21/21  





 11:06 12:00  


 


Pulse Ox 98   


 


O2 Delivery Ventilator Mechanical Ventilator  














Intake and Output   


 


 1/20/21 1/20/21 1/21/21





 15:00 23:00 07:00


 


Intake Total  976 ml 1154.7 ml


 


Output Total 400 ml 500 ml 475 ml


 


Balance -400 ml 476 ml 679.7 ml











Justicifation of Admission Dx:


Justifications for Admission:


Justification of Admission Dx:  Yes


Comminuty Aquired Pneumonia:  Med-High Risk Pt











ANNE SHEPARD MD                 Jan 21, 2021 13:43

## 2021-01-21 NOTE — PDOC
PULMONARY PROGRESS NOTES


DATE: 1/21/21 


TIME: 10:17


Subjective


Patient remains on ventilatory support, FiO2 50% and a PEEP of 5,


Afebrile overnight


Tolerating tube feeding well, planned for trach today 


No overnight concerns from nursing


Vitals





Vital Signs








  Date Time  Temp Pulse Resp B/P (MAP) Pulse Ox O2 Delivery O2 Flow Rate FiO2


 


1/21/21 08:03     98 Ventilator  


 


1/21/21 06:00 101.4 64 25 97/58 (71)    





 101.4       


 


1/21/21 02:00       24.0 








Comments


Intubated/sedated


Lungs:  Clear


Cardiovascular:  S1, S2


Abdomen:  Soft


Extremities:  No Edema


Skin:  Warm, Dry


Labs





Laboratory Tests








Test


 1/20/21


08:55 1/21/21


06:00 1/21/21


08:27


 


O2 Saturation 94 % (92-99)   95 % (92-99) 


 


Arterial Blood pH


 7.44


(7.35-7.45) 


 7.45


(7.35-7.45)


 


Arterial Blood pCO2 at


Patient Temp 42 mmHg


(35-46) 


 44 mmHg


(35-46)


 


Arterial Blood pO2 at Patient


Temp 76 mmHg


() 


 74 mmHg


()


 


Arterial Blood HCO3


 28 mmol/L


(21-28) 


 29 mmol/L


(21-28)


 


Arterial Blood Base Excess


 3 mmol/L


(-3-3) 


 5 mmol/L


(-3-3)


 


FiO2 55%   50 


 


White Blood Count


 


 8.3 x10^3/uL


(4.0-11.0) 





 


Red Blood Count


 


 2.55 x10^6/uL


(4.30-5.70) 





 


Hemoglobin


 


 8.0 g/dL


(13.0-17.5) 





 


Hematocrit


 


 24.4 %


(39.0-53.0) 





 


Mean Corpuscular Volume  96 fL ()  


 


Mean Corpuscular Hemoglobin  31 pg (25-35)  


 


Mean Corpuscular Hemoglobin


Concent 


 33 g/dL


(31-37) 





 


Red Cell Distribution Width


 


 15.3 %


(11.5-14.5) 





 


Platelet Count


 


 319 x10^3/uL


(140-400) 





 


Neutrophils (%) (Auto)  75 % (31-73)  


 


Lymphocytes (%) (Auto)  13 % (24-48)  


 


Monocytes (%) (Auto)  10 % (0-9)  


 


Eosinophils (%) (Auto)  2 % (0-3)  


 


Basophils (%) (Auto)  1 % (0-3)  


 


Neutrophils # (Auto)


 


 6.2 x10^3/uL


(1.8-7.7) 





 


Lymphocytes # (Auto)


 


 1.1 x10^3/uL


(1.0-4.8) 





 


Monocytes # (Auto)


 


 0.8 x10^3/uL


(0.0-1.1) 





 


Eosinophils # (Auto)


 


 0.1 x10^3/uL


(0.0-0.7) 





 


Basophils # (Auto)


 


 0.0 x10^3/uL


(0.0-0.2) 





 


Sodium Level


 


 139 mmol/L


(136-145) 





 


Potassium Level


 


 3.9 mmol/L


(3.5-5.1) 





 


Chloride Level


 


 105 mmol/L


() 





 


Carbon Dioxide Level


 


 30 mmol/L


(21-32) 





 


Anion Gap  4 (6-14)  


 


Blood Urea Nitrogen


 


 20 mg/dL


(8-26) 





 


Creatinine


 


 0.6 mg/dL


(0.7-1.3) 





 


Estimated GFR


(Cockcroft-Gault) 


 134.8 


 





 


BUN/Creatinine Ratio  33 (6-20)  


 


Glucose Level


 


 90 mg/dL


(70-99) 





 


Calcium Level


 


 8.6 mg/dL


(8.5-10.1) 





 


Total Bilirubin


 


 0.5 mg/dL


(0.2-1.0) 





 


Aspartate Amino Transf


(AST/SGOT) 


 33 U/L (15-37) 


 





 


Alanine Aminotransferase


(ALT/SGPT) 


 45 U/L (16-63) 


 





 


Alkaline Phosphatase


 


 60 U/L


() 





 


Total Protein


 


 5.6 g/dL


(6.4-8.2) 





 


Albumin


 


 1.6 g/dL


(3.4-5.0) 





 


Albumin/Globulin Ratio  0.4 (1.0-1.7)  








Laboratory Tests








Test


 1/21/21


06:00 1/21/21


08:27


 


White Blood Count


 8.3 x10^3/uL


(4.0-11.0) 





 


Red Blood Count


 2.55 x10^6/uL


(4.30-5.70) 





 


Hemoglobin


 8.0 g/dL


(13.0-17.5) 





 


Hematocrit


 24.4 %


(39.0-53.0) 





 


Mean Corpuscular Volume 96 fL ()  


 


Mean Corpuscular Hemoglobin 31 pg (25-35)  


 


Mean Corpuscular Hemoglobin


Concent 33 g/dL


(31-37) 





 


Red Cell Distribution Width


 15.3 %


(11.5-14.5) 





 


Platelet Count


 319 x10^3/uL


(140-400) 





 


Neutrophils (%) (Auto) 75 % (31-73)  


 


Lymphocytes (%) (Auto) 13 % (24-48)  


 


Monocytes (%) (Auto) 10 % (0-9)  


 


Eosinophils (%) (Auto) 2 % (0-3)  


 


Basophils (%) (Auto) 1 % (0-3)  


 


Neutrophils # (Auto)


 6.2 x10^3/uL


(1.8-7.7) 





 


Lymphocytes # (Auto)


 1.1 x10^3/uL


(1.0-4.8) 





 


Monocytes # (Auto)


 0.8 x10^3/uL


(0.0-1.1) 





 


Eosinophils # (Auto)


 0.1 x10^3/uL


(0.0-0.7) 





 


Basophils # (Auto)


 0.0 x10^3/uL


(0.0-0.2) 





 


Sodium Level


 139 mmol/L


(136-145) 





 


Potassium Level


 3.9 mmol/L


(3.5-5.1) 





 


Chloride Level


 105 mmol/L


() 





 


Carbon Dioxide Level


 30 mmol/L


(21-32) 





 


Anion Gap 4 (6-14)  


 


Blood Urea Nitrogen


 20 mg/dL


(8-26) 





 


Creatinine


 0.6 mg/dL


(0.7-1.3) 





 


Estimated GFR


(Cockcroft-Gault) 134.8 


 





 


BUN/Creatinine Ratio 33 (6-20)  


 


Glucose Level


 90 mg/dL


(70-99) 





 


Calcium Level


 8.6 mg/dL


(8.5-10.1) 





 


Total Bilirubin


 0.5 mg/dL


(0.2-1.0) 





 


Aspartate Amino Transf


(AST/SGOT) 33 U/L (15-37) 


 





 


Alanine Aminotransferase


(ALT/SGPT) 45 U/L (16-63) 


 





 


Alkaline Phosphatase


 60 U/L


() 





 


Total Protein


 5.6 g/dL


(6.4-8.2) 





 


Albumin


 1.6 g/dL


(3.4-5.0) 





 


Albumin/Globulin Ratio 0.4 (1.0-1.7)  


 


O2 Saturation  95 % (92-99) 


 


Arterial Blood pH


 


 7.45


(7.35-7.45)


 


Arterial Blood pCO2 at


Patient Temp 


 44 mmHg


(35-46)


 


Arterial Blood pO2 at Patient


Temp 


 74 mmHg


()


 


Arterial Blood HCO3


 


 29 mmol/L


(21-28)


 


Arterial Blood Base Excess


 


 5 mmol/L


(-3-3)


 


FiO2  50 








Medications





Active Scripts








 Medications  Dose


 Route/Sig


 Max Daily Dose Days Date Category


 


 Morphine Sulfate


 Er (Morphine


 Sulfate) 30 Mg


 Tablet.er  1 Tab


 PO BID


   12/23/20 Reported


 


 Tramadol Hcl 100


 Mg Tbmp.24hr  100 Mg


 PO PRN Q8HRS PRN


   12/23/20 Reported


 


 Ambien (Zolpidem


 Tartrate) 10 Mg


 Tablet  10 Mg


 PO PRN QHS PRN


   12/23/20 Reported


 


 Methotrexate


  (Methotrexate


 Sodium) 2.5 Mg


 Tablet  10 Tab


 PO WEEKLY


   12/23/20 Reported


 


 Lisinopril 10 Mg


 Tablet  1 Tab


 PO DAILY


   12/23/20 Reported


 


 Crestor


  (Rosuvastatin


 Calcium) 5 Mg


 Tablet  2 Tab


 PO DAILY


   12/23/20 Reported


 


 Hydrochlorothiazide


 Tablet


  (Hydrochlorothiazide)


 50 Mg Tablet  25 Mg


 PO DAILY


   12/23/20 Reported


 


 Escitalopram


 Oxalate 10 Mg


 Tablet  1 Tab


 PO DAILY


   12/23/20 Reported


 


 Sulfasalazine Dr


  (Sulfasalazine)


 500 Mg Tablet.dr  2 Tab


 PO TID


  30 12/23/20 Reported


 


 Colace (Docusate


 Sodium) 100 Mg


 Capsule  1 Cap


 PO DA


  30 12/23/20 Reported


 


 Acetaminophen


 Ext.release


  (Acetaminophen)


 650 Mg Tablet.er  650 Mg


 PO PRN Q8HRS PRN


   12/23/20 Reported


 


 Melatonin 5 Mg


 Capsule  1 Cap


 PO QHS


  30 12/23/20 Reported


 


 B-12


  (Cyanocobalamin


  (Vitamin B-12))


 500 Mcg Tablet  2 Tab


 PO DAILY


  30 12/23/20 Reported


 


 Vitamin D3


  (Cholecalciferol


  (Vitamin D3)) 50


 Mcg Capsule  50 Mcg


 PO DAILY


   12/23/20 Reported


 


 Aller-Sung


  (Cetirizine Hcl)


 10 Mg Tablet  1 Tab


 PO DAILY


  30 12/23/20 Reported








Comments


CXR 1/20


  Bilateral parenchymal opacities and left pleural effusion are grossly stable.





Impression


.


IMPRESSION:


1.  Acute hypoxic respiratory failure secondary to COVID-19


pneumonia and acute respiratory distress syndrome/acute lung injury.-- COVID-19 

positive ----improving


2.  Abnormal CT chest with extensive widespread ground glass and alveolar and


interstitial infiltrates with reactive mild mediastinal/hilar adenopathy.  This 

related to COVID-19 pneumonia.


3.  Patient with history of psoriatic arthritis.  He is likely immunocompromised


as he has been on methotrexate.  


4.  History of tongue cancer with resection, details unknown.


5.  History of prostate cancer.


6.  History of Crohn's disease.





Plan


.


RECOMMENDATIONS:


Continue current ventilatory support, FiO2 50% and a PEEP of 5


s/p trach 1/19


awaiting PEG


will wean off sedation post PEG


Follow chest x-ray/ABG---- 


COVID-19 positive-- now recovered 


Patient remains off antibiotics at this time, infectious disease of signed off 

cultures are negative


Patient is completed a full 10-day course of steroids


Patient has completed full course of steroids


Continue TF for nutritional support 


Patient is planned for trach today-- follow surgery recs 


Follow GI recs 


DVT/GI PPX


awaiting LTAC transfer








D/W RN and RT 





PT. is FULL CODE





Critical care time [id number]











ABI MENDEZ MD                 Jan 21, 2021 10:17

## 2021-01-21 NOTE — PDOC4
PROCEDURE


Procedure


EGD/PEG





Indication: OP dysphagia/prolonged ventilation





Meds: per anesthesia





Findings:





E--Normal


G--Normal save old PEG scar, body.


D--Normal bulb. 





--20F g-tube placed uneventfully.  Briefly re-scoped, confirming good position.





Titi. well.





IMP: Successful PEG





REC: OK to start feeding now as placed in old site and stomach should be adhered

to abdominal wall and no leak.











KENNETH SANDOVAL MD          Jan 21, 2021 14:01

## 2021-01-22 VITALS — DIASTOLIC BLOOD PRESSURE: 59 MMHG | SYSTOLIC BLOOD PRESSURE: 111 MMHG

## 2021-01-22 VITALS — DIASTOLIC BLOOD PRESSURE: 62 MMHG | SYSTOLIC BLOOD PRESSURE: 114 MMHG

## 2021-01-22 VITALS — DIASTOLIC BLOOD PRESSURE: 85 MMHG | SYSTOLIC BLOOD PRESSURE: 153 MMHG

## 2021-01-22 VITALS — SYSTOLIC BLOOD PRESSURE: 91 MMHG | DIASTOLIC BLOOD PRESSURE: 50 MMHG

## 2021-01-22 VITALS — DIASTOLIC BLOOD PRESSURE: 60 MMHG | SYSTOLIC BLOOD PRESSURE: 111 MMHG

## 2021-01-22 VITALS — DIASTOLIC BLOOD PRESSURE: 66 MMHG | SYSTOLIC BLOOD PRESSURE: 105 MMHG

## 2021-01-22 VITALS — DIASTOLIC BLOOD PRESSURE: 53 MMHG | SYSTOLIC BLOOD PRESSURE: 101 MMHG

## 2021-01-22 VITALS — DIASTOLIC BLOOD PRESSURE: 67 MMHG | SYSTOLIC BLOOD PRESSURE: 112 MMHG

## 2021-01-22 VITALS — DIASTOLIC BLOOD PRESSURE: 66 MMHG | SYSTOLIC BLOOD PRESSURE: 109 MMHG

## 2021-01-22 VITALS — SYSTOLIC BLOOD PRESSURE: 115 MMHG | DIASTOLIC BLOOD PRESSURE: 57 MMHG

## 2021-01-22 VITALS — SYSTOLIC BLOOD PRESSURE: 114 MMHG | DIASTOLIC BLOOD PRESSURE: 71 MMHG

## 2021-01-22 VITALS — SYSTOLIC BLOOD PRESSURE: 146 MMHG | DIASTOLIC BLOOD PRESSURE: 73 MMHG

## 2021-01-22 VITALS — SYSTOLIC BLOOD PRESSURE: 108 MMHG | DIASTOLIC BLOOD PRESSURE: 60 MMHG

## 2021-01-22 VITALS — SYSTOLIC BLOOD PRESSURE: 107 MMHG | DIASTOLIC BLOOD PRESSURE: 62 MMHG

## 2021-01-22 VITALS — DIASTOLIC BLOOD PRESSURE: 62 MMHG | SYSTOLIC BLOOD PRESSURE: 104 MMHG

## 2021-01-22 VITALS — DIASTOLIC BLOOD PRESSURE: 58 MMHG | SYSTOLIC BLOOD PRESSURE: 106 MMHG

## 2021-01-22 VITALS — SYSTOLIC BLOOD PRESSURE: 117 MMHG | DIASTOLIC BLOOD PRESSURE: 68 MMHG

## 2021-01-22 VITALS — DIASTOLIC BLOOD PRESSURE: 62 MMHG | SYSTOLIC BLOOD PRESSURE: 103 MMHG

## 2021-01-22 VITALS — DIASTOLIC BLOOD PRESSURE: 57 MMHG | SYSTOLIC BLOOD PRESSURE: 108 MMHG

## 2021-01-22 VITALS — DIASTOLIC BLOOD PRESSURE: 77 MMHG | SYSTOLIC BLOOD PRESSURE: 123 MMHG

## 2021-01-22 VITALS — DIASTOLIC BLOOD PRESSURE: 51 MMHG | SYSTOLIC BLOOD PRESSURE: 105 MMHG

## 2021-01-22 VITALS — DIASTOLIC BLOOD PRESSURE: 53 MMHG | SYSTOLIC BLOOD PRESSURE: 106 MMHG

## 2021-01-22 VITALS — SYSTOLIC BLOOD PRESSURE: 157 MMHG | DIASTOLIC BLOOD PRESSURE: 82 MMHG

## 2021-01-22 VITALS — DIASTOLIC BLOOD PRESSURE: 57 MMHG | SYSTOLIC BLOOD PRESSURE: 102 MMHG

## 2021-01-22 VITALS — DIASTOLIC BLOOD PRESSURE: 60 MMHG | SYSTOLIC BLOOD PRESSURE: 105 MMHG

## 2021-01-22 LAB
APTT PPP: (no result) S
BACTERIA #/AREA URNS HPF: (no result) /HPF
BASE EXCESS ABG: 5 MMOL/L (ref -3–3)
BILIRUB UR QL STRIP: (no result)
FIBRINOGEN PPP-MCNC: (no result) MG/DL
HCO3 BLDA-SCNC: 30 MMOL/L (ref 21–28)
INSPIRATION SETTING TIME VENT: (no result)
NITRITE UR QL STRIP: NEGATIVE
PCO2 BLDA: 41 MMHG (ref 35–46)
PH UR STRIP: 5.5 [PH]
PO2 BLDA: 93 MMHG (ref 65–108)
PROT UR STRIP-MCNC: 30 MG/DL
RBC #/AREA URNS HPF: (no result) /HPF (ref 0–2)
SAO2 % BLDA: 96 % (ref 92–99)
UROBILINOGEN UR-MCNC: 2 MG/DL

## 2021-01-22 RX ADMIN — PIPERACILLIN SODIUM AND TAZOBACTAM SODIUM SCH MLS/HR: 3; .375 INJECTION, POWDER, LYOPHILIZED, FOR SOLUTION INTRAVENOUS at 12:27

## 2021-01-22 RX ADMIN — LINEZOLID SCH MG: 600 TABLET, FILM COATED ORAL at 12:27

## 2021-01-22 RX ADMIN — LINEZOLID SCH MG: 600 TABLET, FILM COATED ORAL at 21:10

## 2021-01-22 RX ADMIN — PIPERACILLIN SODIUM AND TAZOBACTAM SODIUM SCH MLS/HR: 3; .375 INJECTION, POWDER, LYOPHILIZED, FOR SOLUTION INTRAVENOUS at 17:44

## 2021-01-22 RX ADMIN — Medication PRN MLS/HR: at 02:11

## 2021-01-22 RX ADMIN — NYSTATIN SCH APP: 100000 POWDER TOPICAL at 20:55

## 2021-01-22 RX ADMIN — CITALOPRAM HYDROBROMIDE SCH MG: 20 TABLET ORAL at 09:01

## 2021-01-22 RX ADMIN — MULTIVITAMIN SCH ML: LIQUID ORAL at 09:01

## 2021-01-22 RX ADMIN — ZINC SULFATE CAP 220 MG (50 MG ELEMENTAL ZN) SCH MG: 220 (50 ZN) CAP at 09:01

## 2021-01-22 RX ADMIN — DEXMEDETOMIDINE HYDROCHLORIDE PRN MLS/HR: 100 INJECTION, SOLUTION, CONCENTRATE INTRAVENOUS at 07:12

## 2021-01-22 RX ADMIN — NYSTATIN SCH APP: 100000 POWDER TOPICAL at 09:01

## 2021-01-22 RX ADMIN — FAMOTIDINE SCH MG: 10 INJECTION, SOLUTION INTRAVENOUS at 09:01

## 2021-01-22 RX ADMIN — CYANOCOBALAMIN TAB 1000 MCG SCH MCG: 1000 TAB at 09:01

## 2021-01-22 RX ADMIN — MIDAZOLAM PRN MLS/HR: 5 INJECTION, SOLUTION INTRAMUSCULAR; INTRAVENOUS at 14:00

## 2021-01-22 RX ADMIN — DEXMEDETOMIDINE HYDROCHLORIDE PRN MLS/HR: 100 INJECTION, SOLUTION, CONCENTRATE INTRAVENOUS at 16:31

## 2021-01-22 RX ADMIN — PIPERACILLIN SODIUM AND TAZOBACTAM SODIUM SCH MLS/HR: 3; .375 INJECTION, POWDER, LYOPHILIZED, FOR SOLUTION INTRAVENOUS at 23:54

## 2021-01-22 RX ADMIN — ATORVASTATIN CALCIUM SCH MG: 40 TABLET, FILM COATED ORAL at 20:55

## 2021-01-22 RX ADMIN — DEXMEDETOMIDINE HYDROCHLORIDE PRN MLS/HR: 100 INJECTION, SOLUTION, CONCENTRATE INTRAVENOUS at 10:56

## 2021-01-22 RX ADMIN — CETIRIZINE HYDROCHLORIDE SCH MG: 10 TABLET, FILM COATED ORAL at 09:01

## 2021-01-22 RX ADMIN — FAMOTIDINE SCH MG: 20 TABLET ORAL at 21:04

## 2021-01-22 NOTE — PDOC
PULMONARY PROGRESS NOTES


DATE: 1/22/21 


TIME: 09:45


Subjective


Patient remains on ventilatory support, FiO2 50% and a PEEP of 5,


Afebrile overnight


Tolerating tube feeding well,s/p trach/ PEG


No overnight concerns from nursing


Vitals





Vital Signs








  Date Time  Temp Pulse Resp B/P (MAP) Pulse Ox O2 Delivery O2 Flow Rate FiO2


 


1/22/21 08:00      Mechanical Ventilator  


 


1/22/21 08:00 99.5 54 24 101/53 (69) 100   





 99.5       








Comments


Intubated/sedated


Lungs:  Clear


Cardiovascular:  S1, S2


Abdomen:  Soft


Extremities:  No Edema


Skin:  Warm, Dry


Labs





Laboratory Tests








Test


 1/21/21


06:00 1/21/21


08:27 1/22/21


08:00


 


White Blood Count


 8.3 x10^3/uL


(4.0-11.0) 


 





 


Red Blood Count


 2.55 x10^6/uL


(4.30-5.70) 


 





 


Hemoglobin


 8.0 g/dL


(13.0-17.5) 


 





 


Hematocrit


 24.4 %


(39.0-53.0) 


 





 


Mean Corpuscular Volume 96 fL ()   


 


Mean Corpuscular Hemoglobin 31 pg (25-35)   


 


Mean Corpuscular Hemoglobin


Concent 33 g/dL


(31-37) 


 





 


Red Cell Distribution Width


 15.3 %


(11.5-14.5) 


 





 


Platelet Count


 319 x10^3/uL


(140-400) 


 





 


Neutrophils (%) (Auto) 75 % (31-73)   


 


Lymphocytes (%) (Auto) 13 % (24-48)   


 


Monocytes (%) (Auto) 10 % (0-9)   


 


Eosinophils (%) (Auto) 2 % (0-3)   


 


Basophils (%) (Auto) 1 % (0-3)   


 


Neutrophils # (Auto)


 6.2 x10^3/uL


(1.8-7.7) 


 





 


Lymphocytes # (Auto)


 1.1 x10^3/uL


(1.0-4.8) 


 





 


Monocytes # (Auto)


 0.8 x10^3/uL


(0.0-1.1) 


 





 


Eosinophils # (Auto)


 0.1 x10^3/uL


(0.0-0.7) 


 





 


Basophils # (Auto)


 0.0 x10^3/uL


(0.0-0.2) 


 





 


Sodium Level


 139 mmol/L


(136-145) 


 





 


Potassium Level


 3.9 mmol/L


(3.5-5.1) 


 





 


Chloride Level


 105 mmol/L


() 


 





 


Carbon Dioxide Level


 30 mmol/L


(21-32) 


 





 


Anion Gap 4 (6-14)   


 


Blood Urea Nitrogen


 20 mg/dL


(8-26) 


 





 


Creatinine


 0.6 mg/dL


(0.7-1.3) 


 





 


Estimated GFR


(Cockcroft-Gault) 134.8 


 


 





 


BUN/Creatinine Ratio 33 (6-20)   


 


Glucose Level


 90 mg/dL


(70-99) 


 





 


Calcium Level


 8.6 mg/dL


(8.5-10.1) 


 





 


Total Bilirubin


 0.5 mg/dL


(0.2-1.0) 


 





 


Aspartate Amino Transf


(AST/SGOT) 33 U/L (15-37) 


 


 





 


Alanine Aminotransferase


(ALT/SGPT) 45 U/L (16-63) 


 


 





 


Alkaline Phosphatase


 60 U/L


() 


 





 


Total Protein


 5.6 g/dL


(6.4-8.2) 


 





 


Albumin


 1.6 g/dL


(3.4-5.0) 


 





 


Albumin/Globulin Ratio 0.4 (1.0-1.7)   


 


O2 Saturation  95 % (92-99)  96 % (92-99) 


 


Arterial Blood pH


 


 7.45


(7.35-7.45) 7.47


(7.35-7.45)


 


Arterial Blood pCO2 at


Patient Temp 


 44 mmHg


(35-46) 41 mmHg


(35-46)


 


Arterial Blood pO2 at Patient


Temp 


 74 mmHg


() 93 mmHg


()


 


Arterial Blood HCO3


 


 29 mmol/L


(21-28) 30 mmol/L


(21-28)


 


Arterial Blood Base Excess


 


 5 mmol/L


(-3-3) 5 mmol/L


(-3-3)


 


FiO2  50  50% vent 








Laboratory Tests








Test


 1/22/21


08:00


 


O2 Saturation 96 % (92-99) 


 


Arterial Blood pH


 7.47


(7.35-7.45)


 


Arterial Blood pCO2 at


Patient Temp 41 mmHg


(35-46)


 


Arterial Blood pO2 at Patient


Temp 93 mmHg


()


 


Arterial Blood HCO3


 30 mmol/L


(21-28)


 


Arterial Blood Base Excess


 5 mmol/L


(-3-3)


 


FiO2 50% vent 








Medications





Active Scripts








 Medications  Dose


 Route/Sig


 Max Daily Dose Days Date Category


 


 Morphine Sulfate


 Er (Morphine


 Sulfate) 30 Mg


 Tablet.er  1 Tab


 PO BID


   12/23/20 Reported


 


 Tramadol Hcl 100


 Mg Tbmp.24hr  100 Mg


 PO PRN Q8HRS PRN


   12/23/20 Reported


 


 Ambien (Zolpidem


 Tartrate) 10 Mg


 Tablet  10 Mg


 PO PRN QHS PRN


   12/23/20 Reported


 


 Methotrexate


  (Methotrexate


 Sodium) 2.5 Mg


 Tablet  10 Tab


 PO WEEKLY


   12/23/20 Reported


 


 Lisinopril 10 Mg


 Tablet  1 Tab


 PO DAILY


   12/23/20 Reported


 


 Crestor


  (Rosuvastatin


 Calcium) 5 Mg


 Tablet  2 Tab


 PO DAILY


   12/23/20 Reported


 


 Hydrochlorothiazide


 Tablet


  (Hydrochlorothiazide)


 50 Mg Tablet  25 Mg


 PO DAILY


   12/23/20 Reported


 


 Escitalopram


 Oxalate 10 Mg


 Tablet  1 Tab


 PO DAILY


   12/23/20 Reported


 


 Sulfasalazine Dr


  (Sulfasalazine)


 500 Mg Tablet.dr  2 Tab


 PO TID


  30 12/23/20 Reported


 


 Colace (Docusate


 Sodium) 100 Mg


 Capsule  1 Cap


 PO DA


  30 12/23/20 Reported


 


 Acetaminophen


 Ext.release


  (Acetaminophen)


 650 Mg Tablet.er  650 Mg


 PO PRN Q8HRS PRN


   12/23/20 Reported


 


 Melatonin 5 Mg


 Capsule  1 Cap


 PO QHS


  30 12/23/20 Reported


 


 B-12


  (Cyanocobalamin


  (Vitamin B-12))


 500 Mcg Tablet  2 Tab


 PO DAILY


  30 12/23/20 Reported


 


 Vitamin D3


  (Cholecalciferol


  (Vitamin D3)) 50


 Mcg Capsule  50 Mcg


 PO DAILY


   12/23/20 Reported


 


 Aller-Sung


  (Cetirizine Hcl)


 10 Mg Tablet  1 Tab


 PO DAILY


  30 12/23/20 Reported








Comments


CXR 1/20


  Bilateral parenchymal opacities and left pleural effusion are grossly stable.





Impression


.


IMPRESSION:


1.  Acute hypoxic respiratory failure secondary to COVID-19


pneumonia and acute respiratory distress syndrome/acute lung injury.-- COVID-19 

positive ----improving


2.  Abnormal CT chest with extensive widespread ground glass and alveolar and


interstitial infiltrates with reactive mild mediastinal/hilar adenopathy.  This 

related to COVID-19 pneumonia.


3.  Patient with history of psoriatic arthritis.  He is likely immunocompromised


as he has been on methotrexate.  


4.  History of tongue cancer with resection, details unknown.


5.  History of prostate cancer.


6.  History of Crohn's disease.


7.  Low grade fever





Plan


.


RECOMMENDATIONS:


Continue current ventilatory support, FiO2 50% and a PEEP of 5


s/p trach 1/19


s/p PEG


will wean off sedation post PEG


Follow chest x-ray/ABG---- 


COVID-19 positive-- now recovered 


Patient remains off antibiotics at this time, infectious disease of signed off 

cultures are negative, low grade fever, cxr improving ? source, ask ID


Patient is completed a full 10-day course of steroids


Continue TF for nutritional support 


Follow GI recs 


DVT/GI PPX


awaiting LTAC transfer


d/w wife in detail.





D/W RN and RT 





PT. is FULL CODE





Critical care time [id number]











ABI MENDEZ MD                 Jan 22, 2021 09:52

## 2021-01-22 NOTE — PDOC
PROGRESS NOTES


Date of Service:


DATE: 1/22/21 


TIME: 14:17





Chief Complaint


Chief Complaint


 





Acute hypoxic respiratory failure - no pulmonary history. With recent travel to 

and from [place] likely COVID 19 vs other atypical pneumonia. Cont empiric 

antibiotics, steroids. Consult Pulm. 


Improved aeration of the lungs with persistent moderate mixed interstitial and 

alveolar airspace disease.  1/02 


Pt. experienced hypoxia and respiratory decompensation overnight requiring 

intubation 


now on vent 100% PEEP of 10 


Hypotension as well now on pressors


acute respiratory distress syndrome. Consideration may be given for multifocal 

pneumonia versus pulmonary edema.


COVID 19 positive - with pneumonia - given transaminitis and late presentation 

with high O2 requirements no indication for remdesivir


RYDER - likely vasomotor nephropathy - no known renal history, will hydrate


Pneumonia - likely atypical, gram negative vs viral. will cover 


Prostate Ca - s/p resection at [facility] in [place]


Hypokalemia - likely nutritional or loss from diarrhea, will replace


Elevated troponin - likely from type II demand ischemia, will trend troponins, 

maintain telemetry


Crohn's - sees rheumatology regularly, Dr Fan in [place], on sulfasalazine


 


Anemia - likely of chronic disease, will monitor


Cardiomegaly - notable on CT chest - no known cardiac history, though his mother

did have CHF and CAD


Right renal mass -  


HYPOTENSION 


 


Severe malnutrition





History of Present Illness


History of Present Illness


1/22.,    PEG placed,   start tube feeds,  meds changed,  orderes to LTAC soon, 

  wean vent if able





1/21.  PEG today


doing OK


will need LTACH


cont current


discussed wityh RN








1/20/2021,   add PPN,   fluid stable


cont current,  PEG tube tomorrow 


vent OK








Patient seen and examined in the ICU


26 days past his COVID-19 diagnosis


He is still intubated


Assist-control 


Has NG tube feeds


cont supportive care


Sedated with fentanyl Versed and propofol





Vitals


Vitals





Vital Signs








  Date Time  Temp Pulse Resp B/P (MAP) Pulse Ox O2 Delivery O2 Flow Rate FiO2


 


1/22/21 14:00  51 24 104/62 (76) 100 Ventilator  


 


1/22/21 12:00 98.0       





 98.0       











Physical Exam


Physical Exam


GENERAL:  Intubated, sedated.


HEENT:  Normocephalic, atraumatic.  Anicteric.  ETT and OGT tube in place.


NECK:  Supple.


LUNGS:  Decreased breath sounds at the bases.  No wheezing.


HEART:  S1, S2.


ABDOMEN: Mildly distended


EXTREMITIES: Generalized anasarca


GENITOURINARY:  Hart in place.scrotal swelling +


CENTRAL NERVOUS SYSTEM:  Intubated, sedated.


PSYCHIATRIC:  Unable to assess.


LINES:  Right upper extremity PICC line clean.


General:  Other (sedated )


Heart:  Regular rate, Normal S1, Normal S2


Lungs:  Clear


Abdomen:  Soft, No tenderness


Extremities:  No clubbing, No cyanosis


Skin:  No rashes, No breakdown





Labs


LABS





Laboratory Tests








Test


 1/22/21


08:00 1/22/21


11:36


 


O2 Saturation 96 % (92-99)  


 


Arterial Blood pH


 7.47


(7.35-7.45) 





 


Arterial Blood pCO2 at


Patient Temp 41 mmHg


(35-46) 





 


Arterial Blood pO2 at Patient


Temp 93 mmHg


() 





 


Arterial Blood HCO3


 30 mmol/L


(21-28) 





 


Arterial Blood Base Excess


 5 mmol/L


(-3-3) 





 


FiO2 50% vent  


 


Urine Collection Type  Unknown 


 


Urine Color  Orange 


 


Urine Clarity  Cloudy 


 


Urine pH  5.5 (<5.0-8.0) 


 


Urine Specific Gravity


 


 >=1.030


(1.000-1.030)


 


Urine Protein


 


 30 mg/dL


(NEG-TRACE)


 


Urine Glucose (UA)


 


 Negative mg/dL


(NEG)


 


Urine Ketones (Stick)


 


 Negative mg/dL


(NEG)


 


Urine Blood  Negative (NEG) 


 


Urine Nitrite  Negative (NEG) 


 


Urine Bilirubin  Small (NEG) 


 


Urine Urobilinogen Dipstick


 


 2.0 mg/dL (0.2


mg/dL)


 


Urine Leukocyte Esterase  Moderate (NEG) 


 


Urine RBC  3-5 /HPF (0-2) 


 


Urine WBC


 


 11-20 /HPF


(0-4)


 


Urine Squamous Epithelial


Cells 


 Mod /LPF 





 


Urine Bacteria


 


 Few /HPF


(0-FEW)


 


Urine Mucus  Slight /LPF 











Comment


Review of Relevant


I have reviewed the following items jabier (where applicable) has been applied.


Labs





Laboratory Tests








Test


 1/21/21


06:00 1/21/21


08:27 1/22/21


08:00 1/22/21


11:36


 


White Blood Count


 8.3 x10^3/uL


(4.0-11.0) 


 


 





 


Red Blood Count


 2.55 x10^6/uL


(4.30-5.70) 


 


 





 


Hemoglobin


 8.0 g/dL


(13.0-17.5) 


 


 





 


Hematocrit


 24.4 %


(39.0-53.0) 


 


 





 


Mean Corpuscular Volume 96 fL ()    


 


Mean Corpuscular Hemoglobin 31 pg (25-35)    


 


Mean Corpuscular Hemoglobin


Concent 33 g/dL


(31-37) 


 


 





 


Red Cell Distribution Width


 15.3 %


(11.5-14.5) 


 


 





 


Platelet Count


 319 x10^3/uL


(140-400) 


 


 





 


Neutrophils (%) (Auto) 75 % (31-73)    


 


Lymphocytes (%) (Auto) 13 % (24-48)    


 


Monocytes (%) (Auto) 10 % (0-9)    


 


Eosinophils (%) (Auto) 2 % (0-3)    


 


Basophils (%) (Auto) 1 % (0-3)    


 


Neutrophils # (Auto)


 6.2 x10^3/uL


(1.8-7.7) 


 


 





 


Lymphocytes # (Auto)


 1.1 x10^3/uL


(1.0-4.8) 


 


 





 


Monocytes # (Auto)


 0.8 x10^3/uL


(0.0-1.1) 


 


 





 


Eosinophils # (Auto)


 0.1 x10^3/uL


(0.0-0.7) 


 


 





 


Basophils # (Auto)


 0.0 x10^3/uL


(0.0-0.2) 


 


 





 


Sodium Level


 139 mmol/L


(136-145) 


 


 





 


Potassium Level


 3.9 mmol/L


(3.5-5.1) 


 


 





 


Chloride Level


 105 mmol/L


() 


 


 





 


Carbon Dioxide Level


 30 mmol/L


(21-32) 


 


 





 


Anion Gap 4 (6-14)    


 


Blood Urea Nitrogen


 20 mg/dL


(8-26) 


 


 





 


Creatinine


 0.6 mg/dL


(0.7-1.3) 


 


 





 


Estimated GFR


(Cockcroft-Gault) 134.8 


 


 


 





 


BUN/Creatinine Ratio 33 (6-20)    


 


Glucose Level


 90 mg/dL


(70-99) 


 


 





 


Calcium Level


 8.6 mg/dL


(8.5-10.1) 


 


 





 


Total Bilirubin


 0.5 mg/dL


(0.2-1.0) 


 


 





 


Aspartate Amino Transf


(AST/SGOT) 33 U/L (15-37) 


 


 


 





 


Alanine Aminotransferase


(ALT/SGPT) 45 U/L (16-63) 


 


 


 





 


Alkaline Phosphatase


 60 U/L


() 


 


 





 


Total Protein


 5.6 g/dL


(6.4-8.2) 


 


 





 


Albumin


 1.6 g/dL


(3.4-5.0) 


 


 





 


Albumin/Globulin Ratio 0.4 (1.0-1.7)    


 


O2 Saturation  95 % (92-99)  96 % (92-99)  


 


Arterial Blood pH


 


 7.45


(7.35-7.45) 7.47


(7.35-7.45) 





 


Arterial Blood pCO2 at


Patient Temp 


 44 mmHg


(35-46) 41 mmHg


(35-46) 





 


Arterial Blood pO2 at Patient


Temp 


 74 mmHg


() 93 mmHg


() 





 


Arterial Blood HCO3


 


 29 mmol/L


(21-28) 30 mmol/L


(21-28) 





 


Arterial Blood Base Excess


 


 5 mmol/L


(-3-3) 5 mmol/L


(-3-3) 





 


FiO2  50  50% vent  


 


Urine Collection Type    Unknown 


 


Urine Color    Orange 


 


Urine Clarity    Cloudy 


 


Urine pH    5.5 (<5.0-8.0) 


 


Urine Specific Gravity


 


 


 


 >=1.030


(1.000-1.030)


 


Urine Protein


 


 


 


 30 mg/dL


(NEG-TRACE)


 


Urine Glucose (UA)


 


 


 


 Negative mg/dL


(NEG)


 


Urine Ketones (Stick)


 


 


 


 Negative mg/dL


(NEG)


 


Urine Blood    Negative (NEG) 


 


Urine Nitrite    Negative (NEG) 


 


Urine Bilirubin    Small (NEG) 


 


Urine Urobilinogen Dipstick


 


 


 


 2.0 mg/dL (0.2


mg/dL)


 


Urine Leukocyte Esterase    Moderate (NEG) 


 


Urine RBC    3-5 /HPF (0-2) 


 


Urine WBC


 


 


 


 11-20 /HPF


(0-4)


 


Urine Squamous Epithelial


Cells 


 


 


 Mod /LPF 





 


Urine Bacteria


 


 


 


 Few /HPF


(0-FEW)


 


Urine Mucus    Slight /LPF 








Laboratory Tests








Test


 1/22/21


08:00 1/22/21


11:36


 


O2 Saturation 96 % (92-99)  


 


Arterial Blood pH


 7.47


(7.35-7.45) 





 


Arterial Blood pCO2 at


Patient Temp 41 mmHg


(35-46) 





 


Arterial Blood pO2 at Patient


Temp 93 mmHg


() 





 


Arterial Blood HCO3


 30 mmol/L


(21-28) 





 


Arterial Blood Base Excess


 5 mmol/L


(-3-3) 





 


FiO2 50% vent  


 


Urine Collection Type  Unknown 


 


Urine Color  Orange 


 


Urine Clarity  Cloudy 


 


Urine pH  5.5 (<5.0-8.0) 


 


Urine Specific Gravity


 


 >=1.030


(1.000-1.030)


 


Urine Protein


 


 30 mg/dL


(NEG-TRACE)


 


Urine Glucose (UA)


 


 Negative mg/dL


(NEG)


 


Urine Ketones (Stick)


 


 Negative mg/dL


(NEG)


 


Urine Blood  Negative (NEG) 


 


Urine Nitrite  Negative (NEG) 


 


Urine Bilirubin  Small (NEG) 


 


Urine Urobilinogen Dipstick


 


 2.0 mg/dL (0.2


mg/dL)


 


Urine Leukocyte Esterase  Moderate (NEG) 


 


Urine RBC  3-5 /HPF (0-2) 


 


Urine WBC


 


 11-20 /HPF


(0-4)


 


Urine Squamous Epithelial


Cells 


 Mod /LPF 





 


Urine Bacteria


 


 Few /HPF


(0-FEW)


 


Urine Mucus  Slight /LPF 








Microbiology


1/2/21 Blood Culture - Final, Complete


         NO GROWTH AFTER 5 DAYS


Medications





Current Medications


Sodium Chloride (Normal Saline Flush) 3 ml QSHIFT  PRN IV AFTER MEDS AND BLOOD 

DRAWS;  Start 12/23/20 at 07:45


Sodium Chloride 1,000 ml @  150 mls/hr Q6H40M IV  Last administered on 12/23/ 20at 15:10;  Start 12/23/20 at 08:15;  Stop 12/23/20 at 21:34;  Status DC


Ondansetron HCl (Zofran) 4 mg PRN Q4HRS  PRN IV NAUSEA/VOMITING;  Start 12/23/20

at 08:15


Acetaminophen (Tylenol) 650 mg PRN Q4HRS  PRN PO TEMP OVER 100.4F OR MILD PAIN 

Last administered on 12/31/20at 01:02;  Start 12/23/20 at 08:15


Docusate Sodium (Colace) 100 mg PRN BID  PRN PO HARD STOOLS Last administered on

12/25/20at 17:03;  Start 12/23/20 at 08:15


Ceftriaxone Sodium (Rocephin) 1 gm Q24H IVP  Last administered on 1/3/21at 

08:14;  Start 12/24/20 at 09:00;  Stop 1/3/21 at 13:15;  Status DC


Dexamethasone Sodium Phosphate (Decadron) 4 mg DAILY IVP  Last administered on 

1/3/21at 08:14;  Start 12/24/20 at 09:00;  Stop 1/4/21 at 08:39;  Status DC


Amino Acids/ Glycerin/ Electrolytes 1,000 ml @  80 mls/hr [id number] IV  Last 

administered on 1/4/21at 14:53;  Start 12/23/20 at 08:45;  Stop 1/5/21 at 05:03;

 Status DC


Enoxaparin Sodium (Lovenox 40mg Syringe) 40 mg Q12HR SQ  Last administered on 

1/20/21at 08:05;  Start 12/23/20 at 09:00;  Stop 1/21/21 at 14:29;  Status DC


Zinc Sulfate (Orazinc) 220 mg DAILY PO  Last administered on 1/22/21at 09:01;  

Start 12/23/20 at 09:00


Guaifenesin (Robitussin Dm) 10 ml PRN Q6HRS  PRN PO COUGH-2ND CHOICE Last 

administered on 1/1/21at 15:37;  Start 12/23/20 at 08:45


Acetaminophen (Tylenol Supp) 650 mg PRN Q6HRS  PRN CA MILD PAIN / TEMP > 100.3'F

Last administered on 1/21/21at 05:58;  Start 12/23/20 at 08:45


Morphine Sulfate (Morphine Sulfate) 2 mg PRN Q4HRS  PRN IV PAIN Last 

administered on 1/1/21at 16:12;  Start 12/23/20 at 08:45;  Stop 1/2/21 at 01:41;

 Status DC


Azithromycin 500 mg/Sodium Chloride 250 ml @  250 mls/hr 1X  ONCE IV  Last 

administered on 12/23/20at 09:59;  Start 12/23/20 at 10:00;  Stop 12/23/20 at 

10:59;  Status DC


Azithromycin 250 mg/Sodium Chloride 250 ml @  250 mls/hr Q24H IV  Last 

administered on 12/27/20at 08:55;  Start 12/24/20 at 09:00;  Stop 12/27/20 at 

09:59;  Status DC


Cetirizine HCl (ZyrTEC) 10 mg DAILY PO  Last administered on 1/22/21at 09:01;  

Start 12/24/20 at 09:00


Morphine Sulfate (Ms Contin) 15 mg BID PO  Last administered on 1/5/21at 21:11; 

Start 12/23/20 at 21:00;  Stop 1/6/21 at 14:43;  Status DC


Cyanocobalamin (Vitamin B-12) 1,000 mcg DAILY PO  Last administered on 1/22/21at

09:01;  Start 12/24/20 at 09:00


Citalopram Hydrobromide (CeleXA) 20 mg DAILY PO  Last administered on 1/22/21at 

09:01;  Start 12/24/20 at 09:00


Atorvastatin Calcium (Lipitor) 40 mg QHS PO  Last administered on 1/21/21at 

21:45;  Start 12/23/20 at 21:00


Sulfasalazine (Azulfidine) 1,000 mg BID PO  Last administered on 1/22/21at 

09:01;  Start 12/23/20 at 21:00


Tramadol HCl (Ultram) 100 mg PRN Q8HRS  PRN PO MODERATE PAIN, SEVERE PAIN Last 

administered on 1/1/21at 13:35;  Start 12/23/20 at 15:00


Zolpidem Tartrate (Ambien) 5 mg PRN QHS  PRN PO INSOMNIA Last administered on 

1/1/21at 22:05;  Start 12/23/20 at 15:00


Info ([facility] Electrolyte Protocol) 1 ea CONT PRN  PRN MC PER PROTOCOL;  Start 12/ 24/20 at 14:15


Potassium Chloride (Klor-Con) 40 meq 1X  ONCE PO  Last administered on 

12/24/20at 14:24;  Start 12/24/20 at 14:15;  Stop 12/24/20 at 14:16;  Status DC


Sterile Water (WATER for RESP) 1,000 ml CONT  PRN INH VIA VAPOTHERM DEVICE Last 

administered on 1/1/21at 15:49;  Start 12/26/20 at 09:30


Benzonatate (Tessalon Perle) 100 mg [id number] PO  Last administered on 1/1/21at 

20:51;  Start 12/26/20 at 14:00;  Stop 1/2/21 at 10:35;  Status DC


Labetalol HCl (Normodyne Iv Push) 10 mg PRN Q2HR  PRN IVP HYPERTENSION Last 

administered on 1/21/21at 17:31;  Start 12/26/20 at 18:30


Quetiapine Fumarate (SEROquel) 50 mg PRN QHS  PRN PO sleep 2ND CHOICE Last 

administered on 1/1/21at 00:24;  Start 12/27/20 at 21:00


Enalaprilat (Vasotec Inj) 2.5 mg PRN Q6HRS  PRN IVP HYPERTENSION-2ND CHOICE Last

administered on 12/28/20at 12:29;  Start 12/27/20 at 10:00


Olanzapine (ZyPREXA ZYDIS) 5 mg PRN BID  PRN PO ANXIETY / AGITATION Last 

administered on 1/11/21at 09:56;  Start 12/27/20 at 12:30


Guaifenesin (Robitussin) 400 mg PRN Q4HRS  PRN PO COUGH;  Start 12/27/20 at 

22:15;  Stop 12/27/20 at 22:27;  Status DC


Guaifenesin (Robitussin) 400 mg PRN Q4HRS  PRN PO COUGH Last administered on 

1/1/21at 23:00;  Start 12/27/20 at 22:30


Furosemide (Lasix) 40 mg 1X  ONCE IVP  Last administered on 12/30/20at 10:15;  

Start 12/30/20 at 10:00;  Stop 12/30/20 at 10:01;  Status DC


Sodium Chloride 1,000 ml @  100 mls/hr Q10H IV  Last administered on 1/7/21at 

00:03;  Start 12/31/20 at 10:15;  Stop 1/7/21 at 13:14;  Status DC


Furosemide (Lasix) 20 mg 1X  ONCE IVP  Last administered on 1/1/21at 13:34;  

Start 1/1/21 at 12:30;  Stop 1/1/21 at 12:31;  Status DC


Alteplase, Recombinant (Cathflo For Central Catheter Clearance) 1 mg 1X  ONCE 

INT CAT  Last administered on 1/2/21at 02:10;  Start 1/1/21 at 16:15;  Stop 

1/1/21 at 16:16;  Status DC


Fentanyl Citrate 30 ml @ 0 mls/hr CONT  PRN IV SEE PROTOCOL Last administered on

1/2/21at 01:58;  Start 1/2/21 at 01:15;  Stop 1/2/21 at 02:36;  Status DC


Propofol 100 ml @ 0 mls/hr CONT  PRN IV PER PROTOCOL Last administered on 

1/16/21at 23:12;  Start 1/2/21 at 01:15


Chlorhexidine Gluconate (Peridex) 15 ml BID MM  Last administered on 1/9/21at 

09:21;  Start 1/2/21 at 09:00;  Stop 1/9/21 at 19:26;  Status DC


Midazolam HCl 100 ml @ 0 mls/hr CONT  PRN IV SEE PROTOCOL Last administered on 

1/22/21at 14:00;  Start 1/2/21 at 01:15


Succinylcholine Chloride (Anectine) 200 mg STK-MED ONCE .ROUTE ;  Start 1/2/21 

at 01:21;  Stop 1/2/21 at 01:21;  Status DC


Fentanyl Citrate 55 ml @ 0 mls/hr CONT PRN  PRN IV PAIN CONTROL Last 

administered on 1/22/21at 02:11;  Start 1/2/21 at 02:45


Norepinephrine Bitartrate 8 mg/ Dextrose 258 ml @  19.737 mls/ hr CONT  PRN IV 

PER PROTOCOL Last administered on 1/2/21at 22:27;  Start 1/2/21 at 07:30;  Stop 

1/5/21 at 08:55;  Status DC


Vecuronium Bromide (Norcuron Bolus) 6 mg PRN Q6HRS  PRN IV SEDATION Last 

administered on 1/9/21at 17:07;  Start 1/2/21 at 09:15


Vancomycin HCl (Vanco Per Pharmacy) 1 each PRN DAILY  PRN MC SEE COMMENTS Last 

administered on 1/2/21at 20:47;  Start 1/2/21 at 16:00;  Stop 1/3/21 at 13:19;  

Status DC


Vancomycin HCl 2 gm/Sodium Chloride 500 ml @  250 mls/hr 1X  ONCE IV  Last 

administered on 1/2/21at 17:43;  Start 1/2/21 at 17:00;  Stop 1/2/21 at 18:59;  

Status DC


Vancomycin HCl 1.5 gm/Sodium Chloride 500 ml @  250 mls/hr Q12H IV  Last admin

istered on 1/3/21at 05:53;  Start 1/3/21 at 05:00;  Stop 1/3/21 at 13:15;  

Status DC


Vancomycin HCl (Vancomycin Trough Level) 1 each 1X  ONCE MC ;  Start 1/4/21 at 

04:30;  Stop 1/3/21 at 13:20;  Status DC


Cefepime HCl (Maxipime) 2 gm Q12HR IVP  Last administered on 1/12/21at 20:52;  

Start 1/3/21 at 14:00;  Stop 1/13/21 at 07:59;  Status DC


Linezolid (Zyvox) 600 mg BID PO  Last administered on 1/6/21at 20:38;  Start 

1/3/21 at 21:00;  Stop 1/7/21 at 07:58;  Status DC


Dexamethasone Sodium Phosphate (Decadron) 6 mg DAILY IVP  Last administered on 

1/5/21at 08:36;  Start 1/4/21 at 09:00;  Stop 1/5/21 at 08:55;  Status DC


Polyethylene Glycol (miraLAX PACKET) 17 gm DAILY PO  Last administered on 

1/15/21at 08:40;  Start 1/6/21 at 14:30;  Stop 1/17/21 at 12:21;  Status DC


Nystatin (Nystop) 1 lindsay BID TP  Last administered on 1/22/21at 09:01;  Start 

1/6/21 at 16:00


Sodium Chloride 1,000 ml @  0 mls/hr Q0M IV ;  Start 1/7/21 at 13:15


Famotidine (Pepcid Vial) 20 mg BID IVP  Last administered on 1/22/21at 09:01;  

Start 1/8/21 at 21:00;  Stop 1/22/21 at 09:11;  Status DC


Furosemide (Lasix) 40 mg 1X  ONCE IVP  Last administered on 1/12/21at 11:18;  St

art 1/12/21 at 11:15;  Stop 1/12/21 at 11:16;  Status DC


Multivitamins/ Minerals Therapeutic (Centrum Multivit-Mineral Liq) 5 ml DAILY 

PEG  Last administered on 1/22/21at 09:01;  Start 1/15/21 at 09:00


Dexmedetomidine HCl 400 mcg/ Sodium Chloride 100 ml @ 0 mls/hr CONT  PRN IV PER 

PROTOCOL Last administered on 1/22/21at 10:56;  Start 1/17/21 at 07:15


Atropine Sulfate (ATROPINE 0.5mg SYRINGE) 0.5 mg PRN Q5MIN  PRN IV SEE COMMENTS;

 Start 1/17/21 at 07:15


Rocuronium Bromide (Zemuron) 50 mg STK-MED ONCE .ROUTE ;  Start 1/19/21 at 

10:24;  Stop 1/19/21 at 10:25;  Status DC


Bupivacaine HCl/ Epinephrine Bitart (Sensorcain-Epi 0.5% Kit) 30 ml STK-MED ONCE

.ROUTE  Last administered on 1/19/21at 12:47;  Start 1/19/21 at 11:34;  Stop 

1/19/21 at 11:34;  Status DC


Cellulose (Surgicel Fibrillar 1x2) 1 each STK-MED ONCE .ROUTE  Last administered

on 1/19/21at 12:56;  Start 1/19/21 at 11:34;  Stop 1/19/21 at 11:34;  Status DC


Ephedrine Sulfate (Akovaz) 50 mg STK-MED ONCE .ROUTE ;  Start 1/19/21 at 12:56; 

Stop 1/19/21 at 12:57;  Status DC


Desflurane (Suprane) 15 ml STK-MED ONCE IH ;  Start 1/19/21 at 12:56;  Stop 

1/19/21 at 12:57;  Status DC


Sodium Chloride (SODIUM CHLORIDE 20ml) 20 ml STK-MED ONCE IJ ;  Start 1/19/21 at

12:57;  Stop 1/19/21 at 12:57;  Status DC


Amino Acids/ Glycerin/ Electrolytes 1,000 ml @  80 mls/hr [id number] IV  Last 

administered on 1/21/21at 03:49;  Start 1/20/21 at 11:00;  Stop 1/21/21 at 

19:27;  Status DC


Cefazolin Sodium/ Dextrose 50 ml @  100 mls/hr 1X  ONCE IV  Last administered on

1/21/21at 13:42;  Start 1/21/21 at 12:00;  Stop 1/21/21 at 12:29;  Status DC


Amino Acids/ Glycerin/ Electrolytes 1,000 ml @  80 mls/hr [id number] IV ;  Start 

1/20/21 at 14:00;  Stop 1/20/21 at 14:05;  Status DC


Ringer's Solution 1,000 ml @  30 mls/hr Q24H IV ;  Start 1/21/21 at 07:00;  Stop

1/21/21 at 18:59;  Status DC


Propofol (Diprivan) 200 mg STK-MED ONCE IV ;  Start 1/21/21 at 13:59;  Stop 

1/21/21 at 13:59;  Status DC


Enoxaparin Sodium (Lovenox 40mg Syringe) 40 mg Q12HR SQ ;  Start 1/23/21 at 

09:00


Famotidine (Pepcid) 20 mg BID GT ;  Start 1/22/21 at 21:00


Piperacillin Sod/ Tazobactam Sod 3.375 gm/Sodium Chloride 50 ml @  100 mls/hr 

Q6HRS IV  Last administered on 1/22/21at 12:27;  Start 1/22/21 at 12:00


Linezolid (Zyvox) 600 mg BID PO  Last administered on 1/22/21at 12:27;  Start 

1/22/21 at 12:00





Active Scripts


Active


Reported


Morphine Sulfate Er (Morphine Sulfate) 30 Mg Tablet.er 1 Tab PO BID


Tramadol Hcl 100 Mg Tbmp.24hr 100 Mg PO PRN Q8HRS PRN


Ambien (Zolpidem Tartrate) 10 Mg Tablet 10 Mg PO PRN QHS PRN


Methotrexate (Methotrexate Sodium) 2.5 Mg Tablet 10 Tab PO WEEKLY


Lisinopril 10 Mg Tablet 1 Tab PO DAILY


Crestor (Rosuvastatin Calcium) 5 Mg Tablet 2 Tab PO DAILY


Hydrochlorothiazide Tablet (Hydrochlorothiazide) 50 Mg Tablet 25 Mg PO DAILY


Escitalopram Oxalate 10 Mg Tablet 1 Tab PO DAILY


Sulfasalazine Dr (Sulfasalazine) 500 Mg Tablet.dr 2 Tab PO TID 30 Days


Colace (Docusate Sodium) 100 Mg Capsule 1 Cap PO DA 30 Days


Acetaminophen Ext.release (Acetaminophen) 650 Mg Tablet.er 650 Mg PO PRN Q8HRS P

RN


Melatonin 5 Mg Capsule 1 Cap PO QHS 30 Days


B-12 (Cyanocobalamin (Vitamin B-12)) 500 Mcg Tablet 2 Tab PO DAILY 30 Days


Vitamin D3 (Cholecalciferol (Vitamin D3)) 50 Mcg Capsule 50 Mcg PO DAILY


Aller-Sung (Cetirizine Hcl) 10 Mg Tablet 1 Tab PO DAILY 30 Days


Vitals/I & O





Vital Sign - Last 24 Hours








 1/21/21 1/21/21 1/21/21 1/21/21





 15:00 15:15 16:00 16:00


 


Pulse 54   60


 


Resp 25   31


 


B/P (MAP) 95/58 (70)   111/72 (85)


 


Pulse Ox 97 97  98


 


O2 Delivery Ventilator Ventilator Mechanical Ventilator Ventilator





 1/21/21 1/21/21 1/21/21 1/21/21





 17:00 17:31 18:00 19:00


 


Temp 100.9   





 100.9   


 


Pulse 106 100 78 108


 


Resp 28  24 27


 


B/P (MAP) 190/103 (132) 190/103 132/56 (81) 170/80 (110)


 


Pulse Ox 97  94 99


 


O2 Delivery Ventilator  Ventilator Ventilator


 


    





    





 1/21/21 1/21/21 1/21/21 1/21/21





 20:00 20:00 20:08 21:00


 


Temp 100.8   





 100.8   


 


Pulse 65   84


 


Resp 25 27


 


B/P (MAP) 111/50 (70)   163/83 (109)


 


Pulse Ox 94  95 98


 


O2 Delivery Ventilator Mechanical Ventilator Ventilator Ventilator


 


    





    





 1/21/21 1/21/21 1/22/21 1/22/21





 22:00 23:00 00:00 00:00


 


Temp   100.7 





   100.7 


 


Pulse 79 84 84 


 


Resp 27 27 29 


 


B/P (MAP) 124/70 (88) 158/85 (109) 157/82 (107) 


 


Pulse Ox 99 100 100 


 


O2 Delivery Ventilator Ventilator Ventilator Mechanical Ventilator


 


    





    





 1/22/21 1/22/21 1/22/21 1/22/21





 00:08 01:00 02:00 03:00


 


Pulse  118 76 57


 


Resp  30 27 27


 


B/P (MAP)  153/85 (107) 111/59 (76) 91/50 (64)


 


Pulse Ox 100 100 100 100


 


O2 Delivery Ventilator Ventilator Ventilator Ventilator





 1/22/21 1/22/21 1/22/21 1/22/21





 04:00 04:00 04:19 05:00


 


Temp 100.2   





 100.2   


 


Pulse 64   58


 


Resp 27 24


 


B/P (MAP) 109/66 (80)   103/62 (76)


 


Pulse Ox 100  96 100


 


O2 Delivery Ventilator Mechanical Ventilator Ventilator Ventilator


 


    





    





 1/22/21 1/22/21 1/22/21 1/22/21





 06:00 07:00 07:50 08:00


 


Temp    99.5





    99.5


 


Pulse 56 62  54


 


Resp 24 24  24


 


B/P (MAP) 108/57 (74) 105/51 (69)  101/53 (69)


 


Pulse Ox 100 100 100 100


 


O2 Delivery Ventilator Ventilator Ventilator Ventilator


 


    





    





 1/22/21 1/22/21 1/22/21 1/22/21





 08:00 09:00 10:00 11:00


 


Pulse  60 53 52


 


Resp  24 24 24


 


B/P (MAP)  108/60 (76) 111/60 (77) 106/53 (70)


 


Pulse Ox  100 100 93


 


O2 Delivery Mechanical Ventilator Ventilator Ventilator Ventilator





 1/22/21 1/22/21 1/22/21 1/22/21





 11:55 12:00 12:00 13:00


 


Temp   98.0 





   98.0 


 


Pulse   51 50


 


Resp   24 24


 


B/P (MAP)   107/62 (77) 102/57 (72)


 


Pulse Ox 95  99 100


 


O2 Delivery Ventilator Mechanical Ventilator Ventilator Ventilator


 


    





    





 1/22/21   





 14:00   


 


Pulse 51   


 


Resp 24   


 


B/P (MAP) 104/62 (76)   


 


Pulse Ox 100   


 


O2 Delivery Ventilator   














Intake and Output   


 


 1/21/21 1/21/21 1/22/21





 15:00 23:00 07:00


 


Intake Total  1044 ml 633 ml


 


Output Total  420 ml 220 ml


 


Balance  624 ml 413 ml











Justicifation of Admission Dx:


Justifications for Admission:


Justification of Admission Dx:  Yes


Comminuty Aquired Pneumonia:  Med-High Risk Pt











ANNE SHEPARD MD                 Jan 22, 2021 14:18

## 2021-01-22 NOTE — RAD
EXAM: XR CHEST 1V



INDICATION: Reason: resp. failure / Spl. Instructions:  / History: .



TECHNIQUE: Single view 



COMPARISON: 1/20/2021



FINDINGS:



Tracheostomy remains present in apparent satisfactory position.

Right PICC line remains present with the tip in the proximal to mid SVC.



Mild cardiac enlargement is unchanged.



The great vessels appear unremarkable.



There is no hilar or mediastinal mass.



Lungs show improvement in bilateral airspace opacities with residual patchy and reticular densities b
ilaterally. The right costophrenic angle is partly excluded.



There is no pleural effusion or pneumothorax.



There are no significant osseous abnormalities. Surgical clips in the lower right neck are present.



IMPRESSION:

1.  Improving bilateral airspace opacities with residual patchy and reticular densities bilaterally.

2.  Stable tracheostomy and right PICC line. No pneumothorax.



Electronically signed by: Jesse Patino MD (1/22/2021 9:15 AM) [id number]

## 2021-01-22 NOTE — PDOC
Date of Service:


DATE: 1/22/21 


TIME: 09:08





Objective:


Objective:


D/w nurse - PEG functioning.


Vital Signs:





                                   Vital Signs








  Date Time  Temp Pulse Resp B/P (MAP) Pulse Ox O2 Delivery O2 Flow Rate FiO2


 


1/22/21 08:00      Mechanical Ventilator  


 


1/22/21 08:00 99.5 54 24 101/53 (69) 100   





 99.5       








Labs:





Laboratory Tests








Test


 1/22/21


08:00


 


O2 Saturation 96 % 


 


Arterial Blood pH 7.47 


 


Arterial Blood pCO2 at


Patient Temp 41 mmHg 





 


Arterial Blood pO2 at Patient


Temp 93 mmHg 





 


Arterial Blood HCO3 30 mmol/L 


 


Arterial Blood Base Excess 5 mmol/L 


 


FiO2 50% vent 











PE:





GEN: NAD


LUNGS: trach/vent


HEART: RRR


ABD: soft, PEG in place w/ feeds running, site clean/dry - gauze removed


NEURO/PSYCH: sedated





A/P:


COVID-19 infection (negative 1/19), resp failure s/p trach and PEG





--


PEG functioning, continue same.





Justicifation of Admission Dx:


Justifications for Admission:


Justification of Admission Dx:  Yes


Comminuty Aquired Pneumonia:  Med-High Risk Pt











LORAINE CUADRA         Jan 22, 2021 09:12

## 2021-01-23 VITALS — SYSTOLIC BLOOD PRESSURE: 124 MMHG | DIASTOLIC BLOOD PRESSURE: 72 MMHG

## 2021-01-23 VITALS — DIASTOLIC BLOOD PRESSURE: 84 MMHG | SYSTOLIC BLOOD PRESSURE: 127 MMHG

## 2021-01-23 VITALS — SYSTOLIC BLOOD PRESSURE: 166 MMHG | DIASTOLIC BLOOD PRESSURE: 90 MMHG

## 2021-01-23 VITALS — SYSTOLIC BLOOD PRESSURE: 103 MMHG | DIASTOLIC BLOOD PRESSURE: 61 MMHG

## 2021-01-23 VITALS — DIASTOLIC BLOOD PRESSURE: 73 MMHG | SYSTOLIC BLOOD PRESSURE: 156 MMHG

## 2021-01-23 VITALS — DIASTOLIC BLOOD PRESSURE: 94 MMHG | SYSTOLIC BLOOD PRESSURE: 176 MMHG

## 2021-01-23 VITALS — SYSTOLIC BLOOD PRESSURE: 107 MMHG | DIASTOLIC BLOOD PRESSURE: 64 MMHG

## 2021-01-23 VITALS — DIASTOLIC BLOOD PRESSURE: 63 MMHG | SYSTOLIC BLOOD PRESSURE: 108 MMHG

## 2021-01-23 VITALS — DIASTOLIC BLOOD PRESSURE: 56 MMHG | SYSTOLIC BLOOD PRESSURE: 97 MMHG

## 2021-01-23 VITALS — SYSTOLIC BLOOD PRESSURE: 97 MMHG | DIASTOLIC BLOOD PRESSURE: 52 MMHG

## 2021-01-23 VITALS — SYSTOLIC BLOOD PRESSURE: 112 MMHG | DIASTOLIC BLOOD PRESSURE: 64 MMHG

## 2021-01-23 VITALS — SYSTOLIC BLOOD PRESSURE: 178 MMHG | DIASTOLIC BLOOD PRESSURE: 86 MMHG

## 2021-01-23 VITALS — DIASTOLIC BLOOD PRESSURE: 60 MMHG | SYSTOLIC BLOOD PRESSURE: 102 MMHG

## 2021-01-23 VITALS — SYSTOLIC BLOOD PRESSURE: 94 MMHG | DIASTOLIC BLOOD PRESSURE: 55 MMHG

## 2021-01-23 VITALS — DIASTOLIC BLOOD PRESSURE: 68 MMHG | SYSTOLIC BLOOD PRESSURE: 128 MMHG

## 2021-01-23 VITALS — SYSTOLIC BLOOD PRESSURE: 120 MMHG | DIASTOLIC BLOOD PRESSURE: 67 MMHG

## 2021-01-23 VITALS — SYSTOLIC BLOOD PRESSURE: 137 MMHG | DIASTOLIC BLOOD PRESSURE: 74 MMHG

## 2021-01-23 VITALS — DIASTOLIC BLOOD PRESSURE: 56 MMHG | SYSTOLIC BLOOD PRESSURE: 95 MMHG

## 2021-01-23 VITALS — DIASTOLIC BLOOD PRESSURE: 88 MMHG | SYSTOLIC BLOOD PRESSURE: 165 MMHG

## 2021-01-23 VITALS — DIASTOLIC BLOOD PRESSURE: 91 MMHG | SYSTOLIC BLOOD PRESSURE: 190 MMHG

## 2021-01-23 VITALS — DIASTOLIC BLOOD PRESSURE: 58 MMHG | SYSTOLIC BLOOD PRESSURE: 101 MMHG

## 2021-01-23 VITALS — SYSTOLIC BLOOD PRESSURE: 148 MMHG | DIASTOLIC BLOOD PRESSURE: 70 MMHG

## 2021-01-23 LAB
BASE EXCESS ABG: 6 MMOL/L (ref -3–3)
HCO3 BLDA-SCNC: 30 MMOL/L (ref 21–28)
INSPIRATION SETTING TIME VENT: 40
PCO2 BLDA: 44 MMHG (ref 35–46)
PO2 BLDA: 83 MMHG (ref 65–108)
SAO2 % BLDA: 96 % (ref 92–99)

## 2021-01-23 RX ADMIN — ENOXAPARIN SODIUM SCH MG: 40 INJECTION SUBCUTANEOUS at 08:47

## 2021-01-23 RX ADMIN — LINEZOLID SCH MG: 600 TABLET, FILM COATED ORAL at 08:47

## 2021-01-23 RX ADMIN — MIDAZOLAM PRN MLS/HR: 5 INJECTION, SOLUTION INTRAMUSCULAR; INTRAVENOUS at 09:27

## 2021-01-23 RX ADMIN — NYSTATIN SCH APP: 100000 POWDER TOPICAL at 08:47

## 2021-01-23 RX ADMIN — DEXMEDETOMIDINE HYDROCHLORIDE PRN MLS/HR: 100 INJECTION, SOLUTION, CONCENTRATE INTRAVENOUS at 05:25

## 2021-01-23 RX ADMIN — FAMOTIDINE SCH MG: 20 TABLET ORAL at 08:46

## 2021-01-23 RX ADMIN — DEXMEDETOMIDINE HYDROCHLORIDE PRN MLS/HR: 100 INJECTION, SOLUTION, CONCENTRATE INTRAVENOUS at 13:57

## 2021-01-23 RX ADMIN — DEXMEDETOMIDINE HYDROCHLORIDE PRN MLS/HR: 100 INJECTION, SOLUTION, CONCENTRATE INTRAVENOUS at 22:47

## 2021-01-23 RX ADMIN — ATORVASTATIN CALCIUM SCH MG: 40 TABLET, FILM COATED ORAL at 21:47

## 2021-01-23 RX ADMIN — CITALOPRAM HYDROBROMIDE SCH MG: 20 TABLET ORAL at 08:47

## 2021-01-23 RX ADMIN — LINEZOLID SCH MG: 600 TABLET, FILM COATED ORAL at 21:48

## 2021-01-23 RX ADMIN — FAMOTIDINE SCH MG: 20 TABLET ORAL at 21:47

## 2021-01-23 RX ADMIN — PIPERACILLIN SODIUM AND TAZOBACTAM SODIUM SCH MLS/HR: 3; .375 INJECTION, POWDER, LYOPHILIZED, FOR SOLUTION INTRAVENOUS at 06:22

## 2021-01-23 RX ADMIN — PIPERACILLIN SODIUM AND TAZOBACTAM SODIUM SCH MLS/HR: 3; .375 INJECTION, POWDER, LYOPHILIZED, FOR SOLUTION INTRAVENOUS at 11:47

## 2021-01-23 RX ADMIN — ZINC SULFATE CAP 220 MG (50 MG ELEMENTAL ZN) SCH MG: 220 (50 ZN) CAP at 08:47

## 2021-01-23 RX ADMIN — NYSTATIN SCH APP: 100000 POWDER TOPICAL at 21:48

## 2021-01-23 RX ADMIN — Medication PRN MLS/HR: at 12:46

## 2021-01-23 RX ADMIN — PIPERACILLIN SODIUM AND TAZOBACTAM SODIUM SCH MLS/HR: 3; .375 INJECTION, POWDER, LYOPHILIZED, FOR SOLUTION INTRAVENOUS at 17:31

## 2021-01-23 RX ADMIN — CETIRIZINE HYDROCHLORIDE SCH MG: 10 TABLET, FILM COATED ORAL at 08:47

## 2021-01-23 RX ADMIN — ENOXAPARIN SODIUM SCH MG: 40 INJECTION SUBCUTANEOUS at 21:47

## 2021-01-23 RX ADMIN — MULTIVITAMIN SCH ML: LIQUID ORAL at 08:46

## 2021-01-23 RX ADMIN — CYANOCOBALAMIN TAB 1000 MCG SCH MCG: 1000 TAB at 08:47

## 2021-01-23 NOTE — PDOC
PROGRESS NOTES


Date of Service:


DATE: 1/23/21 


TIME: 13:59





Chief Complaint


Chief Complaint


 





Acute hypoxic respiratory failure - no pulmonary history. With recent travel to 

and from [place] likely COVID 19 vs other atypical pneumonia. Cont empiric 

antibiotics, steroids. Consult Pulm. 


Improved aeration of the lungs with persistent moderate mixed interstitial and 

alveolar airspace disease.  1/02 


Pt. experienced hypoxia and respiratory decompensation overnight requiring 

intubation 


now on vent 100% PEEP of 10 


Hypotension as well now on pressors


acute respiratory distress syndrome. Consideration may be given for multifocal 

pneumonia versus pulmonary edema.


COVID 19 positive - with pneumonia - given transaminitis and late presentation 

with high O2 requirements no indication for remdesivir


RYDER - likely vasomotor nephropathy - no known renal history, will hydrate


Pneumonia - likely atypical, gram negative vs viral. will cover 


Prostate Ca - s/p resection at [facility] in [place]


Hypokalemia - likely nutritional or loss from diarrhea, will replace


Elevated troponin - likely from type II demand ischemia, will trend troponins, 

maintain telemetry


Crohn's - sees rheumatology regularly, Dr Fan in [place], on sulfasalazine


 


Anemia - likely of chronic disease, will monitor


Cardiomegaly - notable on CT chest - no known cardiac history, though his mother

did have CHF and CAD


Right renal mass -  


HYPOTENSION 


 


Severe malnutrition





History of Present Illness


History of Present Illness


1/23,  start PT and OT,  plan LTAC soon,  ready for approval


more awake today, weak





1/22.,    PEG placed,   started tube feeds,  meds changed,  orderes to LTAC 

soon,    wean vent if able





1/21.  PEG today


doing OK


will need LTACH


cont current


discussed wityh RN








1/20/2021,   add PPN,   fluid stable


cont current,  PEG tube tomorrow 


vent OK








Patient seen and examined in the ICU


26 days past his COVID-19 diagnosis


He is still intubated


Assist-control 


Has NG tube feeds


cont supportive care


Sedated with fentanyl Versed and propofol





Vitals


Vitals





Vital Signs








  Date Time  Temp Pulse Resp B/P (MAP) Pulse Ox O2 Delivery O2 Flow Rate FiO2


 


1/23/21 13:00  73 24 166/90 (115) 99 Ventilator  


 


1/23/21 12:00 99.0       





 99.0       











Physical Exam


Physical Exam


GENERAL: Patient on vent


HEENT:  Normocephalic, atraumatic. 


NECK: Trach present


LUNGS:  Decreased breath sounds at the bases. 


HEART:  S1, S2.


ABDOMEN: Soft PEG tube present site clean


 Hart in place,scrotal swelling +


EXTREMITIES: Generalized anasarca


CENTRAL NERVOUS SYSTEM:  Intubated, sedated.


LINES:  Right upper extremity PICC line clean.


General:  Other (sedated )


Heart:  Regular rate, Normal S1, Normal S2


Lungs:  Clear


Abdomen:  Soft, No tenderness


Extremities:  No clubbing, No cyanosis


Skin:  No rashes, No breakdown





Labs


LABS





Laboratory Tests








Test


 1/23/21


00:08 1/23/21


08:20


 


Glucose (Fingerstick)


 121 mg/dL


(70-99) 





 


O2 Saturation  96 % (92-99) 


 


Arterial Blood pH


 


 7.45


(7.35-7.45)


 


Arterial Blood pCO2 at


Patient Temp 


 44 mmHg


(35-46)


 


Arterial Blood pO2 at Patient


Temp 


 83 mmHg


()


 


Arterial Blood HCO3


 


 30 mmol/L


(21-28)


 


Arterial Blood Base Excess


 


 6 mmol/L


(-3-3)


 


FiO2  40 











Comment


Review of Relevant


I have reviewed the following items jabier (where applicable) has been applied.


Labs





Laboratory Tests








Test


 1/22/21


08:00 1/22/21


11:36 1/23/21


00:08 1/23/21


08:20


 


O2 Saturation 96 % (92-99)    96 % (92-99) 


 


Arterial Blood pH


 7.47


(7.35-7.45) 


 


 7.45


(7.35-7.45)


 


Arterial Blood pCO2 at


Patient Temp 41 mmHg


(35-46) 


 


 44 mmHg


(35-46)


 


Arterial Blood pO2 at Patient


Temp 93 mmHg


() 


 


 83 mmHg


()


 


Arterial Blood HCO3


 30 mmol/L


(21-28) 


 


 30 mmol/L


(21-28)


 


Arterial Blood Base Excess


 5 mmol/L


(-3-3) 


 


 6 mmol/L


(-3-3)


 


FiO2 50% vent    40 


 


Urine Collection Type  Unknown   


 


Urine Color  Orange   


 


Urine Clarity  Cloudy   


 


Urine pH  5.5 (<5.0-8.0)   


 


Urine Specific Gravity


 


 >=1.030


(1.000-1.030) 


 





 


Urine Protein


 


 30 mg/dL


(NEG-TRACE) 


 





 


Urine Glucose (UA)


 


 Negative mg/dL


(NEG) 


 





 


Urine Ketones (Stick)


 


 Negative mg/dL


(NEG) 


 





 


Urine Blood  Negative (NEG)   


 


Urine Nitrite  Negative (NEG)   


 


Urine Bilirubin  Small (NEG)   


 


Urine Urobilinogen Dipstick


 


 2.0 mg/dL (0.2


mg/dL) 


 





 


Urine Leukocyte Esterase  Moderate (NEG)   


 


Urine RBC  3-5 /HPF (0-2)   


 


Urine WBC


 


 11-20 /HPF


(0-4) 


 





 


Urine Squamous Epithelial


Cells 


 Mod /LPF 


 


 





 


Urine Bacteria


 


 Few /HPF


(0-FEW) 


 





 


Urine Mucus  Slight /LPF   


 


Glucose (Fingerstick)


 


 


 121 mg/dL


(70-99) 











Laboratory Tests








Test


 1/23/21


00:08 1/23/21


08:20


 


Glucose (Fingerstick)


 121 mg/dL


(70-99) 





 


O2 Saturation  96 % (92-99) 


 


Arterial Blood pH


 


 7.45


(7.35-7.45)


 


Arterial Blood pCO2 at


Patient Temp 


 44 mmHg


(35-46)


 


Arterial Blood pO2 at Patient


Temp 


 83 mmHg


()


 


Arterial Blood HCO3


 


 30 mmol/L


(21-28)


 


Arterial Blood Base Excess


 


 6 mmol/L


(-3-3)


 


FiO2  40 








Microbiology


1/22/21 Blood Culture - Preliminary, Resulted


          NO GROWTH AFTER 1 DAY


Medications





Current Medications


Sodium Chloride (Normal Saline Flush) 3 ml QSHIFT  PRN IV AFTER MEDS AND BLOOD 

DRAWS;  Start 12/23/20 at 07:45


Sodium Chloride 1,000 ml @  150 mls/hr Q6H40M IV  Last administered on 

12/23/20at 15:10;  Start 12/23/20 at 08:15;  Stop 12/23/20 at 21:34;  Status DC


Ondansetron HCl (Zofran) 4 mg PRN Q4HRS  PRN IV NAUSEA/VOMITING;  Start 12/23/20

at 08:15


Acetaminophen (Tylenol) 650 mg PRN Q4HRS  PRN PO TEMP OVER 100.4F OR MILD PAIN 

Last administered on 12/31/20at 01:02;  Start 12/23/20 at 08:15


Docusate Sodium (Colace) 100 mg PRN BID  PRN PO HARD STOOLS Last administered on

12/25/20at 17:03;  Start 12/23/20 at 08:15


Ceftriaxone Sodium (Rocephin) 1 gm Q24H IVP  Last administered on 1/3/21at 

08:14;  Start 12/24/20 at 09:00;  Stop 1/3/21 at 13:15;  Status DC


Dexamethasone Sodium Phosphate (Decadron) 4 mg DAILY IVP  Last administered on 

1/3/21at 08:14;  Start 12/24/20 at 09:00;  Stop 1/4/21 at 08:39;  Status DC


Amino Acids/ Glycerin/ Electrolytes 1,000 ml @  80 mls/hr [id number] IV  Last 

administered on 1/4/21at 14:53;  Start 12/23/20 at 08:45;  Stop 1/5/21 at 05:03;

 Status DC


Enoxaparin Sodium (Lovenox 40mg Syringe) 40 mg Q12HR SQ  Last administered on 

1/20/21at 08:05;  Start 12/23/20 at 09:00;  Stop 1/21/21 at 14:29;  Status DC


Zinc Sulfate (Orazinc) 220 mg DAILY PO  Last administered on 1/23/21at 08:47;  

Start 12/23/20 at 09:00


Guaifenesin (Robitussin Dm) 10 ml PRN Q6HRS  PRN PO COUGH-2ND CHOICE Last 

administered on 1/1/21at 15:37;  Start 12/23/20 at 08:45


Acetaminophen (Tylenol Supp) 650 mg PRN Q6HRS  PRN MO MILD PAIN / TEMP > 100.3'F

Last administered on 1/21/21at 05:58;  Start 12/23/20 at 08:45


Morphine Sulfate (Morphine Sulfate) 2 mg PRN Q4HRS  PRN IV PAIN Last 

administered on 1/1/21at 16:12;  Start 12/23/20 at 08:45;  Stop 1/2/21 at 01:41;

 Status DC


Azithromycin 500 mg/Sodium Chloride 250 ml @  250 mls/hr 1X  ONCE IV  Last 

administered on 12/23/20at 09:59;  Start 12/23/20 at 10:00;  Stop 12/23/20 at 

10:59;  Status DC


Azithromycin 250 mg/Sodium Chloride 250 ml @  250 mls/hr Q24H IV  Last 

administered on 12/27/20at 08:55;  Start 12/24/20 at 09:00;  Stop 12/27/20 at 

09:59;  Status DC


Cetirizine HCl (ZyrTEC) 10 mg DAILY PO  Last administered on 1/23/21at 08:47;  

Start 12/24/20 at 09:00


Morphine Sulfate (Ms Contin) 15 mg BID PO  Last administered on 1/5/21at 21:11; 

Start 12/23/20 at 21:00;  Stop 1/6/21 at 14:43;  Status DC


Cyanocobalamin (Vitamin B-12) 1,000 mcg DAILY PO  Last administered on 1/23/21at

08:47;  Start 12/24/20 at 09:00


Citalopram Hydrobromide (CeleXA) 20 mg DAILY PO  Last administered on 1/23/21at 

08:47;  Start 12/24/20 at 09:00


Atorvastatin Calcium (Lipitor) 40 mg QHS PO  Last administered on 1/22/21at 

20:55;  Start 12/23/20 at 21:00


Sulfasalazine (Azulfidine) 1,000 mg BID PO  Last administered on 1/23/21at 

08:46;  Start 12/23/20 at 21:00


Tramadol HCl (Ultram) 100 mg PRN Q8HRS  PRN PO MODERATE PAIN, SEVERE PAIN Last 

administered on 1/1/21at 13:35;  Start 12/23/20 at 15:00


Zolpidem Tartrate (Ambien) 5 mg PRN QHS  PRN PO INSOMNIA Last administered on 

1/1/21at 22:05;  Start 12/23/20 at 15:00


Info (Icu Electrolyte Protocol) 1 ea CONT PRN  PRN MC PER PROTOCOL;  Start 

12/24/20 at 14:15


Potassium Chloride (Klor-Con) 40 meq 1X  ONCE PO  Last administered on 

12/24/20at 14:24;  Start 12/24/20 at 14:15;  Stop 12/24/20 at 14:16;  Status DC


Sterile Water (WATER for RESP) 1,000 ml CONT  PRN INH VIA VAPOTHERM DEVICE Last 

administered on 1/1/21at 15:49;  Start 12/26/20 at 09:30


Benzonatate (Tessalon Perle) 100 mg [id number] PO  Last administered on 1/1/21at 

20:51;  Start 12/26/20 at 14:00;  Stop 1/2/21 at 10:35;  Status DC


Labetalol HCl (Normodyne Iv Push) 10 mg PRN Q2HR  PRN IVP HYPERTENSION Last 

administered on 1/21/21at 17:31;  Start 12/26/20 at 18:30


Quetiapine Fumarate (SEROquel) 50 mg PRN QHS  PRN PO sleep 2ND CHOICE Last 

administered on 1/1/21at 00:24;  Start 12/27/20 at 21:00


Enalaprilat (Vasotec Inj) 2.5 mg PRN Q6HRS  PRN IVP HYPERTENSION-2ND CHOICE Last

administered on 12/28/20at 12:29;  Start 12/27/20 at 10:00


Olanzapine (ZyPREXA ZYDIS) 5 mg PRN BID  PRN PO ANXIETY / AGITATION Last 

administered on 1/11/21at 09:56;  Start 12/27/20 at 12:30


Guaifenesin (Robitussin) 400 mg PRN Q4HRS  PRN PO COUGH;  Start 12/27/20 at 

22:15;  Stop 12/27/20 at 22:27;  Status DC


Guaifenesin (Robitussin) 400 mg PRN Q4HRS  PRN PO COUGH Last administered on 

1/1/21at 23:00;  Start 12/27/20 at 22:30


Furosemide (Lasix) 40 mg 1X  ONCE IVP  Last administered on 12/30/20at 10:15;  

Start 12/30/20 at 10:00;  Stop 12/30/20 at 10:01;  Status DC


Sodium Chloride 1,000 ml @  100 mls/hr Q10H IV  Last administered on 1/7/21at 

00:03;  Start 12/31/20 at 10:15;  Stop 1/7/21 at 13:14;  Status DC


Furosemide (Lasix) 20 mg 1X  ONCE IVP  Last administered on 1/1/21at 13:34;  

Start 1/1/21 at 12:30;  Stop 1/1/21 at 12:31;  Status DC


Alteplase, Recombinant (Cathflo For Central Catheter Clearance) 1 mg 1X  ONCE I

NT CAT  Last administered on 1/2/21at 02:10;  Start 1/1/21 at 16:15;  Stop 

1/1/21 at 16:16;  Status DC


Fentanyl Citrate 30 ml @ 0 mls/hr CONT  PRN IV SEE PROTOCOL Last administered on

1/2/21at 01:58;  Start 1/2/21 at 01:15;  Stop 1/2/21 at 02:36;  Status DC


Propofol 100 ml @ 0 mls/hr CONT  PRN IV PER PROTOCOL Last administered on 

1/16/21at 23:12;  Start 1/2/21 at 01:15


Chlorhexidine Gluconate (Peridex) 15 ml BID MM  Last administered on 1/9/21at 

09:21;  Start 1/2/21 at 09:00;  Stop 1/9/21 at 19:26;  Status DC


Midazolam HCl 100 ml @ 0 mls/hr CONT  PRN IV SEE PROTOCOL Last administered on 

1/23/21at 09:27;  Start 1/2/21 at 01:15


Succinylcholine Chloride (Anectine) 200 mg STK-MED ONCE .ROUTE ;  Start 1/2/21 

at 01:21;  Stop 1/2/21 at 01:21;  Status DC


Fentanyl Citrate 55 ml @ 0 mls/hr CONT PRN  PRN IV PAIN CONTROL Last 

administered on 1/23/21at 12:46;  Start 1/2/21 at 02:45


Norepinephrine Bitartrate 8 mg/ Dextrose 258 ml @  19.737 mls/ hr CONT  PRN IV 

PER PROTOCOL Last administered on 1/2/21at 22:27;  Start 1/2/21 at 07:30;  Stop 

1/5/21 at 08:55;  Status DC


Vecuronium Bromide (Norcuron Bolus) 6 mg PRN Q6HRS  PRN IV SEDATION Last 

administered on 1/9/21at 17:07;  Start 1/2/21 at 09:15


Vancomycin HCl (Vanco Per Pharmacy) 1 each PRN DAILY  PRN MC SEE COMMENTS Last 

administered on 1/2/21at 20:47;  Start 1/2/21 at 16:00;  Stop 1/3/21 at 13:19;  

Status DC


Vancomycin HCl 2 gm/Sodium Chloride 500 ml @  250 mls/hr 1X  ONCE IV  Last 

administered on 1/2/21at 17:43;  Start 1/2/21 at 17:00;  Stop 1/2/21 at 18:59;  

Status DC


Vancomycin HCl 1.5 gm/Sodium Chloride 500 ml @  250 mls/hr Q12H IV  Last 

administered on 1/3/21at 05:53;  Start 1/3/21 at 05:00;  Stop 1/3/21 at 13:15;  

Status DC


Vancomycin HCl (Vancomycin Trough Level) 1 each 1X  ONCE MC ;  Start 1/4/21 at 

04:30;  Stop 1/3/21 at 13:20;  Status DC


Cefepime HCl (Maxipime) 2 gm Q12HR IVP  Last administered on 1/12/21at 20:52;  

Start 1/3/21 at 14:00;  Stop 1/13/21 at 07:59;  Status DC


Linezolid (Zyvox) 600 mg BID PO  Last administered on 1/6/21at 20:38;  Start 

1/3/21 at 21:00;  Stop 1/7/21 at 07:58;  Status DC


Dexamethasone Sodium Phosphate (Decadron) 6 mg DAILY IVP  Last administered on 

1/5/21at 08:36;  Start 1/4/21 at 09:00;  Stop 1/5/21 at 08:55;  Status DC


Polyethylene Glycol (miraLAX PACKET) 17 gm DAILY PO  Last administered on 

1/15/21at 08:40;  Start 1/6/21 at 14:30;  Stop 1/17/21 at 12:21;  Status DC


Nystatin (Nystop) 1 lindsay BID TP  Last administered on 1/23/21at 08:47;  Start 

1/6/21 at 16:00


Sodium Chloride 1,000 ml @  0 mls/hr Q0M IV ;  Start 1/7/21 at 13:15


Famotidine (Pepcid Vial) 20 mg BID IVP  Last administered on 1/22/21at 09:01;  

Start 1/8/21 at 21:00;  Stop 1/22/21 at 09:11;  Status DC


Furosemide (Lasix) 40 mg 1X  ONCE IVP  Last administered on 1/12/21at 11:18;  

Start 1/12/21 at 11:15;  Stop 1/12/21 at 11:16;  Status DC


Multivitamins/ Minerals Therapeutic (Centrum Multivit-Mineral Liq) 5 ml DAILY 

PEG  Last administered on 1/23/21at 08:46;  Start 1/15/21 at 09:00


Dexmedetomidine HCl 400 mcg/ Sodium Chloride 100 ml @ 0 mls/hr CONT  PRN IV PER 

PROTOCOL Last administered on 1/23/21at 13:57;  Start 1/17/21 at 07:15


Atropine Sulfate (ATROPINE 0.5mg SYRINGE) 0.5 mg PRN Q5MIN  PRN IV SEE COMMENTS;

 Start 1/17/21 at 07:15


Rocuronium Bromide (Zemuron) 50 mg STK-MED ONCE .ROUTE ;  Start 1/19/21 at 

10:24;  Stop 1/19/21 at 10:25;  Status DC


Bupivacaine HCl/ Epinephrine Bitart (Sensorcain-Epi 0.5% Kit) 30 ml STK-MED ONCE

.ROUTE  Last administered on 1/19/21at 12:47;  Start 1/19/21 at 11:34;  Stop 

1/19/21 at 11:34;  Status DC


Cellulose (Surgicel Fibrillar 1x2) 1 each STK-MED ONCE .ROUTE  Last administered

on 1/19/21at 12:56;  Start 1/19/21 at 11:34;  Stop 1/19/21 at 11:34;  Status DC


Ephedrine Sulfate (Akovaz) 50 mg STK-MED ONCE .ROUTE ;  Start 1/19/21 at 12:56; 

Stop 1/19/21 at 12:57;  Status DC


Desflurane (Suprane) 15 ml STK-MED ONCE IH ;  Start 1/19/21 at 12:56;  Stop 

1/19/21 at 12:57;  Status DC


Sodium Chloride (SODIUM CHLORIDE 20ml) 20 ml STK-MED ONCE IJ ;  Start 1/19/21 at

12:57;  Stop 1/19/21 at 12:57;  Status DC


Amino Acids/ Glycerin/ Electrolytes 1,000 ml @  80 mls/hr [id number] IV  Last 

administered on 1/21/21at 03:49;  Start 1/20/21 at 11:00;  Stop 1/21/21 at 

19:27;  Status DC


Cefazolin Sodium/ Dextrose 50 ml @  100 mls/hr 1X  ONCE IV  Last administered on

1/21/21at 13:42;  Start 1/21/21 at 12:00;  Stop 1/21/21 at 12:29;  Status DC


Amino Acids/ Glycerin/ Electrolytes 1,000 ml @  80 mls/hr [id number] IV ;  Start 

1/20/21 at 14:00;  Stop 1/20/21 at 14:05;  Status DC


Ringer's Solution 1,000 ml @  30 mls/hr Q24H IV ;  Start 1/21/21 at 07:00;  Stop

1/21/21 at 18:59;  Status DC


Propofol (Diprivan) 200 mg STK-MED ONCE IV ;  Start 1/21/21 at 13:59;  Stop 

1/21/21 at 13:59;  Status DC


Enoxaparin Sodium (Lovenox 40mg Syringe) 40 mg Q12HR SQ  Last administered on 

1/23/21at 08:47;  Start 1/23/21 at 09:00


Famotidine (Pepcid) 20 mg BID GT  Last administered on 1/23/21at 08:46;  Start 

1/22/21 at 21:00


Piperacillin Sod/ Tazobactam Sod 3.375 gm/Sodium Chloride 50 ml @  100 mls/hr 

Q6HRS IV  Last administered on 1/23/21at 11:47;  Start 1/22/21 at 12:00


Linezolid (Zyvox) 600 mg BID PO  Last administered on 1/23/21at 08:47;  Start 

1/22/21 at 12:00





Active Scripts


Active


Reported


Morphine Sulfate Er (Morphine Sulfate) 30 Mg Tablet.er 1 Tab PO BID


Tramadol Hcl 100 Mg Tbmp.24hr 100 Mg PO PRN Q8HRS PRN


Ambien (Zolpidem Tartrate) 10 Mg Tablet 10 Mg PO PRN QHS PRN


Methotrexate (Methotrexate Sodium) 2.5 Mg Tablet 10 Tab PO WEEKLY


Lisinopril 10 Mg Tablet 1 Tab PO DAILY


Crestor (Rosuvastatin Calcium) 5 Mg Tablet 2 Tab PO DAILY


Hydrochlorothiazide Tablet (Hydrochlorothiazide) 50 Mg Tablet 25 Mg PO DAILY


Escitalopram Oxalate 10 Mg Tablet 1 Tab PO DAILY


Sulfasalazine Dr (Sulfasalazine) 500 Mg Tablet.dr 2 Tab PO TID 30 Days


Colace (Docusate Sodium) 100 Mg Capsule 1 Cap PO DA 30 Days


Acetaminophen Ext.release (Acetaminophen) 650 Mg Tablet.er 650 Mg PO PRN Q8HRS 

PRN


Melatonin 5 Mg Capsule 1 Cap PO QHS 30 Days


B-12 (Cyanocobalamin (Vitamin B-12)) 500 Mcg Tablet 2 Tab PO DAILY 30 Days


Vitamin D3 (Cholecalciferol (Vitamin D3)) 50 Mcg Capsule 50 Mcg PO DAILY


Aller-Sung (Cetirizine Hcl) 10 Mg Tablet 1 Tab PO DAILY 30 Days


Vitals/I & O





Vital Sign - Last 24 Hours








 1/22/21 1/22/21 1/22/21 1/22/21





 14:00 15:00 16:00 16:00


 


Temp   98.9 





   98.9 


 


Pulse 51 64 50 


 


Resp 24 24 24 


 


B/P (MAP) 104/62 (76) 123/77 (92) 115/57 (76) 


 


Pulse Ox 100 100 97 


 


O2 Delivery Ventilator Ventilator Ventilator Mechanical Ventilator


 


    





    





 1/22/21 1/22/21 1/22/21 1/22/21





 16:07 17:00 18:00 19:00


 


Pulse  48 50 46


 


Resp  24 24 24


 


B/P (MAP)  117/68 (84) 114/62 (79) 106/58 (74)


 


Pulse Ox 95 98 100 100


 


O2 Delivery Ventilator Ventilator Ventilator Ventilator





 1/22/21 1/22/21 1/22/21 1/22/21





 20:00 20:00 20:09 21:00


 


Temp 98.6   





 98.6   


 


Pulse 51   59


 


Resp 24   24


 


B/P (MAP) 114/71 (85)   112/67 (82)


 


Pulse Ox 100  95 100


 


O2 Delivery Ventilator Mechanical Ventilator Ventilator Ventilator


 


    





    





 1/22/21 1/22/21 1/22/21 1/22/21





 22:00 23:00 23:59 23:59


 


Temp    98.7





    98.7


 


Pulse 72 52  48


 


Resp 24 24  24


 


B/P (MAP) 146/73 (97) 105/60 (75)  105/66 (79)


 


Pulse Ox 99 100  100


 


O2 Delivery Ventilator Ventilator Mechanical Ventilator Ventilator


 


    





    





 1/23/21 1/23/21 1/23/21 1/23/21





 00:40 01:00 02:00 03:00


 


Pulse  48 79 80


 


Resp  24 24 24


 


B/P (MAP)  101/58 (72) 165/88 (113) 127/84 (98)


 


Pulse Ox 99 100 100 99


 


O2 Delivery Ventilator Ventilator Ventilator Ventilator





 1/23/21 1/23/21 1/23/21 1/23/21





 03:37 04:00 04:00 05:00


 


Temp  98.8  





  98.8  


 


Pulse  65  64


 


Resp  24  24


 


B/P (MAP)  148/70 (96)  137/74 (95)


 


Pulse Ox 100 98  96


 


O2 Delivery Ventilator Ventilator Mechanical Ventilator Ventilator


 


    





    





 1/23/21 1/23/21 1/23/21 1/23/21





 06:00 07:00 08:00 08:00


 


Temp    99.1





    99.1


 


Pulse 52 56  53


 


Resp 24 24  24


 


B/P (MAP) 137/74 (95) 107/64 (78)  112/64 (80)


 


Pulse Ox 99 99  99


 


O2 Delivery Ventilator Ventilator Mechanical Ventilator Ventilator


 


    





    





 1/23/21 1/23/21 1/23/21 1/23/21





 08:15 09:00 10:00 11:00


 


Pulse  53 80 96


 


Resp  24 24 24


 


B/P (MAP)  124/72 (89) 178/86 (116) 190/91 (124)


 


Pulse Ox 99 100 97 94


 


O2 Delivery Ventilator Ventilator Ventilator Ventilator





 1/23/21 1/23/21 1/23/21 1/23/21





 12:00 12:00 12:46 13:00


 


Temp  99.0  





  99.0  


 


Pulse  68  73


 


Resp  26  24


 


B/P (MAP)  156/73 (100)  166/90 (115)


 


Pulse Ox  99 99 99


 


O2 Delivery Mechanical Ventilator Ventilator Tracheal Collar Ventilator














Intake and Output   


 


 1/22/21 1/22/21 1/23/21





 14:59 22:59 06:59


 


Intake Total 250 ml 1431 ml 1794 ml


 


Output Total 255 ml 315 ml 480 ml


 


Balance -5 ml 1116 ml 1314 ml











Justicifation of Admission Dx:


Justifications for Admission:


Justification of Admission Dx:  Yes


Comminuty Aquired Pneumonia:  Med-High Risk Pt











ANNE SHEPARD MD                 Jan 23, 2021 14:00

## 2021-01-23 NOTE — PDOC
PULMONARY PROGRESS NOTES


DATE: 1/23/21 


TIME: 05:50


Subjective


Patient remains on ventilatory support, FiO2 40% and a PEEP of 5,  sedated on 

fentanyl versed precedex  large ett secretion 


Afebrile overnight


Tolerating tube feeding well,s/p trach/ PEG


Vitals





Vital Signs








  Date Time  Temp Pulse Resp B/P (MAP) Pulse Ox O2 Delivery O2 Flow Rate FiO2


 


1/23/21 05:00  64 24 137/74 (95) 96 Ventilator  


 


1/23/21 04:00 98.8       





 98.8       








Comments


Intubated/sedated


no distress


nc at 


rrr


no accessory muscle use


obese


no rash


Labs





Laboratory Tests








Test


 1/21/21


06:00 1/21/21


08:27 1/22/21


08:00 1/22/21


11:36


 


White Blood Count


 8.3 x10^3/uL


(4.0-11.0) 


 


 





 


Red Blood Count


 2.55 x10^6/uL


(4.30-5.70) 


 


 





 


Hemoglobin


 8.0 g/dL


(13.0-17.5) 


 


 





 


Hematocrit


 24.4 %


(39.0-53.0) 


 


 





 


Mean Corpuscular Volume 96 fL ()    


 


Mean Corpuscular Hemoglobin 31 pg (25-35)    


 


Mean Corpuscular Hemoglobin


Concent 33 g/dL


(31-37) 


 


 





 


Red Cell Distribution Width


 15.3 %


(11.5-14.5) 


 


 





 


Platelet Count


 319 x10^3/uL


(140-400) 


 


 





 


Neutrophils (%) (Auto) 75 % (31-73)    


 


Lymphocytes (%) (Auto) 13 % (24-48)    


 


Monocytes (%) (Auto) 10 % (0-9)    


 


Eosinophils (%) (Auto) 2 % (0-3)    


 


Basophils (%) (Auto) 1 % (0-3)    


 


Neutrophils # (Auto)


 6.2 x10^3/uL


(1.8-7.7) 


 


 





 


Lymphocytes # (Auto)


 1.1 x10^3/uL


(1.0-4.8) 


 


 





 


Monocytes # (Auto)


 0.8 x10^3/uL


(0.0-1.1) 


 


 





 


Eosinophils # (Auto)


 0.1 x10^3/uL


(0.0-0.7) 


 


 





 


Basophils # (Auto)


 0.0 x10^3/uL


(0.0-0.2) 


 


 





 


Sodium Level


 139 mmol/L


(136-145) 


 


 





 


Potassium Level


 3.9 mmol/L


(3.5-5.1) 


 


 





 


Chloride Level


 105 mmol/L


() 


 


 





 


Carbon Dioxide Level


 30 mmol/L


(21-32) 


 


 





 


Anion Gap 4 (6-14)    


 


Blood Urea Nitrogen


 20 mg/dL


(8-26) 


 


 





 


Creatinine


 0.6 mg/dL


(0.7-1.3) 


 


 





 


Estimated GFR


(Cockcroft-Gault) 134.8 


 


 


 





 


BUN/Creatinine Ratio 33 (6-20)    


 


Glucose Level


 90 mg/dL


(70-99) 


 


 





 


Calcium Level


 8.6 mg/dL


(8.5-10.1) 


 


 





 


Total Bilirubin


 0.5 mg/dL


(0.2-1.0) 


 


 





 


Aspartate Amino Transf


(AST/SGOT) 33 U/L (15-37) 


 


 


 





 


Alanine Aminotransferase


(ALT/SGPT) 45 U/L (16-63) 


 


 


 





 


Alkaline Phosphatase


 60 U/L


() 


 


 





 


Total Protein


 5.6 g/dL


(6.4-8.2) 


 


 





 


Albumin


 1.6 g/dL


(3.4-5.0) 


 


 





 


Albumin/Globulin Ratio 0.4 (1.0-1.7)    


 


O2 Saturation  95 % (92-99)  96 % (92-99)  


 


Arterial Blood pH


 


 7.45


(7.35-7.45) 7.47


(7.35-7.45) 





 


Arterial Blood pCO2 at


Patient Temp 


 44 mmHg


(35-46) 41 mmHg


(35-46) 





 


Arterial Blood pO2 at Patient


Temp 


 74 mmHg


() 93 mmHg


() 





 


Arterial Blood HCO3


 


 29 mmol/L


(21-28) 30 mmol/L


(21-28) 





 


Arterial Blood Base Excess


 


 5 mmol/L


(-3-3) 5 mmol/L


(-3-3) 





 


FiO2  50  50% vent  


 


Urine Collection Type    Unknown 


 


Urine Color    Orange 


 


Urine Clarity    Cloudy 


 


Urine pH    5.5 (<5.0-8.0) 


 


Urine Specific Gravity


 


 


 


 >=1.030


(1.000-1.030)


 


Urine Protein


 


 


 


 30 mg/dL


(NEG-TRACE)


 


Urine Glucose (UA)


 


 


 


 Negative mg/dL


(NEG)


 


Urine Ketones (Stick)


 


 


 


 Negative mg/dL


(NEG)


 


Urine Blood    Negative (NEG) 


 


Urine Nitrite    Negative (NEG) 


 


Urine Bilirubin    Small (NEG) 


 


Urine Urobilinogen Dipstick


 


 


 


 2.0 mg/dL (0.2


mg/dL)


 


Urine Leukocyte Esterase    Moderate (NEG) 


 


Urine RBC    3-5 /HPF (0-2) 


 


Urine WBC


 


 


 


 11-20 /HPF


(0-4)


 


Urine Squamous Epithelial


Cells 


 


 


 Mod /LPF 





 


Urine Bacteria


 


 


 


 Few /HPF


(0-FEW)


 


Urine Mucus    Slight /LPF 


 


Test


 1/23/21


00:08 


 


 





 


Glucose (Fingerstick)


 121 mg/dL


(70-99) 


 


 











Laboratory Tests








Test


 1/22/21


08:00 1/22/21


11:36 1/23/21


00:08


 


O2 Saturation 96 % (92-99)   


 


Arterial Blood pH


 7.47


(7.35-7.45) 


 





 


Arterial Blood pCO2 at


Patient Temp 41 mmHg


(35-46) 


 





 


Arterial Blood pO2 at Patient


Temp 93 mmHg


() 


 





 


Arterial Blood HCO3


 30 mmol/L


(21-28) 


 





 


Arterial Blood Base Excess


 5 mmol/L


(-3-3) 


 





 


FiO2 50% vent   


 


Urine Collection Type  Unknown  


 


Urine Color  Orange  


 


Urine Clarity  Cloudy  


 


Urine pH  5.5 (<5.0-8.0)  


 


Urine Specific Gravity


 


 >=1.030


(1.000-1.030) 





 


Urine Protein


 


 30 mg/dL


(NEG-TRACE) 





 


Urine Glucose (UA)


 


 Negative mg/dL


(NEG) 





 


Urine Ketones (Stick)


 


 Negative mg/dL


(NEG) 





 


Urine Blood  Negative (NEG)  


 


Urine Nitrite  Negative (NEG)  


 


Urine Bilirubin  Small (NEG)  


 


Urine Urobilinogen Dipstick


 


 2.0 mg/dL (0.2


mg/dL) 





 


Urine Leukocyte Esterase  Moderate (NEG)  


 


Urine RBC  3-5 /HPF (0-2)  


 


Urine WBC


 


 11-20 /HPF


(0-4) 





 


Urine Squamous Epithelial


Cells 


 Mod /LPF 


 





 


Urine Bacteria


 


 Few /HPF


(0-FEW) 





 


Urine Mucus  Slight /LPF  


 


Glucose (Fingerstick)


 


 


 121 mg/dL


(70-99)








Medications





Active Scripts








 Medications  Dose


 Route/Sig


 Max Daily Dose Days Date Category


 


 Morphine Sulfate


 Er (Morphine


 Sulfate) 30 Mg


 Tablet.er  1 Tab


 PO BID


   12/23/20 Reported


 


 Tramadol Hcl 100


 Mg Tbmp.24hr  100 Mg


 PO PRN Q8HRS PRN


   12/23/20 Reported


 


 Ambien (Zolpidem


 Tartrate) 10 Mg


 Tablet  10 Mg


 PO PRN QHS PRN


   12/23/20 Reported


 


 Methotrexate


  (Methotrexate


 Sodium) 2.5 Mg


 Tablet  10 Tab


 PO WEEKLY


   12/23/20 Reported


 


 Lisinopril 10 Mg


 Tablet  1 Tab


 PO DAILY


   12/23/20 Reported


 


 Crestor


  (Rosuvastatin


 Calcium) 5 Mg


 Tablet  2 Tab


 PO DAILY


   12/23/20 Reported


 


 Hydrochlorothiazide


 Tablet


  (Hydrochlorothiazide)


 50 Mg Tablet  25 Mg


 PO DAILY


   12/23/20 Reported


 


 Escitalopram


 Oxalate 10 Mg


 Tablet  1 Tab


 PO DAILY


   12/23/20 Reported


 


 Sulfasalazine Dr


  (Sulfasalazine)


 500 Mg Tablet.dr  2 Tab


 PO TID


  30 12/23/20 Reported


 


 Colace (Docusate


 Sodium) 100 Mg


 Capsule  1 Cap


 PO DA


  30 12/23/20 Reported


 


 Acetaminophen


 Ext.release


  (Acetaminophen)


 650 Mg Tablet.er  650 Mg


 PO PRN Q8HRS PRN


   12/23/20 Reported


 


 Melatonin 5 Mg


 Capsule  1 Cap


 PO QHS


  30 12/23/20 Reported


 


 B-12


  (Cyanocobalamin


  (Vitamin B-12))


 500 Mcg Tablet  2 Tab


 PO DAILY


  30 12/23/20 Reported


 


 Vitamin D3


  (Cholecalciferol


  (Vitamin D3)) 50


 Mcg Capsule  50 Mcg


 PO DAILY


   12/23/20 Reported


 


 Aller-Sung


  (Cetirizine Hcl)


 10 Mg Tablet  1 Tab


 PO DAILY


  30 12/23/20 Reported








Comments


CXR 1/20


  Bilateral parenchymal opacities and left pleural effusion are grossly stable.





Impression


.


IMPRESSION:


1.  Acute hypoxic respiratory failure secondary to COVID-19


pneumonia and acute respiratory distress syndrome/acute lung injury.-- COVID-19 

positive ----improving


2.  Abnormal CT chest with extensive widespread ground glass and alveolar and


interstitial infiltrates with reactive mild mediastinal/hilar adenopathy.  This 

related to COVID-19 pneumonia.


3.  Patient with history of psoriatic arthritis.  He is likely immunocompromised


as he has been on methotrexate.  


4.  History of tongue cancer with resection, details unknown.


5.  History of prostate cancer.


6.  History of Crohn's disease.


7.  Low grade fever





Plan


.


RECOMMENDATIONS:


Continue current ventilatory support, FiO2 40% and a PEEP of 5  start weaning as

tolerated 


s/p trach 1/19


s/p PEG


will wean off sedation post PEG


Follow chest x-ray/ABG---- 


COVID-19 positive-- now recovered 


Patient remains off antibiotics at this time, infectious disease of signed off 

cultures are negative, low grade fever, cxr improving ? source, ask ID


Patient is completed a full 10-day course of steroids


Continue TF for nutritional support 


Follow GI recs 


DVT/GI PPX


awaiting LTAC transfer








D/W RN and RT 





PT. is FULL CODE











RIDGE YATES MD               Jan 23, 2021 05:51

## 2021-01-23 NOTE — PDOC
Infectious Disease Note


Subjective:


Subjective


We have been asked to assess patient again for fevers on January 21, 2021


I was called last p.m. regarding the above , I started patient on Zosyn and 

Zyvox on January 22, 2021


Blood culture UA and urine cultures done


Remains intubated


Underwent PEG tube placement on January 21, 2021


Started on tube feeding


Awaiting LTAC placement





Vital Signs:


Vital Signs





Vital Signs








  Date Time  Temp Pulse Resp B/P (MAP) Pulse Ox O2 Delivery O2 Flow Rate FiO2


 


1/23/21 09:00  53 24 124/72 (89) 100 Ventilator  


 


1/23/21 08:00 99.1       





 99.1       











Physical Exam:


PHYSICAL EXAM


GENERAL: Patient on vent


HEENT:  Normocephalic, atraumatic. 


NECK: Trach present


LUNGS:  Decreased breath sounds at the bases. 


HEART:  S1, S2.


ABDOMEN: Soft PEG tube present site clean


 Hart in place,scrotal swelling +


EXTREMITIES: Generalized anasarca


CENTRAL NERVOUS SYSTEM:  Intubated, sedated.


LINES:  Right upper extremity PICC line clean.





Medications:


Inpatient Meds:





Current Medications








 Medications


  (Trade)  Dose


 Ordered  Sig/Kayli  Start Time


 Stop Time Status Last Admin


Dose Admin


 


 Acetaminophen


  (Tylenol Supp)  650 mg  PRN Q6HRS  PRN  12/23/20 08:45


    1/21/21 05:58


650 MG


 


 Acetaminophen


  (Tylenol)  650 mg  PRN Q4HRS  PRN  12/23/20 08:15


    12/31/20 01:02


650 MG


 


 Alteplase,


 Recombinant


  (Cathflo For


 Central Catheter


 Clearance)  1 mg  1X  ONCE  1/1/21 16:15


 1/1/21 16:16 DC 1/2/21 02:10


1 MG


 


 Amino Acids/


 Glycerin/


 Electrolytes  1,000 ml @ 


 80 mls/hr  [id number]  1/20/21 14:00


 1/20/21 14:05 DC  





 


 Atorvastatin


 Calcium


  (Lipitor)  40 mg  QHS  12/23/20 21:00


    1/22/21 20:55


40 MG


 


 Atropine Sulfate


  (ATROPINE 0.5mg


 SYRINGE)  0.5 mg  PRN Q5MIN  PRN  1/17/21 07:15


     





 


 Azithromycin 250


 mg/Sodium Chloride  250 ml @ 


 250 mls/hr  Q24H  12/24/20 09:00


 12/27/20 09:59 DC 12/27/20 08:55


250 MLS/HR


 


 Azithromycin 500


 mg/Sodium Chloride  250 ml @ 


 250 mls/hr  1X  ONCE  12/23/20 10:00


 12/23/20 10:59 DC 12/23/20 09:59


250 MLS/HR


 


 Benzonatate


  (Tessalon Perle)  100 mg  [id number]  12/26/20 14:00


 1/2/21 10:35 DC 1/1/21 20:51


100 MG


 


 Bupivacaine HCl/


 Epinephrine Bitart


  (Sensorcain-Epi


 0.5% Kit)  30 ml  STK-MED ONCE  1/19/21 11:34


 1/19/21 11:34 DC 1/19/21 12:47


10 ML


 


 Cefazolin Sodium/


 Dextrose  50 ml @ 


 100 mls/hr  1X  ONCE  1/21/21 12:00


 1/21/21 12:29 DC 1/21/21 13:42


100 MLS/HR


 


 Cefepime HCl


  (Maxipime)  2 gm  Q12HR  1/3/21 14:00


 1/13/21 07:59 DC 1/12/21 20:52


2 GM


 


 Ceftriaxone Sodium


  (Rocephin)  1 gm  Q24H  12/24/20 09:00


 1/3/21 13:15 DC 1/3/21 08:14


1 GM


 


 Cellulose


  (Surgicel


 Fibrillar 1x2)  1 each  STK-MED ONCE  1/19/21 11:34


 1/19/21 11:34 DC 1/19/21 12:56


1 EACH


 


 Cetirizine HCl


  (ZyrTEC)  10 mg  DAILY  12/24/20 09:00


    1/23/21 08:47


10 MG


 


 Chlorhexidine


 Gluconate


  (Peridex)  15 ml  BID  1/2/21 09:00


 1/9/21 19:26 DC 1/9/21 09:21


15 ML


 


 Citalopram


 Hydrobromide


  (CeleXA)  20 mg  DAILY  12/24/20 09:00


    1/23/21 08:47


20 MG


 


 Cyanocobalamin


  (Vitamin B-12)  1,000 mcg  DAILY  12/24/20 09:00


    1/23/21 08:47


1,000 MCG


 


 Desflurane


  (Suprane)  15 ml  STK-MED ONCE  1/19/21 12:56


 1/19/21 12:57 DC  





 


 Dexamethasone


 Sodium Phosphate


  (Decadron)  6 mg  DAILY  1/4/21 09:00


 1/5/21 08:55 DC 1/5/21 08:36


6 MG


 


 Dexmedetomidine


 HCl 400 mcg/


 Sodium Chloride  100 ml @ 0


 mls/hr  CONT  PRN  1/17/21 07:15


    1/23/21 05:25


10.2 MLS/HR


 


 Docusate Sodium


  (Colace)  100 mg  PRN BID  PRN  12/23/20 08:15


    12/25/20 17:03


100 MG


 


 Enalaprilat


  (Vasotec Inj)  2.5 mg  PRN Q6HRS  PRN  12/27/20 10:00


    12/28/20 12:29


2.5 MG


 


 Enoxaparin Sodium


  (Lovenox 40mg


 Syringe)  40 mg  Q12HR  1/23/21 09:00


    1/23/21 08:47


40 MG


 


 Ephedrine Sulfate


  (Akovaz)  50 mg  STK-MED ONCE  1/19/21 12:56


 1/19/21 12:57 DC  





 


 Famotidine


  (Pepcid Vial)  20 mg  BID  1/8/21 21:00


 1/22/21 09:11 DC 1/22/21 09:01


20 MG


 


 Famotidine


  (Pepcid)  20 mg  BID  1/22/21 21:00


    1/23/21 08:46


20 MG


 


 Fentanyl Citrate  55 ml @ 0


 mls/hr  CONT PRN  PRN  1/2/21 02:45


    1/22/21 02:11


1.5 MLS/HR


 


 Furosemide


  (Lasix)  40 mg  1X  ONCE  1/12/21 11:15


 1/12/21 11:16 DC 1/12/21 11:18


40 MG


 


 Guaifenesin


  (Robitussin Dm)  10 ml  PRN Q6HRS  PRN  12/23/20 08:45


    1/1/21 15:37


10 ML


 


 Guaifenesin


  (Robitussin)  400 mg  PRN Q4HRS  PRN  12/27/20 22:30


    1/1/21 23:00


400 MG


 


 Info


  (Icu Electrolyte


 Protocol)  1 ea  CONT PRN  PRN  12/24/20 14:15


     





 


 Labetalol HCl


  (Normodyne Iv


 Push)  10 mg  PRN Q2HR  PRN  12/26/20 18:30


    1/21/21 17:31


10 MG


 


 Linezolid


  (Zyvox)  600 mg  BID  1/22/21 12:00


    1/23/21 08:47


600 MG


 


 Midazolam HCl  100 ml @ 0


 mls/hr  CONT  PRN  1/2/21 01:15


    1/23/21 09:27


5 MLS/HR


 


 Morphine Sulfate


  (Morphine


 Sulfate)  2 mg  PRN Q4HRS  PRN  12/23/20 08:45


 1/2/21 01:41 DC 1/1/21 16:12


2 MG


 


 Morphine Sulfate


  (Ms Contin)  15 mg  BID  12/23/20 21:00


 1/6/21 14:43 DC 1/5/21 21:11


15 MG


 


 Multivitamins/


 Minerals


 Therapeutic


  (Centrum


 Multivit-Mineral


 Liq)  5 ml  DAILY  1/15/21 09:00


    1/23/21 08:46


5 ML


 


 Norepinephrine


 Bitartrate 8 mg/


 Dextrose  258 ml @ 


 19.737 mls/


 hr  CONT  PRN  1/2/21 07:30


 1/5/21 08:55 DC 1/2/21 22:27


19.737 MLS/HR


 


 Nystatin


  (Nystop)  1 lindsay  BID  1/6/21 16:00


    1/23/21 08:47


1 LINDSAY


 


 Olanzapine


  (ZyPREXA ZYDIS)  5 mg  PRN BID  PRN  12/27/20 12:30


    1/11/21 09:56


5 MG


 


 Ondansetron HCl


  (Zofran)  4 mg  PRN Q4HRS  PRN  12/23/20 08:15


     





 


 Piperacillin Sod/


 Tazobactam Sod


 3.375 gm/Sodium


 Chloride  50 ml @ 


 100 mls/hr  Q6HRS  1/22/21 12:00


    1/23/21 06:22


100 MLS/HR


 


 Polyethylene


 Glycol


  (miraLAX PACKET)  17 gm  DAILY  1/6/21 14:30


 1/17/21 12:21 DC 1/15/21 08:40


17 GM


 


 Potassium Chloride


  (Klor-Con)  40 meq  1X  ONCE  12/24/20 14:15


 12/24/20 14:16 DC 12/24/20 14:24


40 MEQ


 


 Propofol


  (Diprivan)  200 mg  STK-MED ONCE  1/21/21 13:59


 1/21/21 13:59 DC  





 


 Quetiapine


 Fumarate


  (SEROquel)  50 mg  PRN QHS  PRN  12/27/20 21:00


    1/1/21 00:24


50 MG


 


 Ringer's Solution  1,000 ml @ 


 30 mls/hr  Q24H  1/21/21 07:00


 1/21/21 18:59 DC  





 


 Rocuronium Bromide


  (Zemuron)  50 mg  STK-MED ONCE  1/19/21 10:24


 1/19/21 10:25 DC  





 


 Sodium Chloride


  (Normal Saline


 Flush)  3 ml  QSHIFT  PRN  12/23/20 07:45


     





 


 Sodium Chloride


  (SODIUM CHLORIDE


 20ml)  20 ml  STK-MED ONCE  1/19/21 12:57


 1/19/21 12:57 DC  





 


 Sterile Water


  (WATER for RESP)  1,000 ml  CONT  PRN  12/26/20 09:30


    1/1/21 15:49


1,000 ML


 


 Succinylcholine


 Chloride


  (Anectine)  200 mg  STK-MED ONCE  1/2/21 01:21


 1/2/21 01:21 DC  





 


 Sulfasalazine


  (Azulfidine)  1,000 mg  BID  12/23/20 21:00


    1/23/21 08:46


1,000 MG


 


 Tramadol HCl


  (Ultram)  100 mg  PRN Q8HRS  PRN  12/23/20 15:00


    1/1/21 13:35


100 MG


 


 Vancomycin HCl


  (Vanco Per


 Pharmacy)  1 each  PRN DAILY  PRN  1/2/21 16:00


 1/3/21 13:19 DC 1/2/21 20:47


1 EACH


 


 Vancomycin HCl


  (Vancomycin


 Trough Level)  1 each  1X  ONCE  1/4/21 04:30


 1/3/21 13:20 DC  





 


 Vancomycin HCl


 1.5 gm/Sodium


 Chloride  500 ml @ 


 250 mls/hr  Q12H  1/3/21 05:00


 1/3/21 13:15 DC 1/3/21 05:53


250 MLS/HR


 


 Vancomycin HCl 2


 gm/Sodium Chloride  500 ml @ 


 250 mls/hr  1X  ONCE  1/2/21 17:00


 1/2/21 18:59 DC 1/2/21 17:43


250 MLS/HR


 


 Vecuronium Bromide


  (Norcuron Bolus)  6 mg  PRN Q6HRS  PRN  1/2/21 09:15


    1/9/21 17:07


6 MG


 


 Zinc Sulfate


  (Orazinc)  220 mg  DAILY  12/23/20 09:00


    1/23/21 08:47


220 MG


 


 Zolpidem Tartrate


  (Ambien)  5 mg  PRN QHS  PRN  12/23/20 15:00


    1/1/21 22:05


5 MG











Labs:


Lab





Laboratory Tests








Test


 1/22/21


11:36 1/23/21


00:08 1/23/21


08:20


 


Urine Collection Type Unknown   


 


Urine Color Orange   


 


Urine Clarity Cloudy   


 


Urine pH 5.5 (<5.0-8.0)   


 


Urine Specific Gravity


 >=1.030


(1.000-1.030) 


 





 


Urine Protein


 30 mg/dL


(NEG-TRACE) 


 





 


Urine Glucose (UA)


 Negative mg/dL


(NEG) 


 





 


Urine Ketones (Stick)


 Negative mg/dL


(NEG) 


 





 


Urine Blood Negative (NEG)   


 


Urine Nitrite Negative (NEG)   


 


Urine Bilirubin Small (NEG)   


 


Urine Urobilinogen Dipstick


 2.0 mg/dL (0.2


mg/dL) 


 





 


Urine Leukocyte Esterase Moderate (NEG)   


 


Urine RBC 3-5 /HPF (0-2)   


 


Urine WBC


 11-20 /HPF


(0-4) 


 





 


Urine Squamous Epithelial


Cells Mod /LPF 


 


 





 


Urine Bacteria


 Few /HPF


(0-FEW) 


 





 


Urine Mucus Slight /LPF   


 


Glucose (Fingerstick)


 


 121 mg/dL


(70-99) 





 


O2 Saturation   96 % (92-99) 


 


Arterial Blood pH


 


 


 7.45


(7.35-7.45)


 


Arterial Blood pCO2 at


Patient Temp 


 


 44 mmHg


(35-46)


 


Arterial Blood pO2 at Patient


Temp 


 


 83 mmHg


()


 


Arterial Blood HCO3


 


 


 30 mmol/L


(21-28)


 


Arterial Blood Base Excess


 


 


 6 mmol/L


(-3-3)


 


FiO2   40 











Objective:


Assessment:


COVID-19 infection


Acute hypoxic respiratory failure/ARDS from COVID-19 pneumonia


Fever resolved


Sepsis resolved


History of Crohn's


History of psoriasis


Immunosuppression


Anemia


History of tongue and prostate cancer.


MRSA nares + January 15, 2021





Plan:


Plan of Care


Continue Zosyn/Zyvox


Hart changed on 1/2


Follow-up cultures and monitor labs


Patient is awaiting transfer to LTAC


  Discussed with JOSE.











NISA KNAPP MD           Jan 23, 2021 10:01

## 2021-01-24 VITALS — DIASTOLIC BLOOD PRESSURE: 69 MMHG | SYSTOLIC BLOOD PRESSURE: 142 MMHG

## 2021-01-24 VITALS — SYSTOLIC BLOOD PRESSURE: 123 MMHG | DIASTOLIC BLOOD PRESSURE: 58 MMHG

## 2021-01-24 VITALS — DIASTOLIC BLOOD PRESSURE: 72 MMHG | SYSTOLIC BLOOD PRESSURE: 162 MMHG

## 2021-01-24 VITALS — DIASTOLIC BLOOD PRESSURE: 38 MMHG | SYSTOLIC BLOOD PRESSURE: 92 MMHG

## 2021-01-24 VITALS — DIASTOLIC BLOOD PRESSURE: 68 MMHG | SYSTOLIC BLOOD PRESSURE: 124 MMHG

## 2021-01-24 VITALS — SYSTOLIC BLOOD PRESSURE: 107 MMHG | DIASTOLIC BLOOD PRESSURE: 59 MMHG

## 2021-01-24 VITALS — SYSTOLIC BLOOD PRESSURE: 178 MMHG | DIASTOLIC BLOOD PRESSURE: 59 MMHG

## 2021-01-24 VITALS — DIASTOLIC BLOOD PRESSURE: 82 MMHG | SYSTOLIC BLOOD PRESSURE: 124 MMHG

## 2021-01-24 VITALS — SYSTOLIC BLOOD PRESSURE: 185 MMHG | DIASTOLIC BLOOD PRESSURE: 95 MMHG

## 2021-01-24 VITALS — SYSTOLIC BLOOD PRESSURE: 122 MMHG | DIASTOLIC BLOOD PRESSURE: 72 MMHG

## 2021-01-24 VITALS — SYSTOLIC BLOOD PRESSURE: 82 MMHG | DIASTOLIC BLOOD PRESSURE: 47 MMHG

## 2021-01-24 VITALS — DIASTOLIC BLOOD PRESSURE: 92 MMHG | SYSTOLIC BLOOD PRESSURE: 186 MMHG

## 2021-01-24 VITALS — SYSTOLIC BLOOD PRESSURE: 147 MMHG | DIASTOLIC BLOOD PRESSURE: 87 MMHG

## 2021-01-24 VITALS — DIASTOLIC BLOOD PRESSURE: 79 MMHG | SYSTOLIC BLOOD PRESSURE: 162 MMHG

## 2021-01-24 VITALS — SYSTOLIC BLOOD PRESSURE: 122 MMHG | DIASTOLIC BLOOD PRESSURE: 63 MMHG

## 2021-01-24 VITALS — SYSTOLIC BLOOD PRESSURE: 123 MMHG | DIASTOLIC BLOOD PRESSURE: 63 MMHG

## 2021-01-24 VITALS — SYSTOLIC BLOOD PRESSURE: 111 MMHG | DIASTOLIC BLOOD PRESSURE: 52 MMHG

## 2021-01-24 VITALS — DIASTOLIC BLOOD PRESSURE: 119 MMHG | SYSTOLIC BLOOD PRESSURE: 186 MMHG

## 2021-01-24 VITALS — SYSTOLIC BLOOD PRESSURE: 184 MMHG | DIASTOLIC BLOOD PRESSURE: 80 MMHG

## 2021-01-24 VITALS — DIASTOLIC BLOOD PRESSURE: 85 MMHG | SYSTOLIC BLOOD PRESSURE: 183 MMHG

## 2021-01-24 VITALS — SYSTOLIC BLOOD PRESSURE: 92 MMHG | DIASTOLIC BLOOD PRESSURE: 47 MMHG

## 2021-01-24 VITALS — SYSTOLIC BLOOD PRESSURE: 215 MMHG | DIASTOLIC BLOOD PRESSURE: 98 MMHG

## 2021-01-24 VITALS — SYSTOLIC BLOOD PRESSURE: 99 MMHG | DIASTOLIC BLOOD PRESSURE: 45 MMHG

## 2021-01-24 VITALS — SYSTOLIC BLOOD PRESSURE: 162 MMHG | DIASTOLIC BLOOD PRESSURE: 72 MMHG

## 2021-01-24 LAB
APTT PPP: (no result) S
BACTERIA #/AREA URNS HPF: (no result) /HPF
BASE EXCESS ABG: 3 MMOL/L (ref -3–3)
BILIRUB UR QL STRIP: NEGATIVE
FIBRINOGEN PPP-MCNC: (no result) MG/DL
HCO3 BLDA-SCNC: 29 MMOL/L (ref 21–28)
INSPIRATION SETTING TIME VENT: 40
NITRITE UR QL STRIP: NEGATIVE
PCO2 BLDA: 52 MMHG (ref 35–46)
PH UR STRIP: 6 [PH]
PO2 BLDA: 56 MMHG (ref 65–108)
PROT UR STRIP-MCNC: 100 MG/DL
RBC #/AREA URNS HPF: (no result) /HPF (ref 0–2)
SAO2 % BLDA: 87 % (ref 92–99)
UROBILINOGEN UR-MCNC: 1 MG/DL
WBC #/AREA URNS HPF: (no result) /HPF (ref 0–4)
YEAST #/AREA URNS HPF: PRESENT /HPF

## 2021-01-24 RX ADMIN — CITALOPRAM HYDROBROMIDE SCH MG: 20 TABLET ORAL at 07:50

## 2021-01-24 RX ADMIN — MIDAZOLAM PRN MLS/HR: 5 INJECTION, SOLUTION INTRAMUSCULAR; INTRAVENOUS at 14:48

## 2021-01-24 RX ADMIN — PIPERACILLIN SODIUM AND TAZOBACTAM SODIUM SCH MLS/HR: 3; .375 INJECTION, POWDER, LYOPHILIZED, FOR SOLUTION INTRAVENOUS at 17:31

## 2021-01-24 RX ADMIN — MULTIVITAMIN SCH ML: LIQUID ORAL at 07:50

## 2021-01-24 RX ADMIN — LINEZOLID SCH MG: 600 TABLET, FILM COATED ORAL at 21:30

## 2021-01-24 RX ADMIN — ENOXAPARIN SODIUM SCH MG: 40 INJECTION SUBCUTANEOUS at 21:32

## 2021-01-24 RX ADMIN — NYSTATIN SCH APP: 100000 POWDER TOPICAL at 07:51

## 2021-01-24 RX ADMIN — ATORVASTATIN CALCIUM SCH MG: 40 TABLET, FILM COATED ORAL at 21:30

## 2021-01-24 RX ADMIN — PIPERACILLIN SODIUM AND TAZOBACTAM SODIUM SCH MLS/HR: 3; .375 INJECTION, POWDER, LYOPHILIZED, FOR SOLUTION INTRAVENOUS at 12:33

## 2021-01-24 RX ADMIN — PIPERACILLIN SODIUM AND TAZOBACTAM SODIUM SCH MLS/HR: 3; .375 INJECTION, POWDER, LYOPHILIZED, FOR SOLUTION INTRAVENOUS at 05:47

## 2021-01-24 RX ADMIN — PIPERACILLIN SODIUM AND TAZOBACTAM SODIUM SCH MLS/HR: 3; .375 INJECTION, POWDER, LYOPHILIZED, FOR SOLUTION INTRAVENOUS at 00:22

## 2021-01-24 RX ADMIN — NYSTATIN SCH APP: 100000 POWDER TOPICAL at 21:33

## 2021-01-24 RX ADMIN — DEXMEDETOMIDINE HYDROCHLORIDE PRN MLS/HR: 100 INJECTION, SOLUTION, CONCENTRATE INTRAVENOUS at 10:25

## 2021-01-24 RX ADMIN — CYANOCOBALAMIN TAB 1000 MCG SCH MCG: 1000 TAB at 07:50

## 2021-01-24 RX ADMIN — FAMOTIDINE SCH MG: 20 TABLET ORAL at 07:50

## 2021-01-24 RX ADMIN — LABETALOL HYDROCHLORIDE PRN MG: 5 INJECTION, SOLUTION INTRAVENOUS at 19:45

## 2021-01-24 RX ADMIN — MIDAZOLAM PRN MLS/HR: 5 INJECTION, SOLUTION INTRAMUSCULAR; INTRAVENOUS at 01:47

## 2021-01-24 RX ADMIN — LABETALOL HYDROCHLORIDE PRN MG: 5 INJECTION, SOLUTION INTRAVENOUS at 09:10

## 2021-01-24 RX ADMIN — ZINC SULFATE CAP 220 MG (50 MG ELEMENTAL ZN) SCH MG: 220 (50 ZN) CAP at 07:50

## 2021-01-24 RX ADMIN — METOPROLOL TARTRATE SCH MG: 25 TABLET ORAL at 21:38

## 2021-01-24 RX ADMIN — FAMOTIDINE SCH MG: 20 TABLET ORAL at 21:30

## 2021-01-24 RX ADMIN — DEXMEDETOMIDINE HYDROCHLORIDE PRN MLS/HR: 100 INJECTION, SOLUTION, CONCENTRATE INTRAVENOUS at 03:10

## 2021-01-24 RX ADMIN — Medication PRN MLS/HR: at 18:10

## 2021-01-24 RX ADMIN — ENOXAPARIN SODIUM SCH MG: 40 INJECTION SUBCUTANEOUS at 07:50

## 2021-01-24 RX ADMIN — CETIRIZINE HYDROCHLORIDE SCH MG: 10 TABLET, FILM COATED ORAL at 07:50

## 2021-01-24 RX ADMIN — DEXMEDETOMIDINE HYDROCHLORIDE PRN MLS/HR: 100 INJECTION, SOLUTION, CONCENTRATE INTRAVENOUS at 15:24

## 2021-01-24 RX ADMIN — LINEZOLID SCH MG: 600 TABLET, FILM COATED ORAL at 09:26

## 2021-01-24 NOTE — PDOC
PROGRESS NOTES


Date of Service:


DATE: 1/24/21 


TIME: 09:36





Chief Complaint


Chief Complaint


 





Acute hypoxic respiratory failure - no pulmonary history. With recent travel to 

and from [place] likely COVID 19 vs other atypical pneumonia. Cont empiric 

antibiotics, steroids. Consult Pulm. 


Improved aeration of the lungs with persistent moderate mixed interstitial and 

alveolar airspace disease.  1/02 


Pt. experienced hypoxia and respiratory decompensation overnight requiring 

intubation 


now on vent 100% PEEP of 10 


Hypotension as well now on pressors


acute respiratory distress syndrome. Consideration may be given for multifocal 

pneumonia versus pulmonary edema.


COVID 19 positive - with pneumonia - given transaminitis and late presentation 

with high O2 requirements no indication for remdesivir


RYDER - likely vasomotor nephropathy - no known renal history, will hydrate


Pneumonia - likely atypical, gram negative vs viral. will cover 


Prostate Ca - s/p resection at [facility] in [place]


Hypokalemia - likely nutritional or loss from diarrhea, will replace


Elevated troponin - likely from type II demand ischemia, will trend troponins, 

maintain telemetry


Crohn's - sees rheumatology regularly, Dr Fan in [place], on sulfasalazine


 


Anemia - likely of chronic disease, will monitor


Cardiomegaly - notable on CT chest - no known cardiac history, though his mother

did have CHF and CAD


Right renal mass -  


HYPOTENSION 


 


Severe malnutrition





History of Present Illness


History of Present Illness


1/24,   hypotensive this AM when sedated,   now hypertensive, and trying to 

talk, following commands,  looking much better


plan transfer to LTAC at any time. 








1/23,  start PT and OT,  plan LTAC soon,  ready for approval


more awake today, weak





1/22.,    PEG placed,   started tube feeds,  meds changed,  orderes to LTAC 

soon,    wean vent if able





1/21.  PEG today


doing OK


will need LTACH


cont current


discussed wityh RN








1/20/2021,   add PPN,   fluid stable


cont current,  PEG tube tomorrow 


vent OK








Patient seen and examined in the ICU


26 days past his COVID-19 diagnosis


He is still intubated


Assist-control 


Has NG tube feeds


cont supportive care


Sedated with fentanyl Versed and propofol





Vitals


Vitals





Vital Signs








  Date Time  Temp Pulse Resp B/P (MAP) Pulse Ox O2 Delivery O2 Flow Rate FiO2


 


1/24/21 09:10  125  255/112    


 


1/24/21 09:00   24  92 Ventilator  


 


1/24/21 08:00 99.0       





 99.0       











Physical Exam


Physical Exam


GENERAL: Patient on vent


HEENT:  Normocephalic, atraumatic. 


NECK: Trach present


LUNGS:  Decreased breath sounds at the bases. 


HEART:  S1, S2.


ABDOMEN: Soft PEG tube present site clean


 Hart in place,scrotal swelling +


EXTREMITIES: Generalized anasarca


CENTRAL NERVOUS SYSTEM:  Intubated, sedated.


LINES:  Right upper extremity PICC line clean.


General:  Other (sedated )


Heart:  Regular rate, Normal S1, Normal S2


Abdomen:  Soft, No tenderness


Extremities:  No clubbing, No cyanosis


Skin:  No rashes, No breakdown





Labs


LABS





Laboratory Tests








Test


 1/23/21


16:33


 


Glucose (Fingerstick)


 97 mg/dL


(70-99)











Review of Systems


Review of Systems


unable to fully





Comment


Review of Relevant


I have reviewed the following items jabier (where applicable) has been applied.


Labs





Laboratory Tests








Test


 1/22/21


11:36 1/23/21


00:08 1/23/21


08:20 1/23/21


16:33


 


Urine Collection Type Unknown    


 


Urine Color Orange    


 


Urine Clarity Cloudy    


 


Urine pH 5.5 (<5.0-8.0)    


 


Urine Specific Gravity


 >=1.030


(1.000-1.030) 


 


 





 


Urine Protein


 30 mg/dL


(NEG-TRACE) 


 


 





 


Urine Glucose (UA)


 Negative mg/dL


(NEG) 


 


 





 


Urine Ketones (Stick)


 Negative mg/dL


(NEG) 


 


 





 


Urine Blood Negative (NEG)    


 


Urine Nitrite Negative (NEG)    


 


Urine Bilirubin Small (NEG)    


 


Urine Urobilinogen Dipstick


 2.0 mg/dL (0.2


mg/dL) 


 


 





 


Urine Leukocyte Esterase Moderate (NEG)    


 


Urine RBC 3-5 /HPF (0-2)    


 


Urine WBC


 11-20 /HPF


(0-4) 


 


 





 


Urine Squamous Epithelial


Cells Mod /LPF 


 


 


 





 


Urine Bacteria


 Few /HPF


(0-FEW) 


 


 





 


Urine Mucus Slight /LPF    


 


Glucose (Fingerstick)


 


 121 mg/dL


(70-99) 


 97 mg/dL


(70-99)


 


O2 Saturation   96 % (92-99)  


 


Arterial Blood pH


 


 


 7.45


(7.35-7.45) 





 


Arterial Blood pCO2 at


Patient Temp 


 


 44 mmHg


(35-46) 





 


Arterial Blood pO2 at Patient


Temp 


 


 83 mmHg


() 





 


Arterial Blood HCO3


 


 


 30 mmol/L


(21-28) 





 


Arterial Blood Base Excess


 


 


 6 mmol/L


(-3-3) 





 


FiO2   40  








Laboratory Tests








Test


 1/23/21


16:33


 


Glucose (Fingerstick)


 97 mg/dL


(70-99)








Microbiology


1/22/21 Blood Culture - Preliminary, Resulted


          NO GROWTH AFTER 1 DAY


Medications





Current Medications


Sodium Chloride (Normal Saline Flush) 3 ml QSHIFT  PRN IV AFTER MEDS AND BLOOD 

DRAWS;  Start 12/23/20 at 07:45


Sodium Chloride 1,000 ml @  150 mls/hr Q6H40M IV  Last administered on 12/23 /20at 15:10;  Start 12/23/20 at 08:15;  Stop 12/23/20 at 21:34;  Status DC


Ondansetron HCl (Zofran) 4 mg PRN Q4HRS  PRN IV NAUSEA/VOMITING;  Start 12/23/20

at 08:15


Acetaminophen (Tylenol) 650 mg PRN Q4HRS  PRN PO TEMP OVER 100.4F OR MILD PAIN 

Last administered on 12/31/20at 01:02;  Start 12/23/20 at 08:15


Docusate Sodium (Colace) 100 mg PRN BID  PRN PO HARD STOOLS Last administered on

12/25/20at 17:03;  Start 12/23/20 at 08:15


Ceftriaxone Sodium (Rocephin) 1 gm Q24H IVP  Last administered on 1/3/21at 08:14

;  Start 12/24/20 at 09:00;  Stop 1/3/21 at 13:15;  Status DC


Dexamethasone Sodium Phosphate (Decadron) 4 mg DAILY IVP  Last administered on 

1/3/21at 08:14;  Start 12/24/20 at 09:00;  Stop 1/4/21 at 08:39;  Status DC


Amino Acids/ Glycerin/ Electrolytes 1,000 ml @  80 mls/hr [id number] IV  Last 

administered on 1/4/21at 14:53;  Start 12/23/20 at 08:45;  Stop 1/5/21 at 05:03;

 Status DC


Enoxaparin Sodium (Lovenox 40mg Syringe) 40 mg Q12HR SQ  Last administered on 

1/20/21at 08:05;  Start 12/23/20 at 09:00;  Stop 1/21/21 at 14:29;  Status DC


Zinc Sulfate (Orazinc) 220 mg DAILY PO  Last administered on 1/24/21at 07:50;  

Start 12/23/20 at 09:00


Guaifenesin (Robitussin Dm) 10 ml PRN Q6HRS  PRN PO COUGH-2ND CHOICE Last 

administered on 1/1/21at 15:37;  Start 12/23/20 at 08:45


Acetaminophen (Tylenol Supp) 650 mg PRN Q6HRS  PRN AK MILD PAIN / TEMP > 100.3'F

Last administered on 1/21/21at 05:58;  Start 12/23/20 at 08:45


Morphine Sulfate (Morphine Sulfate) 2 mg PRN Q4HRS  PRN IV PAIN Last 

administered on 1/1/21at 16:12;  Start 12/23/20 at 08:45;  Stop 1/2/21 at 01:41;

 Status DC


Azithromycin 500 mg/Sodium Chloride 250 ml @  250 mls/hr 1X  ONCE IV  Last 

administered on 12/23/20at 09:59;  Start 12/23/20 at 10:00;  Stop 12/23/20 at 

10:59;  Status DC


Azithromycin 250 mg/Sodium Chloride 250 ml @  250 mls/hr Q24H IV  Last 

administered on 12/27/20at 08:55;  Start 12/24/20 at 09:00;  Stop 12/27/20 at 

09:59;  Status DC


Cetirizine HCl (ZyrTEC) 10 mg DAILY PO  Last administered on 1/24/21at 07:50;  

Start 12/24/20 at 09:00


Morphine Sulfate (Ms Contin) 15 mg BID PO  Last administered on 1/5/21at 21:11; 

Start 12/23/20 at 21:00;  Stop 1/6/21 at 14:43;  Status DC


Cyanocobalamin (Vitamin B-12) 1,000 mcg DAILY PO  Last administered on 1/24/21at

07:50;  Start 12/24/20 at 09:00


Citalopram Hydrobromide (CeleXA) 20 mg DAILY PO  Last administered on 1/24/21at 

07:50;  Start 12/24/20 at 09:00


Atorvastatin Calcium (Lipitor) 40 mg QHS PO  Last administered on 1/23/21at 

21:47;  Start 12/23/20 at 21:00


Sulfasalazine (Azulfidine) 1,000 mg BID PO  Last administered on 1/24/21at 

07:50;  Start 12/23/20 at 21:00


Tramadol HCl (Ultram) 100 mg PRN Q8HRS  PRN PO MODERATE PAIN, SEVERE PAIN Last 

administered on 1/1/21at 13:35;  Start 12/23/20 at 15:00


Zolpidem Tartrate (Ambien) 5 mg PRN QHS  PRN PO INSOMNIA Last administered on 

1/1/21at 22:05;  Start 12/23/20 at 15:00


Info (Icu Electrolyte Protocol) 1 ea CONT PRN  PRN MC PER PROTOCOL;  Start 12 /24/20 at 14:15


Potassium Chloride (Klor-Con) 40 meq 1X  ONCE PO  Last administered on 

12/24/20at 14:24;  Start 12/24/20 at 14:15;  Stop 12/24/20 at 14:16;  Status DC


Sterile Water (WATER for RESP) 1,000 ml CONT  PRN INH VIA VAPOTHERM DEVICE Last 

administered on 1/1/21at 15:49;  Start 12/26/20 at 09:30


Benzonatate (Tessalon Perle) 100 mg [id number] PO  Last administered on 1/1/21at 20:

51;  Start 12/26/20 at 14:00;  Stop 1/2/21 at 10:35;  Status DC


Labetalol HCl (Normodyne Iv Push) 10 mg PRN Q2HR  PRN IVP HYPERTENSION Last 

administered on 1/24/21at 09:10;  Start 12/26/20 at 18:30


Quetiapine Fumarate (SEROquel) 50 mg PRN QHS  PRN PO sleep 2ND CHOICE Last 

administered on 1/1/21at 00:24;  Start 12/27/20 at 21:00


Enalaprilat (Vasotec Inj) 2.5 mg PRN Q6HRS  PRN IVP HYPERTENSION-2ND CHOICE Last

administered on 12/28/20at 12:29;  Start 12/27/20 at 10:00


Olanzapine (ZyPREXA ZYDIS) 5 mg PRN BID  PRN PO ANXIETY / AGITATION Last 

administered on 1/11/21at 09:56;  Start 12/27/20 at 12:30


Guaifenesin (Robitussin) 400 mg PRN Q4HRS  PRN PO COUGH;  Start 12/27/20 at 

22:15;  Stop 12/27/20 at 22:27;  Status DC


Guaifenesin (Robitussin) 400 mg PRN Q4HRS  PRN PO COUGH Last administered on 

1/1/21at 23:00;  Start 12/27/20 at 22:30


Furosemide (Lasix) 40 mg 1X  ONCE IVP  Last administered on 12/30/20at 10:15;  

Start 12/30/20 at 10:00;  Stop 12/30/20 at 10:01;  Status DC


Sodium Chloride 1,000 ml @  100 mls/hr Q10H IV  Last administered on 1/7/21at 

00:03;  Start 12/31/20 at 10:15;  Stop 1/7/21 at 13:14;  Status DC


Furosemide (Lasix) 20 mg 1X  ONCE IVP  Last administered on 1/1/21at 13:34;  

Start 1/1/21 at 12:30;  Stop 1/1/21 at 12:31;  Status DC


Alteplase, Recombinant (Cathflo For Central Catheter Clearance) 1 mg 1X  ONCE 

INT CAT  Last administered on 1/2/21at 02:10;  Start 1/1/21 at 16:15;  Stop 

1/1/21 at 16:16;  Status DC


Fentanyl Citrate 30 ml @ 0 mls/hr CONT  PRN IV SEE PROTOCOL Last administered on

1/2/21at 01:58;  Start 1/2/21 at 01:15;  Stop 1/2/21 at 02:36;  Status DC


Propofol 100 ml @ 0 mls/hr CONT  PRN IV PER PROTOCOL Last administered on 

1/16/21at 23:12;  Start 1/2/21 at 01:15


Chlorhexidine Gluconate (Peridex) 15 ml BID MM  Last administered on 1/9/21at 

09:21;  Start 1/2/21 at 09:00;  Stop 1/9/21 at 19:26;  Status DC


Midazolam HCl 100 ml @ 0 mls/hr CONT  PRN IV SEE PROTOCOL Last administered on 

1/24/21at 01:47;  Start 1/2/21 at 01:15


Succinylcholine Chloride (Anectine) 200 mg STK-MED ONCE .ROUTE ;  Start 1/2/21 

at 01:21;  Stop 1/2/21 at 01:21;  Status DC


Fentanyl Citrate 55 ml @ 0 mls/hr CONT PRN  PRN IV PAIN CONTROL Last 

administered on 1/23/21at 12:46;  Start 1/2/21 at 02:45


Norepinephrine Bitartrate 8 mg/ Dextrose 258 ml @  19.737 mls/ hr CONT  PRN IV 

PER PROTOCOL Last administered on 1/2/21at 22:27;  Start 1/2/21 at 07:30;  Stop 

1/5/21 at 08:55;  Status DC


Vecuronium Bromide (Norcuron Bolus) 6 mg PRN Q6HRS  PRN IV SEDATION Last 

administered on 1/9/21at 17:07;  Start 1/2/21 at 09:15


Vancomycin HCl (Vanco Per Pharmacy) 1 each PRN DAILY  PRN MC SEE COMMENTS Last 

administered on 1/2/21at 20:47;  Start 1/2/21 at 16:00;  Stop 1/3/21 at 13:19;  

Status DC


Vancomycin HCl 2 gm/Sodium Chloride 500 ml @  250 mls/hr 1X  ONCE IV  Last 

administered on 1/2/21at 17:43;  Start 1/2/21 at 17:00;  Stop 1/2/21 at 18:59;  

Status DC


Vancomycin HCl 1.5 gm/Sodium Chloride 500 ml @  250 mls/hr Q12H IV  Last admi

nistered on 1/3/21at 05:53;  Start 1/3/21 at 05:00;  Stop 1/3/21 at 13:15;  

Status DC


Vancomycin HCl (Vancomycin Trough Level) 1 each 1X  ONCE MC ;  Start 1/4/21 at 

04:30;  Stop 1/3/21 at 13:20;  Status DC


Cefepime HCl (Maxipime) 2 gm Q12HR IVP  Last administered on 1/12/21at 20:52;  

Start 1/3/21 at 14:00;  Stop 1/13/21 at 07:59;  Status DC


Linezolid (Zyvox) 600 mg BID PO  Last administered on 1/6/21at 20:38;  Start 

1/3/21 at 21:00;  Stop 1/7/21 at 07:58;  Status DC


Dexamethasone Sodium Phosphate (Decadron) 6 mg DAILY IVP  Last administered on 

1/5/21at 08:36;  Start 1/4/21 at 09:00;  Stop 1/5/21 at 08:55;  Status DC


Polyethylene Glycol (miraLAX PACKET) 17 gm DAILY PO  Last administered on 

1/15/21at 08:40;  Start 1/6/21 at 14:30;  Stop 1/17/21 at 12:21;  Status DC


Nystatin (Nystop) 1 lindsay BID TP  Last administered on 1/24/21at 07:51;  Start 

1/6/21 at 16:00


Sodium Chloride 1,000 ml @  0 mls/hr Q0M IV ;  Start 1/7/21 at 13:15


Famotidine (Pepcid Vial) 20 mg BID IVP  Last administered on 1/22/21at 09:01;  

Start 1/8/21 at 21:00;  Stop 1/22/21 at 09:11;  Status DC


Furosemide (Lasix) 40 mg 1X  ONCE IVP  Last administered on 1/12/21at 11:18;  S

tart 1/12/21 at 11:15;  Stop 1/12/21 at 11:16;  Status DC


Multivitamins/ Minerals Therapeutic (Centrum Multivit-Mineral Liq) 5 ml DAILY 

PEG  Last administered on 1/24/21at 07:50;  Start 1/15/21 at 09:00


Dexmedetomidine HCl 400 mcg/ Sodium Chloride 100 ml @ 0 mls/hr CONT  PRN IV PER 

PROTOCOL Last administered on 1/24/21at 03:10;  Start 1/17/21 at 07:15


Atropine Sulfate (ATROPINE 0.5mg SYRINGE) 0.5 mg PRN Q5MIN  PRN IV SEE COMMENTS;

 Start 1/17/21 at 07:15


Rocuronium Bromide (Zemuron) 50 mg STK-MED ONCE .ROUTE ;  Start 1/19/21 at 

10:24;  Stop 1/19/21 at 10:25;  Status DC


Bupivacaine HCl/ Epinephrine Bitart (Sensorcain-Epi 0.5% Kit) 30 ml STK-MED ONCE

.ROUTE  Last administered on 1/19/21at 12:47;  Start 1/19/21 at 11:34;  Stop 

1/19/21 at 11:34;  Status DC


Cellulose (Surgicel Fibrillar 1x2) 1 each STK-MED ONCE .ROUTE  Last administered

on 1/19/21at 12:56;  Start 1/19/21 at 11:34;  Stop 1/19/21 at 11:34;  Status DC


Ephedrine Sulfate (Akovaz) 50 mg STK-MED ONCE .ROUTE ;  Start 1/19/21 at 12:56; 

Stop 1/19/21 at 12:57;  Status DC


Desflurane (Suprane) 15 ml STK-MED ONCE IH ;  Start 1/19/21 at 12:56;  Stop 

1/19/21 at 12:57;  Status DC


Sodium Chloride (SODIUM CHLORIDE 20ml) 20 ml STK-MED ONCE IJ ;  Start 1/19/21 at

12:57;  Stop 1/19/21 at 12:57;  Status DC


Amino Acids/ Glycerin/ Electrolytes 1,000 ml @  80 mls/hr [id number] IV  Last 

administered on 1/21/21at 03:49;  Start 1/20/21 at 11:00;  Stop 1/21/21 at 

19:27;  Status DC


Cefazolin Sodium/ Dextrose 50 ml @  100 mls/hr 1X  ONCE IV  Last administered on

1/21/21at 13:42;  Start 1/21/21 at 12:00;  Stop 1/21/21 at 12:29;  Status DC


Amino Acids/ Glycerin/ Electrolytes 1,000 ml @  80 mls/hr [id number] IV ;  Start 

1/20/21 at 14:00;  Stop 1/20/21 at 14:05;  Status DC


Ringer's Solution 1,000 ml @  30 mls/hr Q24H IV ;  Start 1/21/21 at 07:00;  Stop

1/21/21 at 18:59;  Status DC


Propofol (Diprivan) 200 mg STK-MED ONCE IV ;  Start 1/21/21 at 13:59;  Stop 

1/21/21 at 13:59;  Status DC


Enoxaparin Sodium (Lovenox 40mg Syringe) 40 mg Q12HR SQ  Last administered on 

1/24/21at 07:50;  Start 1/23/21 at 09:00


Famotidine (Pepcid) 20 mg BID GT  Last administered on 1/24/21at 07:50;  Start 

1/22/21 at 21:00


Piperacillin Sod/ Tazobactam Sod 3.375 gm/Sodium Chloride 50 ml @  100 mls/hr 

Q6HRS IV  Last administered on 1/24/21at 05:47;  Start 1/22/21 at 12:00


Linezolid (Zyvox) 600 mg BID PO  Last administered on 1/24/21at 09:26;  Start 

1/22/21 at 12:00


Metoprolol Tartrate (Lopressor) 25 mg BID PO ;  Start 1/24/21 at 21:00


Metoprolol Tartrate (Lopressor) 50 mg 1X  ONCE PO ;  Start 1/24/21 at 09:30;  

Stop 1/24/21 at 09:31;  Status DC





Active Scripts


Active


Reported


Morphine Sulfate Er (Morphine Sulfate) 30 Mg Tablet.er 1 Tab PO BID


Tramadol Hcl 100 Mg Tbmp.24hr 100 Mg PO PRN Q8HRS PRN


Ambien (Zolpidem Tartrate) 10 Mg Tablet 10 Mg PO PRN QHS PRN


Methotrexate (Methotrexate Sodium) 2.5 Mg Tablet 10 Tab PO WEEKLY


Lisinopril 10 Mg Tablet 1 Tab PO DAILY


Crestor (Rosuvastatin Calcium) 5 Mg Tablet 2 Tab PO DAILY


Hydrochlorothiazide Tablet (Hydrochlorothiazide) 50 Mg Tablet 25 Mg PO DAILY


Escitalopram Oxalate 10 Mg Tablet 1 Tab PO DAILY


Sulfasalazine Dr (Sulfasalazine) 500 Mg Tablet.dr 2 Tab PO TID 30 Days


Colace (Docusate Sodium) 100 Mg Capsule 1 Cap PO DA 30 Days


Acetaminophen Ext.release (Acetaminophen) 650 Mg Tablet.er 650 Mg PO PRN Q8HRS 

PRN


Melatonin 5 Mg Capsule 1 Cap PO QHS 30 Days


B-12 (Cyanocobalamin (Vitamin B-12)) 500 Mcg Tablet 2 Tab PO DAILY 30 Days


Vitamin D3 (Cholecalciferol (Vitamin D3)) 50 Mcg Capsule 50 Mcg PO DAILY


Aller-Sung (Cetirizine Hcl) 10 Mg Tablet 1 Tab PO DAILY 30 Days


Vitals/I & O





Vital Sign - Last 24 Hours








 1/23/21 1/23/21 1/23/21 1/23/21





 10:00 11:00 12:00 12:00


 


Temp    99.0





    99.0


 


Pulse 80 96  68


 


Resp 24 24  26


 


B/P (MAP) 178/86 (116) 190/91 (124)  156/73 (100)


 


Pulse Ox 97 94  99


 


O2 Delivery Ventilator Ventilator Mechanical Ventilator Ventilator


 


    





    





 1/23/21 1/23/21 1/23/21 1/23/21





 12:46 13:00 13:15 14:00


 


Pulse  73  49


 


Resp  24  24


 


B/P (MAP)  166/90 (115)  108/63 (78)


 


Pulse Ox 99 99 99 98


 


O2 Delivery Tracheal Collar Ventilator Ventilator Ventilator





 1/23/21 1/23/21 1/23/21 1/23/21





 15:00 15:58 16:00 16:00


 


Temp   98.7 





   98.7 


 


Pulse 83  67 


 


Resp 24  24 


 


B/P (MAP) 97/52 (67)  120/67 (84) 


 


Pulse Ox 95 99 100 


 


O2 Delivery Ventilator Ventilator Ventilator Mechanical Ventilator


 


    





    





 1/23/21 1/23/21 1/23/21 1/23/21





 17:00 18:00 19:01 20:00


 


Temp    99.2





    99.2


 


Pulse 112 57 61 48


 


Resp 24 24 26 24


 


B/P (MAP) 176/94 (121) 102/60 (74) 128/68 (88) 97/56 (70)


 


Pulse Ox 96 100 100 100


 


O2 Delivery Ventilator Ventilator Ventilator Ventilator


 


    





    





 1/23/21 1/23/21 1/23/21 1/23/21





 20:01 20:06 21:00 22:00


 


Pulse   48 48


 


Resp   24 24


 


B/P (MAP)   94/55 (68) 95/56 (69)


 


Pulse Ox 99  100 99


 


O2 Delivery Ventilator Mechanical Ventilator Ventilator Ventilator





 1/23/21 1/23/21 1/24/21 1/24/21





 23:00 23:59 00:00 00:00


 


Temp    98.8





    98.8


 


Pulse 50   80


 


Resp 24   27


 


B/P (MAP) 103/61 (75)   122/72 (89)


 


Pulse Ox 100  97 97


 


O2 Delivery Ventilator Mechanical Ventilator Ventilator Ventilator


 


    





    





 1/24/21 1/24/21 1/24/21 1/24/21





 01:00 02:00 03:00 04:00


 


Temp    99.0





    99.0


 


Pulse 118 55 52 54


 


Resp 30 24 24 24


 


B/P (MAP) 186/92 (123) 124/68 (86) 92/47 (62) 107/59 (75)


 


Pulse Ox 91 99 97 97


 


O2 Delivery Ventilator Ventilator Ventilator Ventilator


 


    





    





 1/24/21 1/24/21 1/24/21 1/24/21





 04:04 05:00 05:31 06:03


 


Pulse  49  79


 


Resp  24  24


 


B/P (MAP)  111/52 (71)  123/58 (79)


 


Pulse Ox  94 98 95


 


O2 Delivery Mechanical Ventilator Ventilator Ventilator Ventilator





 1/24/21 1/24/21 1/24/21 1/24/21





 07:00 08:00 08:00 09:00


 


Temp  99.0  





  99.0  


 


Pulse 53 117  118


 


Resp 24 24 24


 


B/P (MAP) 82/47 (59) 147/87 (107)  185/95 (125)


 


Pulse Ox 95 91  92


 


O2 Delivery Ventilator Ventilator Mechanical Ventilator Ventilator


 


    





    





 1/24/21   





 09:10   


 


Pulse 125   


 


B/P (MAP) 255/112   














Intake and Output   


 


 1/23/21 1/23/21 1/24/21





 15:00 23:00 07:00


 


Intake Total 250 ml 1312 ml 1273.8 ml


 


Output Total 1445 ml 730 ml 690 ml


 


Balance -1195 ml 582 ml 583.8 ml











Justicifation of Admission Dx:


Justifications for Admission:


Justification of Admission Dx:  Yes


Comminuty Aquired Pneumonia:  Med-High Risk Pt











ANNE SHEPARD MD                 Jan 24, 2021 09:37

## 2021-01-24 NOTE — PDOC
PULMONARY PROGRESS NOTES


DATE: 1/24/21 


TIME: 10:46


Subjective


Patient remains on ventilatory support, FiO2 40% and a PEEP of 5,  sedated on 

fentanyl  precedex  large ett secretion 


Afebrile overnight


Tolerating tube feeding well,s/p trach/ PEG


Vitals





Vital Signs








  Date Time  Temp Pulse Resp B/P (MAP) Pulse Ox O2 Delivery O2 Flow Rate FiO2


 


1/24/21 10:04  87  185/83    


 


1/24/21 10:00   28  97 Ventilator  


 


1/24/21 08:00 99.0       





 99.0       








Comments


Intubated/sedated


no distress


nc at 


rrr


no accessory muscle use


obese


no rash


Labs





Laboratory Tests








Test


 1/22/21


11:36 1/23/21


00:08 1/23/21


08:20 1/23/21


16:33


 


Urine Collection Type Unknown    


 


Urine Color Orange    


 


Urine Clarity Cloudy    


 


Urine pH 5.5 (<5.0-8.0)    


 


Urine Specific Gravity


 >=1.030


(1.000-1.030) 


 


 





 


Urine Protein


 30 mg/dL


(NEG-TRACE) 


 


 





 


Urine Glucose (UA)


 Negative mg/dL


(NEG) 


 


 





 


Urine Ketones (Stick)


 Negative mg/dL


(NEG) 


 


 





 


Urine Blood Negative (NEG)    


 


Urine Nitrite Negative (NEG)    


 


Urine Bilirubin Small (NEG)    


 


Urine Urobilinogen Dipstick


 2.0 mg/dL (0.2


mg/dL) 


 


 





 


Urine Leukocyte Esterase Moderate (NEG)    


 


Urine RBC 3-5 /HPF (0-2)    


 


Urine WBC


 11-20 /HPF


(0-4) 


 


 





 


Urine Squamous Epithelial


Cells Mod /LPF 


 


 


 





 


Urine Bacteria


 Few /HPF


(0-FEW) 


 


 





 


Urine Mucus Slight /LPF    


 


Glucose (Fingerstick)


 


 121 mg/dL


(70-99) 


 97 mg/dL


(70-99)


 


O2 Saturation   96 % (92-99)  


 


Arterial Blood pH


 


 


 7.45


(7.35-7.45) 





 


Arterial Blood pCO2 at


Patient Temp 


 


 44 mmHg


(35-46) 





 


Arterial Blood pO2 at Patient


Temp 


 


 83 mmHg


() 





 


Arterial Blood HCO3


 


 


 30 mmol/L


(21-28) 





 


Arterial Blood Base Excess


 


 


 6 mmol/L


(-3-3) 





 


FiO2   40  


 


Test


 1/24/21


09:40 


 


 





 


O2 Saturation 87 % (92-99)    


 


Arterial Blood pH


 7.37


(7.35-7.45) 


 


 





 


Arterial Blood pCO2 at


Patient Temp 52 mmHg


(35-46) 


 


 





 


Arterial Blood pO2 at Patient


Temp 56 mmHg


() 


 


 





 


Arterial Blood HCO3


 29 mmol/L


(21-28) 


 


 





 


Arterial Blood Base Excess


 3 mmol/L


(-3-3) 


 


 





 


FiO2 40    








Laboratory Tests








Test


 1/23/21


16:33 1/24/21


09:40


 


Glucose (Fingerstick)


 97 mg/dL


(70-99) 





 


O2 Saturation  87 % (92-99) 


 


Arterial Blood pH


 


 7.37


(7.35-7.45)


 


Arterial Blood pCO2 at


Patient Temp 


 52 mmHg


(35-46)


 


Arterial Blood pO2 at Patient


Temp 


 56 mmHg


()


 


Arterial Blood HCO3


 


 29 mmol/L


(21-28)


 


Arterial Blood Base Excess


 


 3 mmol/L


(-3-3)


 


FiO2  40 








Medications





Active Scripts








 Medications  Dose


 Route/Sig


 Max Daily Dose Days Date Category


 


 Morphine Sulfate


 Er (Morphine


 Sulfate) 30 Mg


 Tablet.er  1 Tab


 PO BID


   12/23/20 Reported


 


 Tramadol Hcl 100


 Mg Tbmp.24hr  100 Mg


 PO PRN Q8HRS PRN


   12/23/20 Reported


 


 Ambien (Zolpidem


 Tartrate) 10 Mg


 Tablet  10 Mg


 PO PRN QHS PRN


   12/23/20 Reported


 


 Methotrexate


  (Methotrexate


 Sodium) 2.5 Mg


 Tablet  10 Tab


 PO WEEKLY


   12/23/20 Reported


 


 Lisinopril 10 Mg


 Tablet  1 Tab


 PO DAILY


   12/23/20 Reported


 


 Crestor


  (Rosuvastatin


 Calcium) 5 Mg


 Tablet  2 Tab


 PO DAILY


   12/23/20 Reported


 


 Hydrochlorothiazide


 Tablet


  (Hydrochlorothiazide)


 50 Mg Tablet  25 Mg


 PO DAILY


   12/23/20 Reported


 


 Escitalopram


 Oxalate 10 Mg


 Tablet  1 Tab


 PO DAILY


   12/23/20 Reported


 


 Sulfasalazine Dr


  (Sulfasalazine)


 500 Mg Tablet.dr  2 Tab


 PO TID


  30 12/23/20 Reported


 


 Colace (Docusate


 Sodium) 100 Mg


 Capsule  1 Cap


 PO DA


  30 12/23/20 Reported


 


 Acetaminophen


 Ext.release


  (Acetaminophen)


 650 Mg Tablet.er  650 Mg


 PO PRN Q8HRS PRN


   12/23/20 Reported


 


 Melatonin 5 Mg


 Capsule  1 Cap


 PO QHS


  30 12/23/20 Reported


 


 B-12


  (Cyanocobalamin


  (Vitamin B-12))


 500 Mcg Tablet  2 Tab


 PO DAILY


  30 12/23/20 Reported


 


 Vitamin D3


  (Cholecalciferol


  (Vitamin D3)) 50


 Mcg Capsule  50 Mcg


 PO DAILY


   12/23/20 Reported


 


 Aller-Sung


  (Cetirizine Hcl)


 10 Mg Tablet  1 Tab


 PO DAILY


  30 12/23/20 Reported








Comments


CXR 1/20


  Bilateral parenchymal opacities and left pleural effusion are grossly stable.





Impression


.


IMPRESSION:


1.  Acute hypoxic respiratory failure secondary to COVID-19


pneumonia and acute respiratory distress syndrome/acute lung injury.-- COVID-19 

positive ----improving


2.  Abnormal CT chest with extensive widespread ground glass and alveolar and


interstitial infiltrates with reactive mild mediastinal/hilar adenopathy.  This 

related to COVID-19 pneumonia.


3.  Patient with history of psoriatic arthritis.  He is likely immunocompromised


as he has been on methotrexate.  


4.  History of tongue cancer with resection, details unknown.


5.  History of prostate cancer.


6.  History of Crohn's disease.


7.  Low grade fever





Plan


.


RECOMMENDATIONS:


Continue current ventilatory support, FiO2 40% and a PEEP of 5   weaning as 

tolerated   try to decrease sedation 


s/p trach 1/19


s/p PEG


will wean off sedation post PEG


Follow chest x-ray/ABG---- 


COVID-19 positive-- now recovered 


Patient remains off antibiotics at this time, infectious disease of signed off 

cultures are negative, low grade fever, cxr improving ? source, ask ID


Patient is completed a full 10-day course of steroids


Continue TF for nutritional support 


Follow GI recs 


DVT/GI PPX


awaiting LTAC transfer








D/W RN and RT 





PT. is FULL CODE











RIDGE YATES MD               Jan 24, 2021 10:47

## 2021-01-25 VITALS — SYSTOLIC BLOOD PRESSURE: 97 MMHG | DIASTOLIC BLOOD PRESSURE: 48 MMHG

## 2021-01-25 VITALS — DIASTOLIC BLOOD PRESSURE: 55 MMHG | SYSTOLIC BLOOD PRESSURE: 102 MMHG

## 2021-01-25 VITALS — DIASTOLIC BLOOD PRESSURE: 55 MMHG | SYSTOLIC BLOOD PRESSURE: 106 MMHG

## 2021-01-25 VITALS — DIASTOLIC BLOOD PRESSURE: 51 MMHG | SYSTOLIC BLOOD PRESSURE: 101 MMHG

## 2021-01-25 VITALS — SYSTOLIC BLOOD PRESSURE: 149 MMHG | DIASTOLIC BLOOD PRESSURE: 64 MMHG

## 2021-01-25 VITALS — DIASTOLIC BLOOD PRESSURE: 79 MMHG | SYSTOLIC BLOOD PRESSURE: 161 MMHG

## 2021-01-25 VITALS — SYSTOLIC BLOOD PRESSURE: 189 MMHG | DIASTOLIC BLOOD PRESSURE: 85 MMHG

## 2021-01-25 VITALS — SYSTOLIC BLOOD PRESSURE: 203 MMHG | DIASTOLIC BLOOD PRESSURE: 125 MMHG

## 2021-01-25 VITALS — SYSTOLIC BLOOD PRESSURE: 109 MMHG | DIASTOLIC BLOOD PRESSURE: 58 MMHG

## 2021-01-25 VITALS — DIASTOLIC BLOOD PRESSURE: 45 MMHG | SYSTOLIC BLOOD PRESSURE: 95 MMHG

## 2021-01-25 VITALS — DIASTOLIC BLOOD PRESSURE: 57 MMHG | SYSTOLIC BLOOD PRESSURE: 116 MMHG

## 2021-01-25 VITALS — SYSTOLIC BLOOD PRESSURE: 183 MMHG | DIASTOLIC BLOOD PRESSURE: 95 MMHG

## 2021-01-25 VITALS — SYSTOLIC BLOOD PRESSURE: 173 MMHG | DIASTOLIC BLOOD PRESSURE: 99 MMHG

## 2021-01-25 VITALS — SYSTOLIC BLOOD PRESSURE: 104 MMHG | DIASTOLIC BLOOD PRESSURE: 49 MMHG

## 2021-01-25 VITALS — DIASTOLIC BLOOD PRESSURE: 50 MMHG | SYSTOLIC BLOOD PRESSURE: 101 MMHG

## 2021-01-25 VITALS — SYSTOLIC BLOOD PRESSURE: 172 MMHG | DIASTOLIC BLOOD PRESSURE: 84 MMHG

## 2021-01-25 VITALS — SYSTOLIC BLOOD PRESSURE: 116 MMHG | DIASTOLIC BLOOD PRESSURE: 57 MMHG

## 2021-01-25 LAB
ALBUMIN SERPL-MCNC: 1.5 G/DL (ref 3.4–5)
ALBUMIN/GLOB SERPL: 0.4 {RATIO} (ref 1–1.7)
ALP SERPL-CCNC: 117 U/L (ref 46–116)
ALT SERPL-CCNC: 169 U/L (ref 16–63)
ANION GAP SERPL CALC-SCNC: 7 MMOL/L (ref 6–14)
AST SERPL-CCNC: 105 U/L (ref 15–37)
BASE EXCESS ABG: 4 MMOL/L (ref -3–3)
BASOPHILS # BLD AUTO: 0.1 X10^3/UL (ref 0–0.2)
BASOPHILS NFR BLD: 1 % (ref 0–3)
BILIRUB SERPL-MCNC: 0.3 MG/DL (ref 0.2–1)
BUN SERPL-MCNC: 16 MG/DL (ref 8–26)
BUN/CREAT SERPL: 27 (ref 6–20)
CALCIUM SERPL-MCNC: 7.2 MG/DL (ref 8.5–10.1)
CHLORIDE SERPL-SCNC: 110 MMOL/L (ref 98–107)
CO2 SERPL-SCNC: 31 MMOL/L (ref 21–32)
CREAT SERPL-MCNC: 0.6 MG/DL (ref 0.7–1.3)
EOSINOPHIL NFR BLD: 0.5 X10^3/UL (ref 0–0.7)
EOSINOPHIL NFR BLD: 9 % (ref 0–3)
ERYTHROCYTE [DISTWIDTH] IN BLOOD BY AUTOMATED COUNT: 16.2 % (ref 11.5–14.5)
GFR SERPLBLD BASED ON 1.73 SQ M-ARVRAT: 134.8 ML/MIN
GLUCOSE SERPL-MCNC: 106 MG/DL (ref 70–99)
HCO3 BLDA-SCNC: 30 MMOL/L (ref 21–28)
HCT VFR BLD CALC: 20.6 % (ref 39–53)
HGB BLD-MCNC: 6.8 G/DL (ref 13–17.5)
INSPIRATION SETTING TIME VENT: 40
LYMPHOCYTES # BLD: 1.4 X10^3/UL (ref 1–4.8)
LYMPHOCYTES NFR BLD AUTO: 26 % (ref 24–48)
MCH RBC QN AUTO: 31 PG (ref 25–35)
MCHC RBC AUTO-ENTMCNC: 33 G/DL (ref 31–37)
MCV RBC AUTO: 95 FL (ref 79–100)
MONO #: 0.6 X10^3/UL (ref 0–1.1)
MONOCYTES NFR BLD: 11 % (ref 0–9)
NEUT #: 2.8 X10^3/UL (ref 1.8–7.7)
NEUTROPHILS NFR BLD AUTO: 52 % (ref 31–73)
PCO2 BLDA: 49 MMHG (ref 35–46)
PLATELET # BLD AUTO: 324 X10^3/UL (ref 140–400)
PO2 BLDA: 64 MMHG (ref 65–108)
POTASSIUM SERPL-SCNC: 3.7 MMOL/L (ref 3.5–5.1)
PROT SERPL-MCNC: 5.3 G/DL (ref 6.4–8.2)
RBC # BLD AUTO: 2.18 X10^6/UL (ref 4.3–5.7)
SAO2 % BLDA: 92 % (ref 92–99)
SODIUM SERPL-SCNC: 148 MMOL/L (ref 136–145)
WBC # BLD AUTO: 5.3 X10^3/UL (ref 4–11)

## 2021-01-25 PROCEDURE — 30233N1 TRANSFUSION OF NONAUTOLOGOUS RED BLOOD CELLS INTO PERIPHERAL VEIN, PERCUTANEOUS APPROACH: ICD-10-PCS | Performed by: INTERNAL MEDICINE

## 2021-01-25 RX ADMIN — LABETALOL HYDROCHLORIDE PRN MG: 5 INJECTION, SOLUTION INTRAVENOUS at 02:42

## 2021-01-25 RX ADMIN — PIPERACILLIN SODIUM AND TAZOBACTAM SODIUM SCH MLS/HR: 3; .375 INJECTION, POWDER, LYOPHILIZED, FOR SOLUTION INTRAVENOUS at 00:42

## 2021-01-25 RX ADMIN — ENOXAPARIN SODIUM SCH MG: 40 INJECTION SUBCUTANEOUS at 09:02

## 2021-01-25 RX ADMIN — PIPERACILLIN SODIUM AND TAZOBACTAM SODIUM SCH MLS/HR: 3; .375 INJECTION, POWDER, LYOPHILIZED, FOR SOLUTION INTRAVENOUS at 06:04

## 2021-01-25 RX ADMIN — CYANOCOBALAMIN TAB 1000 MCG SCH MCG: 1000 TAB at 09:01

## 2021-01-25 RX ADMIN — MIDAZOLAM PRN MLS/HR: 5 INJECTION, SOLUTION INTRAMUSCULAR; INTRAVENOUS at 02:51

## 2021-01-25 RX ADMIN — CITALOPRAM HYDROBROMIDE SCH MG: 20 TABLET ORAL at 09:01

## 2021-01-25 RX ADMIN — NYSTATIN SCH APP: 100000 POWDER TOPICAL at 09:02

## 2021-01-25 RX ADMIN — FAMOTIDINE SCH MG: 20 TABLET ORAL at 09:01

## 2021-01-25 RX ADMIN — METOPROLOL TARTRATE SCH MG: 25 TABLET ORAL at 09:01

## 2021-01-25 RX ADMIN — CETIRIZINE HYDROCHLORIDE SCH MG: 10 TABLET, FILM COATED ORAL at 09:01

## 2021-01-25 RX ADMIN — ZINC SULFATE CAP 220 MG (50 MG ELEMENTAL ZN) SCH MG: 220 (50 ZN) CAP at 09:01

## 2021-01-25 RX ADMIN — DEXMEDETOMIDINE HYDROCHLORIDE PRN MLS/HR: 100 INJECTION, SOLUTION, CONCENTRATE INTRAVENOUS at 02:52

## 2021-01-25 RX ADMIN — LINEZOLID SCH MG: 600 TABLET, FILM COATED ORAL at 09:01

## 2021-01-25 RX ADMIN — PIPERACILLIN SODIUM AND TAZOBACTAM SODIUM SCH MLS/HR: 3; .375 INJECTION, POWDER, LYOPHILIZED, FOR SOLUTION INTRAVENOUS at 12:00

## 2021-01-25 RX ADMIN — MULTIVITAMIN SCH ML: LIQUID ORAL at 09:03

## 2021-01-25 RX ADMIN — LABETALOL HYDROCHLORIDE PRN MG: 5 INJECTION, SOLUTION INTRAVENOUS at 09:00

## 2021-01-25 NOTE — PDOC
Date of Service:


DATE: 1/25/21 


TIME: 09:27





Objective:


Objective:


No GI concerns per nurse - PEG functioning.  No bleeding.


Vital Signs:





                                   Vital Signs








  Date Time  Temp Pulse Resp B/P (MAP) Pulse Ox O2 Delivery O2 Flow Rate FiO2


 


1/25/21 09:01    171/112    


 


1/25/21 08:07     97 Ventilator  


 


1/25/21 06:00  72 25     


 


1/25/21 04:00 99.3       





 99.3       








Labs:





Laboratory Tests








Test


 1/24/21


09:40 1/24/21


14:50 1/25/21


05:35 1/25/21


05:37


 


O2 Saturation 87 %    


 


Arterial Blood pH 7.37    


 


Arterial Blood pCO2 at


Patient Temp 52 mmHg 


 


 


 





 


Arterial Blood pO2 at Patient


Temp 56 mmHg 


 


 


 





 


Arterial Blood HCO3 29 mmol/L    


 


Arterial Blood Base Excess 3 mmol/L    


 


FiO2 40    


 


Urine Collection Type  Unknown   


 


Urine Color  Aide   


 


Urine Clarity  Cloudy   


 


Urine pH  6.0   


 


Urine Specific Gravity  >=1.030   


 


Urine Protein  100 mg/dL   


 


Urine Glucose (UA)  Negative mg/dL   


 


Urine Ketones (Stick)  Trace mg/dL   


 


Urine Blood  Small   


 


Urine Nitrite  Negative   


 


Urine Bilirubin  Negative   


 


Urine Urobilinogen Dipstick  1.0 mg/dL   


 


Urine Leukocyte Esterase  Large   


 


Urine RBC  6-10 /HPF   


 


Urine WBC  Tntc /HPF   


 


Urine Bacteria  Few /HPF   


 


Urine Yeast  Present /HPF   


 


White Blood Count   5.3 x10^3/uL  


 


Red Blood Count   2.18 x10^6/uL  


 


Hemoglobin   6.8 g/dL  


 


Hematocrit   20.6 %  


 


Mean Corpuscular Volume   95 fL  


 


Mean Corpuscular Hemoglobin   31 pg  


 


Mean Corpuscular Hemoglobin


Concent 


 


 33 g/dL 


 





 


Red Cell Distribution Width   16.2 %  


 


Platelet Count   324 x10^3/uL  


 


Neutrophils (%) (Auto)   52 %  


 


Lymphocytes (%) (Auto)   26 %  


 


Monocytes (%) (Auto)   11 %  


 


Eosinophils (%) (Auto)   9 %  


 


Basophils (%) (Auto)   1 %  


 


Neutrophils # (Auto)   2.8 x10^3/uL  


 


Lymphocytes # (Auto)   1.4 x10^3/uL  


 


Monocytes # (Auto)   0.6 x10^3/uL  


 


Eosinophils # (Auto)   0.5 x10^3/uL  


 


Basophils # (Auto)   0.1 x10^3/uL  


 


Sodium Level   148 mmol/L  


 


Potassium Level   3.7 mmol/L  


 


Chloride Level   110 mmol/L  


 


Carbon Dioxide Level   31 mmol/L  


 


Anion Gap   7  


 


Blood Urea Nitrogen   16 mg/dL  


 


Creatinine   0.6 mg/dL  


 


Estimated GFR


(Cockcroft-Gault) 


 


 134.8 


 





 


BUN/Creatinine Ratio   27  


 


Glucose Level   106 mg/dL  


 


Calcium Level   7.2 mg/dL  


 


Total Bilirubin   0.3 mg/dL  


 


Aspartate Amino Transf


(AST/SGOT) 


 


 105 U/L 


 





 


Alanine Aminotransferase


(ALT/SGPT) 


 


 169 U/L 


 





 


Alkaline Phosphatase   117 U/L  


 


Total Protein   5.3 g/dL  


 


Albumin   1.5 g/dL  


 


Albumin/Globulin Ratio   0.4  


 


Glucose (Fingerstick)    108 mg/dL 


 


Test


 1/25/21


08:20 


 


 





 


O2 Saturation 92 %    


 


Arterial Blood pH 7.40    


 


Arterial Blood pCO2 at


Patient Temp 49 mmHg 


 


 


 





 


Arterial Blood pO2 at Patient


Temp 64 mmHg 


 


 


 





 


Arterial Blood HCO3 30 mmol/L    


 


Arterial Blood Base Excess 4 mmol/L    


 


FiO2 40    





  BLOOD CULTURE  Preliminary  


        NO GROWTH AFTER 2 DAYS





PE:





GEN: NAD


LUNGS: trach/vent, clear anteriorly


HEART: RRR


ABD: soft, BS+, PEG in place - site clean/dry - feeds running @ 60, rectal tube 

w/ brown stool


NEURO/PSYCH: briefly opens eyes, mittens





A/P:


COVID-19 infection (negative 1/19), resp failure s/p trach and PEG


Anemia (on B12), ?UTI, elevated LFTs


H/o Crohn's, psoriatic arthritis, GERD (on H2 blocker BID here)





--


Drift in Hgb w/o obvious bleeding and plans for transfusion.


Continue acid-reducer in some form.  Monitor LFTs.


PEG functioning.





Justicifation of Admission Dx:


Justifications for Admission:


Justification of Admission Dx:  Yes


Comminuty Aquired Pneumonia:  Med-High Risk Pt











LORAINE CUADRA         Jan 25, 2021 09:33

## 2021-01-25 NOTE — PDOC
PULMONARY PROGRESS NOTES


DATE: 1/25/21 


TIME: 12:16


Subjective


Patient remains on ventilatory support, FiO2 40% and a PEEP of 5,   


Afebrile overnight


Anemia this morning plan for 1 unit packed red blood cells


No other concerns from nursing


Vitals





Vital Signs








  Date Time  Temp Pulse Resp B/P (MAP) Pulse Ox O2 Delivery O2 Flow Rate FiO2


 


1/25/21 12:07     98 Ventilator  


 


1/25/21 11:38 98.3 57 24 106/55    





 98.3       








Comments


Tracheostomy midline


no distress


nc at 


rrr


no accessory muscle use


obese


no rash


Labs





Laboratory Tests








Test


 1/23/21


16:33 1/24/21


09:40 1/24/21


14:50 1/25/21


05:35


 


Glucose (Fingerstick)


 97 mg/dL


(70-99) 


 


 





 


O2 Saturation  87 % (92-99)   


 


Arterial Blood pH


 


 7.37


(7.35-7.45) 


 





 


Arterial Blood pCO2 at


Patient Temp 


 52 mmHg


(35-46) 


 





 


Arterial Blood pO2 at Patient


Temp 


 56 mmHg


() 


 





 


Arterial Blood HCO3


 


 29 mmol/L


(21-28) 


 





 


Arterial Blood Base Excess


 


 3 mmol/L


(-3-3) 


 





 


FiO2  40   


 


Urine Collection Type   Unknown  


 


Urine Color   Aide  


 


Urine Clarity   Cloudy  


 


Urine pH   6.0 (<5.0-8.0)  


 


Urine Specific Gravity


 


 


 >=1.030


(1.000-1.030) 





 


Urine Protein


 


 


 100 mg/dL


(NEG-TRACE) 





 


Urine Glucose (UA)


 


 


 Negative mg/dL


(NEG) 





 


Urine Ketones (Stick)


 


 


 Trace mg/dL


(NEG) 





 


Urine Blood   Small (NEG)  


 


Urine Nitrite   Negative (NEG)  


 


Urine Bilirubin   Negative (NEG)  


 


Urine Urobilinogen Dipstick


 


 


 1.0 mg/dL (0.2


mg/dL) 





 


Urine Leukocyte Esterase   Large (NEG)  


 


Urine RBC


 


 


 6-10 /HPF


(0-2) 





 


Urine WBC


 


 


 Tntc /HPF


(0-4) 





 


Urine Bacteria


 


 


 Few /HPF


(0-FEW) 





 


Urine Yeast   Present /HPF  


 


White Blood Count


 


 


 


 5.3 x10^3/uL


(4.0-11.0)


 


Red Blood Count


 


 


 


 2.18 x10^6/uL


(4.30-5.70)


 


Hemoglobin


 


 


 


 6.8 g/dL


(13.0-17.5)


 


Hematocrit


 


 


 


 20.6 %


(39.0-53.0)


 


Mean Corpuscular Volume    95 fL () 


 


Mean Corpuscular Hemoglobin    31 pg (25-35) 


 


Mean Corpuscular Hemoglobin


Concent 


 


 


 33 g/dL


(31-37)


 


Red Cell Distribution Width


 


 


 


 16.2 %


(11.5-14.5)


 


Platelet Count


 


 


 


 324 x10^3/uL


(140-400)


 


Neutrophils (%) (Auto)    52 % (31-73) 


 


Lymphocytes (%) (Auto)    26 % (24-48) 


 


Monocytes (%) (Auto)    11 % (0-9) 


 


Eosinophils (%) (Auto)    9 % (0-3) 


 


Basophils (%) (Auto)    1 % (0-3) 


 


Neutrophils # (Auto)


 


 


 


 2.8 x10^3/uL


(1.8-7.7)


 


Lymphocytes # (Auto)


 


 


 


 1.4 x10^3/uL


(1.0-4.8)


 


Monocytes # (Auto)


 


 


 


 0.6 x10^3/uL


(0.0-1.1)


 


Eosinophils # (Auto)


 


 


 


 0.5 x10^3/uL


(0.0-0.7)


 


Basophils # (Auto)


 


 


 


 0.1 x10^3/uL


(0.0-0.2)


 


Sodium Level


 


 


 


 148 mmol/L


(136-145)


 


Potassium Level


 


 


 


 3.7 mmol/L


(3.5-5.1)


 


Chloride Level


 


 


 


 110 mmol/L


()


 


Carbon Dioxide Level


 


 


 


 31 mmol/L


(21-32)


 


Anion Gap    7 (6-14) 


 


Blood Urea Nitrogen


 


 


 


 16 mg/dL


(8-26)


 


Creatinine


 


 


 


 0.6 mg/dL


(0.7-1.3)


 


Estimated GFR


(Cockcroft-Gault) 


 


 


 134.8 





 


BUN/Creatinine Ratio    27 (6-20) 


 


Glucose Level


 


 


 


 106 mg/dL


(70-99)


 


Calcium Level


 


 


 


 7.2 mg/dL


(8.5-10.1)


 


Total Bilirubin


 


 


 


 0.3 mg/dL


(0.2-1.0)


 


Aspartate Amino Transf


(AST/SGOT) 


 


 


 105 U/L


(15-37)


 


Alanine Aminotransferase


(ALT/SGPT) 


 


 


 169 U/L


(16-63)


 


Alkaline Phosphatase


 


 


 


 117 U/L


()


 


Total Protein


 


 


 


 5.3 g/dL


(6.4-8.2)


 


Albumin


 


 


 


 1.5 g/dL


(3.4-5.0)


 


Albumin/Globulin Ratio    0.4 (1.0-1.7) 


 


Test


 1/25/21


05:37 1/25/21


08:20 


 





 


Glucose (Fingerstick)


 108 mg/dL


(70-99) 


 


 





 


O2 Saturation  92 % (92-99)   


 


Arterial Blood pH


 


 7.40


(7.35-7.45) 


 





 


Arterial Blood pCO2 at


Patient Temp 


 49 mmHg


(35-46) 


 





 


Arterial Blood pO2 at Patient


Temp 


 64 mmHg


() 


 





 


Arterial Blood HCO3


 


 30 mmol/L


(21-28) 


 





 


Arterial Blood Base Excess


 


 4 mmol/L


(-3-3) 


 





 


FiO2  40   








Laboratory Tests








Test


 1/24/21


14:50 1/25/21


05:35 1/25/21


05:37 1/25/21


08:20


 


Urine Collection Type Unknown    


 


Urine Color Aide    


 


Urine Clarity Cloudy    


 


Urine pH 6.0 (<5.0-8.0)    


 


Urine Specific Gravity


 >=1.030


(1.000-1.030) 


 


 





 


Urine Protein


 100 mg/dL


(NEG-TRACE) 


 


 





 


Urine Glucose (UA)


 Negative mg/dL


(NEG) 


 


 





 


Urine Ketones (Stick)


 Trace mg/dL


(NEG) 


 


 





 


Urine Blood Small (NEG)    


 


Urine Nitrite Negative (NEG)    


 


Urine Bilirubin Negative (NEG)    


 


Urine Urobilinogen Dipstick


 1.0 mg/dL (0.2


mg/dL) 


 


 





 


Urine Leukocyte Esterase Large (NEG)    


 


Urine RBC


 6-10 /HPF


(0-2) 


 


 





 


Urine WBC


 Tntc /HPF


(0-4) 


 


 





 


Urine Bacteria


 Few /HPF


(0-FEW) 


 


 





 


Urine Yeast Present /HPF    


 


White Blood Count


 


 5.3 x10^3/uL


(4.0-11.0) 


 





 


Red Blood Count


 


 2.18 x10^6/uL


(4.30-5.70) 


 





 


Hemoglobin


 


 6.8 g/dL


(13.0-17.5) 


 





 


Hematocrit


 


 20.6 %


(39.0-53.0) 


 





 


Mean Corpuscular Volume  95 fL ()   


 


Mean Corpuscular Hemoglobin  31 pg (25-35)   


 


Mean Corpuscular Hemoglobin


Concent 


 33 g/dL


(31-37) 


 





 


Red Cell Distribution Width


 


 16.2 %


(11.5-14.5) 


 





 


Platelet Count


 


 324 x10^3/uL


(140-400) 


 





 


Neutrophils (%) (Auto)  52 % (31-73)   


 


Lymphocytes (%) (Auto)  26 % (24-48)   


 


Monocytes (%) (Auto)  11 % (0-9)   


 


Eosinophils (%) (Auto)  9 % (0-3)   


 


Basophils (%) (Auto)  1 % (0-3)   


 


Neutrophils # (Auto)


 


 2.8 x10^3/uL


(1.8-7.7) 


 





 


Lymphocytes # (Auto)


 


 1.4 x10^3/uL


(1.0-4.8) 


 





 


Monocytes # (Auto)


 


 0.6 x10^3/uL


(0.0-1.1) 


 





 


Eosinophils # (Auto)


 


 0.5 x10^3/uL


(0.0-0.7) 


 





 


Basophils # (Auto)


 


 0.1 x10^3/uL


(0.0-0.2) 


 





 


Sodium Level


 


 148 mmol/L


(136-145) 


 





 


Potassium Level


 


 3.7 mmol/L


(3.5-5.1) 


 





 


Chloride Level


 


 110 mmol/L


() 


 





 


Carbon Dioxide Level


 


 31 mmol/L


(21-32) 


 





 


Anion Gap  7 (6-14)   


 


Blood Urea Nitrogen


 


 16 mg/dL


(8-26) 


 





 


Creatinine


 


 0.6 mg/dL


(0.7-1.3) 


 





 


Estimated GFR


(Cockcroft-Gault) 


 134.8 


 


 





 


BUN/Creatinine Ratio  27 (6-20)   


 


Glucose Level


 


 106 mg/dL


(70-99) 


 





 


Calcium Level


 


 7.2 mg/dL


(8.5-10.1) 


 





 


Total Bilirubin


 


 0.3 mg/dL


(0.2-1.0) 


 





 


Aspartate Amino Transf


(AST/SGOT) 


 105 U/L


(15-37) 


 





 


Alanine Aminotransferase


(ALT/SGPT) 


 169 U/L


(16-63) 


 





 


Alkaline Phosphatase


 


 117 U/L


() 


 





 


Total Protein


 


 5.3 g/dL


(6.4-8.2) 


 





 


Albumin


 


 1.5 g/dL


(3.4-5.0) 


 





 


Albumin/Globulin Ratio  0.4 (1.0-1.7)   


 


Glucose (Fingerstick)


 


 


 108 mg/dL


(70-99) 





 


O2 Saturation    92 % (92-99) 


 


Arterial Blood pH


 


 


 


 7.40


(7.35-7.45)


 


Arterial Blood pCO2 at


Patient Temp 


 


 


 49 mmHg


(35-46)


 


Arterial Blood pO2 at Patient


Temp 


 


 


 64 mmHg


()


 


Arterial Blood HCO3


 


 


 


 30 mmol/L


(21-28)


 


Arterial Blood Base Excess


 


 


 


 4 mmol/L


(-3-3)


 


FiO2    40 








Medications





Active Scripts








 Medications  Dose


 Route/Sig


 Max Daily Dose Days Date Category


 


 Morphine Sulfate


 Er (Morphine


 Sulfate) 30 Mg


 Tablet.er  1 Tab


 PO BID


   12/23/20 Reported


 


 Tramadol Hcl 100


 Mg Tbmp.24hr  100 Mg


 PO PRN Q8HRS PRN


   12/23/20 Reported


 


 Ambien (Zolpidem


 Tartrate) 10 Mg


 Tablet  10 Mg


 PO PRN QHS PRN


   12/23/20 Reported


 


 Methotrexate


  (Methotrexate


 Sodium) 2.5 Mg


 Tablet  10 Tab


 PO WEEKLY


   12/23/20 Reported


 


 Lisinopril 10 Mg


 Tablet  1 Tab


 PO DAILY


   12/23/20 Reported


 


 Crestor


  (Rosuvastatin


 Calcium) 5 Mg


 Tablet  2 Tab


 PO DAILY


   12/23/20 Reported


 


 Hydrochlorothiazide


 Tablet


  (Hydrochlorothiazide)


 50 Mg Tablet  25 Mg


 PO DAILY


   12/23/20 Reported


 


 Escitalopram


 Oxalate 10 Mg


 Tablet  1 Tab


 PO DAILY


   12/23/20 Reported


 


 Sulfasalazine Dr


  (Sulfasalazine)


 500 Mg Tablet.dr  2 Tab


 PO TID


  30 12/23/20 Reported


 


 Colace (Docusate


 Sodium) 100 Mg


 Capsule  1 Cap


 PO DA


  30 12/23/20 Reported


 


 Acetaminophen


 Ext.release


  (Acetaminophen)


 650 Mg Tablet.er  650 Mg


 PO PRN Q8HRS PRN


   12/23/20 Reported


 


 Melatonin 5 Mg


 Capsule  1 Cap


 PO QHS


  30 12/23/20 Reported


 


 B-12


  (Cyanocobalamin


  (Vitamin B-12))


 500 Mcg Tablet  2 Tab


 PO DAILY


  30 12/23/20 Reported


 


 Vitamin D3


  (Cholecalciferol


  (Vitamin D3)) 50


 Mcg Capsule  50 Mcg


 PO DAILY


   12/23/20 Reported


 


 Aller-Sung


  (Cetirizine Hcl)


 10 Mg Tablet  1 Tab


 PO DAILY


  30 12/23/20 Reported








Comments


CXR 1/20


  Bilateral parenchymal opacities and left pleural effusion are grossly stable.





Impression


.


IMPRESSION:


1.  Acute hypoxic respiratory failure secondary to COVID-19


pneumonia and acute respiratory distress syndrome/acute lung injury.-- COVID-19 

positive ----improving


2.  Abnormal CT chest with extensive widespread ground glass and alveolar and


interstitial infiltrates with reactive mild mediastinal/hilar adenopathy.  This 

related to COVID-19 pneumonia.


3.  Patient with history of psoriatic arthritis.  He is likely immunocompromised


as he has been on methotrexate.  


4.  History of tongue cancer with resection, details unknown.


5.  History of prostate cancer.


6.  History of Crohn's disease.


7.  Low grade fever--resolved


8.  Anemia---





Plan


.


RECOMMENDATIONS:


Continue current ventilatory support, FiO2 40% and a PEEP of 5   weaning as 

tolerated 


s/p trach 1/19  and s/p PEG


Follow chest x-ray/ABG---- 


COVID-19 positive-- now recovered 


Patient is back on Zosyn/Zyvox per infectious disease, continue to follow 

recommendations


Patient is completed a full 10-day course of steroids


Continue TF for nutritional support--tolerating well


Follow GI recs--anemia today on labs, plan for transfusion


DVT/GI PPX








Plan to DC to LTAC today at 2 PM, 








D/W RN and RT 





PT. is FULL CODE








Critical care time  30min discussing with care team, reviewing diagnostics and 

lab work











ABI MENDEZ MD                 Jan 25, 2021 12:20

## 2021-01-25 NOTE — SNU/HH DC
DISCHARGE ORDERS


DISCHARGE INFORMATION:


DISCHARGE DATE:  Jan 25, 2021


CONDITION ON DISCHARGE:  Guarded





CODE STATUS:


Code Status:  Full





LTAC:


ADMIT TO LTAC:  Yes





POST DISCHARGE ORDERS:


ACTIVITY ORDERS:  Activity as tolerated


WEIGHT BEARING STATUS:  As tolerated


DIET AFTER DISCHARGE:  NPO





FOLLOW-UP:


PHYSICIAN FOLLOW-UP:  PCP within 2 weeks of discharge


LAB ORDERS FOR FOLLOW-UP:  CBC, CMP





TREATMENT/EQUIPMENT ORDERS:


INFUSION EQUIPMENT NEEDED:  PICC Line, Feeding Tube


Physical Therapy For:  Evalulation/Treatment


Occupational Therapy For:  Evaluation/Treatment


Speech Language Pathology For:  Evaluation/Treatment





DISCHARGE MEDICATIONS:


Home Meds


Reported Medications


Morphine Sulfate (MORPHINE SULFATE ER) 30 Mg Tablet.er, 1 TAB PO BID for CHRONIC

PAIN, #60 TAB


   12/23/20


Tramadol Hcl (TRAMADOL HCL) 100 Mg Tbmp.24hr, 100 MG PO PRN Q8HRS PRN for PAIN, 

TAB 0 Refills


   12/23/20


Zolpidem Tartrate (AMBIEN) 10 Mg Tablet, 10 MG PO PRN QHS PRN for INSOMNIA, TAB 

0 Refills


   12/23/20


Methotrexate Sodium (METHOTREXATE) 2.5 Mg Tablet, 10 TAB PO WEEKLY for CHRONS, 

#32 TAB 2 Refills


   12/23/20


Lisinopril (LISINOPRIL) 10 Mg Tablet, 1 TAB PO DAILY for HTN, #30 TAB 5 Refills


   12/23/20


Rosuvastatin Calcium (CRESTOR) 5 Mg Tablet, 2 TAB PO DAILY for HLD, #30 TAB 5 

Refills


   12/23/20


Hydrochlorothiazide (HYDROCHLOROTHIAZIDE TABLET) 50 Mg Tablet, 25 MG PO DAILY 

for DIURETIC, TAB 0 Refills


   12/23/20


Escitalopram Oxalate (ESCITALOPRAM OXALATE) 10 Mg Tablet, 1 TAB PO DAILY for 

depression, #30 TAB 3 Refills


   12/23/20


Sulfasalazine (SULFASALAZINE DR) 500 Mg Tablet.dr, 2 TAB PO TID for chrons  for 

30 Days, #180 TAB 0 Refills


   12/23/20


Docusate Sodium (COLACE) 100 Mg Capsule, 1 CAP PO DA for STOOL SOFTENER for 30 

Days, CAP 0 Refills


   12/23/20


Acetaminophen Er (ACETAMINOPHEN EXT.RELEASE) 650 Mg Tablet.er, 650 MG PO PRN 

Q8HRS PRN for FEVER/PAIN, TAB.SR


   12/23/20


Melatonin (Melatonin) 5 Mg Capsule, 1 CAP PO QHS for sleep for 30 Days, #30 CAP 

0 Refills


   12/23/20


Cyanocobalamin (Vitamin B-12) (B-12) 500 Mcg Tablet, 2 TAB PO DAILY for 

SUPPLEMENT  for 30 Days, #60 TAB 0 Refills


   12/23/20


Cholecalciferol (Vitamin D3) (Vitamin D3) 50 Mcg Capsule, 50 MCG PO DAILY for 

SUPPLEMENT, CAP


   12/23/20


Cetirizine Hcl (ALLER-SHIRLEY) 10 Mg Tablet, 1 TAB PO DAILY for allergy symptoms for

30 Days, #30 TAB 0 Refills


   12/23/20











DAYRON ROBERTS MD                    Jan 25, 2021 11:57

## 2021-01-25 NOTE — NUR
[id number] Required increase in sedation thru night w little effect. ABJOYCE arellano texet  to Dr House. No new orders at this time. Charge nurse in communication w CALVIN, next several days 
discussion on POC . Plans to extubate today(1/1) questionable. Full sedation on .
IP: Pt is mrsa screen + requiring contact precautions, however if the Nozin/CHG protocol is 
initiated isolation may be discontinued after 5 days.
Paged Dr. Green via telephone in regards to concerns about pt. Pt vomited tube feed 
yesterday and abdomen is distended. No BM in a few days. Orders received to give Miralax 
daily and draw liver enzymes. Tube feed restarted at low rate 20/hr to see how he tolerates 
it. NG placement verified via auscultation.
Pharmacy Vancomycin Dosing Note



S:Consulted to monitor and dose vancomycin started 01/02/21.



O:WALESKA BUENO is a 65 year old M with Sepsis.



Height: 5 feet, 10 inches

Weight: 102.0 kg

Dosing Weight: Actual



Other Antibiotics: 

ROCEPHIN 1G IV Q24HRS



LABS:

Last BUN: 23 

Last Creatinine: 1 

Creatinine Clearance: 82 mL/min

Last WBC: 14.8 

Last Procalcitonin: 0.42 

Tmax (past 24 hours): 98.7 



Microbiology: 

BLOOD CX PENDING



I/O: 2607/600 



Vancomycin Dosing:

Loading Dose: 2000 mg x1

Dosing Weight: Actual

Target Trough: 15-20





A: Patient requires vancomycin for sepsis, goal trough 15-20 mcg/ml. Patient's SCr is 1.0 
(increasing from admission) with an eCrCl of 82 ml/min. 





P: 1. Initiate Vancomycin 2000 mg IV x 1 dose, then 1500 mg IV q12h

   2. Follow up Trough level on 01/04/21 at 0430 

   3. Pharmacy will continue to monitor, follow and adjust therapy as needed.

 

 SEEMA LOPEZ [facility], 01/02/21 0991
Pt complains of increasing cough this shift. PRN Robutissin DM administered with no relief. 
Call placed to on-call MD, Dr. Green. Received order for PRN Guaifenesin 400 mg Q4hrs. 
Order entered into system. Medication administered and patient has had no further complaints 
of cough at this time. Will pass on and continue to monitor.
Pt returned from OR with a 7XLT disposable inner cannula trach. OG remains in place and 
taped to his cheek. Dr. Mae called and spoke with wife about the procedure.
Pt to OR for tracheostomy.
Repositioned q3 H most of day. Precedex infusion decreased mid am... HR 55. Able to return 
to low 60S early afternoon. Periods wakefulness w tracking. Sedation bolus as needed w good 
pt response . Titi L side lying position w sats 99% (right side up 3 out of every 3 H ). Peg 
to be placed 1/21 per Dr Goddard. Oral secretions remain blood tinged.  IV ProcalAmine 
started mid afternoon- improved UO observer. Remains grossly edematous. Continue POC
SS following for discharge planning. SS reviewed pt chart and discussed with pt RN. Pt is 
from home with spouse and is currently on the BIPAP at 80%. COVID19 test pending. Pt on IV 
Azithromycin. Not stable. SS will continue to follow for discharge planning.
SS following up with discharge planning. SS reviewed pt chart and discussed with pt RN. Pt 
is currently on Vapotherm and non rebreather. COVID19 positive. Pt on PPN. Not stable. SS 
will continue to follow for discharge planning.
SS following up with discharge planning. SS reviewed pt chart and discussed with pt RN. Pt 
is currently on Vapotherm at 100% and non rebreather. COVID19 positive. Pt on IV Rocephin 
and PPN. Not stable. SS will continue to follow for discharge planning.
SS following up with discharge planning. SS reviewed pt chart and discussed with pt RN. Pt 
is currently on non rebreather at 15 liters. COVID19 test pending. Pt on IV Azithromycin and 
IV Rocephin. Not stable. SS will continue to follow for discharge planning.
SS following up with discharge planning. SS reviewed pt chart and discussed with pt RN. Pt 
is currently on the vent at 100%. COVID19 positive. Pt on Vapotherm at 100%. Pt on IV 
Rocephin and PPN. Not stable. SS will continue to follow for discharge planning.
SS following up with discharge planning. SS reviewed pt chart and discussed with pt RN. Pt 
is currently on the vent at 50%. COVID19 recovered. Trach and peg in place. Pt on IV Zosyn. 
Discharge orders received for [facility], [phone]; fax [phone]. SS 
phoned and faxed discharge orders and clinical updates to [facility]. SS notified that bed may 
not be available until tomorrow. Currently awaiting bed at this time. Packet and transfer 
form on the chart. SS will continue to follow for discharge planning.
SS following up with discharge planning. SS reviewed pt chart and discussed with pt RN. Pt 
is currently on the vent at 50%. COVID19 recovered. Trach placed on 1/19/2021 and Peg being 
placed today. PPN. Pt accepted at [facility], [phone]; fax [phone]. 
SS spoke with Georgina at [facility] and pt on the list for tomorrow admission. SS will continue 
to follow for discharge planning.
SS following up with discharge planning. SS reviewed pt chart and discussed with pt RN. Pt 
is currently on the vent at 70%. COVID19 positive. Pt on IV Cefepime. Not stable. SS will 
continue to follow for discharge planning.
SS following up with discharge planning. SS reviewed pt chart and discussed with pt RN. Pt 
is currently on the vent at 85%. COVID19 positive. Pt on IV Cefepime and Zyvox. Not stable. 
SS will continue to follow for discharge planning.
SS following up with discharge planning. SS reviewed pt chart and discussed with pt RN. Pt 
is currently on the vent at 85%. COVID19 positive. Pt on IV Zyvox and IV Cefepime. Not 
stable. SS will continue to follow for discharge planning.
SS following up with discharge planning. SS reviewed pt chart and discussed with pt RN. Pt 
is currently on the vent at 90%. COVID19 positive. Pt on propofol, fentanyl, versed, PPN, 
and IV Cefepime. Not stable. SS will continue to follow for discharge planning.
SW following for today. Pt on Vapotherm, IV Rocephin, NPO. COVID positive. Not ready for 
discharge. darshan Byers to continue following.
This RN called to give pt family an update about PEG tube being done and POC. They requested 
a phone meeting with Dr Mccormick tomorrow, paged Dr Mccormick and arranged a time, notified family, 
phone meeting to occur 1/22/21 4997
Wife and Son were here to visit, they are aware that the patient will be moving to [place].  
They have taken the laptop bag home with them today.  Left are glasses, a book, and a cross 
necklace that is currently in a biohazard bag in his chart.  These items they requested stay 
with him.
Wound Care



Wound Type/Assessment:  

Consult to eval and treat for "skin tear" to perineum. Upon inspection, the etiology of the 
wound is unknown. The patient admitted to the ICU on 12/23/20 with COVID19 and is sedated on 
a ventilator. His skin otherwise appears in good condition. The opening to the wound is 
located between the scrotum and rectum and has a puncture-like appearance (clean and 
circular), with no visible slough, bloody drainage on palpation, and a total depth of 2.4cm. 
No undermining or additional tunnelling noted. No items were observed in the bed that could 
be responsible for the wound, and the pt is not wearing a brief. No overt signs of 
infection. Periwound pink and soft, no induration. 



Treatment Recommendations/Plan:  

Perineum wound: Cleanse and dry. pack depth with 1/4" iodoform gauze packing strip, leaving 
a tail. No outer dressing needed at this time



Education provided: 

Education not appropriate at this time d/t sedation



Offloading surface/device: 

ICU bed, wedge turned to L side, pillows to float heels, and under elbows



Recommended Referrals/Tests: 

Will follow up on Monday 1/11/21 to check on wound progression. Will reevaluate treatment 
plan at that time. 



Discharge Recommendations for dressings: 

As above. If discharge to home is immanent, may consider referral to wound clinic to monitor 
wound healing.
Wound Care

Wound care follow up for perineum wound. Wound appears to be closed at this time, patient 
does have skin fold that may be scar tissue that appears to be a wound but it is healed. Pt 
has severe yeast rash to groin, scrotum, under pannus, perirectal and on bilateral buttocks. 
Cleansed area and reapplied nystatin powder mixed with calazime. Wound care will sign off at 
this time. Please reconsult if new wounds develop.
gold chain and cross taken over to [facility],informed patient wife. gave it to guard-person at 
.
morning po medications held because pt does not have og or peg as of now. morning lovenox 
held because pt is to have peg placed later today
not needed at this time due to full bottle hanging currently
pt has been sating 86-88% most of the shift, on 40L/100% on vapotherm and 15L/100% on NRB. 
pt frequently removes mask to take drinks of water, cough into kleenex but recovers after 
some time. but over the last couple of hours, pt is sleeping and sats are maintaining in low 
80s with him visibly working harder to breathe. ABG obtained and called to Dr House, new 
intubation orders received and noted. pt educated on new orders and in agreement with 
intubation, wife called and given full update as well as plans to intubate pt. son in 
attendance on phone, all questions answered regarding new orders and pt's current status. pt 
intubated at this time, tolerated without difficulty. positive color change and bilateral 
breath sounds auscultated by this nurse. OG and curtis inserted without issue. CXR obtained 
and positive ET tube and OG placement visualized. sedation and pain medication started, see 
IV spreadsheet. will pass on in report, will continue to closely monitor.
pt noted to have some yellow/green liquid out of mouth/NG tube- appears to be a mix of 
gastric content/tube feed. Ng checked, RN unable to hear air bolus. Appears to be far out of 
pt nose. Will get KUB to check placement. Tube feed held. NG clamped. Pt sedated, all VSS.
9

## 2021-01-25 NOTE — PDOC
Infectious Disease Note


Subjective


Subjective


pt is awake, follows command, still on vent





ROS


ROS


no n/v


does have diarrhea now after constipation





Vital Sign


Vital Signs





Vital Signs








  Date Time  Temp Pulse Resp B/P (MAP) Pulse Ox O2 Delivery O2 Flow Rate FiO2


 


1/25/21 08:07     97 Ventilator  


 


1/25/21 06:00  72 25 101/51 (68)    


 


1/25/21 04:00 99.3       





 99.3       











Physical Exam


PHYSICAL EXAM


GENERAL: Patient on vent


HEENT:  Normocephalic, atraumatic. 


NECK: Trach present


LUNGS:  Decreased breath sounds at the bases. 


HEART:  S1, S2.


ABDOMEN: Soft PEG tube present site clean


 Hart in place,scrotal swelling +


EXTREMITIES: Generalized anasarca


CENTRAL NERVOUS SYSTEM:  Intubated, sedated.


LINES:  Right upper extremity PICC line clean.





Labs


Lab





Laboratory Tests








Test


 1/24/21


09:40 1/24/21


14:50 1/25/21


05:35 1/25/21


05:37


 


O2 Saturation 87 % (92-99)    


 


Arterial Blood pH


 7.37


(7.35-7.45) 


 


 





 


Arterial Blood pCO2 at


Patient Temp 52 mmHg


(35-46) 


 


 





 


Arterial Blood pO2 at Patient


Temp 56 mmHg


() 


 


 





 


Arterial Blood HCO3


 29 mmol/L


(21-28) 


 


 





 


Arterial Blood Base Excess


 3 mmol/L


(-3-3) 


 


 





 


FiO2 40    


 


Urine Collection Type  Unknown   


 


Urine Color  Aide   


 


Urine Clarity  Cloudy   


 


Urine pH  6.0 (<5.0-8.0)   


 


Urine Specific Gravity


 


 >=1.030


(1.000-1.030) 


 





 


Urine Protein


 


 100 mg/dL


(NEG-TRACE) 


 





 


Urine Glucose (UA)


 


 Negative mg/dL


(NEG) 


 





 


Urine Ketones (Stick)


 


 Trace mg/dL


(NEG) 


 





 


Urine Blood  Small (NEG)   


 


Urine Nitrite  Negative (NEG)   


 


Urine Bilirubin  Negative (NEG)   


 


Urine Urobilinogen Dipstick


 


 1.0 mg/dL (0.2


mg/dL) 


 





 


Urine Leukocyte Esterase  Large (NEG)   


 


Urine RBC


 


 6-10 /HPF


(0-2) 


 





 


Urine WBC


 


 Tntc /HPF


(0-4) 


 





 


Urine Bacteria


 


 Few /HPF


(0-FEW) 


 





 


Urine Yeast  Present /HPF   


 


White Blood Count


 


 


 5.3 x10^3/uL


(4.0-11.0) 





 


Red Blood Count


 


 


 2.18 x10^6/uL


(4.30-5.70) 





 


Hemoglobin


 


 


 6.8 g/dL


(13.0-17.5) 





 


Hematocrit


 


 


 20.6 %


(39.0-53.0) 





 


Mean Corpuscular Volume   95 fL ()  


 


Mean Corpuscular Hemoglobin   31 pg (25-35)  


 


Mean Corpuscular Hemoglobin


Concent 


 


 33 g/dL


(31-37) 





 


Red Cell Distribution Width


 


 


 16.2 %


(11.5-14.5) 





 


Platelet Count


 


 


 324 x10^3/uL


(140-400) 





 


Neutrophils (%) (Auto)   52 % (31-73)  


 


Lymphocytes (%) (Auto)   26 % (24-48)  


 


Monocytes (%) (Auto)   11 % (0-9)  


 


Eosinophils (%) (Auto)   9 % (0-3)  


 


Basophils (%) (Auto)   1 % (0-3)  


 


Neutrophils # (Auto)


 


 


 2.8 x10^3/uL


(1.8-7.7) 





 


Lymphocytes # (Auto)


 


 


 1.4 x10^3/uL


(1.0-4.8) 





 


Monocytes # (Auto)


 


 


 0.6 x10^3/uL


(0.0-1.1) 





 


Eosinophils # (Auto)


 


 


 0.5 x10^3/uL


(0.0-0.7) 





 


Basophils # (Auto)


 


 


 0.1 x10^3/uL


(0.0-0.2) 





 


Sodium Level


 


 


 148 mmol/L


(136-145) 





 


Potassium Level


 


 


 3.7 mmol/L


(3.5-5.1) 





 


Chloride Level


 


 


 110 mmol/L


() 





 


Carbon Dioxide Level


 


 


 31 mmol/L


(21-32) 





 


Anion Gap   7 (6-14)  


 


Blood Urea Nitrogen


 


 


 16 mg/dL


(8-26) 





 


Creatinine


 


 


 0.6 mg/dL


(0.7-1.3) 





 


Estimated GFR


(Cockcroft-Gault) 


 


 134.8 


 





 


BUN/Creatinine Ratio   27 (6-20)  


 


Glucose Level


 


 


 106 mg/dL


(70-99) 





 


Calcium Level


 


 


 7.2 mg/dL


(8.5-10.1) 





 


Total Bilirubin


 


 


 0.3 mg/dL


(0.2-1.0) 





 


Aspartate Amino Transf


(AST/SGOT) 


 


 105 U/L


(15-37) 





 


Alanine Aminotransferase


(ALT/SGPT) 


 


 169 U/L


(16-63) 





 


Alkaline Phosphatase


 


 


 117 U/L


() 





 


Total Protein


 


 


 5.3 g/dL


(6.4-8.2) 





 


Albumin


 


 


 1.5 g/dL


(3.4-5.0) 





 


Albumin/Globulin Ratio   0.4 (1.0-1.7)  


 


Glucose (Fingerstick)


 


 


 


 108 mg/dL


(70-99)


 


Test


 1/25/21


08:20 


 


 





 


O2 Saturation 92 % (92-99)    


 


Arterial Blood pH


 7.40


(7.35-7.45) 


 


 





 


Arterial Blood pCO2 at


Patient Temp 49 mmHg


(35-46) 


 


 





 


Arterial Blood pO2 at Patient


Temp 64 mmHg


() 


 


 





 


Arterial Blood HCO3


 30 mmol/L


(21-28) 


 


 





 


Arterial Blood Base Excess


 4 mmol/L


(-3-3) 


 


 





 


FiO2 40    








Micro





Microbiology


1/2/21 Blood Culture - Preliminary, Resulted


         NO GROWTH AFTER 1 DAY





Objective


Assessment


COVID-19 infection


Acute hypoxic respiratory failure/ARDS from COVID-19 pneumonia


Fever resolved


Sepsis resolved


History of Crohn's


History of psoriasis


Immunosuppression


Anemia


History of tongue and prostate cancer.


MRSA nares + January 15, 2021





Plan


Plan of Care


Continue Zosyn/Zyvox


Hart changed on 1/2


Follow-up cultures and monitor labs


Patient is awaiting transfer to LTAC


  Discussed with RN.











ANAT KNAPP MD               Jan 25, 2021 09:04

## 2021-01-26 NOTE — PDOC3
[facility]-Discharge Summary


Date of Admission:


Date of Admission:  Dec 23, 2020





Date of Discharge:


Date of Discharge:  Jan 25, 2021





Discharge Diagnosis:


Discharge Diagnosis:


Acute hypoxic respiratory failure Due to COVID 19 infection. 


 1/02 Pt. experienced hypoxia and respiratory decompensation overnight requiring

intubation 


now on vent


Hypotension


COVID 19 positive - with pneumonia - given transaminitis and late presentation 

with high O2 requirements no indication for remdesivir


RYDER - likely vasomotor nephropathy - no known renal history, will hydrate


Prostate Ca - s/p resection at [facility] in [place]


Hypokalemia - likely nutritional or loss from diarrhea, will replace


Elevated troponin - likely from type II demand ischemia, will trend troponins, 

maintain telemetry


Crohn's - sees rheumatology regularly, Dr Fan in [place], on sulfasalazine


Anemia - likely of chronic disease, will monitor


Cardiomegaly - notable on CT chest - no known cardiac history, though his mother

did have CHF and CAD


Right renal mass -  


HYPOTENSION 


Severe malnutrition





Hospital Course:


Hospital Course:


65-year-old male w/ past medical history of Crohn's, psoriatic arthritis, GERD, 

HLD, HTN, prostate ca, tongue cancer who presents to the emergency department at

[facility] concerned about shortness of breath that had been progressive. This 

started 12/18/2020 and has been getting worse since that time. Of note patient 

also has loss of taste, loss of smell, cough, shortness of breath, fever. He 

recently went to [place] on a business trip and just returned 5 days prior to

presentation.


Upon arrival to the emergency room patient had significant hypoxia with a pulse 

ox in the 40s.  He was placed initially on a nonrebreather and then placed on 

BiPAP.


He was also somewhat concerned about a Crohn's flareup.  Patient states he has 

been having abdominal pain with nausea and diarrhea.  These are not typical 

symptoms of his Crohn's.  He feels very tired and has body aches as well.


He was given steroids and Rocephin in ED. 


Chest radiograph concerning for bilateral interstitial infiltrates.


Labs significant for WBC 4.5, Hb 11.8, platelets 229, , K2.8, BUN 34, CR 

1.5, glucose 119, .9, albumin 3, , , lactic acid 1.6, D-

dimer 3.15, troponin 0.068.


ABG on 80% FiO2 7.53/42/64


CT negative for pulmonary embolism. Widespread airspace disease throughout both 

lungs, consistent with pneumonia. Mild mediastinal and bilateral hilar 

lymphadenopathy, likely reactive. Cardiomegaly with mild aneurysmal dilation of 

the ascending thoracic aorta. Indeterminate hyperdense nodule at the midpole 

right kidney. This could represent a solid mass or complex cyst.





Patient transferred to [facility] for pulmonary consultation and 

ICU admission.





12/24: Overnight BP improved with fluids. O2 saturations slightly increased. Did

not tolerate more than 1 hour off BIPAP even with non-rebreather O2 saturations 

were 92% at best. No pain currently, very slightly appetite.


12/25: COVID-19 returned positive.  Down to 65% on his BiPAP 18/8.  Was able to 

take it off for medications meal yesterday, but desaturates to 79% and recovers 

quickly.  Still very drowsy with little appetite.  Afebrile.


12/26: Afebrile with 100% O2 on BiPAP today.  Transition to Vapotherm with more 

ease of breathing.  He has almost no appetite.  Less drowsy today.  He is 

depressed mood but now has a cell phone  and is able to talk to his 

family.  No complaints of chest pain some abdominal pain no bowel movement as of

yet.  ABG 7.49/37/59


12-29 abg pending 





1/4: Patient seen in [place] [address] ICU.  Breathing on vent, FiO2 90%, PEEP 10.  He is

afebrile.  Continue treatment with cefepime, Zyvox, and steroids.  Present labs 

reviewed, discussed with RN.


1/5: Seen in [place] [address] ICU.  Still on vent, FiO2 90%, PEEP 10.  Afebrile.  

Continue supportive care and steroids.  Continue antibiotics cefepime and 

linezolid.  Discussed with RN.


1/6: Afebrile.  Intubated, FiO2 85%, PEEP 10.  Hemoglobin is dropping.  Continue

to monitor hemoglobin, transfuse as needed.  Continue supportive care, steroids.

 Antibiotics, per ID.  Discussed with RN.


1/7: Patient seen in [place] [address] ICU.  Remains on ventilator, FiO2 35%, PEEP 10.  

Hemoglobin appears to have stabilized.  Continue steroids, antibiotics, 

supportive care.  Discussed with RN


1/8: Seen in [place] [address] ICU.  Afebrile.  Intubated, FiO2 65%, PEEP 9.  Discussed 

with RN, no acute events overnight.  Continue steroids, antibiotics, supportive 

care.


1/9: Afebrile.  On vent with FiO2 65%, PEEP 9.  No acute events overnight.  

Continue cefepime and supportive care.  Discussed with RN.


1/10: Patient seen in ICU.  On vent, FiO2 55%, PEEP 9.  Discussed with RN, no 

acute vents overnight.  No significant improvement.  Charts and labs reviewed.  

Continue cefepime and supportive care.





Patient was eventually had a tracheostomy on 1/19/21 and PEG placement on 

1/21/21 and tolerated both procedures well without any major complications.  

Patient will be transferred to LTAC ICU for further care.  The rest of his 

hospital stay was uneventful.





Disposition:


Disposition/Orders:  D/C to Another Facility





Activity:


Activity:


Resume previous activity





Diet:


Diet:  Tube Feeding





Medications:


Home Meds


Reported Medications


Morphine Sulfate (MORPHINE SULFATE ER) 30 Mg Tablet.er, 1 TAB PO BID for CHRONIC

PAIN, #60 TAB


   12/23/20


Tramadol Hcl (TRAMADOL HCL) 100 Mg Tbmp.24hr, 100 MG PO PRN Q8HRS PRN for PAIN, 

TAB 0 Refills


   12/23/20


Zolpidem Tartrate (AMBIEN) 10 Mg Tablet, 10 MG PO PRN QHS PRN for INSOMNIA, TAB 

0 Refills


   12/23/20


Methotrexate Sodium (METHOTREXATE) 2.5 Mg Tablet, 10 TAB PO WEEKLY for CHRONS, 

#32 TAB 2 Refills


   12/23/20


Lisinopril (LISINOPRIL) 10 Mg Tablet, 1 TAB PO DAILY for HTN, #30 TAB 5 Refills


   12/23/20


Rosuvastatin Calcium (CRESTOR) 5 Mg Tablet, 2 TAB PO DAILY for HLD, #30 TAB 5 

Refills


   12/23/20


Hydrochlorothiazide (HYDROCHLOROTHIAZIDE TABLET) 50 Mg Tablet, 25 MG PO DAILY 

for DIURETIC, TAB 0 Refills


   12/23/20


Escitalopram Oxalate (ESCITALOPRAM OXALATE) 10 Mg Tablet, 1 TAB PO DAILY for 

depression, #30 TAB 3 Refills


   12/23/20


Sulfasalazine (SULFASALAZINE DR) 500 Mg Tablet.dr, 2 TAB PO TID for chrons  for 

30 Days, #180 TAB 0 Refills


   12/23/20


Docusate Sodium (COLACE) 100 Mg Capsule, 1 CAP PO DA for STOOL SOFTENER for 30 

Days, CAP 0 Refills


   12/23/20


Acetaminophen Er (ACETAMINOPHEN EXT.RELEASE) 650 Mg Tablet.er, 650 MG PO PRN 

Q8HRS PRN for FEVER/PAIN, TAB.SR


   12/23/20


Melatonin (Melatonin) 5 Mg Capsule, 1 CAP PO QHS for sleep for 30 Days, #30 CAP 

0 Refills


   12/23/20


Cyanocobalamin (Vitamin B-12) (B-12) 500 Mcg Tablet, 2 TAB PO DAILY for 

SUPPLEMENT  for 30 Days, #60 TAB 0 Refills


   12/23/20


Cholecalciferol (Vitamin D3) (Vitamin D3) 50 Mcg Capsule, 50 MCG PO DAILY for 

SUPPLEMENT, CAP


   12/23/20


Cetirizine Hcl (ALLER-SUNG) 10 Mg Tablet, 1 TAB PO DAILY for allergy symptoms for

30 Days, #30 TAB 0 Refills


   12/23/20


Scheduled


Cetirizine Hcl (Aller-Sung), 1 TAB PO DAILY, (Reported)


Cholecalciferol (Vitamin D3) (Vitamin D3), 50 MCG PO DAILY, (Reported)


Cyanocobalamin (Vitamin B-12) (B-12), 2 TAB PO DAILY, (Reported)


Docusate Sodium (Colace), 1 CAP PO DA, (Reported)


Escitalopram Oxalate (Escitalopram Oxalate), 1 TAB PO DAILY, (Reported)


Hydrochlorothiazide (Hydrochlorothiazide Tablet), 25 MG PO DAILY, (Reported)


Lisinopril (Lisinopril), 1 TAB PO DAILY, (Reported)


Melatonin (Melatonin), 1 CAP PO QHS, (Reported)


Methotrexate Sodium (Methotrexate), 10 TAB PO WEEKLY, (Reported)


Morphine Sulfate (Morphine Sulfate Er), 1 TAB PO BID, (Reported)


Rosuvastatin Calcium (Crestor), 2 TAB PO DAILY, (Reported)


Sulfasalazine (Sulfasalazine Dr), 2 TAB PO TID, (Reported)





Scheduled PRN


Acetaminophen Er (Acetaminophen Ext.release), 650 MG PO PRN Q8HRS PRN for 

FEVER/PAIN, (Reported)


Tramadol Hcl (Tramadol Hcl), 100 MG PO PRN Q8HRS PRN for PAIN, (Reported)


Zolpidem Tartrate (Ambien), 10 MG PO PRN QHS PRN for INSOMNIA, (Reported)





Total Time:


Total Time:


Total time spent was  60 minutes in preparing scripts, discharge planning with 

SW and RN, and preparing this discharge summary. 








Patient seen and examined on day of discharge.





Justicifation of Admission Dx:


Justifications for Admission:


Justification of Admission Dx:  Yes


Comminuty Aquired Pneumonia:  Med-High Risk Pt











DAYRON ROBERTS MD                    Jan 26, 2021 16:00

## 2021-03-24 ENCOUNTER — HOSPITAL ENCOUNTER (EMERGENCY)
Dept: HOSPITAL 61 - ER | Age: 66
Discharge: HOME | End: 2021-03-24
Payer: MEDICARE

## 2021-03-24 VITALS — DIASTOLIC BLOOD PRESSURE: 75 MMHG | SYSTOLIC BLOOD PRESSURE: 138 MMHG

## 2021-03-24 VITALS — HEIGHT: 70 IN | BODY MASS INDEX: 30.58 KG/M2 | WEIGHT: 213.63 LBS

## 2021-03-24 DIAGNOSIS — Z85.46: ICD-10-CM

## 2021-03-24 DIAGNOSIS — Z86.14: ICD-10-CM

## 2021-03-24 DIAGNOSIS — I95.1: Primary | ICD-10-CM

## 2021-03-24 DIAGNOSIS — Z98.890: ICD-10-CM

## 2021-03-24 DIAGNOSIS — Z88.8: ICD-10-CM

## 2021-03-24 LAB
ALBUMIN SERPL-MCNC: 3.6 G/DL (ref 3.4–5)
ALBUMIN/GLOB SERPL: 1.1 {RATIO} (ref 1–1.7)
ALP SERPL-CCNC: 58 U/L (ref 46–116)
ALT SERPL-CCNC: 39 U/L (ref 16–63)
ANION GAP SERPL CALC-SCNC: 5 MMOL/L (ref 6–14)
AST SERPL-CCNC: 24 U/L (ref 15–37)
BASOPHILS # BLD AUTO: 0 X10^3/UL (ref 0–0.2)
BASOPHILS NFR BLD: 1 % (ref 0–3)
BILIRUB SERPL-MCNC: 0.3 MG/DL (ref 0.2–1)
BUN SERPL-MCNC: 19 MG/DL (ref 8–26)
BUN/CREAT SERPL: 19 (ref 6–20)
CALCIUM SERPL-MCNC: 8.8 MG/DL (ref 8.5–10.1)
CHLORIDE SERPL-SCNC: 104 MMOL/L (ref 98–107)
CO2 SERPL-SCNC: 31 MMOL/L (ref 21–32)
CREAT SERPL-MCNC: 1 MG/DL (ref 0.7–1.3)
EOSINOPHIL NFR BLD: 0 X10^3/UL (ref 0–0.7)
EOSINOPHIL NFR BLD: 1 % (ref 0–3)
ERYTHROCYTE [DISTWIDTH] IN BLOOD BY AUTOMATED COUNT: 18.3 % (ref 11.5–14.5)
GFR SERPLBLD BASED ON 1.73 SQ M-ARVRAT: 74.8 ML/MIN
GLUCOSE SERPL-MCNC: 96 MG/DL (ref 70–99)
HCT VFR BLD CALC: 37.8 % (ref 39–53)
HGB BLD-MCNC: 12.5 G/DL (ref 13–17.5)
LYMPHOCYTES # BLD: 1.1 X10^3/UL (ref 1–4.8)
LYMPHOCYTES NFR BLD AUTO: 15 % (ref 24–48)
MAGNESIUM SERPL-MCNC: 2.2 MG/DL (ref 1.8–2.4)
MCH RBC QN AUTO: 30 PG (ref 25–35)
MCHC RBC AUTO-ENTMCNC: 33 G/DL (ref 31–37)
MCV RBC AUTO: 91 FL (ref 79–100)
MONO #: 0.4 X10^3/UL (ref 0–1.1)
MONOCYTES NFR BLD: 7 % (ref 0–9)
NEUT #: 5.3 X10^3/UL (ref 1.8–7.7)
NEUTROPHILS NFR BLD AUTO: 77 % (ref 31–73)
PLATELET # BLD AUTO: 246 X10^3/UL (ref 140–400)
POTASSIUM SERPL-SCNC: 4.3 MMOL/L (ref 3.5–5.1)
PROT SERPL-MCNC: 7 G/DL (ref 6.4–8.2)
RBC # BLD AUTO: 4.16 X10^6/UL (ref 4.3–5.7)
SODIUM SERPL-SCNC: 140 MMOL/L (ref 136–145)
WBC # BLD AUTO: 6.9 X10^3/UL (ref 4–11)

## 2021-03-24 PROCEDURE — 96361 HYDRATE IV INFUSION ADD-ON: CPT

## 2021-03-24 PROCEDURE — 83735 ASSAY OF MAGNESIUM: CPT

## 2021-03-24 PROCEDURE — 85025 COMPLETE CBC W/AUTO DIFF WBC: CPT

## 2021-03-24 PROCEDURE — 96360 HYDRATION IV INFUSION INIT: CPT

## 2021-03-24 PROCEDURE — 93005 ELECTROCARDIOGRAM TRACING: CPT

## 2021-03-24 PROCEDURE — 80053 COMPREHEN METABOLIC PANEL: CPT

## 2021-03-24 PROCEDURE — 83880 ASSAY OF NATRIURETIC PEPTIDE: CPT

## 2021-03-24 PROCEDURE — 36415 COLL VENOUS BLD VENIPUNCTURE: CPT

## 2021-03-24 PROCEDURE — 99285 EMERGENCY DEPT VISIT HI MDM: CPT

## 2021-03-24 PROCEDURE — 84484 ASSAY OF TROPONIN QUANT: CPT

## 2021-03-24 NOTE — EKG
[facility]

              [address] [place] [address]

Test Date:    2021               Test Time:    11:09:02

Pat Name:     WALESKA BUENO              Department:   

Patient ID:   [id number]           Room:          

Gender:       M                        Technician:   

:          1955               Requested By: KATHIA JAMES

Order Number: [id number]           Reading MD:     

                                 Measurements

Intervals                              Axis          

Rate:         72                       P:            38

CO:           164                      QRS:          28

QRSD:         106                      T:            58

QT:           388                                    

QTc:          426                                    

                           Interpretive Statements

SINUS RHYTHM

OTHERWISE NORMAL ECG

RI6.02

No previous ECG available for comparison

## 2021-03-24 NOTE — PHYS DOC
Past Medical History


Past Medical History:  Cancer


Additional Past Medical Histor:  PROSTATE CA, "BASE OF TONGUE" CA, CHRONS, MRSA 

R NECK


Past Surgical History:  Other


Additional Past Surgical Histo:  R JAW SURGERY


Smoking Status:  Never Smoker


Alcohol Use:  None





General Adult


EDM:


Chief Complaint:  NEAR SYNCOPE





HPI:


HPI:





Patient is a 65  year old who was brought here by EMS from a Physical therapy 

rehab center after he passed out briefly after doing his physical therapy today.

 Patient denied any chest pain or any shortness of air.  He had severe COVID-19 

infection in 12/23/20.  He was intubated, admitted to the hospital for a long 

time, was discharged to nursing home for rehab.  He was then discharged from the

nursing home, living with his son.  He has been doing physical therapy 

outpatient started last month.  Recently, they have increased his work load on 

the physical therapy regimen.  Today, after several hours of doing physical 

therapy, patient felt worn out, he also only had light breakfast this morning, 

did not have lunch yet.  They checked his blood pressure and it was low, when he

stood up he became pale and passed out for a few seconds.  They caught him 

before he fell down.  They checked his blood pressure and it was even lower.  He

denied any chest pain or shortness of air, no cough, no fever, no headache, no 

neck pain.  EMS was called, they got there, did a 12 lead EKG, was shown some 

abnormality, they called here to activate CODE STEMI even when patient did not 

have any chest pain or shortness of air.  





Upon arrival to ER, patient was awake, alert, back to his baseline, denied any 

chest pain or shortness of air, was in no acute distress.  REPEAT EKG in the ER 

was normal, no STEMI.  CODE STEMI was then cancelled by this physician.





Review of Systems:


Review of Systems:


Constitutional:   Denies fever or chills. []


Eyes:   Denies change in visual acuity. []


HENT:   Denies nasal congestion or sore throat. [] 


Respiratory:   Denies cough or shortness of breath. [] 


Cardiovascular:   Denies chest pain or edema. [] 


GI:   Denies abdominal pain, nausea, vomiting, bloody stools or diarrhea. [] 


:  Denies dysuria. [] 


Musculoskeletal:   Denies back pain or joint pain. [] 


Integument:   Denies rash. [] 


Neurologic:   Denies headache, focal weakness or sensory changes. [] 


Endocrine:   Denies polyuria or polydipsia. [] 


Lymphatic:  Denies swollen glands. [] 


Psychiatric:  Denies depression or anxiety. []





Heart Score:


C/O Chest Pain:  N/A


Risk Factors:


Risk Factors:  DM, Current or recent (<one month) smoker, HTN, HLP, family 

history of CAD, obesity.


Risk Scores:


Score 0 - 3:  2.5% MACE over next 6 weeks - Discharge Home


Score 4 - 6:  20.3% MACE over next 6 weeks - Admit for Clinical Observation


Score 7 - 10:  72.7% MACE over next 6 weeks - Early Invasive Strategies





Current Medications:





Current Medications








 Medications


  (Trade)  Dose


 Ordered  Sig/Kayli  Start Time


 Stop Time Status Last Admin


Dose Admin


 


 Sodium Chloride  1,000 ml @ 


 1,000 mls/hr  1X  ONCE  3/24/21 12:30


 3/24/21 13:29 DC 3/24/21 12:51


1,000 MLS/HR











Allergies:


Allergies:





Allergies








Coded Allergies Type Severity Reaction Last Updated Verified


 


  I S O L A T I O N *CONTACT* Allergy Unknown  1/16/21 Yes


 


  No Known Medication Allergies Allergy Unknown  1/16/21 Yes











Physical Exam:


PE:





Constitutional: Well developed, well nourished, no acute distress, non-toxic 

appearance. []


HENT: Normocephalic, atraumatic, bilateral external ears normal, oropharynx 

moist, no oral exudates, nose normal. []


Eyes: PERRLA, EOMI, conjunctiva normal, no discharge. [] 


Neck: Normal range of motion, no tenderness, supple, no stridor. [] 


Cardiovascular:Heart rate regular rhythm, no murmur []


Lungs & Thorax:  Bilateral breath sounds clear to auscultation []


Abdomen: Bowel sounds normal, soft, no tenderness, no masses, no pulsatile 

masses. [] 


Skin: Warm, dry, no erythema, no rash. [] 


Back: No tenderness, no CVA tenderness. [] 


Extremities: No tenderness, no cyanosis, no clubbing, ROM intact, no edema. [] 


Neurologic: Alert and oriented X 3, normal motor function, normal sensory 

function, no focal deficits noted. []


Psychologic: Affect normal, judgement normal, mood normal. []





Current Patient Data:


Labs:





                                Laboratory Tests








Test


 3/24/21


11:15


 


White Blood Count


 6.9 x10^3/uL


(4.0-11.0)


 


Red Blood Count


 4.16 x10^6/uL


(4.30-5.70)  L


 


Hemoglobin


 12.5 g/dL


(13.0-17.5)  L


 


Hematocrit


 37.8 %


(39.0-53.0)  L


 


Mean Corpuscular Volume


 91 fL ()





 


Mean Corpuscular Hemoglobin 30 pg (25-35)  


 


Mean Corpuscular Hemoglobin


Concent 33 g/dL


(31-37)


 


Red Cell Distribution Width


 18.3 %


(11.5-14.5)  H


 


Platelet Count


 246 x10^3/uL


(140-400)


 


Neutrophils (%) (Auto) 77 % (31-73)  H


 


Lymphocytes (%) (Auto) 15 % (24-48)  L


 


Monocytes (%) (Auto) 7 % (0-9)  


 


Eosinophils (%) (Auto) 1 % (0-3)  


 


Basophils (%) (Auto) 1 % (0-3)  


 


Neutrophils # (Auto)


 5.3 x10^3/uL


(1.8-7.7)


 


Lymphocytes # (Auto)


 1.1 x10^3/uL


(1.0-4.8)


 


Monocytes # (Auto)


 0.4 x10^3/uL


(0.0-1.1)


 


Eosinophils # (Auto)


 0.0 x10^3/uL


(0.0-0.7)


 


Basophils # (Auto)


 0.0 x10^3/uL


(0.0-0.2)


 


Sodium Level


 140 mmol/L


(136-145)


 


Potassium Level


 4.3 mmol/L


(3.5-5.1)


 


Chloride Level


 104 mmol/L


()


 


Carbon Dioxide Level


 31 mmol/L


(21-32)


 


Anion Gap 5 (6-14)  L


 


Blood Urea Nitrogen


 19 mg/dL


(8-26)


 


Creatinine


 1.0 mg/dL


(0.7-1.3)


 


Estimated GFR


(Cockcroft-Gault) 74.8  





 


BUN/Creatinine Ratio 19 (6-20)  


 


Glucose Level


 96 mg/dL


(70-99)


 


Calcium Level


 8.8 mg/dL


(8.5-10.1)


 


Magnesium Level


 2.2 mg/dL


(1.8-2.4)


 


Total Bilirubin


 0.3 mg/dL


(0.2-1.0)


 


Aspartate Amino Transferase


(AST) 24 U/L (15-37)





 


Alanine Aminotransferase (ALT)


 39 U/L (16-63)





 


Alkaline Phosphatase


 58 U/L


()


 


Troponin I Quantitative


 < 0.017 ng/mL


(0.000-0.055)


 


NT-Pro-B-Type Natriuretic


Peptide 185 pg/mL


(0-124)  H


 


Total Protein


 7.0 g/dL


(6.4-8.2)


 


Albumin


 3.6 g/dL


(3.4-5.0)


 


Albumin/Globulin Ratio 1.1 (1.0-1.7)  





                                Laboratory Tests


3/24/21 11:15








                                Laboratory Tests


3/24/21 11:15








Vital Signs:





                                   Vital Signs








  Date Time  Temp Pulse Resp B/P (MAP) Pulse Ox O2 Delivery O2 Flow Rate FiO2


 


3/24/21 11:06 98.1 74 16 161/88 (112) 94 Room Air  





 98.1       











EKG:


EKG:


EKG was done at 1111, heart rate of 72 bpm, sinus rhythm, no ST segment 

elevation, normal axis





Radiology/Procedures:


Radiology/Procedures:


[]





Course & Med Decision Making:


Course & Med Decision Making


Pertinent Labs and Imaging studies reviewed. (See chart for details)


Patient is a 65 years old male who was brought here due to syncope episode after

 physical therapy today.  He was found to have orthostatic hypotension, causing 

him to pass out briefly.  


Patient said he felt much better.  He denied any chest pain, no shortness of 

air, no cough, no fever.  He was instructed to keep taking his blood pressure 

medications as before except reducing the labetatol to 100 mg daily.  He is 

flying back home to [place] with his wife in 3 days.  He will call his doctor

 for follow up when he gets there.





Dragon Disclaimer:


Dragon Disclaimer:


This electronic medical record was generated, in whole or in part, using a voice

 recognition dictation system.





Departure


Departure


Impression:  


   Primary Impression:  


   Syncope, near


   Additional Impression:  


   Orthostatic hypotension


Disposition:  01 DC HOME SELF CARE/HOMELESS


Condition:  IMPROVED


Referrals:  


UNKNOWN PCP NAME (PCP)


FOLLOW UP WITH YOUR DOCTOR WHEN YOU GET BACK HOME.


Patient Instructions:  Near-Syncope, Orthostatic Hypotension











KATHIA JAMES DO                Mar 24, 2021 14:48